# Patient Record
Sex: FEMALE | Race: WHITE | NOT HISPANIC OR LATINO | Employment: OTHER | URBAN - METROPOLITAN AREA
[De-identification: names, ages, dates, MRNs, and addresses within clinical notes are randomized per-mention and may not be internally consistent; named-entity substitution may affect disease eponyms.]

---

## 2017-03-14 DIAGNOSIS — N18.30 CKD (CHRONIC KIDNEY DISEASE) STAGE 3, GFR 30-59 ML/MIN (H): ICD-10-CM

## 2017-03-14 NOTE — TELEPHONE ENCOUNTER
Medication Detail      Disp Refills Start End MANASA   lisinopril (PRINIVIL/ZESTRIL) 10 MG tablet 90 tablet 0 12/13/2016  No   Sig: Take 1 tablet (10 mg) by mouth daily          Last Office Visit with G, P or [facility] prescribing provider: 12/13/16       Potassium   Date Value Ref Range Status   12/13/2016 4.7 3.4 - 5.3 mmol/L Final     Creatinine   Date Value Ref Range Status   12/13/2016 1.10 (H) 0.52 - 1.04 mg/dL Final     BP Readings from Last 3 Encounters:   12/13/16 130/68   09/06/16 167/77   08/16/16 183/72

## 2017-03-15 RX ORDER — LISINOPRIL 10 MG/1
10 TABLET ORAL DAILY
Qty: 90 TABLET | Refills: 0 | Status: SHIPPED | OUTPATIENT
Start: 2017-03-15 | End: 2017-06-10

## 2017-03-15 NOTE — TELEPHONE ENCOUNTER
Routing refill request to provider for review/approval because:  Labs out of range:  creatinine    Routing to PCP.    Dr. Dickerson-Please sign if agree.    Thank you!  MEGHAN Ruiz, CRISTIANN, RN

## 2017-03-15 NOTE — TELEPHONE ENCOUNTER
Medication Detail      Disp Refills Start End MANASA   lisinopril (PRINIVIL/ZESTRIL) 10 MG tablet 90 tablet 0 12/13/2016  No   Sig: Take 1 tablet (10 mg) by mouth daily     Last Office Visit with G, P or [facility] prescribing provider: 12/13/16       Potassium   Date Value Ref Range Status   12/13/2016 4.7 3.4 - 5.3 mmol/L Final     Creatinine   Date Value Ref Range Status   12/13/2016 1.10 (H) 0.52 - 1.04 mg/dL Final     BP Readings from Last 3 Encounters:   12/13/16 130/68   09/06/16 167/77   08/16/16 183/72

## 2017-04-10 ENCOUNTER — TELEPHONE (OUTPATIENT)
Dept: FAMILY MEDICINE | Facility: CLINIC | Age: 82
End: 2017-04-10

## 2017-04-10 NOTE — TELEPHONE ENCOUNTER
Pt c/o bursitis pain in arm. Bicep area.  Wanting to know exercises to help it.  Neck pain with reading and sitting.  Swelling in neck.    Pt made appt with pcp for issue.  KIM Sanford

## 2017-06-10 DIAGNOSIS — N18.30 CKD (CHRONIC KIDNEY DISEASE) STAGE 3, GFR 30-59 ML/MIN (H): ICD-10-CM

## 2017-06-10 NOTE — TELEPHONE ENCOUNTER
Medication Detail      Disp Refills Start End MANASA   lisinopril (PRINIVIL/ZESTRIL) 10 MG tablet 90 tablet 0 3/15/2017  No   Sig: Take 1 tablet (10 mg) by mouth daily   Class: E-Prescribe   Notes to Pharmacy: ++DUE FOR FOLLOW UP IN June 2017++   Route: Oral   Order: [id number]       Last Office Visit with [facility] or [facility] prescribing provider: 12/13/2016       Potassium   Date Value Ref Range Status   12/13/2016 4.7 3.4 - 5.3 mmol/L Final     Creatinine   Date Value Ref Range Status   12/13/2016 1.10 (H) 0.52 - 1.04 mg/dL Final     BP Readings from Last 3 Encounters:   12/13/16 130/68   09/06/16 167/77   08/16/16 183/72

## 2017-06-13 RX ORDER — LISINOPRIL 10 MG/1
TABLET ORAL
Qty: 30 TABLET | Refills: 0 | Status: SHIPPED | OUTPATIENT
Start: 2017-06-13 | End: 2017-07-12

## 2017-06-13 NOTE — TELEPHONE ENCOUNTER
Elevated creatinine  Last refill for #90 had reminder that patient was to be seen in June  No appointment has been scheduled  1 month refill pended and message added to make appointment.  Shanae Noyola RN

## 2017-11-29 ENCOUNTER — OFFICE VISIT (OUTPATIENT)
Dept: FAMILY MEDICINE | Facility: CLINIC | Age: 82
End: 2017-11-29
Payer: MEDICARE

## 2017-11-29 VITALS
OXYGEN SATURATION: 99 % | SYSTOLIC BLOOD PRESSURE: 148 MMHG | TEMPERATURE: 97.8 F | WEIGHT: 168.5 LBS | DIASTOLIC BLOOD PRESSURE: 74 MMHG | BODY MASS INDEX: 30.82 KG/M2 | HEART RATE: 78 BPM

## 2017-11-29 DIAGNOSIS — B02.9 HERPES ZOSTER WITHOUT COMPLICATION: Primary | ICD-10-CM

## 2017-11-29 PROCEDURE — 99214 OFFICE O/P EST MOD 30 MIN: CPT | Performed by: PHYSICIAN ASSISTANT

## 2017-11-29 RX ORDER — ACYCLOVIR 800 MG/1
800 TABLET ORAL
Qty: 35 TABLET | Refills: 0 | Status: SHIPPED | OUTPATIENT
Start: 2017-11-29 | End: 2018-05-10

## 2017-11-29 NOTE — PATIENT INSTRUCTIONS
Shingles  Shingles is a viral infection caused by the same virus as chicken pox. Anyone who has had chicken pox may get shingles later in life. The virus stays in the body, but remains dormant (asleep). Shingles often occurs in older persons or persons with lowered immunity. But it can affect anyone at any age.  Shingles starts as a tingling patch of skin on one side of the body. Small, painful blisters may then appear. The rash does not spread to the rest of the body.  Exposure to shingles cannot cause shingles. However, it can cause chicken pox in anyone who has not had chicken pox or has not been vaccinated. The contagious period ends when all blisters have crusted over (generally about 2 weeks after the illness begins).  After the blisters heal, the affected skin may be sensitive or painful for months (neuralgia). This often gradually goes away.  A shingles vaccine is available. This can help prevent shingles or make it less painful. It is generally recommended for adults over the age of 60 who have had chicken pox in the past, but who have never had shingles. Adults over 60 who have had neither chicken pox nor shingles can prevent both diseases with the chicken pox vaccine. Ask your healthcare provider about these vaccines.  Home care    Medicines may be prescribed to help relieve pain. Take these medicines as directed. Ask your healthcare provider or pharmacist before using over-the-counter medicines for helping treat pain and itching.    In certain cases, antiviral medicines may be prescribed to reduce pain, shorten the illness, and prevent neuralgia. Take these medicines as directed.    Compresses made from a solution of cool water mixed with cornstarch or baking soda may help relieve pain and itching.     Gently wash skin daily with soap and water to help prevent infection.  Be certain to rinse off all of the soap, which can be irritating.    Trim fingernails and try not to scratch. Scratching the sores  may leave scars.    Stay home from work or school until all blisters have formed a crust and you are no longer contagious.  Follow-up care  Follow up with your healthcare provider or as directed by our staff.  When to seek medical advice    Fever of 100.4 F (38 C) or higher, or as directed by your healthcare provider    Affected skin is on the face or neck and any of the following occur:    Headache    Eye pain    Changes in vision    Sores near the eye    Weakness of facial muscles    Pain, redness, or swelling of a joint    Signs of skin infection: colored drainage from the sores, warmth, increasing redness, or increasing pain  Date Last Reviewed: 9/25/2015 2000-2017 [employer]. [address] [place] [address], [place] [address]. All rights reserved. This information is not intended as a substitute for professional medical care. Always follow your healthcare professional's instructions.

## 2017-11-29 NOTE — PROGRESS NOTES
"  SUBJECTIVE:   Lara Marc is a 82 year old female who presents to clinic today for the following health issues:      Rash      Duration: one year ago    Description  Location: left leg inner thigh, began one year ago as a little rash and has gotten much worse over time.  Itching: moderate    Intensity:  moderate    Accompanying signs and symptoms: little water blisters    History (similar episodes/previous evaluation): None    Precipitating or alleviating factors:  SHE THINKS SHE IS ALLERGIC TO SOMETHING BELEAVES IT COULD BE A SIDE EFFECT OF LISINOPRIL MEDICATION    Therapies tried and outcome: different over the counter ointments nothing prescription.          {additional problems for provider to add:035766}    Problem list and histories reviewed & adjusted, as indicated.  Additional history: {NONE - AS DOCUMENTED:373197::\"as documented\"}    {HIST REVIEW/ LINKS 2:471935}    Reviewed and updated as needed this visit by clinical staff     Reviewed and updated as needed this visit by Provider         {PROVIDER CHARTING PREFERENCE:868610}  "

## 2017-11-29 NOTE — MR AVS SNAPSHOT
After Visit Summary   11/29/2017    Lara Marc    MRN: [id number]           Patient Information     Date Of Birth          1935        Visit Information        Provider Department      11/29/2017 4:00 PM Julianna Fried PA-C [facility]        Today's Diagnoses     Herpes zoster without complication    -  1      Care Instructions      Shingles  Shingles is a viral infection caused by the same virus as chicken pox. Anyone who has had chicken pox may get shingles later in life. The virus stays in the body, but remains dormant (asleep). Shingles often occurs in older persons or persons with lowered immunity. But it can affect anyone at any age.  Shingles starts as a tingling patch of skin on one side of the body. Small, painful blisters may then appear. The rash does not spread to the rest of the body.  Exposure to shingles cannot cause shingles. However, it can cause chicken pox in anyone who has not had chicken pox or has not been vaccinated. The contagious period ends when all blisters have crusted over (generally about 2 weeks after the illness begins).  After the blisters heal, the affected skin may be sensitive or painful for months (neuralgia). This often gradually goes away.  A shingles vaccine is available. This can help prevent shingles or make it less painful. It is generally recommended for adults over the age of 60 who have had chicken pox in the past, but who have never had shingles. Adults over 60 who have had neither chicken pox nor shingles can prevent both diseases with the chicken pox vaccine. Ask your healthcare provider about these vaccines.  Home care    Medicines may be prescribed to help relieve pain. Take these medicines as directed. Ask your healthcare provider or pharmacist before using over-the-counter medicines for helping treat pain and itching.    In certain cases, antiviral medicines may be prescribed to reduce pain, shorten the illness, and  prevent neuralgia. Take these medicines as directed.    Compresses made from a solution of cool water mixed with cornstarch or baking soda may help relieve pain and itching.     Gently wash skin daily with soap and water to help prevent infection.  Be certain to rinse off all of the soap, which can be irritating.    Trim fingernails and try not to scratch. Scratching the sores may leave scars.    Stay home from work or school until all blisters have formed a crust and you are no longer contagious.  Follow-up care  Follow up with your healthcare provider or as directed by our staff.  When to seek medical advice    Fever of 100.4 F (38 C) or higher, or as directed by your healthcare provider    Affected skin is on the face or neck and any of the following occur:    Headache    Eye pain    Changes in vision    Sores near the eye    Weakness of facial muscles    Pain, redness, or swelling of a joint    Signs of skin infection: colored drainage from the sores, warmth, increasing redness, or increasing pain  Date Last Reviewed: 9/25/2015 2000-2017 [employer]. [address] [place] [address]. All rights reserved. This information is not intended as a substitute for professional medical care. Always follow your healthcare professional's instructions.                Follow-ups after your visit        Additional Services     DERMATOLOGY REFERRAL       Your provider has referred you to:   [facility]: [facility] [phone]   [email or web address]    Please be aware that coverage of these services is subject to the terms and limitations of your health insurance plan.  Call member services at your health plan with any benefit or coverage questions.      Please bring the following with you to your appointment:    (1) Any X-Rays, CTs or MRIs which have been performed.  Contact the facility where they were done to arrange for  prior to your  "scheduled appointment.    (2) List of current medications  (3) This referral request   (4) Any documents/labs given to you for this referral                  Who to contact     If you have questions or need follow up information about today's clinic visit or your schedule please contact [facility] directly at [phone].  Normal or non-critical lab and imaging results will be communicated to you by MyChart, letter or phone within 4 business days after the clinic has received the results. If you do not hear from us within 7 days, please contact the clinic through [facility] or phone. If you have a critical or abnormal lab result, we will notify you by phone as soon as possible.  Submit refill requests through [employer] or call your pharmacy and they will forward the refill request to us. Please allow 3 business days for your refill to be completed.          Additional Information About Your Visit        MyChart Information     [employer] lets you send messages to your doctor, view your test results, renew your prescriptions, schedule appointments and more. To sign up, go to [email or web address] . Click on \"Log in\" on the left side of the screen, which will take you to the Welcome page. Then click on \"Sign up Now\" on the right side of the page.     You will be asked to enter the access code listed below, as well as some personal information. Please follow the directions to create your username and password.     Your access code is: [email or web address]  Expires: 2018  4:38 PM     Your access code will  in 90 days. If you need help or a new code, please call your [facility] clinic or [phone].        Care EveryWhere ID     This is your Care EveryWhere ID. This could be used by other organizations to access your [facility] medical records  [id number]        Your Vitals Were     Pulse Temperature Pulse Oximetry Breastfeeding? BMI (Body Mass Index)       78 97.8  F (36.6  C) (Axillary) 99% No 30.82 kg/m2  "       Blood Pressure from Last 3 Encounters:   11/29/17 148/74   12/13/16 130/68   09/06/16 167/77    Weight from Last 3 Encounters:   11/29/17 168 lb 8 oz (76.4 kg)   12/13/16 172 lb (78 kg)   09/06/16 172 lb (78 kg)              We Performed the Following     DERMATOLOGY REFERRAL          Today's Medication Changes          These changes are accurate as of: 11/29/17  4:38 PM.  If you have any questions, ask your nurse or doctor.               Start taking these medicines.        Dose/Directions    acyclovir 800 MG tablet   Commonly known as:  ZOVIRAX   Used for:  Herpes zoster without complication        Dose:  800 mg   Take 1 tablet (800 mg) by mouth 5 times daily for 7 days   Quantity:  35 tablet   Refills:  0            Where to get your medicines      These medications were sent to [facility] [address] [place] [address] [address]     Phone:  [phone]     acyclovir 800 MG tablet                Primary Care Provider Office Phone # Fax #    Yecenia Dickerson [phone] [address] [facility] [address]        Equal Access to Services     ALEXEI ZUÑIGA : Hadii lam [place]o Sotim, waaxda luqadaha, qaybta kaalmada adesantosh, raffi de la vega . So [facility] [phone].    ATENCIÓN: Si habla español, tiene a mckeon disposición servicios gratuitos de asistencia lingüística. Llame al [phone].    We comply with applicable federal civil rights laws and [place] laws. We do not discriminate on the basis of race, color, national origin, age, disability, sex, sexual orientation, or gender identity.            Thank you!     Thank you for choosing [facility]  for your care. Our goal is always to provide you with excellent care. Hearing back from our patients is one way we can continue to improve our services. Please take a few minutes to complete the written survey that you may receive in the mail after  your visit with us. Thank you!             Your Updated Medication List - Protect others around you: Learn how to safely use, store and throw away your medicines at www.disposemymeds.org.          This list is accurate as of: 11/29/17  4:38 PM.  Always use your most recent med list.                   Brand Name Dispense Instructions for use Diagnosis    acyclovir 800 MG tablet    ZOVIRAX    35 tablet    Take 1 tablet (800 mg) by mouth 5 times daily for 7 days    Herpes zoster without complication       lisinopril 10 MG tablet    PRINIVIL/ZESTRIL    90 tablet    TAKE 1 TABLET (10 MG) BY MOUTH DAILY. MAKE APPOINTMENT FOR FUTHER REFILLS    CKD (chronic kidney disease) stage 3, GFR 30-59 ml/min       MULTIVITAMIN PO      Take 1 tablet by mouth daily.        VITAMIN D3 PO      Take by mouth daily

## 2017-12-02 ASSESSMENT — ENCOUNTER SYMPTOMS
SHORTNESS OF BREATH: 0
NAUSEA: 0
ABDOMINAL PAIN: 0
DIARRHEA: 0
FOCAL WEAKNESS: 0
VOMITING: 0
FEVER: 0
CHILLS: 0
HEADACHES: 0

## 2017-12-02 NOTE — PROGRESS NOTES
"HPI    SUBJECTIVE:   Lara Marc is a 82 year old female who presents to clinic today for the following health issues:    Rash      Duration: one year ago    Description  Location: left leg inner thigh, began one year ago as a little rash and has gotten much worse over time.  Itching: moderate    Intensity:  moderate    Accompanying signs and symptoms: little water blisters    History (similar episodes/previous evaluation): None    Precipitating or alleviating factors: None    Therapies tried and outcome: different over the counter ointments     Pt reports she had small red dots and what looked like dry skin to her L inner thigh for the past year. And for the past week the rash has changed and become large red patches with \"water blisters\". Rash is slightly painful and itchy. No new soaps, lotions, or other products.     Additional complaints: None    Chart Review:  PHQ-9 SCORE 10/18/2011   Total Score 17     No flowsheet data found.    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     C VAGINAL HYSTERECTOMY  9/15/06    TVH/BSO/A&P repair/sacrospinus ligament fixation......childbirth x 9     childbirth X9[       CLOSED RX RIB FRACTURE  7/06    left     DAVINCI SACROCOLPOPEXY, MIDURETHRAL SLING, CYSTOSCOPY  2/8/2013    Procedure: DAVINCI SACROCOLPOPEXY, MIDURETHRAL SLING, CYSTOSCOPY;  DAVINCI SACROCOLPOPEXY, TVT EXACT SLING, RIGHT SALPINGO-OOPHERECTOMY, CYSTOSCOPY;  Surgeon: Bennie Galdamez MD;  " Location:  OR     HC COLONOSCOPY THRU STOMA, DIAGNOSTIC  7/2005    diverticulosis,small polyp,recheck 5 yrs     LAPAROSCOPIC CHOLECYSTECTOMY  3/31/2011    Procedure:LAPAROSCOPIC CHOLECYSTECTOMY; Surgeon:DAVID MOLINA; Location:[facility] OR     Family History   Problem Relation Age of Onset     HEART DISEASE Father      Cancer - colorectal Brother      Prostate Cancer Brother      DIABETES Sister      HEART DISEASE Sister       Social History   Substance Use Topics     Smoking status: Former Smoker     Packs/day: 0.00     Years: 17.00     Smokeless tobacco: Never Used      Comment: quit when she was 29 years old     Alcohol use No        Problem list, Medication list, Allergies, Medical/Social/Surg hx reviewed in [facility], updated as appropriate.      Review of Systems   Constitutional: Negative for chills and fever.   Respiratory: Negative for shortness of breath.    Cardiovascular: Negative for chest pain.   Gastrointestinal: Negative for abdominal pain, diarrhea, nausea and vomiting.   Skin: Positive for rash.   Neurological: Negative for focal weakness and headaches.   All other systems reviewed and are negative.        Physical Exam   Constitutional: She is oriented to person, place, and time and well-developed, well-nourished, and in no distress.   HENT:   Head: Normocephalic and atraumatic.   Cardiovascular: Normal rate, regular rhythm and normal heart sounds.    Pulmonary/Chest: Effort normal and breath sounds normal.   Musculoskeletal: Normal range of motion.   Neurological: She is alert and oriented to person, place, and time. Gait normal.   Skin: Skin is warm and dry. Rash noted.        Nursing note and vitals reviewed.    Vital Signs  /74  Pulse 78  Temp 97.8  F (36.6  C) (Axillary)  Wt 168 lb 8 oz (76.4 kg)  SpO2 99%  Breastfeeding? No  BMI 30.82 kg/m2   Body mass index is 30.82 kg/(m^2).    Diagnostic Test Results:  none     ASSESSMENT/PLAN:                                                         ICD-10-CM    1. Herpes zoster without complication B02.9 acyclovir (ZOVIRAX) 800 MG tablet     DERMATOLOGY REFERRAL     Rash over the past week c/w herpes zoster. Unsure what rash has been present for the past year but it sounds like it may be unrelated to new development of shingles. Due to atypical presentation, referred to dermatology. Rx acyclovir.    I have discussed any lab or imaging results, the patient's diagnosis, and my plan of treatment with the patient and/or family. Patient is aware to come back in if with worsening symptoms or if no relief despite treatment plan.  Patient voiced understanding and had no further questions.       Follow Up: Data Unavailable    DEBORA SosaA, PA-C  [facility]

## 2017-12-21 DIAGNOSIS — N18.30 CKD (CHRONIC KIDNEY DISEASE) STAGE 3, GFR 30-59 ML/MIN (H): ICD-10-CM

## 2017-12-22 NOTE — TELEPHONE ENCOUNTER
Requested Prescriptions   Pending Prescriptions Disp Refills     lisinopril (PRINIVIL/ZESTRIL) 10 MG tablet [Pharmacy Med Name: LISINOPRIL 10 MG TABLET]  Last Written Prescription Date:  7/13/2017  Last Fill Quantity: 90 tabs,  # refills: 1   Last Office Visit with [facility], [facility] or [facility] prescribing provider:  11/29/2017     Future Office Visit:      90 tablet 1     Sig: TAKE 1 TABLET (10 MG) BY MOUTH DAILY. MAKE APPOINTMENT FOR FUTHER REFILLS    ACE Inhibitors (Including Combos) Protocol Failed    12/21/2017  1:00 AM       Failed - Blood pressure under 140/90    BP Readings from Last 3 Encounters:   11/29/17 148/74   12/13/16 130/68   09/06/16 167/77                Failed - Normal serum creatinine on file in past 12 months    Recent Labs   Lab Test  12/13/16   1208   CR  1.10*            Failed - Normal serum potassium on file in past 12 months    Recent Labs   Lab Test  12/13/16   1208   POTASSIUM  4.7            Passed - Recent or future visit with authorizing provider's specialty    Patient had office visit in the last year or has a visit in the next 30 days with authorizing provider.  See chart review.              Passed - Patient is age 18 or older       Passed - No active pregnancy on record       Passed - No positive pregnancy test in past 12 months

## 2017-12-23 RX ORDER — LISINOPRIL 10 MG/1
TABLET ORAL
Qty: 90 TABLET | Refills: 0 | Status: SHIPPED | OUTPATIENT
Start: 2017-12-23 | End: 2018-03-31

## 2018-03-31 DIAGNOSIS — N18.30 CKD (CHRONIC KIDNEY DISEASE) STAGE 3, GFR 30-59 ML/MIN (H): ICD-10-CM

## 2018-04-02 NOTE — TELEPHONE ENCOUNTER
"Requested Prescriptions   Pending Prescriptions Disp Refills     lisinopril (PRINIVIL/ZESTRIL) 10 MG tablet [Pharmacy Med Name: LISINOPRIL 10 MG TABLET]  Last Written Prescription Date:  12-23-17  Last Fill Quantity: 90 tab,  # refills: 0   Last office visit: 11/29/2017 with prescribing provider:  Albin Beatty    Future Office Visit:    90 tablet 0     Sig: TAKE 1 TABLET (10 MG) BY MOUTH DAILY. MAKE APPOINTMENT FOR FUTHER REFILLS    ACE Inhibitors (Including Combos) Protocol Failed    3/31/2018  5:03 PM       Failed - Blood pressure under 140/90 in past 12 months    BP Readings from Last 3 Encounters:   11/29/17 148/74   12/13/16 130/68   09/06/16 167/77          Failed - Normal serum creatinine on file in past 12 months    Recent Labs   Lab Test  12/13/16   1208   CR  1.10*          Failed - Normal serum potassium on file in past 12 months    Recent Labs   Lab Test  12/13/16   1208   POTASSIUM  4.7          Passed - Recent (12 mo) or future (30 days) visit within the authorizing provider's specialty    Patient had office visit in the last 12 months or has a visit in the next 30 days with authorizing provider or within the authorizing provider's specialty.  See \"Patient Info\" tab in inbasket, or \"Choose Columns\" in Meds & Orders section of the refill encounter.           Passed - Patient is age 18 or older       Passed - No active pregnancy on record       Passed - No positive pregnancy test in past 12 months          "

## 2018-04-05 RX ORDER — LISINOPRIL 10 MG/1
TABLET ORAL
Qty: 30 TABLET | Refills: 0 | Status: SHIPPED | OUTPATIENT
Start: 2018-04-05 | End: 2018-05-02

## 2018-04-05 NOTE — TELEPHONE ENCOUNTER
Medication is being filled for one month only due to: Patient needs to be seen because it has been more than one year since last visit.    Please call her to make an appointment  Thanks!     Daniella Hendrickson RN

## 2018-05-02 DIAGNOSIS — Z13.6 CARDIOVASCULAR SCREENING; LDL GOAL LESS THAN 130: Primary | ICD-10-CM

## 2018-05-02 DIAGNOSIS — N18.30 CKD (CHRONIC KIDNEY DISEASE) STAGE 3, GFR 30-59 ML/MIN (H): ICD-10-CM

## 2018-05-02 NOTE — TELEPHONE ENCOUNTER
"Requested Prescriptions   Pending Prescriptions Disp Refills     lisinopril (PRINIVIL/ZESTRIL) 10 MG tablet [Pharmacy Med Name: LISINOPRIL 10 MG TABLET]  Last Written Prescription Date:  11-29-17  Last Fill Quantity: 30 tab,  # refills: 0   Last office visit: 11/29/2017 with prescribing provider:  Albin Beatty  Future Office Visit:   Next 5 appointments (look out 90 days)     May 17, 2018  9:20 AM CDT   [facility] Dickerson [facility]    [address] [place] [id number] [phone]               30 tablet 0     Sig: TAKE 1 TABLET BY MOUTH EVERY DAY *NEED APPOINTMENT FOR MORE REFILLS    ACE Inhibitors (Including Combos) Protocol Failed    5/2/2018  1:33 AM       Failed - Blood pressure under 140/90 in past 12 months    BP Readings from Last 3 Encounters:   11/29/17 148/74   12/13/16 130/68   09/06/16 167/77          Failed - Normal serum creatinine on file in past 12 months    Recent Labs   Lab Test  12/13/16   1208   CR  1.10*          Failed - Normal serum potassium on file in past 12 months    Recent Labs   Lab Test  12/13/16   1208   POTASSIUM  4.7          Passed - Recent (12 mo) or future (30 days) visit within the authorizing provider's specialty    Patient had office visit in the last 12 months or has a visit in the next 30 days with authorizing provider or within the authorizing provider's specialty.  See \"Patient Info\" tab in inbasket, or \"Choose Columns\" in Meds & Orders section of the refill encounter.           Passed - Patient is age 18 or older       Passed - No active pregnancy on record       Passed - No positive pregnancy test in past 12 months          "

## 2018-05-07 RX ORDER — LISINOPRIL 10 MG/1
TABLET ORAL
Qty: 30 TABLET | Refills: 0 | Status: SHIPPED | OUTPATIENT
Start: 2018-05-07 | End: 2018-05-10

## 2018-05-07 NOTE — TELEPHONE ENCOUNTER
BP above range  Creatinine above range and last done 2016  Last refill had note for patient to be seen for further refills  No appointment has been scheduled   Pended labs per [facility]  Routing to provider per protocol. (PCP and Provider listed on refill, Rogreio)  Routing to scheduling as well  Shanae Noyola RN

## 2018-05-09 NOTE — PROGRESS NOTES
"  SUBJECTIVE:   Lara Marc is a 82 year old female who presents to clinic today for the following health issues:      Hyperlipidemia Follow-Up    Rate your low fat/cholesterol diet?: poor    Taking statin?  No    Other lipid medications/supplements?:  none    Chronic Kidney Disease Follow-up    Current NSAID use?  Yes:   Aspirin  Frequency: 2/daily - due to arthritis    Amount of exercise or physical activity: None - has been having more weakness    Problems taking medications regularly: No    Medication side effects: not sure but now has a rash on left thigh and upper right side of back    Diet: regular (no restrictions)      Patient reports general weakness over the years and sometimes that her balance is off; she worries about falling. She gets short of breath with activity. This is not new. It seems to be generally increasing over time and she admits she does not get much physical activity. Denies exertional chest pain.  She complains of her right back and right shoulder pain as if she pulled a muscle.     She has a itchy rash on her back and thighs that has been present for 9 months to a year that has waxed and waned over that period of time that is possibly spreading. She notes that it starts out as a blistering look, but it then does not appear to be blistering.  She was told it was shingles and given medicine ,but she did not take it as she couldn't afford it and she didn't think it was shingles.      Problem list and histories reviewed & adjusted, as indicated.  Additional history: as documented    12/13/16 Office Visit HPI:  \"Hypertension Follow-up   Outpatient blood pressures are not being checked.    Low Salt Diet: not monitoring salt      Chronic Kidney Disease Follow-up  Current NSAID use?  Yes:   ibuprofen (Motrin)  Frequency: 2 tablets in the morning, 1 Ibuprofen PM at night     Amount of exercise or physical activity: 6-7 days/week for an average of 15-30 minutes    Problems taking medications " "regularly: No    Medication side effects: none    Diet: regular (no restrictions)     She reports some pain in her knee, which she thinks is arthritis. She has been taking 2 ibuprofen every morning, and 1 at night. She has no complaint of chest pain or shortness of breath.\"    12/13/16 Office Visit A&P:  \"1. Hypertension goal BP (blood pressure) < 140/90  Controlled. Continue lisinopril, which she is tolerating well.  - Basic metabolic panel  2. CKD (chronic kidney disease) stage 3, GFR 30-59 ml/min  Stable. BMP today.  - lisinopril (PRINIVIL/ZESTRIL) 10 MG tablet; Take 1 tablet (10 mg) by mouth daily  Dispense: 90 tablet; Refill: 0  3. CARDIOVASCULAR SCREENING; LDL GOAL LESS THAN 130  Note -- she is nonfasting today, but overdue for lipids.  - Lipid Profile with reflex to direct LDL  4. Left knee pain  Improving over time. She declines referral to sports medicine today, but will call if she changes her mind.      She mentioned that she has been taking ibuprofen PM for sleep lately. I recommended against this and suggested she try melatonin 3mg nightly for sleep instead.      Patient declined a flu shot and pneumonia shot today but will consider these for the future. 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     C VAGINAL HYSTERECTOMY  9/15/06    TVH/BSO/A&P repair/sacrospinus ligament fixation......childbirth x 9     childbirth X9[       CLOSED RX RIB FRACTURE  7/06    " left     DAVINCI SACROCOLPOPEXY, MIDURETHRAL SLING, CYSTOSCOPY  2/8/2013    Procedure: DAVINCI SACROCOLPOPEXY, MIDURETHRAL SLING, CYSTOSCOPY;  DAVINCI SACROCOLPOPEXY, TVT EXACT SLING, RIGHT SALPINGO-OOPHERECTOMY, CYSTOSCOPY;  Surgeon: Bennie Galdamez MD;  Location:  OR      COLONOSCOPY THRU STOMA, DIAGNOSTIC  7/2005    diverticulosis,small polyp,recheck 5 yrs     LAPAROSCOPIC CHOLECYSTECTOMY  3/31/2011    Procedure:LAPAROSCOPIC CHOLECYSTECTOMY; Surgeon:DAVID MOLINA; Location: OR       Social History   Substance Use Topics     Smoking status: Former Smoker     Packs/day: 0.00     Years: 17.00     Smokeless tobacco: Never Used      Comment: quit when she was 29 years old     Alcohol use No     Family History   Problem Relation Age of Onset     HEART DISEASE Father      Cancer - colorectal Brother      Prostate Cancer Brother      DIABETES Sister      HEART DISEASE Sister          Current Outpatient Prescriptions   Medication Sig Dispense Refill     Cholecalciferol (VITAMIN D3 PO) Take by mouth daily       lisinopril (PRINIVIL/ZESTRIL) 10 MG tablet Take 1 tablet (10 mg) by mouth daily 90 tablet 3     Multiple Vitamins-Minerals (MULTIVITAMIN OR) Take 1 tablet by mouth daily.       [DISCONTINUED] lisinopril (PRINIVIL/ZESTRIL) 10 MG tablet TAKE 1 TABLET BY MOUTH EVERY DAY *NEED APPOINTMENT FOR MORE REFILLS 30 tablet 0     Allergies   Allergen Reactions     Nkda [No Known Drug Allergies]      Seasonal Allergies      Itchy eyes and watery eyes     Recent Labs   Lab Test  12/13/16   1208  06/09/16   0528  06/08/16 2027  06/08/16   0913  08/18/15   1204   09/18/13   1840  09/18/13   1130  02/05/13   1123   LDL  109*   --    --    --   102   --   114   --    --    HDL  33*   --    --    --   30*   --   36*   --    --    TRIG  183*   --    --    --   159*   --   109   --    --    ALT   --    --    --   32   --    --    --   26  32   CR  1.10*  1.16*  1.37*  1.23*  1.06*   < >   --   0.98  1.04   GFRESTIMATED  48*  " 45*  37*  42*  50*   < >   --   55*  51*   GFRESTBLACK  58*  54*  45*  51*  60*   < >   --   66  62   POTASSIUM  4.7  4.1  4.0  4.1  4.5   < >   --   4.2  4.6   TSH   --    --   2.21   --    --    --    --    --    --     < > = values in this interval not displayed.      BP Readings from Last 3 Encounters:   05/10/18 150/79   11/29/17 148/74   12/13/16 130/68    Wt Readings from Last 3 Encounters:   05/10/18 169 lb 4 oz (76.8 kg)   11/29/17 168 lb 8 oz (76.4 kg)   12/13/16 172 lb (78 kg)        Reviewed and updated as needed this visit by clinical staff  Tobacco  Allergies  Meds  Med Hx  Surg Hx  Fam Hx  Soc Hx      Reviewed and updated as needed this visit by Provider       ROS:  See above    This document serves as a record of the services and decisions personally performed and made by Yecenia Dickerson MD. It was created on his/her behalf by Madelyn Anderson, trained medical scribe. The creation of this document is based the provider's statements to the medical scribes.    Scribfili Anderson, May 10, 2018  OBJECTIVE:     /79  Pulse 76  Temp 98.5  F (36.9  C) (Oral)  Resp 16  Ht 5' 1.75\" (1.568 m)  Wt 169 lb 4 oz (76.8 kg)  SpO2 100%  BMI 31.21 kg/m2  Body mass index is 31.21 kg/(m^2).     GENERAL: healthy, alert and no distress  RESP: lungs clear to auscultation - no rales, rhonchi or wheezes  CV: regular rate and rhythm, normal S1 S2, 3/6 systolic murmur, no peripheral edema  SKIN: no suspicious lesions or rashes  NEURO: Normal strength and tone, mentation intact and speech normal  Skin: there are erythematous papules on the right shoulder and left anterior thigh, many appear excoriated, no vesicles.   PSYCH: mentation appears normal, affect normal/bright    Diagnostic Test Results:  Results for orders placed or performed in visit on 05/10/18 (from the past 24 hour(s))   Hemoglobin   Result Value Ref Range    Hemoglobin 12.0 11.7 - 15.7 g/dL       ASSESSMENT/PLAN:     1. Hypertension goal BP " (blood pressure) < 150/90  Elevated initially today, which pt feels is because the cuff is painful, will recheck. Discussed increasing medication. She has some orthostatic symptoms when arising from bed. I am concerned about being too aggressive with her medication. See pt instructions. Continue lisinopril 10mg daily for now and could increase to 20mg daily if elevated on MA bp recheck.   She will follow up with me in 6 months.   - BASIC METABOLIC PANEL    2. CKD (chronic kidney disease) stage 3, GFR 30-59 ml/min   stable. Due for recheck   - lisinopril (PRINIVIL/ZESTRIL) 10 MG tablet; Take 1 tablet (10 mg) by mouth daily  Dispense: 90 tablet; Refill: 3  - Albumin Random Urine Quantitative with Creat Ratio  - Hemoglobin    3. Rash   x 9 months per pt, intermittent appearance. Previous thought was possibly shingles, and she reports history of vesicles though none now. Atypical for shingles. Referred to derm.   - DERMATOLOGY REFERRAL    4. Shoulder pain, unspecified chronicity, unspecified laterality   right shoulder pain for some time, unclear how long. She prefers to wait on further evaluation as it is mild and intermittent. I gave referral if needed  - ORTHO  REFERRAL    5. CARDIOVASCULAR SCREENING; LDL GOAL LESS THAN 130     - Lipid panel reflex to direct LDL Fasting    Patient Instructions   1) I recommend tylenol (acetaminophen) for pain.   2) Schedule with dermatology for your rash   3) Schedule with sports medicine for your shoulder/neck pain.   4) We will recheck your blood pressure today. Goal is less than 150/90. If still high, please return for a free blood pressure check on another day. If still high, we should adjust your medication.       The information in this document, created by the medical scribe for me, accurately reflects the services I personally performed and the decisions made by me. I have reviewed and approved this document for accuracy. 05/10/18    Yecenia Dickerson MD  [place]  [facility]

## 2018-05-10 ENCOUNTER — OFFICE VISIT (OUTPATIENT)
Dept: FAMILY MEDICINE | Facility: CLINIC | Age: 83
End: 2018-05-10
Payer: MEDICARE

## 2018-05-10 VITALS
TEMPERATURE: 98.5 F | DIASTOLIC BLOOD PRESSURE: 79 MMHG | OXYGEN SATURATION: 100 % | WEIGHT: 169.25 LBS | SYSTOLIC BLOOD PRESSURE: 150 MMHG | RESPIRATION RATE: 16 BRPM | HEIGHT: 62 IN | BODY MASS INDEX: 31.15 KG/M2 | HEART RATE: 76 BPM

## 2018-05-10 DIAGNOSIS — I10 HYPERTENSION GOAL BP (BLOOD PRESSURE) < 140/90: Primary | ICD-10-CM

## 2018-05-10 DIAGNOSIS — M25.519 SHOULDER PAIN, UNSPECIFIED CHRONICITY, UNSPECIFIED LATERALITY: ICD-10-CM

## 2018-05-10 DIAGNOSIS — N18.30 CKD (CHRONIC KIDNEY DISEASE) STAGE 3, GFR 30-59 ML/MIN (H): ICD-10-CM

## 2018-05-10 DIAGNOSIS — Z13.6 CARDIOVASCULAR SCREENING; LDL GOAL LESS THAN 130: ICD-10-CM

## 2018-05-10 DIAGNOSIS — R21 RASH: ICD-10-CM

## 2018-05-10 LAB
ANION GAP SERPL CALCULATED.3IONS-SCNC: 7 MMOL/L (ref 3–14)
BUN SERPL-MCNC: 21 MG/DL (ref 7–30)
CALCIUM SERPL-MCNC: 9.2 MG/DL (ref 8.5–10.1)
CHLORIDE SERPL-SCNC: 111 MMOL/L (ref 94–109)
CHOLEST SERPL-MCNC: 207 MG/DL
CO2 SERPL-SCNC: 25 MMOL/L (ref 20–32)
CREAT SERPL-MCNC: 1.11 MG/DL (ref 0.52–1.04)
CREAT UR-MCNC: 103 MG/DL
GFR SERPL CREATININE-BSD FRML MDRD: 47 ML/MIN/1.7M2
GLUCOSE SERPL-MCNC: 97 MG/DL (ref 70–99)
HDLC SERPL-MCNC: 38 MG/DL
HGB BLD-MCNC: 12 G/DL (ref 11.7–15.7)
LDLC SERPL CALC-MCNC: 143 MG/DL
MICROALBUMIN UR-MCNC: 12 MG/L
MICROALBUMIN/CREAT UR: 11.65 MG/G CR (ref 0–25)
NONHDLC SERPL-MCNC: 169 MG/DL
POTASSIUM SERPL-SCNC: 4.9 MMOL/L (ref 3.4–5.3)
SODIUM SERPL-SCNC: 143 MMOL/L (ref 133–144)
TRIGL SERPL-MCNC: 128 MG/DL

## 2018-05-10 PROCEDURE — 80048 BASIC METABOLIC PNL TOTAL CA: CPT | Performed by: FAMILY MEDICINE

## 2018-05-10 PROCEDURE — 80061 LIPID PANEL: CPT | Performed by: FAMILY MEDICINE

## 2018-05-10 PROCEDURE — 99214 OFFICE O/P EST MOD 30 MIN: CPT | Performed by: FAMILY MEDICINE

## 2018-05-10 PROCEDURE — 85018 HEMOGLOBIN: CPT | Performed by: FAMILY MEDICINE

## 2018-05-10 PROCEDURE — 36415 COLL VENOUS BLD VENIPUNCTURE: CPT | Performed by: FAMILY MEDICINE

## 2018-05-10 PROCEDURE — 82043 UR ALBUMIN QUANTITATIVE: CPT | Performed by: FAMILY MEDICINE

## 2018-05-10 RX ORDER — LISINOPRIL 10 MG/1
10 TABLET ORAL DAILY
Qty: 90 TABLET | Refills: 3 | Status: SHIPPED | OUTPATIENT
Start: 2018-05-10 | End: 2019-05-25

## 2018-05-10 NOTE — PATIENT INSTRUCTIONS
1) I recommend tylenol (acetaminophen) for pain.   2) Schedule with dermatology for your rash   3) Schedule with sports medicine for your shoulder/neck pain.   4) We will recheck your blood pressure today. Goal is less than 150/90. If still high, please return for a free blood pressure check on another day. If still high, we should adjust your medication.

## 2018-05-10 NOTE — PROGRESS NOTES
Excellent! Please call or sent a [employer] message if you have any questions. Yecenia Dickerson M.D.

## 2018-05-10 NOTE — LETTER
May 15, 2018      Lara TORRES Monico  [address]  [facility] [address]        Dear ,    We are writing to inform you of your test results.    The results of your recent lipid (cholesterol) profile were abnormal.    Here are the results:  Lab Results       Component                Value               Date                       CHOL                     207                 05/10/2018            Lab Results       Component                Value               Date                       HDL                      38                  05/10/2018            Lab Results       Component                Value               Date                       LDL                      143                 05/10/2018            Lab Results       Component                Value               Date                       TRIG                     128                 05/10/2018            Lab Results       Component                Value               Date                       CHOLHDLRATIO             5.5                 08/18/2015              Desired or goal levels are:  CHOLESTEROL: Desirable is less than 200.   HDL (Good Cholesterol): Desirable is greater than 40 (for men) greater than 50 (for women).  LDL (Bad Cholesterol): Desirable is less than 130 (or less than 100 if you have heart disease or diabetes). Borderline 130-160.  TRIGLYCERIDES: Desirable is less than 150.  Borderline is 150-200.    For high triglycerides, reduce the intake of jam, jelly, honey, alcohol, and/or coffee creamers  To help lower your LDL (bad cholesterol) you could start adding ground flax seed to your diet. The recommended dose is 2 heaping tablespoonfuls daily, you may want to start with 1 tablespoonful and increase to 2 heaping tablespoonfuls. You can mix or add ground flax seed to hot cereals, yogurt, applesauce, cottage cheese or any sauce mixture that is your portion. Ground flax seed can be found in the baking aisle near the flour.      As you may  know, an elevated cholesterol is one factor that increases your risk for heart disease and stroke. You can improve your cholesterol by controlling the amount and type of fat you eat and by increasing your daily activity level.    Here are some ways to improve your nutrition:  Eat less fat (especially butter, Crisco and other saturated fats)  Buy lean cuts of meat, reduce your portions of red meat or substitute poultry or fish  Use skim milk and low-fat dairy products  Eat no more than 4 egg yolks per week  Avoid fried or fast foods that are high in fat  Eat more fruits and vegetables      Also consider starting or increasing your aerobic activity. Aerobic activity is the best way to improve HDL (good) cholesterol. If this would be new to you, please talk with me first about what activities are safe for you.      Other lab results:    Your urine protein test and hemoglobin were normal.    The testing of your blood sugar, kidney function, and electrolytes was stable.      Please feel free to contact us with any questions or if you would like more information.            Resulted Orders   BASIC METABOLIC PANEL   Result Value Ref Range    Sodium 143 133 - 144 mmol/L    Potassium 4.9 3.4 - 5.3 mmol/L    Chloride 111 (H) 94 - 109 mmol/L    Carbon Dioxide 25 20 - 32 mmol/L    Anion Gap 7 3 - 14 mmol/L    Glucose 97 70 - 99 mg/dL      Comment:      Non Fasting    Urea Nitrogen 21 7 - 30 mg/dL    Creatinine 1.11 (H) 0.52 - 1.04 mg/dL    GFR Estimate 47 (L) >60 mL/min/1.7m2      Comment:      Non  GFR Calc    GFR Estimate If Black 57 (L) >60 mL/min/1.7m2      Comment:       GFR Calc    Calcium 9.2 8.5 - 10.1 mg/dL   Lipid panel reflex to direct LDL Fasting   Result Value Ref Range    Cholesterol 207 (H) <200 mg/dL      Comment:      Desirable:       <200 mg/dl    Triglycerides 128 <150 mg/dL      Comment:      Non Fasting    HDL Cholesterol 38 (L) >49 mg/dL    LDL Cholesterol Calculated 143 (H)  <100 mg/dL      Comment:      Above desirable:  100-129 mg/dl  Borderline High:  130-159 mg/dL  High:             160-189 mg/dL  Very high:       >189 mg/dl      Non HDL Cholesterol 169 (H) <130 mg/dL      Comment:      Above Desirable:  130-159 mg/dl  Borderline high:  160-189 mg/dl  High:             190-219 mg/dl  Very high:       >219 mg/dl     Albumin Random Urine Quantitative with Creat Ratio   Result Value Ref Range    Creatinine Urine 103 mg/dL    Albumin Urine mg/L 12 mg/L    Albumin Urine mg/g Cr 11.65 0 - 25 mg/g Cr   Hemoglobin   Result Value Ref Range    Hemoglobin 12.0 11.7 - 15.7 g/dL       If you have any questions or concerns, please call the clinic at the number listed above.       Sincerely,        Yecenia Dickerson MD/nr

## 2018-05-10 NOTE — MR AVS SNAPSHOT
After Visit Summary   5/10/2018    Lara Marc    MRN: [id number]           Patient Information     Date Of Birth          1935        Visit Information        Provider Department      5/10/2018 9:40 AM Yecenia Dickerson MD [facility]        Today's Diagnoses     Hypertension goal BP (blood pressure) < 140/90    -  1    CKD (chronic kidney disease) stage 3, GFR 30-59 ml/min        Rash        Shoulder pain, unspecified chronicity, unspecified laterality        CARDIOVASCULAR SCREENING; LDL GOAL LESS THAN 130          Care Instructions    1) I recommend tylenol (acetaminophen) for pain.   2) Schedule with dermatology for your rash   3) Schedule with sports medicine for your shoulder/neck pain.   4) We will recheck your blood pressure today. Goal is less than 150/90. If still high, please return for a free blood pressure check on another day. If still high, we should adjust your medication.             Follow-ups after your visit        Additional Services     DERMATOLOGY REFERRAL       Your provider has referred you to: [facility]: D[facility]atology Clinic [facility] [phone]   [email or web address]  [facility]: [place] [facility] [phone]  [email or web address]: [facility] [place] [phone]   [email or web address]    Please be aware that coverage of these services is subject to the terms and limitations of your health insurance plan.  Call member services at your health plan with any benefit or coverage questions.      Please bring the following with you to your appointment:    (1) Any X-Rays, CTs or MRIs which have been performed.  Contact the facility where they were done to arrange for  prior to your scheduled appointment.    (2) List of current medications  (3) This referral request   (4) Any documents/labs given to you for this referral            ORTHO  REFERRAL        [facility] is referring you to the Orthopedic  Services at [facility].       The  Representative will assist you in the coordination of your Orthopedic and Musculoskeletal Care as prescribed by your physician.    The  Representative will call you within 1 business day to help schedule your appointment, or you may contact the  Representative at:    All areas ~ [phone]     Type of Referral : Non Surgical       Timeframe requested: Routine    Coverage of these services is subject to the terms and limitations of your health insurance plan.  Please call member services at your health plan with any benefit or coverage questions.      If X-rays, CT or MRI's have been performed, please contact the facility where they were done to arrange for , prior to your scheduled appointment.  Please bring this referral request to your appointment and present it to your specialist.                  Who to contact     If you have questions or need follow up information about today's clinic visit or your schedule please contact [facility] directly at [phone].  Normal or non-critical lab and imaging results will be communicated to you by MyChart, letter or phone within 4 business days after the clinic has received the results. If you do not hear from us within 7 days, please contact the clinic through sendwithushart or phone. If you have a critical or abnormal lab result, we will notify you by phone as soon as possible.  Submit refill requests through Ticketbud or call your pharmacy and they will forward the refill request to us. Please allow 3 business days for your refill to be completed.          Additional Information About Your Visit        sendwithusharYouTube Information     Ticketbud lets you send messages to your doctor, view your test results, renew your prescriptions, schedule appointments and more. To sign up, go to [email or web address]  ". Click on \"Log in\" on the left side of the screen, which will take you to the Welcome page. Then click on \"Sign up Now\" on the right side of the page.     You will be asked to enter the access code listed below, as well as some personal information. Please follow the directions to create your username and password.     Your access code is: [email or web address]  Expires: 2018 10:19 AM     Your access code will  in 90 days. If you need help or a new code, please call your [place] clinic or [phone].        Care EveryWhere ID     This is your Care EveryWhere ID. This could be used by other organizations to access your [place] medical records  [id number]        Your Vitals Were     Pulse Temperature Respirations Height Pulse Oximetry BMI (Body Mass Index)    76 98.5  F (36.9  C) (Oral) 16 5' 1.75\" (1.568 m) 100% 31.21 kg/m2       Blood Pressure from Last 3 Encounters:   05/10/18 152/84   17 148/74   16 130/68    Weight from Last 3 Encounters:   05/10/18 169 lb 4 oz (76.8 kg)   17 168 lb 8 oz (76.4 kg)   16 172 lb (78 kg)              We Performed the Following     Albumin Random Urine Quantitative with Creat Ratio     BASIC METABOLIC PANEL     DERMATOLOGY REFERRAL     Hemoglobin     Lipid panel reflex to direct LDL Fasting     ORTHO  REFERRAL          Today's Medication Changes          These changes are accurate as of 5/10/18 10:19 AM.  If you have any questions, ask your nurse or doctor.               These medicines have changed or have updated prescriptions.        Dose/Directions    lisinopril 10 MG tablet   Commonly known as:  PRINIVIL/ZESTRIL   This may have changed:  See the new instructions.   Used for:  Hypertension goal BP (blood pressure) < 140/90   Changed by:  Yecenia Dickerson MD        Dose:  10 mg   Take 1 tablet (10 mg) by mouth daily   Quantity:  90 tablet   Refills:  3            Where to get your medicines      These medications were sent to [facility] [address] [place] " [facility] - [place], [place] - [address]  [address] [address]     Phone:  [phone]     lisinopril 10 MG tablet                Primary Care Provider Office Phone # Fax #    Yecenia Dickerson [phone] [address]  [facility] [address]        Equal Access to Services     FELIPA ZUÑIGA : Hadii aad ku hadasho Soomaali, waaxda luqadaha, qaybta kaalmada adeegyada, waxay idiin hayaan adeeg kharash lasoilan . So [facility] [phone].    ATENCIÓN: Si habla español, tiene a mckeon disposición servicios gratuitos de asistencia lingüística. Llame al [phone].    We comply with applicable federal civil rights laws and [place] laws. We do not discriminate on the basis of race, color, national origin, age, disability, sex, sexual orientation, or gender identity.            Thank you!     Thank you for choosing [facility]  for your care. Our goal is always to provide you with excellent care. Hearing back from our patients is one way we can continue to improve our services. Please take a few minutes to complete the written survey that you may receive in the mail after your visit with us. Thank you!             Your Updated Medication List - Protect others around you: Learn how to safely use, store and throw away your medicines at www.disposemymeds.org.          This list is accurate as of 5/10/18 10:19 AM.  Always use your most recent med list.                   Brand Name Dispense Instructions for use Diagnosis    lisinopril 10 MG tablet    PRINIVIL/ZESTRIL    90 tablet    Take 1 tablet (10 mg) by mouth daily    Hypertension goal BP (blood pressure) < 140/90       MULTIVITAMIN PO      Take 1 tablet by mouth daily.        VITAMIN D3 PO      Take by mouth daily

## 2018-05-14 NOTE — PROGRESS NOTES
The results of your recent lipid (cholesterol) profile were abnormal.    Here are the results:  Lab Results       Component                Value               Date                       CHOL                     207                 05/10/2018            Lab Results       Component                Value               Date                       HDL                      38                  05/10/2018            Lab Results       Component                Value               Date                       LDL                      143                 05/10/2018            Lab Results       Component                Value               Date                       TRIG                     128                 05/10/2018            Lab Results       Component                Value               Date                       CHOLHDLRANDREWO             5.5                 08/18/2015              Desired or goal levels are:  CHOLESTEROL: Desirable is less than 200.   HDL (Good Cholesterol): Desirable is greater than 40 (for men) greater than 50 (for women).  LDL (Bad Cholesterol): Desirable is less than 130 (or less than 100 if you have heart disease or diabetes). Borderline 130-160.  TRIGLYCERIDES: Desirable is less than 150.  Borderline is 150-200.    For high triglycerides, reduce the intake of jam, jelly, honey, alcohol, and/or coffee creamers  To help lower your LDL (bad cholesterol) you could start adding ground flax seed to your diet. The recommended dose is 2 heaping tablespoonfuls daily, you may want to start with 1 tablespoonful and increase to 2 heaping tablespoonfuls. You can mix or add ground flax seed to hot cereals, yogurt, applesauce, cottage cheese or any sauce mixture that is your portion. Ground flax seed can be found in the baking aisle near the flour.      As you may know, an elevated cholesterol is one factor that increases your risk for heart disease and stroke. You can improve your cholesterol by controlling the amount  and type of fat you eat and by increasing your daily activity level.    Here are some ways to improve your nutrition:  Eat less fat (especially butter, Crisco and other saturated fats)  Buy lean cuts of meat, reduce your portions of red meat or substitute poultry or fish  Use skim milk and low-fat dairy products  Eat no more than 4 egg yolks per week  Avoid fried or fast foods that are high in fat  Eat more fruits and vegetables      Also consider starting or increasing your aerobic activity. Aerobic activity is the best way to improve HDL (good) cholesterol. If this would be new to you, please talk with me first about what activities are safe for you.      Other lab results:    Your urine protein test and hemoglobin were normal.    The testing of your blood sugar, kidney function, and electrolytes was stable.      Please feel free to contact us with any questions or if you would like more information.

## 2018-08-27 ENCOUNTER — TELEPHONE (OUTPATIENT)
Dept: FAMILY MEDICINE | Facility: CLINIC | Age: 83
End: 2018-08-27

## 2018-08-27 NOTE — TELEPHONE ENCOUNTER
She can try magnesium over the counter up to 400 mg daily and make an apt to discuss with dr Dickerson if not better after this for further evaluation with labs like ferritin etc

## 2018-08-27 NOTE — TELEPHONE ENCOUNTER
"Patient states she has had leg cramps on and off for a long time  States she tried \"Calm\" for the leg cramps and then had pain in area of her kidney.  Stopped the OTC medication immediately, thinks the issue is because it has Quinine in it.    Generally the cramps are in her calves and her toes but the other night they were going up into her thighs as well  Patient is asking if there is an OTC medication she can try that would not affect her kidneys.  Please advise.  Shanae Noyola RN      "

## 2018-08-27 NOTE — TELEPHONE ENCOUNTER
Writer called patient and reviewed message per below from Dr. Nunez.    Patient verbalized understanding and in agreement with plan.  CRISTIAN GilmanN, RN

## 2018-08-30 ENCOUNTER — ALLIED HEALTH/NURSE VISIT (OUTPATIENT)
Dept: NURSING | Facility: CLINIC | Age: 83
End: 2018-08-30
Payer: MEDICARE

## 2018-08-30 VITALS — HEART RATE: 76 BPM | DIASTOLIC BLOOD PRESSURE: 71 MMHG | SYSTOLIC BLOOD PRESSURE: 115 MMHG

## 2018-08-30 DIAGNOSIS — I10 HYPERTENSION GOAL BP (BLOOD PRESSURE) < 140/90: Primary | ICD-10-CM

## 2018-08-30 PROCEDURE — 99207 ZZC NO CHARGE NURSE ONLY: CPT

## 2018-08-30 NOTE — NURSING NOTE
Lara Marc is a 83 year old patient who comes in today for a Blood Pressure check.  Initial BP:  /71 (BP Location: Left arm, Patient Position: Sitting, Cuff Size: Adult Large)  Pulse 76     76  Disposition: results routed to provider and BP elevated.  Triage RN notified, patient asked to wait due to having sx of feeling tired and confusion.    Adama Marroquin, KRISTIE

## 2018-08-30 NOTE — MR AVS SNAPSHOT
"              After Visit Summary   8/30/2018    Lara Marc    MRN: [id number]           Patient Information     Date Of Birth          1935        Visit Information        Provider Department      8/30/2018 2:00 PM  MEDICAL ASSISTANT [facility]        Today's Diagnoses     Hypertension goal BP (blood pressure) < 140/90    -  1       Follow-ups after your visit        Your next 10 appointments already scheduled     Sep 10, 2018  2:20 PM CDT   [facility] Visit with [facility]erson [facility]   [facility]    [address] [place] [id number] [phone]           Bring a current list of meds and any records pertaining to this visit. For Physicals, please bring immunization records and any forms needing to be filled out. Please arrive 10 minutes early to complete paperwork.              Who to contact     If you have questions or need follow up information about today's clinic visit or your schedule please contact [facility] directly at [phone].  Normal or non-critical lab and imaging results will be communicated to you by LiquidTalkhart, letter or phone within 4 business days after the clinic has received the results. If you do not hear from us within 7 days, please contact the clinic through Inventablest or phone. If you have a critical or abnormal lab result, we will notify you by phone as soon as possible.  Submit refill requests through StartSampling or call your pharmacy and they will forward the refill request to us. Please allow 3 business days for your refill to be completed.          Additional Information About Your Visit        LiquidTalkharShots Information     StartSampling lets you send messages to your doctor, view your test results, renew your prescriptions, schedule appointments and more. To sign up, go to [email or web address] . Click on \"Log in\" on the left side of the screen, which will take you to the Welcome page. Then click on \"Sign " "up Now\" on the right side of the page.     You will be asked to enter the access code listed below, as well as some personal information. Please follow the directions to create your username and password.     Your access code is: [email or web address]  Expires: 2018  7:30 AM     Your access code will  in 90 days. If you need help or a new code, please call your [place] clinic or [phone].        Care EveryWhere ID     This is your Care EveryWhere ID. This could be used by other organizations to access your [place] medical records  [id number]        Your Vitals Were     Pulse                   76            Blood Pressure from Last 3 Encounters:   18 115/71   05/10/18 150/79   17 148/74    Weight from Last 3 Encounters:   05/10/18 169 lb 4 oz (76.8 kg)   17 168 lb 8 oz (76.4 kg)   16 172 lb (78 kg)              Today, you had the following     No orders found for display       Primary Care Provider Office Phone # Fax #    Yecenia Dickerson [phone]       [address]  [facility] [address]        Equal Access to Services     FELIPA ZUÑIGA AH: Hadii lam ku hadasho Soomaali, waaxda luqadaha, qaybta kaalmada adeegyada, raffi reddy. So [facility] [phone].    ATENCIÓN: Si habla español, tiene a mckeon disposición servicios gratuitos de asistencia lingüística. Llame al [phone].    We comply with applicable federal civil rights laws and [place] laws. We do not discriminate on the basis of race, color, national origin, age, disability, sex, sexual orientation, or gender identity.            Thank you!     Thank you for choosing [facility]  for your care. Our goal is always to provide you with excellent care. Hearing back from our patients is one way we can continue to improve our services. Please take a few minutes to complete the written survey that you may receive in the mail after your visit with us. Thank you!           "   Your Updated Medication List - Protect others around you: Learn how to safely use, store and throw away your medicines at www.disposemymeds.org.          This list is accurate as of 8/30/18 11:59 PM.  Always use your most recent med list.                   Brand Name Dispense Instructions for use Diagnosis    lisinopril 10 MG tablet    PRINIVIL/ZESTRIL    90 tablet    Take 1 tablet (10 mg) by mouth daily    Hypertension goal BP (blood pressure) < 140/90       MULTIVITAMIN PO      Take 1 tablet by mouth daily.        VITAMIN D3 PO      Take by mouth daily

## 2018-08-31 NOTE — PROGRESS NOTES
Blood pressure now in range.  Patient is still concerned about the OTC meds with Quinine that she took for leg cramps and what that may have done to her kidney.  Patient has been feeling light headed at times.  Appointment with provider scheduled for 9/10/18.  Patient to call if increase in symptoms.  Shanae Noyola RN

## 2018-09-10 ENCOUNTER — OFFICE VISIT (OUTPATIENT)
Dept: FAMILY MEDICINE | Facility: CLINIC | Age: 83
End: 2018-09-10
Payer: MEDICARE

## 2018-09-10 VITALS
RESPIRATION RATE: 18 BRPM | SYSTOLIC BLOOD PRESSURE: 118 MMHG | WEIGHT: 172 LBS | BODY MASS INDEX: 31.71 KG/M2 | OXYGEN SATURATION: 98 % | DIASTOLIC BLOOD PRESSURE: 64 MMHG | HEART RATE: 81 BPM | TEMPERATURE: 98.4 F

## 2018-09-10 DIAGNOSIS — R25.2 CRAMP OF LIMB: ICD-10-CM

## 2018-09-10 DIAGNOSIS — I10 HYPERTENSION GOAL BP (BLOOD PRESSURE) < 140/90: Primary | ICD-10-CM

## 2018-09-10 DIAGNOSIS — N18.30 CKD (CHRONIC KIDNEY DISEASE) STAGE 3, GFR 30-59 ML/MIN (H): ICD-10-CM

## 2018-09-10 DIAGNOSIS — L30.9 DERMATITIS: ICD-10-CM

## 2018-09-10 DIAGNOSIS — R49.8 WEAKNESS OF VOICE: ICD-10-CM

## 2018-09-10 PROCEDURE — 36415 COLL VENOUS BLD VENIPUNCTURE: CPT | Performed by: FAMILY MEDICINE

## 2018-09-10 PROCEDURE — 80048 BASIC METABOLIC PNL TOTAL CA: CPT | Performed by: FAMILY MEDICINE

## 2018-09-10 PROCEDURE — 99214 OFFICE O/P EST MOD 30 MIN: CPT | Performed by: FAMILY MEDICINE

## 2018-09-10 RX ORDER — TRIAMCINOLONE ACETONIDE 1 MG/G
CREAM TOPICAL
Qty: 30 G | Refills: 0 | Status: SHIPPED | OUTPATIENT
Start: 2018-09-10 | End: 2020-04-28

## 2018-09-10 RX ORDER — LISINOPRIL 20 MG/1
20 TABLET ORAL DAILY
Qty: 90 TABLET | Refills: 1 | Status: CANCELLED | OUTPATIENT
Start: 2018-09-10

## 2018-09-10 NOTE — MR AVS SNAPSHOT
After Visit Summary   9/10/2018    Lara Marc    MRN: [id number]           Patient Information     Date Of Birth          1935        Visit Information        Provider Department      9/10/2018 2:20 PM Yecenia Dickerson MD [facility] [place]        Today's Diagnoses     Hypertension goal BP (blood pressure) < 140/90    -  1    CKD (chronic kidney disease) stage 3, GFR 30-59 ml/min        Cramp of limb        Dermatitis        Weakness of voice          Care Instructions      1) Try measures below for your leg cramps. Make sure that you wear shoes with good arch support.   2) Start a daily fiber supplement for more regular stools.  3) You can take your magnesium and vitamin D supplements. You do not need to take a multivitamin. I will check your electrolytes and kidney function today.     Leg Cramps  A muscle cramp or spasm is a strong contraction of the muscle fibers. It is also called a charley horse. This may occur in the foot, calf, or thigh at night when the legs are elevated. If the spasm is prolonged, it can become very painful.  This may be caused by sleeping in an uncomfortable position, muscle fatigue, poor muscle tone from lack of exercise and stretching, dehydration, electrolyte imbalance, diabetes, alcohol use, and certain medicine.  Home care    Drink plenty of fluids during the day to prevent dehydration.    Stretch your legs before bedtime.    Eat a diet high in potassium. These foods include fresh fruit, such as bananas, oranges, cantaloupe, and honeydew melon. It also includes apple, prune, orange, grape and pineapple juices. Other foods high in potassium are white, red, and mahoney beans, baked potatoes, raw spinach, cod, flounder, halibut, salmon, and scallops.    Talk with your healthcare provider about taking mineral and vitamin supplements that contain magnesium and vitamin B-12 if you are not already taking these. Other prescription medicines may also be  used.    Avoid stimulants such as caffeine, nicotine, decongestants.  How to relieve an acute leg cramp    For mild pain, getting out of the bed and walking may help. Some people find relief with heat and massage. You can apply heat with a warm shower, bath, or compress. Some people feel better with a cold packs. You can make an ice pack by filling a plastic bag that seals at the top with ice cubes and then wrapping it with a thin towel. Try both and use the method that feels best for 15 to 20 minutes at a time.    For severe pain, stretching the muscle that is in spasm may quickly relieve the pain.    When the spasm is in your foot, your toes may curl up or down. To stretch the muscle in spasm, bend your toes in the opposite direction. If the spasm pulls your toes up, bend them down. If the spasm pulls them down, bend them up.    When the spasm is in your calf, bend the ankle so the foot points upward toward your knee.    When the spasm is in your thigh, bend or straighten the knee and hip until you feel relief.  Follow-up care  Follow up with your healthcare provider, or as advised.  When to seek medical advice  Call your healthcare provider right away if any of these occur:    Walking makes your pain worse and rest makes it better    You develop weakness in the affected leg    Pain or frequency of spasms increases and is not controlled by the above measures  Date Last Reviewed: 11/23/2015 2000-2017 [employer]. [address] [place] [address]. All rights reserved. This information is not intended as a substitute for professional medical care. Always follow your healthcare professional's instructions.                Follow-ups after your visit        Additional Services     CARE COORDINATION REFERRAL       Services are provided by a Care Coordinator for people with complex needs such as: medical, social, or financial troubles.  The Care Coordinator works with the patient and their  "Primary Care Provider to determine health goals, obtain resources, achieve outcomes, and develop care plans that help coordinate the patient's care.     Reason for Referral: Financial Support: unable to afford visits to specialists when referred.     Additional pertinent details:       Clinical Staff have discussed the Care Coordination Referral with the patient and/or caregiver: yes            SPEECH THERAPY REFERRAL       *This therapy referral will be filtered to a centralized scheduling office at [facility] and the patient will receive a call to schedule an appointment at a [facility] location most convenient for them. *     [facility] provides Speech Therapy evaluation and treatment and many specialty services across the [facility] system.  If requesting a specialty program, please choose from the list below.  If you have not heard from the scheduling office within 2 business days, please call [phone] for all locations, with the exception of [place], please call [phone] and [facility], please call [phone]      Treatment: Evaluation & Treatment  Speech Treatment Diagnosis: Problems With Voice Production  Special Instructions:    Special Programs: as indicated    Please be aware that coverage of these services is subject to the terms and limitations of your health insurance plan.  Call member services at your health plan with any benefit or coverage questions.      **Note to Provider:  If you are referring outside of [facility] for the therapy appointment, please list the name of the location in the \"special instructions\" above, print the referral and give to the patient to schedule the appointment.                  Who to contact     If you have questions or need follow up information about today's clinic visit or your schedule please contact [facility] directly at [phone].  Normal or non-critical lab and imaging results will be " "communicated to you by evolsohart, letter or phone within 4 business days after the clinic has received the results. If you do not hear from us within 7 days, please contact the clinic through Khush or phone. If you have a critical or abnormal lab result, we will notify you by phone as soon as possible.  Submit refill requests through Khush or call your pharmacy and they will forward the refill request to us. Please allow 3 business days for your refill to be completed.          Additional Information About Your Visit        Khush Information     Khush lets you send messages to your doctor, view your test results, renew your prescriptions, schedule appointments and more. To sign up, go to [email or web address] . Click on \"Log in\" on the left side of the screen, which will take you to the Welcome page. Then click on \"Sign up Now\" on the right side of the page.     You will be asked to enter the access code listed below, as well as some personal information. Please follow the directions to create your username and password.     Your access code is: [email or web address]  Expires: 2018  7:30 AM     Your access code will  in 90 days. If you need help or a new code, please call your [place] clinic or [phone].        Care EveryWhere ID     This is your Care EveryWhere ID. This could be used by other organizations to access your [facility] medical records  [id number]        Your Vitals Were     Pulse Temperature Respirations Pulse Oximetry BMI (Body Mass Index)       81 98.4  F (36.9  C) (Oral) 18 98% 31.71 kg/m2        Blood Pressure from Last 3 Encounters:   09/10/18 118/64   18 115/71   05/10/18 150/79    Weight from Last 3 Encounters:   09/10/18 172 lb (78 kg)   05/10/18 169 lb 4 oz (76.8 kg)   17 168 lb 8 oz (76.4 kg)              We Performed the Following     Basic metabolic panel     CARE COORDINATION REFERRAL     SPEECH THERAPY REFERRAL          Today's Medication Changes          These " changes are accurate as of 9/10/18  3:00 PM.  If you have any questions, ask your nurse or doctor.               Start taking these medicines.        Dose/Directions    triamcinolone 0.1 % cream   Commonly known as:  KENALOG   Used for:  Dermatitis   Started by:  Yecenia Dickerson MD        Apply sparingly to affected area three times daily for 14 days.   Quantity:  30 g   Refills:  0            Where to get your medicines      These medications were sent to [place] [id number] [place] [facility] - [address] [address]     Phone:  [phone]     triamcinolone 0.1 % cream                Primary Care Provider Office Phone # Fax #    Yecenia Dickerson [phone]       [address]  [facility] [address]        Equal Access to Services     FELIPA ZUÑIGA : Hadii aad ku hadasho Soomaali, waaxda luqadaha, qaybta kaalmada adeegyada, raffi de la vega . So [facility] [phone].    ATENCIÓN: Si habla español, tiene a mckeon disposición servicios gratuitos de asistencia lingüística. Llame al [phone].    We comply with applicable federal civil rights laws and [place] laws. We do not discriminate on the basis of race, color, national origin, age, disability, sex, sexual orientation, or gender identity.            Thank you!     Thank you for choosing [facility]  for your care. Our goal is always to provide you with excellent care. Hearing back from our patients is one way we can continue to improve our services. Please take a few minutes to complete the written survey that you may receive in the mail after your visit with us. Thank you!             Your Updated Medication List - Protect others around you: Learn how to safely use, store and throw away your medicines at www.disposemymeds.org.          This list is accurate as of 9/10/18  3:00 PM.  Always use your most recent med list.                   Brand Name Dispense Instructions  for use Diagnosis    lisinopril 10 MG tablet    PRINIVIL/ZESTRIL    90 tablet    Take 1 tablet (10 mg) by mouth daily    Hypertension goal BP (blood pressure) < 140/90       MULTIVITAMIN PO      Take 1 tablet by mouth daily.        triamcinolone 0.1 % cream    KENALOG    30 g    Apply sparingly to affected area three times daily for 14 days.    Dermatitis       VITAMIN D3 PO      Take by mouth daily

## 2018-09-10 NOTE — LETTER
September 12, 2018      Lara TORRES Monico  [address]  [facility] [address]        Dear ,    We are writing to inform you of your test results.    Your lab results are stable. Please let us know if you have any questions.    Resulted Orders   Basic metabolic panel   Result Value Ref Range    Sodium 143 133 - 144 mmol/L    Potassium 4.5 3.4 - 5.3 mmol/L    Chloride 111 (H) 94 - 109 mmol/L    Carbon Dioxide 26 20 - 32 mmol/L    Anion Gap 6 3 - 14 mmol/L    Glucose 87 70 - 99 mg/dL      Comment:      Non Fasting    Urea Nitrogen 25 7 - 30 mg/dL    Creatinine 1.25 (H) 0.52 - 1.04 mg/dL    GFR Estimate 41 (L) >60 mL/min/1.7m2      Comment:      Non  GFR Calc    GFR Estimate If Black 50 (L) >60 mL/min/1.7m2      Comment:       GFR Calc    Calcium 8.6 8.5 - 10.1 mg/dL     If you have any questions or concerns, please call the clinic at the number listed above.   Sincerely,  Yecenia Dickerson MD/nr

## 2018-09-10 NOTE — PATIENT INSTRUCTIONS
1) Try measures below for your leg cramps. Make sure that you wear shoes with good arch support.   2) Start a daily fiber supplement for more regular stools.  3) You can take your magnesium and vitamin D supplements. You do not need to take a multivitamin. I will check your electrolytes and kidney function today.     Leg Cramps  A muscle cramp or spasm is a strong contraction of the muscle fibers. It is also called a charley horse. This may occur in the foot, calf, or thigh at night when the legs are elevated. If the spasm is prolonged, it can become very painful.  This may be caused by sleeping in an uncomfortable position, muscle fatigue, poor muscle tone from lack of exercise and stretching, dehydration, electrolyte imbalance, diabetes, alcohol use, and certain medicine.  Home care    Drink plenty of fluids during the day to prevent dehydration.    Stretch your legs before bedtime.    Eat a diet high in potassium. These foods include fresh fruit, such as bananas, oranges, cantaloupe, and honeydew melon. It also includes apple, prune, orange, grape and pineapple juices. Other foods high in potassium are white, red, and mahoney beans, baked potatoes, raw spinach, cod, flounder, halibut, salmon, and scallops.    Talk with your healthcare provider about taking mineral and vitamin supplements that contain magnesium and vitamin B-12 if you are not already taking these. Other prescription medicines may also be used.    Avoid stimulants such as caffeine, nicotine, decongestants.  How to relieve an acute leg cramp    For mild pain, getting out of the bed and walking may help. Some people find relief with heat and massage. You can apply heat with a warm shower, bath, or compress. Some people feel better with a cold packs. You can make an ice pack by filling a plastic bag that seals at the top with ice cubes and then wrapping it with a thin towel. Try both and use the method that feels best for 15 to 20 minutes at a  time.    For severe pain, stretching the muscle that is in spasm may quickly relieve the pain.    When the spasm is in your foot, your toes may curl up or down. To stretch the muscle in spasm, bend your toes in the opposite direction. If the spasm pulls your toes up, bend them down. If the spasm pulls them down, bend them up.    When the spasm is in your calf, bend the ankle so the foot points upward toward your knee.    When the spasm is in your thigh, bend or straighten the knee and hip until you feel relief.  Follow-up care  Follow up with your healthcare provider, or as advised.  When to seek medical advice  Call your healthcare provider right away if any of these occur:    Walking makes your pain worse and rest makes it better    You develop weakness in the affected leg    Pain or frequency of spasms increases and is not controlled by the above measures  Date Last Reviewed: 11/23/2015 2000-2017 [employer]. [address] [place] [address], [place] [address]. All rights reserved. This information is not intended as a substitute for professional medical care. Always follow your healthcare professional's instructions.

## 2018-09-10 NOTE — PROGRESS NOTES
"  SUBJECTIVE:   Lara Marc is a 83 year old female who presents to clinic today for the following health issues:       Last week she had an episode of cramps in her legs that were worse than they had been in the past, up to her thighs. She got some medicine for leg cramps. Tried Calm and took a dose of each of that and the OTC supplement. The next couple of days, she felt no energy. She says both packages indicated they had quinine and she worried that had caused an issue. Her right low back was hurting while this happened so she thought it was her kidney. She is having trouble going to sleep because she is worried about getting a leg cramp. She came in here and talked with triage because she was worried. She then got some magnesium. Then she noticed that her MVI had magnesium, so did not know if she should take both, so did not take it.     She has been having some rashes. Her sister was ill for awhile and had a rash and had some cream that lara used. This was clobetasol. She used that on her arm where she gets some red itchy bumps sometimes. She did not go to derm. She says she does not go to any other doctors because she does not have the money to go.     She feels like she always has to have a bowel movement. She will have a spasm that she thinks that she is going to leak. She thinks she goes to the bathroom 10 times a day. She will only get small amt of stool at a time. Now she is back on her bowel routine. The last few days, she has had regular stools. No hard stools. She does not use fiber. Every since her sacrocolpexy, sling procedure she has had regular bowel movements. Before that she had constipation. She denies blood in the stool, or melena.     Denies dysuria, hematuria.     She says her voice seems to be \"going away\" over a and she can barely talk. Denies any throat pain. She is not sure when this started. Over the years she is noticing this. She has a hard time having a conversation with " "someone because her voice feels weak.     She brought her medicine with her to see what I think.      Shanae Noyola RN        8/27/18 1:28 PM   Note      Patient states she has had leg cramps on and off for a long time  States she tried \"Calm\" for the leg cramps and then had pain in area of her kidney.  Stopped the OTC medication immediately, thinks the issue is because it has Quinine in it.     Generally the cramps are in her calves and her toes but the other night they were going up into her thighs as well  Patient is asking if there is an OTC medication she can try that would not affect her kidneys.  Please advise.  Shanae Noyola RN               Problem list and histories reviewed & adjusted, as indicated.  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     C VAGINAL HYSTERECTOMY  9/15/06    TVH/BSO/A&P repair/sacrospinus ligament fixation......childbirth x 9     childbirth X9[       CLOSED RX RIB FRACTURE  7/06    left     DAVINCI SACROCOLPOPEXY, MIDURETHRAL SLING, CYSTOSCOPY  2/8/2013    Procedure: DAVINCI SACROCOLPOPEXY, MIDURETHRAL SLING, CYSTOSCOPY;  DAVINCI SACROCOLPOPEXY, TVT EXACT SLING, RIGHT SALPINGO-OOPHERECTOMY, CYSTOSCOPY;  Surgeon: Bennie Galdamez MD;  Location:  OR      COLONOSCOPY THRU STOMA, DIAGNOSTIC  7/2005    diverticulosis,small polyp,recheck 5 yrs     LAPAROSCOPIC CHOLECYSTECTOMY  3/31/2011    Procedure:LAPAROSCOPIC CHOLECYSTECTOMY; " Surgeon:DAVID MOLINA; Location: OR       Social History   Substance Use Topics     Smoking status: Former Smoker     Packs/day: 0.00     Years: 17.00     Smokeless tobacco: Never Used      Comment: quit when she was 29 years old     Alcohol use No     Family History   Problem Relation Age of Onset     HEART DISEASE Father      Cancer - colorectal Brother      Prostate Cancer Brother      Diabetes Sister      HEART DISEASE Sister          Current Outpatient Prescriptions   Medication Sig Dispense Refill     triamcinolone (KENALOG) 0.1 % cream Apply sparingly to affected area three times daily for 14 days. 30 g 0     Cholecalciferol (VITAMIN D3 PO) Take by mouth daily       lisinopril (PRINIVIL/ZESTRIL) 10 MG tablet Take 1 tablet (10 mg) by mouth daily 90 tablet 3     Multiple Vitamins-Minerals (MULTIVITAMIN OR) Take 1 tablet by mouth daily.       Allergies   Allergen Reactions     Nkda [No Known Drug Allergies]      Seasonal Allergies      Itchy eyes and watery eyes     Recent Labs   Lab Test  05/10/18   1026  12/13/16   1208   06/08/16   2027  06/08/16   0913  08/18/15   1204   09/18/13   1130  02/05/13   1123   LDL  143*  109*   --    --    --   102   < >   --    --    HDL  38*  33*   --    --    --   30*   < >   --    --    TRIG  128  183*   --    --    --   159*   < >   --    --    ALT   --    --    --    --   32   --    --   26  32   CR  1.11*  1.10*   < >  1.37*  1.23*  1.06*   < >  0.98  1.04   GFRESTIMATED  47*  48*   < >  37*  42*  50*   < >  55*  51*   GFRESTBLACK  57*  58*   < >  45*  51*  60*   < >  66  62   POTASSIUM  4.9  4.7   < >  4.0  4.1  4.5   < >  4.2  4.6   TSH   --    --    --   2.21   --    --    --    --    --     < > = values in this interval not displayed.      BP Readings from Last 3 Encounters:   09/10/18 118/64   08/30/18 115/71   05/10/18 150/79    Wt Readings from Last 3 Encounters:   09/10/18 172 lb (78 kg)   05/10/18 169 lb 4 oz (76.8 kg)   11/29/17 168 lb 8 oz (76.4 kg)               Reviewed and updated as needed this visit by clinical staff  Tobacco  Allergies  Meds  Med Hx  Surg Hx  Fam Hx  Soc Hx      Reviewed and updated as needed this visit by Provider         ROS:  As above     OBJECTIVE:     /64 (BP Location: Left arm, Patient Position: Sitting, Cuff Size: Adult Regular)  Pulse 81  Temp 98.4  F (36.9  C) (Oral)  Resp 18  Wt 172 lb (78 kg)  SpO2 98%  BMI 31.71 kg/m2  Body mass index is 31.71 kg/(m^2).  GENERAL: healthy, alert and no distress    Diagnostic Test Results:  none     ASSESSMENT/PLAN:        1. Hypertension goal BP (blood pressure) < 150/90  Controlled . Will continue lisinopril 10mg daily.            2. CKD (chronic kidney disease) stage 3, GFR 30-59 ml/min   stable. Bmp today     3. Leg cramps  Will check BMP. Reviewed recommendations below. She will try a magnesium supplement as recommended by Dr. Nunez.     4. Dermatitis     - triamcinolone (KENALOG) 0.1 % cream; Apply sparingly to affected area three times daily for 14 days.  Dispense: 30 g; Refill: 0  - CARE COORDINATION REFERRAL    5. Weakness of voice   for years, seems to be worse over time. Can sign without problem. Denies any pain. Recommended speech eval.   - SPEECH THERAPY REFERRAL  - CARE COORDINATION REFERRAL     Patient Instructions     1) Try measures below for your leg cramps. Make sure that you wear shoes with good arch support.   2) Start a daily fiber supplement for more regular stools.  3) You can take your magnesium and vitamin D supplements. You do not need to take a multivitamin. I will check your electrolytes and kidney function today.     Leg Cramps  A muscle cramp or spasm is a strong contraction of the muscle fibers. It is also called a charley horse. This may occur in the foot, calf, or thigh at night when the legs are elevated. If the spasm is prolonged, it can become very painful.  This may be caused by sleeping in an uncomfortable position, muscle fatigue, poor muscle tone  from lack of exercise and stretching, dehydration, electrolyte imbalance, diabetes, alcohol use, and certain medicine.  Home care    Drink plenty of fluids during the day to prevent dehydration.    Stretch your legs before bedtime.    Eat a diet high in potassium. These foods include fresh fruit, such as bananas, oranges, cantaloupe, and honeydew melon. It also includes apple, prune, orange, grape and pineapple juices. Other foods high in potassium are white, red, and mahoney beans, baked potatoes, raw spinach, cod, flounder, halibut, salmon, and scallops.    Talk with your healthcare provider about taking mineral and vitamin supplements that contain magnesium and vitamin B-12 if you are not already taking these. Other prescription medicines may also be used.    Avoid stimulants such as caffeine, nicotine, decongestants.  How to relieve an acute leg cramp    For mild pain, getting out of the bed and walking may help. Some people find relief with heat and massage. You can apply heat with a warm shower, bath, or compress. Some people feel better with a cold packs. You can make an ice pack by filling a plastic bag that seals at the top with ice cubes and then wrapping it with a thin towel. Try both and use the method that feels best for 15 to 20 minutes at a time.    For severe pain, stretching the muscle that is in spasm may quickly relieve the pain.    When the spasm is in your foot, your toes may curl up or down. To stretch the muscle in spasm, bend your toes in the opposite direction. If the spasm pulls your toes up, bend them down. If the spasm pulls them down, bend them up.    When the spasm is in your calf, bend the ankle so the foot points upward toward your knee.    When the spasm is in your thigh, bend or straighten the knee and hip until you feel relief.  Follow-up care  Follow up with your healthcare provider, or as advised.  When to seek medical advice  Call your healthcare provider right away if any of  these occur:    Walking makes your pain worse and rest makes it better    You develop weakness in the affected leg    Pain or frequency of spasms increases and is not controlled by the above measures  Date Last Reviewed: 11/23/2015 2000-2017 [employer]. [address] [place] [address], [place] [address]. All rights reserved. This information is not intended as a substitute for professional medical care. Always follow your healthcare professional's instructions.            Yecenia Dickerson M.D.        [facility]

## 2018-09-11 LAB
ANION GAP SERPL CALCULATED.3IONS-SCNC: 6 MMOL/L (ref 3–14)
BUN SERPL-MCNC: 25 MG/DL (ref 7–30)
CALCIUM SERPL-MCNC: 8.6 MG/DL (ref 8.5–10.1)
CHLORIDE SERPL-SCNC: 111 MMOL/L (ref 94–109)
CO2 SERPL-SCNC: 26 MMOL/L (ref 20–32)
CREAT SERPL-MCNC: 1.25 MG/DL (ref 0.52–1.04)
GFR SERPL CREATININE-BSD FRML MDRD: 41 ML/MIN/1.7M2
GLUCOSE SERPL-MCNC: 87 MG/DL (ref 70–99)
POTASSIUM SERPL-SCNC: 4.5 MMOL/L (ref 3.4–5.3)
SODIUM SERPL-SCNC: 143 MMOL/L (ref 133–144)

## 2018-09-12 ENCOUNTER — PATIENT OUTREACH (OUTPATIENT)
Dept: CARE COORDINATION | Facility: CLINIC | Age: 83
End: 2018-09-12

## 2018-09-13 NOTE — PROGRESS NOTES
"Clinic Care Coordination Contact    Clinic Care Coordination Contact  OUTREACH    Referral Information:  Referral Source: PCP    Chief Complaint   Patient presents with     Clinic Care Coordination - Initial        Universal Utilization: No Concerns     Utilization    Last refreshed: 9/12/2018  4:04 PM:  No Show Count (past year) 0       Last refreshed: 9/12/2018  4:04 PM:  ED visits 0       Last refreshed: 9/12/2018  4:04 PM:  Hospital admissions 0          Current as of: 9/12/2018  4:04 PM           Medication Management:  Patient manages her own medications    Functional Status:  Bed or wheelchair confined: No    Living Situation:  Current living arrangement: I live in a private home    Financial/Insurance: Medicare     SW received referral to outreach to patient regarding financial/insurance concerns. Referral stated that patient is unable to attend specialist appointments due to her out of pocket cost. SW spoke with patient who states that she \"doesn't feel the need to see a specialist anyway\". SW discussed barriers to seeing other providers and patient states she is on a fixed income and could go into her savings to cover the cost of other appointments but \"doesn't want to\". Discussed the referral for Speech therapy and patient states she doesn't plan to attend and doesn't feel it is necessary. Patient stated she is \"old and might not have a lot of time left.\" Patient does not have a supplemental insurance with her Medicare coverage. Discussed the option to enroll in supplemental insurance which would help with her medical bills. Patient states she has thought about doing this but still isn't sure. Discussed with patient the future and what would happen if she had a hospital stay and the associated cost. Patient's response is that \"she hopes the good Lord will take her before anything happens.\" SW strongly encouraged patient to apply for a supplemental insurance policy during the open enrollment period in " October. Patient declines any assistance from SW at this time but appreciates the call. SW will do no further follow-up as patient is not interested in services/resources for specialty appointments. Patient can be referred back to SW at any time in the future if needs arise.    Patient/Caregiver understanding: Pt reports understanding and denies any additional questions or concerns at this times. SW CC engaged in [employer] communication during encounter.    Alison Rodriguez SANDEEP  Social Work Care Coordinator   [facility]  [phone]

## 2018-10-02 ENCOUNTER — HOSPITAL ENCOUNTER (EMERGENCY)
Facility: CLINIC | Age: 83
Discharge: HOME | End: 2018-10-02
Attending: EMERGENCY MEDICINE | Admitting: EMERGENCY MEDICINE
Payer: MEDICARE

## 2018-10-02 VITALS
WEIGHT: 172 LBS | DIASTOLIC BLOOD PRESSURE: 48 MMHG | TEMPERATURE: 98.2 F | OXYGEN SATURATION: 95 % | SYSTOLIC BLOOD PRESSURE: 134 MMHG | BODY MASS INDEX: 32.47 KG/M2 | HEIGHT: 61 IN | RESPIRATION RATE: 18 BRPM | HEART RATE: 121 BPM

## 2018-10-02 DIAGNOSIS — N30.00 ACUTE CYSTITIS WITHOUT HEMATURIA: ICD-10-CM

## 2018-10-02 LAB
ALBUMIN UR-MCNC: NEGATIVE MG/DL
AMORPH CRY #/AREA URNS HPF: ABNORMAL /HPF
ANION GAP SERPL CALCULATED.3IONS-SCNC: 7 MMOL/L (ref 3–14)
APPEARANCE UR: CLEAR
BASOPHILS # BLD AUTO: 0 10E9/L (ref 0–0.2)
BASOPHILS NFR BLD AUTO: 0.1 %
BILIRUB UR QL STRIP: NEGATIVE
BUN SERPL-MCNC: 28 MG/DL (ref 7–30)
CALCIUM SERPL-MCNC: 8.7 MG/DL (ref 8.5–10.1)
CHLORIDE SERPL-SCNC: 108 MMOL/L (ref 94–109)
CO2 SERPL-SCNC: 25 MMOL/L (ref 20–32)
COLOR UR AUTO: YELLOW
CREAT SERPL-MCNC: 1.24 MG/DL (ref 0.52–1.04)
DIFFERENTIAL METHOD BLD: ABNORMAL
EOSINOPHIL # BLD AUTO: 0.2 10E9/L (ref 0–0.7)
EOSINOPHIL NFR BLD AUTO: 1.9 %
ERYTHROCYTE [DISTWIDTH] IN BLOOD BY AUTOMATED COUNT: 14.8 % (ref 10–15)
GFR SERPL CREATININE-BSD FRML MDRD: 41 ML/MIN/1.7M2
GLUCOSE SERPL-MCNC: 110 MG/DL (ref 70–99)
GLUCOSE UR STRIP-MCNC: NEGATIVE MG/DL
HCT VFR BLD AUTO: 39.4 % (ref 35–47)
HGB BLD-MCNC: 12.4 G/DL (ref 11.7–15.7)
HGB UR QL STRIP: NEGATIVE
IMM GRANULOCYTES # BLD: 0 10E9/L (ref 0–0.4)
IMM GRANULOCYTES NFR BLD: 0.1 %
INTERPRETATION ECG - MUSE: NORMAL
KETONES UR STRIP-MCNC: NEGATIVE MG/DL
LEUKOCYTE ESTERASE UR QL STRIP: ABNORMAL
LYMPHOCYTES # BLD AUTO: 0.6 10E9/L (ref 0.8–5.3)
LYMPHOCYTES NFR BLD AUTO: 7.3 %
MCH RBC QN AUTO: 26.7 PG (ref 26.5–33)
MCHC RBC AUTO-ENTMCNC: 31.5 G/DL (ref 31.5–36.5)
MCV RBC AUTO: 85 FL (ref 78–100)
MONOCYTES # BLD AUTO: 0.3 10E9/L (ref 0–1.3)
MONOCYTES NFR BLD AUTO: 3.4 %
NEUTROPHILS # BLD AUTO: 7 10E9/L (ref 1.6–8.3)
NEUTROPHILS NFR BLD AUTO: 87.2 %
NITRATE UR QL: NEGATIVE
NRBC # BLD AUTO: 0 10*3/UL
NRBC BLD AUTO-RTO: 0 /100
PH UR STRIP: 6.5 PH (ref 5–7)
PLATELET # BLD AUTO: 151 10E9/L (ref 150–450)
POTASSIUM SERPL-SCNC: 4.4 MMOL/L (ref 3.4–5.3)
RBC # BLD AUTO: 4.64 10E12/L (ref 3.8–5.2)
RBC #/AREA URNS AUTO: 3 /HPF (ref 0–2)
SODIUM SERPL-SCNC: 140 MMOL/L (ref 133–144)
SOURCE: ABNORMAL
SP GR UR STRIP: 1.02 (ref 1–1.03)
SQUAMOUS #/AREA URNS AUTO: 9 /HPF (ref 0–1)
TRANS CELLS #/AREA URNS HPF: <1 /HPF (ref 0–1)
UROBILINOGEN UR STRIP-MCNC: NORMAL MG/DL (ref 0–2)
WBC # BLD AUTO: 8.1 10E9/L (ref 4–11)
WBC #/AREA URNS AUTO: 25 /HPF (ref 0–5)

## 2018-10-02 PROCEDURE — 25000128 H RX IP 250 OP 636: Performed by: EMERGENCY MEDICINE

## 2018-10-02 PROCEDURE — 81001 URINALYSIS AUTO W/SCOPE: CPT | Performed by: EMERGENCY MEDICINE

## 2018-10-02 PROCEDURE — 87086 URINE CULTURE/COLONY COUNT: CPT | Performed by: EMERGENCY MEDICINE

## 2018-10-02 PROCEDURE — 85025 COMPLETE CBC W/AUTO DIFF WBC: CPT | Performed by: EMERGENCY MEDICINE

## 2018-10-02 PROCEDURE — A9270 NON-COVERED ITEM OR SERVICE: HCPCS | Mod: GY | Performed by: EMERGENCY MEDICINE

## 2018-10-02 PROCEDURE — 25000132 ZZH RX MED GY IP 250 OP 250 PS 637: Mod: GY | Performed by: EMERGENCY MEDICINE

## 2018-10-02 PROCEDURE — 96361 HYDRATE IV INFUSION ADD-ON: CPT | Performed by: EMERGENCY MEDICINE

## 2018-10-02 PROCEDURE — 96360 HYDRATION IV INFUSION INIT: CPT | Performed by: EMERGENCY MEDICINE

## 2018-10-02 PROCEDURE — 93005 ELECTROCARDIOGRAM TRACING: CPT | Performed by: EMERGENCY MEDICINE

## 2018-10-02 PROCEDURE — 80048 BASIC METABOLIC PNL TOTAL CA: CPT | Performed by: EMERGENCY MEDICINE

## 2018-10-02 PROCEDURE — 99284 EMERGENCY DEPT VISIT MOD MDM: CPT | Mod: 25 | Performed by: EMERGENCY MEDICINE

## 2018-10-02 PROCEDURE — 93010 ELECTROCARDIOGRAM REPORT: CPT | Mod: Z6 | Performed by: EMERGENCY MEDICINE

## 2018-10-02 RX ORDER — NITROFURANTOIN 25; 75 MG/1; MG/1
100 CAPSULE ORAL 2 TIMES DAILY
Qty: 6 CAPSULE | Refills: 0 | Status: SHIPPED | OUTPATIENT
Start: 2018-10-02 | End: 2018-10-08

## 2018-10-02 RX ORDER — NITROFURANTOIN 25; 75 MG/1; MG/1
100 CAPSULE ORAL EVERY 12 HOURS SCHEDULED
Status: COMPLETED | OUTPATIENT
Start: 2018-10-02 | End: 2018-10-02

## 2018-10-02 RX ORDER — SODIUM CHLORIDE 9 MG/ML
INJECTION, SOLUTION INTRAVENOUS CONTINUOUS
Status: DISCONTINUED | OUTPATIENT
Start: 2018-10-02 | End: 2018-10-02 | Stop reason: HOSPADM

## 2018-10-02 RX ADMIN — SODIUM CHLORIDE: 9 INJECTION, SOLUTION INTRAVENOUS at 08:33

## 2018-10-02 RX ADMIN — NITROFURANTOIN (MONOHYDRATE/MACROCRYSTALS) 100 MG: 75; 25 CAPSULE ORAL at 10:10

## 2018-10-02 ASSESSMENT — ENCOUNTER SYMPTOMS
WOUND: 0
SHORTNESS OF BREATH: 0
CHILLS: 0
DIFFICULTY URINATING: 1
COUGH: 1
ABDOMINAL PAIN: 0
COLOR CHANGE: 0
FEVER: 0
TREMORS: 1

## 2018-10-02 NOTE — ED PROVIDER NOTES
[place] EMERGENCY DEPARTMENT ([facility])  10/02/18   History     Chief Complaint   Patient presents with     Generalized Body Aches     The history is provided by the patient and a relative.     Lara Marc is a 83 year old female with a medical history significant for hypertension and CKD stage III who presents to the Emergency Department for evaluation of generalized body shakes.  Patient reports that she had fallen asleep early this morning at approximately 2:30 AM, she then woke up at 3 AM with generalized body shakes.  She states that she has had shaking in muscles in the past, but this has typically resolved within a few seconds.  Her shaking this morning continued throughout the early morning and was more severe than anything she has had in the past.  She states that she did feel somewhat chilled when the shakes first started, but otherwise did not feel cold.  She does report that she intermittently felt she was becoming feverish, but this resolved as well.  Patient's shaking began improving prior to coming to the Emergency Department.  She reports that she was feeling at her baseline of health yesterday.  She does note that she had a more busy day than usual with cleaning and moving furniture.  Patient states that she has had a mild cough, but nothing that she was concerned about.  She denies any shortness of breath and denies any skin issues or significant abdominal pain.  She does note that she has had some problems with her bowel movements and urine.  She denies any history of diabetes mellitus.    I have reviewed the Medications, Allergies, Past Medical and Surgical History, and Social History in the [facility] system.    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     Dyspnea on exertion      Gastro-oesophageal reflux disease      Hypertension goal BP (blood pressure) < 140/90 7/25/2011     Other and unspecified hyperlipidemia      Unspecified essential hypertension     not medicated at this time     Uterovaginal prolapse, complete 2004     resolved '06     Vaginal enterocele, congenital or acquired 2007     or cystocele       Past Surgical History:   Procedure Laterality Date     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     C VAGINAL HYSTERECTOMY  9/15/06    TVH/BSO/A&P repair/sacrospinus ligament fixation......childbirth x 9     childbirth X9[       CLOSED RX RIB FRACTURE  7/06    left     DAVINCI SACROCOLPOPEXY, MIDURETHRAL SLING, CYSTOSCOPY  2/8/2013    Procedure: DAVINCI SACROCOLPOPEXY, MIDURETHRAL SLING, CYSTOSCOPY;  DAVINCI SACROCOLPOPEXY, TVT EXACT SLING, RIGHT SALPINGO-OOPHERECTOMY, CYSTOSCOPY;  Surgeon: Bennie Galdamez MD;  Location:  OR      COLONOSCOPY THRU STOMA, DIAGNOSTIC  7/2005    diverticulosis,small polyp,recheck 5 yrs     LAPAROSCOPIC CHOLECYSTECTOMY  3/31/2011    Procedure:LAPAROSCOPIC CHOLECYSTECTOMY; Surgeon:DAVID MOLINA; Location:[facility] OR       Family History   Problem Relation Age of Onset     HEART DISEASE Father      Cancer - colorectal Brother      Prostate Cancer Brother      Diabetes Sister      HEART DISEASE Sister        Social History   Substance Use Topics     Smoking status: Former Smoker     Packs/day: 0.00     Years: 17.00     Smokeless tobacco: Never Used      Comment: quit when she was 29 years old     Alcohol use No       No current facility-administered medications for this encounter.      Current Outpatient Prescriptions   Medication     nitroFURantoin, macrocrystal-monohydrate, (MACROBID) 100 MG capsule     Cholecalciferol (VITAMIN D3 PO)     lisinopril (PRINIVIL/ZESTRIL) 10 MG tablet     Multiple Vitamins-Minerals  "(MULTIVITAMIN OR)     triamcinolone (KENALOG) 0.1 % cream        Allergies   Allergen Reactions     Nkda [No Known Drug Allergies]      Seasonal Allergies      Itchy eyes and watery eyes         Review of Systems   Constitutional: Negative for chills and fever.   Respiratory: Positive for cough (mild). Negative for shortness of breath.    Gastrointestinal: Negative for abdominal pain.   Genitourinary: Positive for difficulty urinating.   Skin: Negative for color change and wound.   Neurological: Positive for tremors.   All other systems reviewed and are negative.      Physical Exam   BP: 168/89  Pulse: 121  Heart Rate: 121  Temp: 98.5  F (36.9  C)  Resp: 18  Height: 154.9 cm (5' 1\")  Weight: 78 kg (172 lb)  SpO2: 95 %      Physical Exam   Constitutional: She is oriented to person, place, and time. She appears well-developed and well-nourished. No distress.   HENT:   Head: Normocephalic and atraumatic.   Eyes: Conjunctivae and EOM are normal. Pupils are equal, round, and reactive to light.   Neck: Normal range of motion. Neck supple. No JVD present.   Cardiovascular: Normal rate and regular rhythm.    Murmur heard.  Pulmonary/Chest: Effort normal and breath sounds normal. No respiratory distress. She has no wheezes. She has no rales.   Abdominal: Soft. She exhibits no mass. There is no tenderness. There is no guarding.   Musculoskeletal: Normal range of motion.        Right lower leg: Normal.        Left lower leg: Normal.   Neurological: She is alert and oriented to person, place, and time. No cranial nerve deficit.   Skin: Skin is warm and dry. She is not diaphoretic.   Psychiatric: She has a normal mood and affect. Her behavior is normal. Thought content normal.   Nursing note and vitals reviewed.      ED Course   8:24 AM  The patient was seen and examined by Vignesh Oneill MD in Room ED23.     ED Course     Procedures             EKG Interpretation:      Interpreted by Vignesh Oneill  Time reviewed: " 9:47 AM   Symptoms at time of EKG: Shakes   Rhythm: LBBB  Rate: normal  Axis: normal  Ectopy: none  Conduction: normal  ST Segments/ T Waves: No ST-T wave changes  Q Waves: none  Comparison to prior: Unchanged from 2016    Clinical Impression: normal EKG      Medications   nitroFURantoin (macrocrystal-monohydrate) (MACROBID) capsule 100 mg (100 mg Oral Given 10/2/18 1010)              Labs Ordered and Resulted from Time of ED Arrival Up to the Time of Departure from the ED   CBC WITH PLATELETS DIFFERENTIAL - Abnormal; Notable for the following:        Result Value    Absolute Lymphocytes 0.6 (*)     All other components within normal limits   BASIC METABOLIC PANEL - Abnormal; Notable for the following:     Glucose 110 (*)     Creatinine 1.24 (*)     GFR Estimate 41 (*)     GFR Estimate If Black 50 (*)     All other components within normal limits   ROUTINE UA WITH MICROSCOPIC - Abnormal; Notable for the following:     Leukocyte Esterase Urine Large (*)     WBC Urine 25 (*)     RBC Urine 3 (*)     Squamous Epithelial /HPF Urine 9 (*)     Amorphous Crystals Few (*)     All other components within normal limits   URINE CULTURE AEROBIC BACTERIAL            Assessments & Plan (with Medical Decision Making)   83-year-old female with episode of shaking when she woke up this morning.  No fevers.  She is having some frequency and urgency with urination.  Here she was found to have pyuria and was given a dose of oral antibiotics remainder of her workup was negative.  She did seem somewhat dehydrated and was given fluids and feels better.  I suspect her symptoms were from her urinary infection.  We will discharge her home to complete a course of Macrobid I recommend they make primary clinic follow-up and she can return if she is not doing well or having any other concerning symptoms.  Urine culture is pending.    I have reviewed the nursing notes.    I have reviewed the findings, diagnosis, plan and need for follow up with the  patient.    Discharge Medication List as of 10/2/2018 11:58 AM      START taking these medications    Details   nitroFURantoin, macrocrystal-monohydrate, (MACROBID) 100 MG capsule Take 1 capsule (100 mg) by mouth 2 times daily, Disp-6 capsule, R-0, Local Print             Final diagnoses:   Acute cystitis without hematuria     IJuan M, am serving as a trained medical scribe to document services personally performed by Vignesh Oneill MD, based on the provider's statements to me.   I, Vignesh Oneill MD, was physically present and have reviewed and verified the accuracy of this note documented by Juan M Vega.    10/2/2018   [facility], [place], EMERGENCY DEPARTMENT     Vignesh Oneill MD  10/02/18 [id number]

## 2018-10-02 NOTE — ED AVS SNAPSHOT
[facility], Emergency Department    [address] [facility] [address]    Phone:  [phone]                                       Lara Marc   MRN: [id number]    Department:  [facility], Emergency Department   Date of Visit:  10/2/2018           Patient Information     Date Of Birth          1935        Your diagnoses for this visit were:     Acute cystitis without hematuria        You were seen by Vignesh Oneill MD.        Discharge Instructions       Take antibiotics as directed and make a follow-up appointment for later this week in your clinic  Return if you have any problems, concerns or change in your condition    24 Hour Appointment Hotline       To make an appointment at any [facility] clinic, call [phone] ([phone]). If you don't have a family doctor or clinic, we will help you find one. [facility] clinics are conveniently located to serve the needs of you and your family.             Review of your medicines      START taking        Dose / Directions Last dose taken    nitroFURantoin (macrocrystal-monohydrate) 100 MG capsule   Commonly known as:  MACROBID   Dose:  100 mg   Quantity:  6 capsule        Take 1 capsule (100 mg) by mouth 2 times daily   Refills:  0          Our records show that you are taking the medicines listed below. If these are incorrect, please call your family doctor or clinic.        Dose / Directions Last dose taken    lisinopril 10 MG tablet   Commonly known as:  PRINIVIL/ZESTRIL   Dose:  10 mg   Quantity:  90 tablet        Take 1 tablet (10 mg) by mouth daily   Refills:  3        MULTIVITAMIN PO   Dose:  1 tablet        Take 1 tablet by mouth daily.   Refills:  0        triamcinolone 0.1 % cream   Commonly known as:  KENALOG   Quantity:  30 g        Apply sparingly to affected area three times daily for 14 days.   Refills:  0        VITAMIN D3 PO        Take by mouth daily   Refills:  0                Prescriptions were sent or printed at  "these locations (1 Prescription)                   Other Prescriptions                Printed at Department/Unit printer (1 of 1)         nitroFURantoin, macrocrystal-monohydrate, (MACROBID) 100 MG capsule                Procedures and tests performed during your visit     Basic metabolic panel    CBC with platelets differential    EKG 12-lead, tracing only    UA with Microscopic    Urine Culture      Orders Needing Specimen Collection     None      Pending Results     Date and Time Order Name Status Description    10/2/2018 0855 Urine Culture Preliminary             Pending Culture Results     Date and Time Order Name Status Description    10/2/2018 0855 Urine Culture Preliminary             Pending Results Instructions     If you had any lab results that were not finalized at the time of your Discharge, you can call the ED Lab Result RN at [phone]. You will be contacted by this team for any positive Lab results or changes in treatment. The nurses are available 7 days a week from 10A to 6:30P.  You can leave a message 24 hours per day and they will return your call.        Thank you for choosing [facility]       Thank you for choosing [facility] for your care. Our goal is always to provide you with excellent care. Hearing back from our patients is one way we can continue to improve our services. Please take a few minutes to complete the written survey that you may receive in the mail after you visit with us. Thank you!        [employer] Information     [employer] lets you send messages to your doctor, view your test results, renew your prescriptions, schedule appointments and more. To sign up, go to [email or web address] . Click on \"Log in\" on the left side of the screen, which will take you to the Welcome page. Then click on \"Sign up Now\" on the right side of the page.     You will be asked to enter the access code listed below, as well as some personal information. Please follow the directions to create your " username and password.     Your access code is: [email or web address]  Expires: 2018  7:30 AM     Your access code will  in 90 days. If you need help or a new code, please call your [place] clinic or [phone].        Care EveryWhere ID     This is your Care EveryWhere ID. This could be used by other organizations to access your [place] medical records  [id number]        Equal Access to Services     [facility]: Hadii lam robin hadasho Soomaali, waaxda luqadaha, qaybta kaalmada adeegyada, raffi de la vega . So [facility] [phone].    ATENCIÓN: Si habla español, tiene a mckeon disposición servicios gratuitos de asistencia lingüística. Llame al [phone].    We comply with applicable federal civil rights laws and [place] laws. We do not discriminate on the basis of race, color, national origin, age, disability, sex, sexual orientation, or gender identity.            After Visit Summary       This is your record. Keep this with you and show to your community pharmacist(s) and doctor(s) at your next visit.

## 2018-10-02 NOTE — ED AVS SNAPSHOT
[facility], [place], Emergency Department    [address] [place] [address]    Phone:  [phone]                                       Lara Marc   MRN: [id number]    Department:  [facility], Emergency Department   Date of Visit:  10/2/2018           After Visit Summary Signature Page     I have received my discharge instructions, and my questions have been answered. I have discussed any challenges I see with this plan with the nurse or doctor.    ..........................................................................................................................................  Patient/Patient Representative Signature      ..........................................................................................................................................  Patient Representative Print Name and Relationship to Patient    ..................................................               ................................................  Date                                   Time    ..........................................................................................................................................  Reviewed by Signature/Title    ...................................................              ..............................................  Date                                               Time          22EPIC Rev 08/18

## 2018-10-02 NOTE — DISCHARGE INSTRUCTIONS
Take antibiotics as directed and make a follow-up appointment for later this week in your clinic  Return if you have any problems, concerns or change in your condition

## 2018-10-02 NOTE — ED TRIAGE NOTES
Patient reports full body shakes since 0300, ? Feverish at home,  Sob in the last week, increased frequency and urgency with urination. Denies chest pain, abd pain, nausea, emesis, diarrhea.

## 2018-10-03 LAB
BACTERIA SPEC CULT: NORMAL
BACTERIA SPEC CULT: NORMAL
Lab: NORMAL
SPECIMEN SOURCE: NORMAL

## 2018-10-05 ENCOUNTER — ALLIED HEALTH/NURSE VISIT (OUTPATIENT)
Dept: NURSING | Facility: CLINIC | Age: 83
End: 2018-10-05
Payer: MEDICARE

## 2018-10-05 DIAGNOSIS — R21 RASH: Primary | ICD-10-CM

## 2018-10-05 PROCEDURE — 99207 ZZC NO CHARGE NURSE ONLY: CPT

## 2018-10-05 NOTE — MR AVS SNAPSHOT
After Visit Summary   10/5/2018    Lara Marc    MRN: [id number]           Patient Information     Date Of Birth          1935        Visit Information        Provider Department      10/5/2018 2:30 PM HW RN/TRIAGE NURSE ONLY [facility]        Today's Diagnoses     Rash    -  1       Follow-ups after your visit        Your next 10 appointments already scheduled     Oct 08, 2018 11:00 AM CDT   SHORT with Yecenia Dickerson MD   [facility] ([facility])    [address] [place] [address] [phone]              Who to contact     If you have questions or need follow up information about today's clinic visit or your schedule please contact [facility] directly at [phone].  Normal or non-critical lab and imaging results will be communicated to you by MyChart, letter or phone within 4 business days after the clinic has received the results. If you do not hear from us within 7 days, please contact the clinic through MyChart or phone. If you have a critical or abnormal lab result, we will notify you by phone as soon as possible.  Submit refill requests through [employer] or call your pharmacy and they will forward the refill request to us. Please allow 3 business days for your refill to be completed.          Additional Information About Your Visit        Care EveryWhere ID     This is your Care EveryWhere ID. This could be used by other organizations to access your [place] medical records  [id number]         Blood Pressure from Last 3 Encounters:   10/02/18 134/48   09/10/18 118/64   08/30/18 115/71    Weight from Last 3 Encounters:   10/02/18 172 lb (78 kg)   09/10/18 172 lb (78 kg)   05/10/18 169 lb 4 oz (76.8 kg)              Today, you had the following     No orders found for display       Primary Care Provider Office Phone # Fax #    Yecenia Dickerson [phone] [address] [place] [address]  [place] [address]        Equal Access to Services     [facility] : Hadii lam robin padminidiane Martaali, waesperanzada luqadaha, qaybta kamarywes bo, raffi reddy. So [facility] [phone].    ATENCIÓN: Si habla español, tiene a mckeon disposición servicios gratuitos de asistencia lingüística. Llame al [phone].    We comply with applicable federal civil rights laws and [place] laws. We do not discriminate on the basis of race, color, national origin, age, disability, sex, sexual orientation, or gender identity.            Thank you!     Thank you for choosing [facility]  for your care. Our goal is always to provide you with excellent care. Hearing back from our patients is one way we can continue to improve our services. Please take a few minutes to complete the written survey that you may receive in the mail after your visit with us. Thank you!             Your Updated Medication List - Protect others around you: Learn how to safely use, store and throw away your medicines at www.disposemymeds.org.          This list is accurate as of 10/5/18  2:57 PM.  Always use your most recent med list.                   Brand Name Dispense Instructions for use Diagnosis    lisinopril 10 MG tablet    PRINIVIL/ZESTRIL    90 tablet    Take 1 tablet (10 mg) by mouth daily    Hypertension goal BP (blood pressure) < 140/90       MULTIVITAMIN PO      Take 1 tablet by mouth daily.        nitroFURantoin (macrocrystal-monohydrate) 100 MG capsule    MACROBID    6 capsule    Take 1 capsule (100 mg) by mouth 2 times daily        triamcinolone 0.1 % cream    KENALOG    30 g    Apply sparingly to affected area three times daily for 14 days.    Dermatitis       VITAMIN D3 PO      Take by mouth daily

## 2018-10-05 NOTE — PROGRESS NOTES
S-(situation): Patient walked into clinic c/o redness at center of chest and under rt breast. Patient states she has noticed the area red for a week then stated 2-21/2 weeks. Patient states has been working in garden and was nervous that it was a bite or Lyme's disease. Patient reported the area to be itchy but not currently. Denies fever, no swelling of any kind reported or noted by writer, denies ha, no resp. Infection. Area slightly red, no blisters no pinpoint redness, denies pain, no particular form of rash noted, not raised.     B-(background): Patient noted some redness at center of chest about 2 weeks ago or so. No other associated symptoms. Patient was in ER on 10/2/2018 for acute cystitis and hematuria.     A-(assessment): Area pink at center of chest between breast and slightly under rt breast.     R-(recommendations): Patient scheduled for hospital f/u on Monday 10/8 at 11:00 for hospital f/u and assessment of pinkened rash are.    Patient instructed to wash area and make sure to dry completely under and between breasts-ok to use OTC powder to dry area. Patient encouraged to ask Pharmacist what would be a good OTC powder for area. Patient in agreement with plan and verbalizes understanding.   Thanks!   Judi Billy RN

## 2018-11-23 ENCOUNTER — TELEPHONE (OUTPATIENT)
Dept: FAMILY MEDICINE | Facility: CLINIC | Age: 83
End: 2018-11-23

## 2018-11-23 ENCOUNTER — OFFICE VISIT (OUTPATIENT)
Dept: URGENT CARE | Facility: URGENT CARE | Age: 83
End: 2018-11-23
Payer: MEDICARE

## 2018-11-23 VITALS
HEIGHT: 61 IN | BODY MASS INDEX: 32.1 KG/M2 | DIASTOLIC BLOOD PRESSURE: 80 MMHG | RESPIRATION RATE: 15 BRPM | TEMPERATURE: 97.6 F | HEART RATE: 80 BPM | WEIGHT: 170 LBS | SYSTOLIC BLOOD PRESSURE: 118 MMHG

## 2018-11-23 DIAGNOSIS — H83.09 VIRAL LABYRINTHITIS, UNSPECIFIED LATERALITY: Primary | ICD-10-CM

## 2018-11-23 PROCEDURE — 99213 OFFICE O/P EST LOW 20 MIN: CPT | Performed by: FAMILY MEDICINE

## 2018-11-23 RX ORDER — MULTIVITAMIN WITH IRON
1 TABLET ORAL DAILY
COMMUNITY

## 2018-11-23 RX ORDER — ASPIRIN 81 MG/1
81 TABLET ORAL DAILY
COMMUNITY

## 2018-11-23 NOTE — MR AVS SNAPSHOT
"              After Visit Summary   11/23/2018    Lara Marc    MRN: [id number]           Patient Information     Date Of Birth          1935        Visit Information        Provider Department      11/23/2018 5:40 PM Ganesh Perry MD [facility]        Today's Diagnoses     Viral labyrinthitis, unspecified laterality    -  1       Follow-ups after your visit        Your next 10 appointments already scheduled     Nov 26, 2018 11:00 AM CST   SHORT with [facility]ec[facility] Dick[facility]   [facility]    [address] [place] [address] [phone]              Who to contact     If you have questions or need follow up information about today's clinic visit or your schedule please contact [facility] directly at [phone].  Normal or non-critical lab and imaging results will be communicated to you by MyChart, letter or phone within 4 business days after the clinic has received the results. If you do not hear from us within 7 days, please contact the clinic through MyChart or phone. If you have a critical or abnormal lab result, we will notify you by phone as soon as possible.  Submit refill requests through [employer] or call your pharmacy and they will forward the refill request to us. Please allow 3 business days for your refill to be completed.          Additional Information About Your Visit        Care EveryWhere ID     This is your Care EveryWhere ID. This could be used by other organizations to access your [place] medical records  [id number]        Your Vitals Were     Pulse Temperature Respirations Height BMI (Body Mass Index)       80 97.6  F (36.4  C) (Oral) 15 5' 1\" (1.549 m) 32.12 kg/m2        Blood Pressure from Last 3 Encounters:   11/23/18 118/80   10/02/18 134/48   09/10/18 118/64    Weight from Last 3 Encounters:   11/23/18 170 lb (77.1 kg)   10/02/18 172 lb (78 kg)   09/10/18 172 lb (78 kg) "              Today, you had the following     No orders found for display       Primary Care Provider Office Phone # Fax #    Yecenia Dickerson [phone]       [address]  [place] [address]        Equal Access to Services     FELIPA ZUÑIGA : Hadii lam ku haddwaino Soomaali, waaxda luqadaha, qaybta kaalmada adeegyada, raffi mckeonn rocco dior yolette reddy. So [facility] [phone].    ATENCIÓN: Si habla español, tiene a mckeon disposición servicios gratuitos de asistencia lingüística. Llame al [phone].    We comply with applicable federal civil rights laws and [place] laws. We do not discriminate on the basis of race, color, national origin, age, disability, sex, sexual orientation, or gender identity.            Thank you!     Thank you for choosing [facility]  for your care. Our goal is always to provide you with excellent care. Hearing back from our patients is one way we can continue to improve our services. Please take a few minutes to complete the written survey that you may receive in the mail after your visit with us. Thank you!             Your Updated Medication List - Protect others around you: Learn how to safely use, store and throw away your medicines at www.disposemymeds.org.          This list is accurate as of 11/23/18  7:23 PM.  Always use your most recent med list.                   Brand Name Dispense Instructions for use Diagnosis    aspirin 81 MG EC tablet      Take 81 mg by mouth daily        lisinopril 10 MG tablet    PRINIVIL/ZESTRIL    90 tablet    Take 1 tablet (10 mg) by mouth daily    Hypertension goal BP (blood pressure) < 140/90       magnesium 250 MG tablet      Take 1 tablet by mouth daily        MULTIVITAMIN PO      Take 1 tablet by mouth daily.        triamcinolone 0.1 % cream    KENALOG    30 g    Apply sparingly to affected area three times daily for 14 days.    Dermatitis       VITAMIN D3 PO      Take by mouth daily

## 2018-11-23 NOTE — TELEPHONE ENCOUNTER
Pt reports sx starting yest.  Stayed home from Thanksgiving activities.    Dizzy.  Better now.  Has balance issues.  Has to hold on to something for balance.    No energy.  Tired.  Voiding fine but does notice some difficulty going at times. Has to reposition in order to void. Not all the time.    No appts at  or . PT will have her sister take her to UC at  this evening.  Discussed other UC options.  Pt did make an appt with pcp for 11/26/18. I did tell pt that I thought that was too long to wait.  KIM Sanford

## 2018-11-24 NOTE — PROGRESS NOTES
Subjective: Yesterday morning patient woke feeling the room was spinning.  After a while she was able to get up.  Felt a little nauseous initially but as the day progressed it got better.  This morning it was worse again but again she has been up and around and it is feeling better.  She has not had anything like this before except when she was first diagnosed with hypertension.  Blood pressure has been fine.  No new medications.    Objective: Neurologic exam is entirely normal.  I cannot see her fundi.  Thyroid is normal.  Carotids are normal.  Heart is regular without murmurs.  Lungs are clear.  Abdomen benign.  Movement does not seem to cause much in the way of symptoms.    Assessment and plan: Pretty classic tightness.  It seems to be already getting better.  I explained the mechanism can try Epley maneuvers if she wants but most likely it will just run its course, usually within a week.

## 2018-12-04 ENCOUNTER — OFFICE VISIT (OUTPATIENT)
Dept: FAMILY MEDICINE | Facility: CLINIC | Age: 83
End: 2018-12-04
Payer: MEDICARE

## 2018-12-04 VITALS
SYSTOLIC BLOOD PRESSURE: 122 MMHG | BODY MASS INDEX: 32.45 KG/M2 | OXYGEN SATURATION: 98 % | DIASTOLIC BLOOD PRESSURE: 62 MMHG | WEIGHT: 171.75 LBS | TEMPERATURE: 97.6 F | HEART RATE: 72 BPM

## 2018-12-04 DIAGNOSIS — I10 HYPERTENSION GOAL BP (BLOOD PRESSURE) < 140/90: ICD-10-CM

## 2018-12-04 DIAGNOSIS — R53.83 FATIGUE, UNSPECIFIED TYPE: ICD-10-CM

## 2018-12-04 DIAGNOSIS — R42 DIZZINESS: Primary | ICD-10-CM

## 2018-12-04 LAB
BASOPHILS # BLD AUTO: 0 10E9/L (ref 0–0.2)
BASOPHILS NFR BLD AUTO: 0.2 %
DIFFERENTIAL METHOD BLD: ABNORMAL
EOSINOPHIL # BLD AUTO: 0.3 10E9/L (ref 0–0.7)
EOSINOPHIL NFR BLD AUTO: 4.2 %
ERYTHROCYTE [DISTWIDTH] IN BLOOD BY AUTOMATED COUNT: 14.7 % (ref 10–15)
HCT VFR BLD AUTO: 38.4 % (ref 35–47)
HGB BLD-MCNC: 11.9 G/DL (ref 11.7–15.7)
LYMPHOCYTES # BLD AUTO: 1.5 10E9/L (ref 0.8–5.3)
LYMPHOCYTES NFR BLD AUTO: 25.8 %
MCH RBC QN AUTO: 26.2 PG (ref 26.5–33)
MCHC RBC AUTO-ENTMCNC: 31 G/DL (ref 31.5–36.5)
MCV RBC AUTO: 84 FL (ref 78–100)
MONOCYTES # BLD AUTO: 0.6 10E9/L (ref 0–1.3)
MONOCYTES NFR BLD AUTO: 9.7 %
NEUTROPHILS # BLD AUTO: 3.6 10E9/L (ref 1.6–8.3)
NEUTROPHILS NFR BLD AUTO: 60.1 %
PLATELET # BLD AUTO: 202 10E9/L (ref 150–450)
RBC # BLD AUTO: 4.55 10E12/L (ref 3.8–5.2)
WBC # BLD AUTO: 6 10E9/L (ref 4–11)

## 2018-12-04 PROCEDURE — 84439 ASSAY OF FREE THYROXINE: CPT | Performed by: FAMILY MEDICINE

## 2018-12-04 PROCEDURE — 80048 BASIC METABOLIC PNL TOTAL CA: CPT | Performed by: FAMILY MEDICINE

## 2018-12-04 PROCEDURE — 85025 COMPLETE CBC W/AUTO DIFF WBC: CPT | Performed by: FAMILY MEDICINE

## 2018-12-04 PROCEDURE — 99214 OFFICE O/P EST MOD 30 MIN: CPT | Performed by: FAMILY MEDICINE

## 2018-12-04 PROCEDURE — 84443 ASSAY THYROID STIM HORMONE: CPT | Performed by: FAMILY MEDICINE

## 2018-12-04 PROCEDURE — 36415 COLL VENOUS BLD VENIPUNCTURE: CPT | Performed by: FAMILY MEDICINE

## 2018-12-04 NOTE — MR AVS SNAPSHOT
After Visit Summary   12/4/2018    Lara Marc    MRN: [id number]           Patient Information     Date Of Birth          1935        Visit Information        Provider Department      12/4/2018 11:00 AM Yecenia Dickerson MD [facility]        Today's Diagnoses     Dizziness    -  1    Hypertension goal BP (blood pressure) < 140/90        Fatigue, unspecified type          Care Instructions    1) Schedule with ENT if the dizziness persists.   2) Continue to hydrate well  3) I will do some labs today to look for other causes of your spinning and fatigue.   4) Follow up with me in two weeks. If symptoms worsen, let me know right away.     Dizziness (Uncertain Cause)  Dizziness is a common symptom. It may be described as lightheadedness, spinning, or feeling like you are going to faint. Dizziness can have many causes.  Be sure to tell the healthcare provider about:    All medicines you take, including prescription, over-the-counter, herbs, and supplements    Any other symptoms you have    Any health problems you are being treated for    Any past major health problems you've had, such as a heart attack, balance issues, hearing problems, or blood pressure problems    Anything that causes the dizziness to get worse or better  Today's exam did not show an exact cause for your dizziness. Other tests may be needed. Follow up with your healthcare provider.  Home care    Dizziness that occurs with sudden standing may be a sign of mild dehydration. Drink extra fluids for the next few days.    If you recently started a new medicine, stopped a medicine, or had the dose of a current medicine changed, talk with the prescribing healthcare provider. Your medicine plan may need adjustment.    If dizziness lasts more than a few seconds, sit or lie down until it passes. This may help prevent injury in case you pass out. Get up slowly when you feel better.    Don't drive or use power tools or dangerous  equipment until you have had no dizziness for at least 48 hours.  Follow-up care  Follow up with your healthcare provider for further evaluation within the next 7 days or as advised.  When to seek medical advice  Call your healthcare provider for any of the following:    Worsening of symptoms or new symptoms    Passing out or seizure    Repeated vomiting    Headache    Palpitations (the sense that your heart is fluttering or beating fast or hard)    Shortness of breath    Blood in vomit or stool (black or red color)    Weakness of an arm or leg or 1 side of the face    Vision or hearing changes    Trouble walking or speaking    Chest, arm, neck, back, or jaw pain  Date Last Reviewed: 11/1/2017 2000-2018 [employer]. [address] [place] [address]. All rights reserved. This information is not intended as a substitute for professional medical care. Always follow your healthcare professional's instructions.                Follow-ups after your visit        Additional Services     OTOLARYNGOLOGY REFERRAL       Your provider has referred you to: [facility]: [facility] (Otolaryngology) - [place] [phone]  [email or web address]  N: [facility] [place] [phone]   [email or web address]  N: [facility] [phone] [email or web address]    Please be aware that coverage of these services is subject to the terms and limitations of your health insurance plan.  Call member services at your health plan with any benefit or coverage questions.      Please bring the following with you to your appointment:    (1) Any X-Rays, CTs or MRIs which have been performed.  Contact the facility where they were done to arrange for  prior to your scheduled appointment.   (2) List of current medications  (3) This referral request    (4) Any documents/labs given to you for this referral                  Who to contact     If you have questions or need follow up information about today's clinic visit or your schedule please contact [facility] directly at [phone].  Normal or non-critical lab and imaging results will be communicated to you by MyChart, letter or phone within 4 business days after the clinic has received the results. If you do not hear from us within 7 days, please contact the clinic through MyChart or phone. If you have a critical or abnormal lab result, we will notify you by phone as soon as possible.  Submit refill requests through [employer] or call your pharmacy and they will forward the refill request to us. Please allow 3 business days for your refill to be completed.          Additional Information About Your Visit        Care EveryWhere ID     This is your Care EveryWhere ID. This could be used by other organizations to access your [place] medical records  [id number]        Your Vitals Were     Pulse Temperature Pulse Oximetry BMI (Body Mass Index)          72 97.6  F (36.4  C) (Oral) 98% 32.45 kg/m2         Blood Pressure from Last 3 Encounters:   12/04/18 122/62   11/23/18 118/80   10/02/18 134/48    Weight from Last 3 Encounters:   12/04/18 171 lb 12 oz (77.9 kg)   11/23/18 170 lb (77.1 kg)   10/02/18 172 lb (78 kg)              We Performed the Following     Basic metabolic panel     CBC with platelets differential     OTOLARYNGOLOGY REFERRAL     TSH with free T4 reflex        Primary Care Provider Office Phone # Fax #    Yecenia Dickerson [phone] [address]  [facility] [address]        Equal Access to Services     ALEXEI ZUÑIGA : Hadii lam Arellano, aishwarya queen, raffi peng. So [facility] [phone].    ATENCIÓN: Si habla español, tiene a mckeon disposición servicios gratuitos de asistencia lingüística.  Ewelina kearney [phone].    We comply with applicable federal civil rights laws and [place] laws. We do not discriminate on the basis of race, color, national origin, age, disability, sex, sexual orientation, or gender identity.            Thank you!     Thank you for choosing [facility]  for your care. Our goal is always to provide you with excellent care. Hearing back from our patients is one way we can continue to improve our services. Please take a few minutes to complete the written survey that you may receive in the mail after your visit with us. Thank you!             Your Updated Medication List - Protect others around you: Learn how to safely use, store and throw away your medicines at www.disposemymeds.org.          This list is accurate as of 12/4/18 12:08 PM.  Always use your most recent med list.                   Brand Name Dispense Instructions for use Diagnosis    aspirin 81 MG EC tablet      Take 81 mg by mouth daily        lisinopril 10 MG tablet    PRINIVIL/ZESTRIL    90 tablet    Take 1 tablet (10 mg) by mouth daily    Hypertension goal BP (blood pressure) < 140/90       magnesium 250 MG tablet      Take 1 tablet by mouth daily        MULTIVITAMIN PO      Take 1 tablet by mouth daily.        triamcinolone 0.1 % external cream    KENALOG    30 g    Apply sparingly to affected area three times daily for 14 days.    Dermatitis       VITAMIN D3 PO      Take by mouth daily

## 2018-12-04 NOTE — PATIENT INSTRUCTIONS
1) Schedule with ENT if the dizziness persists.   2) Continue to hydrate well  3) I will do some labs today to look for other causes of your spinning and fatigue.   4) Follow up with me in two weeks. If symptoms worsen, let me know right away.     Dizziness (Uncertain Cause)  Dizziness is a common symptom. It may be described as lightheadedness, spinning, or feeling like you are going to faint. Dizziness can have many causes.  Be sure to tell the healthcare provider about:    All medicines you take, including prescription, over-the-counter, herbs, and supplements    Any other symptoms you have    Any health problems you are being treated for    Any past major health problems you've had, such as a heart attack, balance issues, hearing problems, or blood pressure problems    Anything that causes the dizziness to get worse or better  Today's exam did not show an exact cause for your dizziness. Other tests may be needed. Follow up with your healthcare provider.  Home care    Dizziness that occurs with sudden standing may be a sign of mild dehydration. Drink extra fluids for the next few days.    If you recently started a new medicine, stopped a medicine, or had the dose of a current medicine changed, talk with the prescribing healthcare provider. Your medicine plan may need adjustment.    If dizziness lasts more than a few seconds, sit or lie down until it passes. This may help prevent injury in case you pass out. Get up slowly when you feel better.    Don't drive or use power tools or dangerous equipment until you have had no dizziness for at least 48 hours.  Follow-up care  Follow up with your healthcare provider for further evaluation within the next 7 days or as advised.  When to seek medical advice  Call your healthcare provider for any of the following:    Worsening of symptoms or new symptoms    Passing out or seizure    Repeated vomiting    Headache    Palpitations (the sense that your heart is fluttering or  beating fast or hard)    Shortness of breath    Blood in vomit or stool (black or red color)    Weakness of an arm or leg or 1 side of the face    Vision or hearing changes    Trouble walking or speaking    Chest, arm, neck, back, or jaw pain  Date Last Reviewed: 11/1/2017 2000-2018 [employer]. [address] [place] [address]. All rights reserved. This information is not intended as a substitute for professional medical care. Always follow your healthcare professional's instructions.

## 2018-12-04 NOTE — PROGRESS NOTES
SUBJECTIVE:   Lara Marc is a 83 year old female who presents to clinic today for the following health issues:     What name does patient prefer? Lara     Dizziness  Onset: a couple of weeks ago     Description:   Do you feel faint:  No, but feels weak and fatigued with the vertigo  Does it feel like the surroundings (bed, room) are moving: yes, when it is occurring, when it first started she did feel as if the room was spinning   Unsteady/off balance: YES- she has to hang on to things   Have you passed out or fallen: no    Intensity: no     Progression of Symptoms:  Dizziness is getting better but she still has it when she wakes up in the morning     Accompanying Signs & Symptoms:  Heart palpitations: No new heart palpitations   sometimes when she is lying in bed she can hear her heart beating or sensation of skipped beat. Not persistent. Not new since dizziness, had this a long time   Nausea, vomiting: YES- nausea when she felt as if she was spinning   Weakness in arms or legs: YES- mainly her legs, but generally weak  Fatigue: YES  Vision or speech changes: no  Ringing in ears (Tinnitus): YES-- little bit, not new, has had this for years.   Hearing Loss: YES- gradual over time, not new  SOB: YES sometimes, seems more so since she has been feeling fatigued recently     History:   Head trauma/concussion hx: YES- when she was little (2years old) she fell 2 stories into the basement    Previous similar symptoms: YES- when she started taking her BP medication for the first time, she was also feeling this way prior to the medication   Recent bleeding history: no      Precipitating factors:   Worse with activity or head movement: YES- she if she moves head to quickly   Any new medications (BP?): no   Alcohol/drug abuse/withdrawal: no     Alleviating factors:   Does staying in a fixed position give relief:  Not so much     Therapies Tried and outcome: exercise that she was given to relieve some tension in her  "ears      One day, she woke up in the morning it was quite early and the room was spinning. She got up and it did not go away. She walked around and had to hang on to things as she walked and it gradually got a little better. She has felt tired and weak. When she lies down at night she feels the spinning sensation for a little bit and in the morning has the same thing. Last night the spinning did not happen right away and same thing this morning. Feels a sensation of fuzziness in the head. Feels like she is generally tired and weak. Back in October she had shaking in the morning and went to the ER and was diagnosed with a UTI.     Couple of days ago left ear hurt, but went away after that day.       She has a hiatal hernia and when she first sits down to eat sometimes it will burn.     If she drinks too much water she gets nauseated and sometimes has to vomit. This is not new.     She has urinary leakage sometimes and has some sensation of bowel pressure and these are not new. Has also had leakage of stool. This is not new since the dizziness started, present for 6 months or more.     She reports she thinks she has allergies and has itchy eyes, congestion and cough for years. It is all year round, especially in the morning. This has not changed.       From [facility] on 11/23/18:   \"   Subjective: Yesterday morning patient woke feeling the room was spinning.  After a while she was able to get up.  Felt a little nauseous initially but as the day progressed it got better.  This morning it was worse again but again she has been up and around and it is feeling better.  She has not had anything like this before except when she was first diagnosed with hypertension.  Blood pressure has been fine.  No new medications.     Objective: Neurologic exam is entirely normal.  I cannot see her fundi.  Thyroid is normal.  Carotids are normal.  Heart is regular without murmurs.  Lungs are clear.  Abdomen benign.  Movement does not " "seem to cause much in the way of symptoms.     Assessment and plan: Pretty classic tightness.  It seems to be already getting better.  I explained the mechanism can try Epley maneuvers if she wants but most likely it will just run its course, usually within a week.\"    Problem list and histories reviewed & adjusted, as indicated.  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     C VAGINAL HYSTERECTOMY  9/15/06    TVH/BSO/A&P repair/sacrospinus ligament fixation......childbirth x 9     childbirth X9[       CLOSED RX RIB FRACTURE  7/06    left     DAVINCI SACROCOLPOPEXY, MIDURETHRAL SLING, CYSTOSCOPY  2/8/2013    Procedure: DAVINCI SACROCOLPOPEXY, MIDURETHRAL SLING, CYSTOSCOPY;  DAVINCI SACROCOLPOPEXY, TVT EXACT SLING, RIGHT SALPINGO-OOPHERECTOMY, CYSTOSCOPY;  Surgeon: Bennie Galdamez MD;  Location:  OR      COLONOSCOPY THRU STOMA, DIAGNOSTIC  7/2005    diverticulosis,small polyp,recheck 5 yrs     LAPAROSCOPIC CHOLECYSTECTOMY  3/31/2011    Procedure:LAPAROSCOPIC CHOLECYSTECTOMY; Surgeon:DAVID MOLINA; Location: OR       Social History   Substance Use Topics     Smoking status: Former Smoker     Packs/day: 0.00     Years: 17.00     Smokeless tobacco: Never Used      Comment: quit when she was 29 years old     Alcohol use No     Family History   Problem Relation Age of Onset     Heart Disease Father      Cancer - colorectal Brother      Prostate Cancer Brother "      Diabetes Sister      Heart Disease Sister          Current Outpatient Prescriptions   Medication Sig Dispense Refill     aspirin 81 MG EC tablet Take 81 mg by mouth daily       Cholecalciferol (VITAMIN D3 PO) Take by mouth daily       lisinopril (PRINIVIL/ZESTRIL) 10 MG tablet Take 1 tablet (10 mg) by mouth daily 90 tablet 3     magnesium 250 MG tablet Take 1 tablet by mouth daily       Multiple Vitamins-Minerals (MULTIVITAMIN OR) Take 1 tablet by mouth daily.       triamcinolone (KENALOG) 0.1 % cream Apply sparingly to affected area three times daily for 14 days. 30 g 0     Allergies   Allergen Reactions     Nkda [No Known Drug Allergies]      Seasonal Allergies      Itchy eyes and watery eyes     Recent Labs   Lab Test  10/02/18   0706  09/10/18   1509  05/10/18   1026  12/13/16   1208   06/08/16   2027  06/08/16   0913  08/18/15   1204   09/18/13   1130  02/05/13   1123   LDL   --    --   143*  109*   --    --    --   102   < >   --    --    HDL   --    --   38*  33*   --    --    --   30*   < >   --    --    TRIG   --    --   128  183*   --    --    --   159*   < >   --    --    ALT   --    --    --    --    --    --   32   --    --   26  32   CR  1.24*  1.25*  1.11*  1.10*   < >  1.37*  1.23*  1.06*   < >  0.98  1.04   GFRESTIMATED  41*  41*  47*  48*   < >  37*  42*  50*   < >  55*  51*   GFRESTBLACK  50*  50*  57*  58*   < >  45*  51*  60*   < >  66  62   POTASSIUM  4.4  4.5  4.9  4.7   < >  4.0  4.1  4.5   < >  4.2  4.6   TSH   --    --    --    --    --   2.21   --    --    --    --    --     < > = values in this interval not displayed.      BP Readings from Last 3 Encounters:   12/04/18 122/62   11/23/18 118/80   10/02/18 134/48    Wt Readings from Last 3 Encounters:   12/04/18 171 lb 12 oz (77.9 kg)   11/23/18 170 lb (77.1 kg)   10/02/18 172 lb (78 kg)              Reviewed and updated as needed this visit by clinical staff  Tobacco  Allergies  Meds       Reviewed and updated as needed this visit  by Provider         ROS:   ROS: 10 point ROS neg other than the symptoms noted above in the HPI.     OBJECTIVE:     /62  Pulse 72  Temp 97.6  F (36.4  C) (Oral)  Wt 171 lb 12 oz (77.9 kg)  SpO2 98%  BMI 32.45 kg/m2  Body mass index is 32.45 kg/(m^2).  /62  Pulse 72  Temp 97.6  F (36.4  C) (Oral)  Wt 171 lb 12 oz (77.9 kg)  SpO2 98%  BMI 32.45 kg/m2    Gen: alert, no acute distress  Eyes: anicteric, normal lids and conjunctiva; PERRL  ENT: OP normal  NECK: no masses, no thyromegaly appreciated  Resp: CTAB, normal respiratory effort  CV: Regular rate and rhythm, 2/6 systolic murmur unchanged, no peripheral edema  ABD; soft, nontender, no appreciable masses or hepatosplenomegaly  NEURO:  CN intact, ambulates without difficulty.  no focal deficits noted.  Psych: A and O x 3, appropriate affect     Diagnostic Test Results:  none     ASSESSMENT/PLAN:          1. Dizziness  unclear etiology with uncertain prognosis, requires further workup    Seen in UC and symptoms thought likely viral labyrinthitis. Vertigo is improving, but not resolved. May still be viral etiology, but will complete additional labs today given her sense of fatigue   - OTOLARYNGOLOGY REFERRAL  - Basic metabolic panel  - CBC with platelets differential    2.Fatigue, unspecified type  Vertigo associated with fatigue, generalized weakness. ?secondary to viral illness. The vertigo is improving. Will check labs as above and will follow up in the next two weeks.   - TSH with free T4 reflex     3. Hypertension goal BP (blood pressure) < 140/90   controlled  - Basic metabolic panel      Patient Instructions   1) Schedule with ENT if the dizziness persists.   2) Continue to hydrate well  3) I will do some labs today to look for other causes of your spinning and fatigue.   4) Follow up with me in two weeks. If symptoms worsen, let me know right away.     Dizziness (Uncertain Cause)  Dizziness is a common symptom. It may be described as  lightheadedness, spinning, or feeling like you are going to faint. Dizziness can have many causes.  Be sure to tell the healthcare provider about:    All medicines you take, including prescription, over-the-counter, herbs, and supplements    Any other symptoms you have    Any health problems you are being treated for    Any past major health problems you've had, such as a heart attack, balance issues, hearing problems, or blood pressure problems    Anything that causes the dizziness to get worse or better  Today's exam did not show an exact cause for your dizziness. Other tests may be needed. Follow up with your healthcare provider.  Home care    Dizziness that occurs with sudden standing may be a sign of mild dehydration. Drink extra fluids for the next few days.    If you recently started a new medicine, stopped a medicine, or had the dose of a current medicine changed, talk with the prescribing healthcare provider. Your medicine plan may need adjustment.    If dizziness lasts more than a few seconds, sit or lie down until it passes. This may help prevent injury in case you pass out. Get up slowly when you feel better.    Don't drive or use power tools or dangerous equipment until you have had no dizziness for at least 48 hours.  Follow-up care  Follow up with your healthcare provider for further evaluation within the next 7 days or as advised.  When to seek medical advice  Call your healthcare provider for any of the following:    Worsening of symptoms or new symptoms    Passing out or seizure    Repeated vomiting    Headache    Palpitations (the sense that your heart is fluttering or beating fast or hard)    Shortness of breath    Blood in vomit or stool (black or red color)    Weakness of an arm or leg or 1 side of the face    Vision or hearing changes    Trouble walking or speaking    Chest, arm, neck, back, or jaw pain  Date Last Reviewed: 11/1/2017 2000-2018 [employer]. [address] [facility] [address], [place] [address]. All rights reserved. This information is not intended as a substitute for professional medical care. Always follow your healthcare professional's instructions.            Yecenia Dickerson M.D.        [facility]

## 2018-12-05 LAB
ANION GAP SERPL CALCULATED.3IONS-SCNC: 9 MMOL/L (ref 3–14)
BUN SERPL-MCNC: 28 MG/DL (ref 7–30)
CALCIUM SERPL-MCNC: 8.9 MG/DL (ref 8.5–10.1)
CHLORIDE SERPL-SCNC: 109 MMOL/L (ref 94–109)
CO2 SERPL-SCNC: 22 MMOL/L (ref 20–32)
CREAT SERPL-MCNC: 1.27 MG/DL (ref 0.52–1.04)
GFR SERPL CREATININE-BSD FRML MDRD: 40 ML/MIN/1.7M2
GLUCOSE SERPL-MCNC: 87 MG/DL (ref 70–99)
POTASSIUM SERPL-SCNC: 5.1 MMOL/L (ref 3.4–5.3)
SODIUM SERPL-SCNC: 140 MMOL/L (ref 133–144)
T4 FREE SERPL-MCNC: 1.04 NG/DL (ref 0.76–1.46)
TSH SERPL DL<=0.005 MIU/L-ACNC: 4.01 MU/L (ref 0.4–4)

## 2018-12-18 ENCOUNTER — OFFICE VISIT (OUTPATIENT)
Dept: FAMILY MEDICINE | Facility: CLINIC | Age: 83
End: 2018-12-18
Payer: MEDICARE

## 2018-12-18 VITALS
SYSTOLIC BLOOD PRESSURE: 144 MMHG | DIASTOLIC BLOOD PRESSURE: 70 MMHG | TEMPERATURE: 98 F | HEART RATE: 84 BPM | OXYGEN SATURATION: 97 % | RESPIRATION RATE: 16 BRPM | WEIGHT: 173 LBS | BODY MASS INDEX: 32.69 KG/M2

## 2018-12-18 DIAGNOSIS — R42 DIZZINESS: Primary | ICD-10-CM

## 2018-12-18 PROCEDURE — 99214 OFFICE O/P EST MOD 30 MIN: CPT | Performed by: FAMILY MEDICINE

## 2018-12-18 NOTE — PROGRESS NOTES
"  SUBJECTIVE:   Lara Marc is a 83 year old female who presents to clinic today for the following health issues:     Update: patient states in the morning before getting out of bed she has to sit at the edge of her bed before the dizziness goes away. This morning the dizziness did not go away as fast. Feels like she is spinning or the room is spinning.     In general the past couple of weeks, the dizziness has been pretty good during the day when she first gets up, but still feels like she has to hang on to things to walk. When she calls her daughter, she calms down and then feels better.     She says her forehead feels fuzzy. Eyes feel like she is fuzzy. Vision is okay. She has a hard time describing. Says she would not describe as brain fog or vision change, just seems a little different.     What name does patient prefer? Lara - Yes    From clinic visit on 12/4/2018:  \"  Dizziness  Onset: a couple of weeks ago     Description:   Do you feel faint:  No, but feels weak and fatigued with the vertigo  Does it feel like the surroundings (bed, room) are moving: yes, when it is occurring, when it first started she did feel as if the room was spinning   Unsteady/off balance: YES- she has to hang on to things   Have you passed out or fallen: no    Intensity: no     Progression of Symptoms:  Dizziness is getting better but she still has it when she wakes up in the morning     Accompanying Signs & Symptoms:  Heart palpitations: No new heart palpitations   sometimes when she is lying in bed she can hear her heart beating or sensation of skipped beat. Not persistent. Not new since dizziness, had this a long time   Nausea, vomiting: YES- nausea when she felt as if she was spinning   Weakness in arms or legs: YES- mainly her legs, but generally weak  Fatigue: YES  Vision or speech changes: no  Ringing in ears (Tinnitus): YES-- little bit, not new, has had this for years.   Hearing Loss: YES- gradual over time, not " new  SOB: YES sometimes, seems more so since she has been feeling fatigued recently     History:   Head trauma/concussion hx: YES- when she was little (2years old) she fell 2 stories into the basement    Previous similar symptoms: YES- when she started taking her BP medication for the first time, she was also feeling this way prior to the medication   Recent bleeding history: no      Precipitating factors:   Worse with activity or head movement: YES- she if she moves head to quickly   Any new medications (BP?): no   Alcohol/drug abuse/withdrawal: no     Alleviating factors:   Does staying in a fixed position give relief:  Not so much     Therapies Tried and outcome: exercise that she was given to relieve some tension in her ears      One day, she woke up in the morning it was quite early and the room was spinning. She got up and it did not go away. She walked around and had to hang on to things as she walked and it gradually got a little better. She has felt tired and weak. When she lies down at night she feels the spinning sensation for a little bit and in the morning has the same thing. Last night the spinning did not happen right away and same thing this morning. Feels a sensation of fuzziness in the head. Feels like she is generally tired and weak. Back in October she had shaking in the morning and went to the ER and was diagnosed with a UTI.     Couple of days ago left ear hurt, but went away after that day.       She has a hiatal hernia and when she first sits down to eat sometimes it will burn.     If she drinks too much water she gets nauseated and sometimes has to vomit. This is not new.     She has urinary leakage sometimes and has some sensation of bowel pressure and these are not new. Has also had leakage of stool. This is not new since the dizziness started, present for 6 months or more.     She reports she thinks she has allergies and has itchy eyes, congestion and cough for years. It is all year  "round, especially in the morning. This has not changed.   \"      From  viist on 11/23/18:   \"   Subjective: Yesterday morning patient woke feeling the room was spinning.  After a while she was able to get up.  Felt a little nauseous initially but as the day progressed it got better.  This morning it was worse again but again she has been up and around and it is feeling better.  She has not had anything like this before except when she was first diagnosed with hypertension.  Blood pressure has been fine.  No new medications.     Objective: Neurologic exam is entirely normal.  I cannot see her fundi.  Thyroid is normal.  Carotids are normal.  Heart is regular without murmurs.  Lungs are clear.  Abdomen benign.  Movement does not seem to cause much in the way of symptoms.     Assessment and plan: Pretty classic tightness.  It seems to be already getting better.  I explained the mechanism can try Epley maneuvers if she wants but most likely it will just run its course, usually within a week.\"    Problem list and histories reviewed & adjusted, as indicated.  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     C VAGINAL HYSTERECTOMY  9/15/06    TVH/BSO/A&P repair/sacrospinus ligament fixation......childbirth x 9     childbirth X9[       CLOSED RX RIB FRACTURE  7/06    left     DAVINCI SACROCOLPOPEXY, MIDURETHRAL SLING, CYSTOSCOPY  2/8/2013    Procedure: DAVINCI " SACROCOLPOPEXY, MIDURETHRAL SLING, CYSTOSCOPY;  DAVINCI SACROCOLPOPEXY, TVT EXACT SLING, RIGHT SALPINGO-OOPHERECTOMY, CYSTOSCOPY;  Surgeon: Bennie Galdamez MD;  Location:  OR      COLONOSCOPY THRU STOMA, DIAGNOSTIC  7/2005    diverticulosis,small polyp,recheck 5 yrs     LAPAROSCOPIC CHOLECYSTECTOMY  3/31/2011    Procedure:LAPAROSCOPIC CHOLECYSTECTOMY; Surgeon:DAVID MOLINA; Location:[facility] OR       Social History     Tobacco Use     Smoking status: Former Smoker     Packs/day: 0.00     Years: 17.00     Pack years: 0.00     Smokeless tobacco: Never Used     Tobacco comment: quit when she was 29 years old   Substance Use Topics     Alcohol use: No     Family History   Problem Relation Age of Onset     Heart Disease Father      Cancer - colorectal Brother      Prostate Cancer Brother      Diabetes Sister      Heart Disease Sister          Current Outpatient Medications   Medication Sig Dispense Refill     aspirin 81 MG EC tablet Take 81 mg by mouth daily       Cholecalciferol (VITAMIN D3 PO) Take by mouth daily       lisinopril (PRINIVIL/ZESTRIL) 10 MG tablet Take 1 tablet (10 mg) by mouth daily 90 tablet 3     magnesium 250 MG tablet Take 1 tablet by mouth daily       Multiple Vitamins-Minerals (MULTIVITAMIN OR) Take 1 tablet by mouth daily.       triamcinolone (KENALOG) 0.1 % cream Apply sparingly to affected area three times daily for 14 days. 30 g 0     Allergies   Allergen Reactions     Nkda [No Known Drug Allergies]      Seasonal Allergies      Itchy eyes and watery eyes     Recent Labs   Lab Test 12/04/18  1214 10/02/18  0706  05/10/18  1026 12/13/16  1208  06/08/16  2027 06/08/16  0913 08/18/15  1204  09/18/13  1130 02/05/13  1123   LDL  --   --   --  143* 109*  --   --   --  102   < >  --   --    HDL  --   --   --  38* 33*  --   --   --  30*   < >  --   --    TRIG  --   --   --  128 183*  --   --   --  159*   < >  --   --    ALT  --   --   --   --   --   --   --  32  --   --  26 32   CR 1.27* 1.24*   < >  1.11* 1.10*   < > 1.37* 1.23* 1.06*   < > 0.98 1.04   GFRESTIMATED 40* 41*   < > 47* 48*   < > 37* 42* 50*   < > 55* 51*   GFRESTBLACK 49* 50*   < > 57* 58*   < > 45* 51* 60*   < > 66 62   POTASSIUM 5.1 4.4   < > 4.9 4.7   < > 4.0 4.1 4.5   < > 4.2 4.6   TSH 4.01*  --   --   --   --   --  2.21  --   --   --   --   --     < > = values in this interval not displayed.      BP Readings from Last 3 Encounters:   12/18/18 144/70   12/04/18 122/62   11/23/18 118/80    Wt Readings from Last 3 Encounters:   12/18/18 78.5 kg (173 lb)   12/04/18 77.9 kg (171 lb 12 oz)   11/23/18 77.1 kg (170 lb)              Reviewed and updated as needed this visit by clinical staff  Tobacco  Allergies  Meds  Med Hx  Surg Hx  Fam Hx  Soc Hx      Reviewed and updated as needed this visit by Provider         ROS:   ROS: 10 point ROS neg other than the symptoms noted above in the HPI.     OBJECTIVE:     /70   Pulse 84   Temp 98  F (36.7  C) (Oral)   Resp 16   Wt 78.5 kg (173 lb)   SpO2 97%   BMI 32.69 kg/m    Body mass index is 32.69 kg/m .  /70   Pulse 84   Temp 98  F (36.7  C) (Oral)   Resp 16   Wt 78.5 kg (173 lb)   SpO2 97%   BMI 32.69 kg/m      Gen: alert, no acute distress       Diagnostic Test Results:  none     ASSESSMENT/PLAN:           1. Dizziness  unclear etiology with uncertain prognosis, requires further workup    Seen in UC and symptoms thought likely viral labyrinthitis. Vertigo is improving, but not resolved. May still be viral etiology. Labs completed at last visit did not show evidence of cause.  If she agrees to schedule with neurology and with otolaryngology for further evaluation.  Her daughter is here with her today and encouraging her to make those appointments.      2.Fatigue, unspecified type  Vertigo associated with vague sense of fuzziness and fatigue and difficulty balancing when the dizziness is present.?secondary to viral illness. The vertigo is improving.  See above    3. Hypertension  goal BP (blood pressure) < 140/90   reasonable control below 150/90    continue lisinopril 10 mg daily.      Patient Instructions   1) Schedule with neurology for your dizziness, balance difficulty and odd head sensation.   2) Schedule with ENT as well.   Your provider has referred you to: [facility]: [facility] (Otolaryngology) - [place] [phone]  [email or web address]  [facility]: [facility] [phone]   [email or web address]  N: [facility] [phone] [email or web address]      Yecenia Dickerson M.D.        [facility]

## 2018-12-18 NOTE — PATIENT INSTRUCTIONS
1) Schedule with neurology for your dizziness, balance difficulty and odd head sensation.   2) Schedule with ENT as well.   Your provider has referred you to: [facility] (Otolaryngology) - [place] [phone]  [email or web address]  [facility]: [facility] [phone]   [email or web address]  N: [facility] [phone] [email or web address]

## 2019-01-15 ENCOUNTER — OFFICE VISIT (OUTPATIENT)
Dept: NEUROLOGY | Facility: CLINIC | Age: 84
End: 2019-01-15
Payer: MEDICARE

## 2019-01-15 VITALS
TEMPERATURE: 98.4 F | WEIGHT: 173 LBS | RESPIRATION RATE: 12 BRPM | DIASTOLIC BLOOD PRESSURE: 93 MMHG | SYSTOLIC BLOOD PRESSURE: 170 MMHG | HEART RATE: 106 BPM | BODY MASS INDEX: 32.69 KG/M2 | OXYGEN SATURATION: 96 %

## 2019-01-15 DIAGNOSIS — R42 VERTIGO: Primary | ICD-10-CM

## 2019-01-15 PROCEDURE — 99205 OFFICE O/P NEW HI 60 MIN: CPT | Performed by: PSYCHIATRY & NEUROLOGY

## 2019-01-15 NOTE — PATIENT INSTRUCTIONS
AFTER VISIT SUMMARY (AVS):    At today's visit we thoroughly discussed various diagnostic possibilities for your symptoms and the reasons for work-up, which includes:  Orders Placed This Encounter   Procedures     MR Brain w/o & w Contrast     PHYSICAL THERAPY REFERRAL     No new medications were ordered.  We discussed the use of meclizine that could be prescribed in the future if desired.    Additional recommendations after the work-up.    Next follow-up appointment is in the next 4 weeks or earlier if needed.    Please do not hesitate to call me with any questions or concerns.    Thanks.

## 2019-01-15 NOTE — PROGRESS NOTES
"INITIAL NEUROLOGY CONSULTATION    DATE OF VISIT: 1/15/2019  CLINIC LOCATION: [facility] [place][note 3]  MRN: [id number]  PATIENT NAME: Lara Marc  YOB: 1935    PRIMARY CARE PROVIDER: Yecenia Dickerson MD.     REASON FOR VISIT:   Chief Complaint   Patient presents with     Dizziness     HISTORY OF PRESENT ILLNESS:                                                    Ms. Lara Marc is 83 year old right handed female patient with past medical history hypertension, chronic kidney disease, stage 3, gout, and hyperlipidemia, who was seen in consultation today requested by Yecenia Dickerson MD, for dizziness.    Per patient's report, she was in her usual state of health until 11/22/2018, when she woke up feeling very dizzy in the morning: \"Everything was spinning\".  She also felt nauseous and unsteady on her feet without additional associated symptoms.  This condition lasted all day, but improved after that.  However, intermittent dizziness/vertigo continued since that time.  She only notices it when she lies down, gets up, turns her head, changes her body position, or turns in bed.  It feels like spinning and continues for 10-15 seconds multiple times per day.  There are no other associated symptoms, aggravating or alleviating factors.  She did not try any treatments.  She was seen at urgent care in November 2018, and her presentation felt consistent with viral labyrinthitis.    Most recent labs from December 2018 include elevated creatinine of 1.27 with low GFR of 40, slightly elevated TSH (4.01) with normal free T4 (1.04), and normal CBC.    Previous head CT from 05/20/2005, performed for facial numbness and dizziness, was negative for acute intracranial pathology.    No additional useful information is available in Care Everywhere, which was reviewed.    The patient denies a history of recent head injury. Prior neurological history: negative for migraine, stroke, brain neoplasms, seizure disorders, " multiple sclerosis, meningitis, encephalitis, and major head injuries.    Neurologic Review of Systems - no amaurosis, diplopia, abnormal speech, unilateral numbness or weakness. She endorses insomnia, heart murmur, tinnitus, hay fever, nausea, vomiting, urinary retention/urgency arthritis, and left eye infection.  All of these symptoms were previously discussed with other medical providers. Otherwise, she denies any other complaints on 14-point comprehensive review of systems.  PAST MEDICAL/SURGICAL HISTORY:                                                    I personally reviewed patient's past medical and surgical history with the patient at today's visit.  Patient Active Problem List   Diagnosis     Kidney donor     Acquired absence of kidney     CARDIOVASCULAR SCREENING; LDL GOAL LESS THAN 130     Hypertension goal BP (blood pressure) < 140/90     CKD (chronic kidney disease) stage 3, GFR 30-59 ml/min (H)     Adjustment disorder with depressed mood     GERD (gastroesophageal reflux disease)     Complete uterovaginal prolapse     Advanced directives, counseling/discussion     Gout     Breast pain, left     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     CKD (chronic kidney disease) stage 3, GFR 30-59 ml/min (H) 7/25/2011    has 1 kidney, the right kidney is present---gave left kidney to sister     Dyspnea on exertion      Gastro-oesophageal reflux disease      Hypertension goal BP (blood pressure) < 140/90 7/25/2011     Other and unspecified hyperlipidemia      Unspecified essential hypertension     not medicated at this time     Uterovaginal prolapse, complete 2004     resolved '06     Vaginal enterocele, congenital or acquired 2007     or cystocele     Past Surgical History:   Procedure Laterality Date     C NEPHRECTOMY  1994    donated left  kidney to sister     C VAGINAL HYSTERECTOMY  9/15/06    TVH/BSO/A&P repair/sacrospinus ligament fixation......childbirth x 9     childbirth X9[       CLOSED RX RIB FRACTURE  7/06    left     DAVINCI SACROCOLPOPEXY, MIDURETHRAL SLING, CYSTOSCOPY  2/8/2013    Procedure: DAVINCI SACROCOLPOPEXY, MIDURETHRAL SLING, CYSTOSCOPY;  DAVINCI SACROCOLPOPEXY, TVT EXACT SLING, RIGHT SALPINGO-OOPHERECTOMY, CYSTOSCOPY;  Surgeon: Bennie Galdamez MD;  Location:  OR      COLONOSCOPY THRU STOMA, DIAGNOSTIC  7/2005    diverticulosis,small polyp,recheck 5 yrs     LAPAROSCOPIC CHOLECYSTECTOMY  3/31/2011    Procedure:LAPAROSCOPIC CHOLECYSTECTOMY; Surgeon:DAVID MOLINA; Location[facility]     MEDICATIONS:                                                    I personally reviewed patient's medications and allergies with the patient at today's visit.  Current Outpatient Medications on File Prior to Visit:  aspirin 81 MG EC tablet Take 81 mg by mouth daily   Cholecalciferol (VITAMIN D3 PO) Take by mouth daily   lisinopril (PRINIVIL/ZESTRIL) 10 MG tablet Take 1 tablet (10 mg) by mouth daily   magnesium 250 MG tablet Take 1 tablet by mouth daily   Multiple Vitamins-Minerals (MULTIVITAMIN OR) Take 1 tablet by mouth daily.   triamcinolone (KENALOG) 0.1 % cream Apply sparingly to affected area three times daily for 14 days.     ALLERGIES:                                                      Allergies   Allergen Reactions     Nkda [No Known Drug Allergies]      Seasonal Allergies      Itchy eyes and watery eyes     FAMILY/SOCIAL HISTORY:                                                    Family and social history was reviewed with the patient at today's visit.  No family history of neurological disorders.  Former smoker, quit when she was 29 years old.  Denies current alcohol and recreational drug use.  , lives with her sister.  Retired.  REVIEW OF SYSTEMS:                                                    Patient has completed a  [facility] Patient Health History, including a 14-system review, which was personally reviewed, and pertinent positives are listed in HPI. She denies any additional problems on the further questioning.  EXAM:                                                    VITAL SIGNS:   BP (!) 170/93 (BP Location: Left arm, Patient Position: Sitting)   Pulse 106   Temp 98.4  F (36.9  C) (Oral)   Resp 12   Wt 78.5 kg (173 lb)   SpO2 96%   BMI 32.69 kg/m    Orthostatic blood pressure:  Vitals:    01/15/19 1047 01/15/19 1050   BP: 150/77 (!) 170/93   BP Location: Left arm Left arm   Patient Position: Supine Sitting   Cuff Size: Adult Large    Pulse: 93 106   Resp: 12    Temp: 98.4  F (36.9  C)    TempSrc: Oral    SpO2: 96%    Weight: 78.5 kg (173 lb)      Mini-Cog Assessment:  Mini Cog Assessment  Clock Draw Score: 2 Normal  3 Item Recall: 3 objects recalled  Mini Cog Total Score: 5  Administered by: : Rachel Munroe MA    General: pt is in NAD, cooperative.  Skin: normal turgor, moist mucous membranes, no lesions/rashes noticed.  HEENT: ATNC, EOMI, PERRL, white sclera, normal conjunctiva, no nystagmus or ptosis. No carotid bruits bilaterally.  Respiratory: lung sounds clear to auscultation bilaterally, no crackles, wheezes, rhonchi. Symmetric lung excursion, no accessory respiratory muscle use.  Cardiovascular: normal S1/S2, loud systolic murmur, no rubs/gallops.   Abdomen: Not distended.  : deferred.    Neurological:  Mental: alert, follows commands, Mini Cog Total Score: 5/5 with 3/3 on memory recall, no aphasia or dysarthria. Fund of knowledge is appropriate for age.  Cranial Nerves:  CN II: visual acuity - able to accurately count fingers with each eye. Visual fields intact, fundi: discs sharp, no papilledema and normal vessels bilaterally.  No spontaneous nystagmus.  No skew deviation.  CN III, IV, VI: EOM intact, pupils equal and reactive  CN V: facial sensation nl  CN VII: face symmetric, no facial droop  CN  VIII: hearing bilaterally reduced.  Head impulse test is equivocal on the left and negative on the right.  [place]-Hallpike maneuver is positive on the left and negative on the right.  CN IX: palate elevation symmetric, uvula at midline  CN XI SCM normal, shoulder shrug nl  CN XII: tongue midline  Motor: Strength: 5/5 in all major groups of all extremities. Normal tone. No abnormal movements. No pronator drift b/l.  Reflexes: Triceps, biceps, brachioradialis, patellar, and achilles reflexes normal and symmetric. No clonus noted. Toes are down-going b/l.   Sensory: temperature, light touch, pinprick, and vibration intact. Romberg: negative.  Coordination: FNF and heel-shin tests intact b/l.   Gait:  Normal, able to tandem, toe, and heel walk.  DATA:   LABS/IMAGING/OTHER STUDIES: I reviewed pertinent medical records, including personal review of head CT images and Care Everywhere, as detailed in the history of present illness.  ASSESSMENT and PLAN:      ASSESSMENT: Lara Marc is a 83 year old female patient with past medical history hypertension, chronic kidney disease, stage III, gout, and hyperlipidemia, who presents with intermittent positional dizziness since November 2018.    We had a prolonged discussion with the patient regarding her symptoms.  She was not able to tolerate orthostatic testing.  Her neurological exam today demonstrated positive [place]-Hallpike maneuver on the left with equivocal head impulse test and without additional focal neurological findings.  The clinical presentation is most likely due to BPPV versus vestibular neuronitis.  However, acute vascular event in the posterior circulation territory or left cerebellopontine lesion cannot be excluded.  For that reason, I ordered brain MRI with and without contrast.  I also placed a physical therapy referral for balance/vestibular therapy.  We discussed the use of meclizine, but the patient declined it at this time.    Patient to follow up with  Primary Care provider regarding elevated blood pressure.     DIAGNOSES:    ICD-10-CM    1. Vertigo R42 MR Brain w/o & w Contrast     PHYSICAL THERAPY REFERRAL     PLAN: At today's visit we thoroughly discussed various diagnostic possibilities for patient's symptoms and the reasons for work-up, which includes:  Orders Placed This Encounter   Procedures     MR Brain w/o & w Contrast     PHYSICAL THERAPY REFERRAL     No new medications were ordered.  We discussed the use of meclizine that could be prescribed in the future if desired.    Additional recommendations after the work-up.    Next follow-up appointment is in the next 2-4 weeks or earlier if needed.    Total Time:  81 minutes with > 50% spent counseling the patient on stated above assessment and recommendations, including nature of the diagnosis, needed w/u, and proposed plan.  Additional time was used to answer patient's questions regarding her symptoms and the plan.    Angel Garcia MD  / Neurology  [place]  (Chart documentation was completed in part with Dragon voice-recognition software. Even though reviewed, some grammatical, spelling, and word errors may remain.)

## 2019-01-16 ENCOUNTER — TELEPHONE (OUTPATIENT)
Dept: FAMILY MEDICINE | Facility: CLINIC | Age: 84
End: 2019-01-16

## 2019-01-16 NOTE — TELEPHONE ENCOUNTER
Called pt to schedule open end MRI. I explain to her what the open end MRI are like, but she decline and would like to try physical therapy first then continue with MRI if it is not helping. Pt was terrified from last MRI appt. Would like MRI to be last resort if possible.      Dr. Garcia, please advise.     Rachel Munroe MA

## 2019-01-16 NOTE — TELEPHONE ENCOUNTER
Patient called explaining she had an MRI scheduled today per Dr Garcia request however, patient states she panicked and was unable to complete    Patient would like to see what options can be done    Please advise

## 2019-02-01 ENCOUNTER — HOSPITAL ENCOUNTER (OUTPATIENT)
Dept: PHYSICAL THERAPY | Facility: CLINIC | Age: 84
Setting detail: THERAPIES SERIES
End: 2019-02-01
Attending: PSYCHIATRY & NEUROLOGY
Payer: MEDICARE

## 2019-02-01 DIAGNOSIS — R42 VERTIGO: ICD-10-CM

## 2019-02-01 PROCEDURE — 95992 CANALITH REPOSITIONING PROC: CPT | Mod: GP | Performed by: PHYSICAL THERAPIST

## 2019-02-01 PROCEDURE — 97161 PT EVAL LOW COMPLEX 20 MIN: CPT | Mod: GP | Performed by: PHYSICAL THERAPIST

## 2019-02-01 NOTE — IP AVS SNAPSHOT
MRN:[id number]                      After Visit Summary   2/1/2019    Lara Marc    MRN: [id number]           Visit Information        Provider Department      2/1/2019 10:45 AM Grecia Kincaid, LIANNA [facility], [place], Physical Therapy - Outpatient        Your next 10 appointments already scheduled    Feb 26, 2019 10:00 AM CST  Return Visit with Angel Garcia MD  [facility][note 3] ([facility][note 3]) [address] [place] [id number] [phone]        Further instructions from your care team       175/87 was your blood pressure.    ?Should be 120/80 or ask your doctor.      SEE your primary doctor in next week or two - to have bp checked.    I think you had positional vertigo - I treated it 3 x today-   CALL Me on tues or wed next week to let me know how you are.    [phone]  Grecia      Care EveryWhere ID    This is your Care EveryWhere ID. This could be used by other organizations to access your [place] medical records  [id number]       Equal Access to Services    FELIPA ZUÑIAG AH: Hadii lam sage Sotim, waaxda luqadaha, qaybta kaalmada adeegraman, raffi reddy. So [facility] [phone].    ATENCIÓN: Si habla español, tiene a mckeon disposición servicios gratuitos de asistencia lingüística. Llame al [phone].    We comply with applicable federal civil rights laws and [place] laws. We do not discriminate on the basis of race, color, national origin, age, disability, sex, sexual orientation, or gender identity.

## 2019-02-01 NOTE — PROGRESS NOTES
"                                                                                              [facility]        OUTPATIENT PHYSICAL THERAPY FUNCTIONAL EVALUATION  PLAN OF TREATMENT FOR OUTPATIENT REHABILITATION  (COMPLETE FOR INITIAL CLAIMS ONLY)  Patient's Last Name, First Name, M.I.  YOB: 1935  Lara Marc     Provider's Name   [facility]   Medical Record No.  [id number]     Start of Care Date:  02/01/19   Onset Date:   1/15/19   Type:     _X__PT   ____OT  ____SLP Medical Diagnosis:   Vertigo (positional)     PT Diagnosis:  BPPV Visits from SOC:  1                              __________________________________________________________________________________  Plan of Treatment/Functional Goals:  neuromuscular re-education, other (see comments)  crms        GOALS  improved sxs  pt to report over phone or show dhi score 8 or less for \"decreased sxs w/ bed mov't\"  05/01/19        Therapy Frequency:  other (see comments)   Predicted Duration of Therapy Intervention:  up to 5x in 90 days    Grecia Kincaid, PT                                    I CERTIFY THE NEED FOR THESE SERVICES FURNISHED UNDER        THIS PLAN OF TREATMENT AND WHILE UNDER MY CARE     (Physician co-signature of this document indicates review and certification of the therapy plan).                Certification Date From:    2/1/19  Certification Date To:   5/1/19    Referring Provider:  Angel Cerrato    Initial Assessment  See Epic Evaluation- Start of Care Date: 02/01/19     Agree with plan  Enrique sanchez md  February 1, 2019            "

## 2019-02-06 NOTE — ADDENDUM NOTE
Encounter addended by: Grecia Kincaid, PT on: 2/6/2019 12:26 PM   Actions taken: Flowsheet accepted, Episode resolved

## 2019-05-20 ENCOUNTER — TELEPHONE (OUTPATIENT)
Dept: FAMILY MEDICINE | Facility: CLINIC | Age: 84
End: 2019-05-20

## 2019-05-20 NOTE — TELEPHONE ENCOUNTER
Reason for call:  Patient reporting a symptom    Symptom or request: cough, sneezing    Duration (how long have symptoms been present): 1 week    Have you been treated for this before? No    Additional comments: worried its pneumonia    Phone Number patient can be reached at:  Home number on file [phone] (home)    Best Time:  asap    Can we leave a detailed message on this number:  YES    Call taken on 5/20/2019 at 10:24 AM by Luzma Piedra

## 2019-05-20 NOTE — TELEPHONE ENCOUNTER
Patient states she has cold symptoms and has been coughing for a week.  Nasal congestion is just starting   Feels cough is dry, not really bringing things up  Has not felt feverish and has not taken temperature  States cough I starting to hurt in her chest more when coughing.  Denies feeling SOB.  Wondering if there is something OTC she can do for the cough.    Discussed drinking warm liquids to try to open things up and they can be more soothing to the throat.  Patient would be willing to be seen if Dr Dickerson thinks she should.  Please advise.  (I did inform patient of [facility] hours as no appts available at )  Shanae Noyola RN

## 2019-05-20 NOTE — TELEPHONE ENCOUNTER
She could try Robitussin DM (dextromethorphan) and/or guaifenesin over-the-counter as instructed. Yecenia Dickerson M.D.

## 2019-05-25 DIAGNOSIS — I10 HYPERTENSION GOAL BP (BLOOD PRESSURE) < 140/90: ICD-10-CM

## 2019-05-28 NOTE — TELEPHONE ENCOUNTER
"Requested Prescriptions   Pending Prescriptions Disp Refills     lisinopril (PRINIVIL/ZESTRIL) 10 MG tablet [Pharmacy Med Name: LISINOPRIL 10 MG TABLET] 90 tablet 3     Sig: TAKE 1 TABLET BY MOUTH EVERY DAY         Last Written Prescription Date:  5/10/18  Last Fill Quantity: 90,   # refills: 3  Last Office Visit: 12/18/18  Future Office visit:       Routing refill request to provider for review/approval because:  BP not at goal      ACE Inhibitors (Including Combos) Protocol Failed - 5/25/2019 12:15 AM        Failed - Blood pressure under 140/90 in past 12 months     BP Readings from Last 3 Encounters:   01/15/19 (!) 170/93   12/18/18 144/70   12/04/18 122/62                 Failed - Normal serum creatinine on file in past 12 months     Recent Labs   Lab Test 12/04/18  1214   CR 1.27*             Passed - Recent (12 mo) or future (30 days) visit within the authorizing provider's specialty     Patient had office visit in the last 12 months or has a visit in the next 30 days with authorizing provider or within the authorizing provider's specialty.  See \"Patient Info\" tab in inbasket, or \"Choose Columns\" in Meds & Orders section of the refill encounter.              Passed - Medication is active on med list        Passed - Patient is age 18 or older        Passed - No active pregnancy on record        Passed - Normal serum potassium on file in past 12 months     Recent Labs   Lab Test 12/04/18  1214   POTASSIUM 5.1             Passed - No positive pregnancy test within past 12 months          "

## 2019-05-28 NOTE — TELEPHONE ENCOUNTER
"Requested Prescriptions   Pending Prescriptions Disp Refills     lisinopril (PRINIVIL/ZESTRIL) 10 MG tablet [Pharmacy Med Name: LISINOPRIL 10 MG TABLET] 30 tablet 0     Sig: TAKE 1 TABLET BY MOUTH EVERY DAY  Last Written Prescription Date:  5/10/2018  Last Fill Quantity: 90 tablet,  # refills: 3   Last Office Visit: 12/18/2018   Future Office Visit:            ACE Inhibitors (Including Combos) Protocol Failed - 5/27/2019 10:54 PM        Failed - Blood pressure under 140/90 in past 12 months     BP Readings from Last 3 Encounters:   01/15/19 (!) 170/93   12/18/18 144/70   12/04/18 122/62           Failed - Normal serum creatinine on file in past 12 months     Recent Labs   Lab Test 12/04/18  1214   CR 1.27*           Passed - Recent (12 mo) or future (30 days) visit within the authorizing provider's specialty     Patient had office visit in the last 12 months or has a visit in the next 30 days with authorizing provider or within the authorizing provider's specialty.  See \"Patient Info\" tab in inbasket, or \"Choose Columns\" in Meds & Orders section of the refill encounter.            Passed - Medication is active on med list        Passed - Patient is age 18 or older        Passed - No active pregnancy on record        Passed - Normal serum potassium on file in past 12 months     Recent Labs   Lab Test 12/04/18  1214   POTASSIUM 5.1           Passed - No positive pregnancy test within past 12 months          "

## 2019-05-29 RX ORDER — LISINOPRIL 10 MG/1
TABLET ORAL
Qty: 90 TABLET | Refills: 1 | Status: SHIPPED | OUTPATIENT
Start: 2019-05-29 | End: 2019-09-17

## 2019-05-29 NOTE — TELEPHONE ENCOUNTER
12/12/18 OV with PCP-  . Hypertension goal BP (blood pressure) < 140/90   reasonable control below 150/90    continue lisinopril 10 mg daily.    1/15/19 OV was with neurology. Pt had vertigo.    Will fill based on PCP note.  KIM Sanford

## 2019-09-09 ENCOUNTER — NURSE TRIAGE (OUTPATIENT)
Dept: FAMILY MEDICINE | Facility: CLINIC | Age: 84
End: 2019-09-09

## 2019-09-09 ENCOUNTER — APPOINTMENT (OUTPATIENT)
Dept: GENERAL RADIOLOGY | Facility: CLINIC | Age: 84
DRG: 388 | End: 2019-09-09
Attending: FAMILY MEDICINE
Payer: MEDICARE

## 2019-09-09 ENCOUNTER — APPOINTMENT (OUTPATIENT)
Dept: CT IMAGING | Facility: CLINIC | Age: 84
DRG: 388 | End: 2019-09-09
Attending: EMERGENCY MEDICINE
Payer: MEDICARE

## 2019-09-09 ENCOUNTER — HOSPITAL ENCOUNTER (INPATIENT)
Facility: CLINIC | Age: 84
LOS: 2 days | Discharge: HOME | DRG: 388 | End: 2019-09-11
Attending: EMERGENCY MEDICINE | Admitting: SURGERY
Payer: MEDICARE

## 2019-09-09 DIAGNOSIS — K56.609 SBO (SMALL BOWEL OBSTRUCTION) (H): ICD-10-CM

## 2019-09-09 DIAGNOSIS — K56.2 CECAL VOLVULUS (H): Primary | ICD-10-CM

## 2019-09-09 LAB
ALBUMIN SERPL-MCNC: 3.4 G/DL (ref 3.4–5)
ALBUMIN UR-MCNC: NEGATIVE MG/DL
ALP SERPL-CCNC: 65 U/L (ref 40–150)
ALT SERPL W P-5'-P-CCNC: 30 U/L (ref 0–50)
ANION GAP SERPL CALCULATED.3IONS-SCNC: 6 MMOL/L (ref 3–14)
APPEARANCE UR: CLEAR
AST SERPL W P-5'-P-CCNC: 30 U/L (ref 0–45)
BASOPHILS # BLD AUTO: 0 10E9/L (ref 0–0.2)
BASOPHILS NFR BLD AUTO: 0.2 %
BILIRUB SERPL-MCNC: 0.3 MG/DL (ref 0.2–1.3)
BILIRUB UR QL STRIP: NEGATIVE
BUN SERPL-MCNC: 28 MG/DL (ref 7–30)
CALCIUM SERPL-MCNC: 8.9 MG/DL (ref 8.5–10.1)
CHLORIDE SERPL-SCNC: 109 MMOL/L (ref 94–109)
CO2 SERPL-SCNC: 28 MMOL/L (ref 20–32)
COLOR UR AUTO: YELLOW
CREAT SERPL-MCNC: 1.27 MG/DL (ref 0.52–1.04)
DIFFERENTIAL METHOD BLD: ABNORMAL
EOSINOPHIL # BLD AUTO: 0.2 10E9/L (ref 0–0.7)
EOSINOPHIL NFR BLD AUTO: 3.3 %
ERYTHROCYTE [DISTWIDTH] IN BLOOD BY AUTOMATED COUNT: 14.5 % (ref 10–15)
GFR SERPL CREATININE-BSD FRML MDRD: 39 ML/MIN/{1.73_M2}
GLUCOSE SERPL-MCNC: 95 MG/DL (ref 70–99)
GLUCOSE UR STRIP-MCNC: NEGATIVE MG/DL
HCT VFR BLD AUTO: 37.4 % (ref 35–47)
HGB BLD-MCNC: 11.6 G/DL (ref 11.7–15.7)
HGB UR QL STRIP: NEGATIVE
IMM GRANULOCYTES # BLD: 0 10E9/L (ref 0–0.4)
IMM GRANULOCYTES NFR BLD: 0.2 %
KETONES UR STRIP-MCNC: NEGATIVE MG/DL
LEUKOCYTE ESTERASE UR QL STRIP: ABNORMAL
LIPASE SERPL-CCNC: 186 U/L (ref 73–393)
LYMPHOCYTES # BLD AUTO: 1.1 10E9/L (ref 0.8–5.3)
LYMPHOCYTES NFR BLD AUTO: 23.3 %
MCH RBC QN AUTO: 25.1 PG (ref 26.5–33)
MCHC RBC AUTO-ENTMCNC: 31 G/DL (ref 31.5–36.5)
MCV RBC AUTO: 81 FL (ref 78–100)
MONOCYTES # BLD AUTO: 0.4 10E9/L (ref 0–1.3)
MONOCYTES NFR BLD AUTO: 9.3 %
MUCOUS THREADS #/AREA URNS LPF: PRESENT /LPF
NEUTROPHILS # BLD AUTO: 2.9 10E9/L (ref 1.6–8.3)
NEUTROPHILS NFR BLD AUTO: 63.7 %
NITRATE UR QL: NEGATIVE
NRBC # BLD AUTO: 0 10*3/UL
NRBC BLD AUTO-RTO: 0 /100
PH UR STRIP: 5.5 PH (ref 5–7)
PLATELET # BLD AUTO: 191 10E9/L (ref 150–450)
POTASSIUM SERPL-SCNC: 4.7 MMOL/L (ref 3.4–5.3)
PROT SERPL-MCNC: 7 G/DL (ref 6.8–8.8)
RBC # BLD AUTO: 4.63 10E12/L (ref 3.8–5.2)
RBC #/AREA URNS AUTO: 1 /HPF (ref 0–2)
SODIUM SERPL-SCNC: 143 MMOL/L (ref 133–144)
SOURCE: ABNORMAL
SP GR UR STRIP: 1.02 (ref 1–1.03)
SQUAMOUS #/AREA URNS AUTO: 2 /HPF (ref 0–1)
TROPONIN I BLD-MCNC: 0 UG/L (ref 0–0.08)
UROBILINOGEN UR STRIP-MCNC: NORMAL MG/DL (ref 0–2)
WBC # BLD AUTO: 4.5 10E9/L (ref 4–11)
WBC #/AREA URNS AUTO: 2 /HPF (ref 0–5)

## 2019-09-09 PROCEDURE — 12000001 ZZH R&B MED SURG/OB UMMC

## 2019-09-09 PROCEDURE — 25000128 H RX IP 250 OP 636: Performed by: EMERGENCY MEDICINE

## 2019-09-09 PROCEDURE — 85025 COMPLETE CBC W/AUTO DIFF WBC: CPT | Performed by: EMERGENCY MEDICINE

## 2019-09-09 PROCEDURE — 99285 EMERGENCY DEPT VISIT HI MDM: CPT | Mod: Z6 | Performed by: EMERGENCY MEDICINE

## 2019-09-09 PROCEDURE — 84484 ASSAY OF TROPONIN QUANT: CPT

## 2019-09-09 PROCEDURE — 80053 COMPREHEN METABOLIC PANEL: CPT | Performed by: EMERGENCY MEDICINE

## 2019-09-09 PROCEDURE — 74177 CT ABD & PELVIS W/CONTRAST: CPT

## 2019-09-09 PROCEDURE — 96374 THER/PROPH/DIAG INJ IV PUSH: CPT | Performed by: EMERGENCY MEDICINE

## 2019-09-09 PROCEDURE — 81001 URINALYSIS AUTO W/SCOPE: CPT | Performed by: EMERGENCY MEDICINE

## 2019-09-09 PROCEDURE — 25800030 ZZH RX IP 258 OP 636: Performed by: STUDENT IN AN ORGANIZED HEALTH CARE EDUCATION/TRAINING PROGRAM

## 2019-09-09 PROCEDURE — 25000128 H RX IP 250 OP 636: Performed by: STUDENT IN AN ORGANIZED HEALTH CARE EDUCATION/TRAINING PROGRAM

## 2019-09-09 PROCEDURE — 99285 EMERGENCY DEPT VISIT HI MDM: CPT | Mod: 25 | Performed by: EMERGENCY MEDICINE

## 2019-09-09 PROCEDURE — 83690 ASSAY OF LIPASE: CPT | Performed by: EMERGENCY MEDICINE

## 2019-09-09 PROCEDURE — 96361 HYDRATE IV INFUSION ADD-ON: CPT | Performed by: EMERGENCY MEDICINE

## 2019-09-09 PROCEDURE — 93005 ELECTROCARDIOGRAM TRACING: CPT | Performed by: EMERGENCY MEDICINE

## 2019-09-09 PROCEDURE — 71045 X-RAY EXAM CHEST 1 VIEW: CPT

## 2019-09-09 RX ORDER — ONDANSETRON 4 MG/1
4 TABLET, ORALLY DISINTEGRATING ORAL EVERY 6 HOURS PRN
Status: DISCONTINUED | OUTPATIENT
Start: 2019-09-09 | End: 2019-09-11 | Stop reason: HOSPADM

## 2019-09-09 RX ORDER — HYDROMORPHONE HCL/0.9% NACL/PF 0.2MG/0.2
0.2 SYRINGE (ML) INTRAVENOUS EVERY 4 HOURS PRN
Status: DISCONTINUED | OUTPATIENT
Start: 2019-09-09 | End: 2019-09-11 | Stop reason: HOSPADM

## 2019-09-09 RX ORDER — SODIUM CHLORIDE 9 MG/ML
INJECTION, SOLUTION INTRAVENOUS
Status: DISCONTINUED
Start: 2019-09-09 | End: 2019-09-09 | Stop reason: HOSPADM

## 2019-09-09 RX ORDER — ACETAMINOPHEN 325 MG/1
325 TABLET ORAL EVERY 4 HOURS PRN
Status: DISCONTINUED | OUTPATIENT
Start: 2019-09-09 | End: 2019-09-10

## 2019-09-09 RX ORDER — SODIUM CHLORIDE 9 MG/ML
100 INJECTION, SOLUTION INTRAVENOUS ONCE
Status: DISCONTINUED | OUTPATIENT
Start: 2019-09-09 | End: 2019-09-11 | Stop reason: HOSPADM

## 2019-09-09 RX ORDER — IOPAMIDOL 755 MG/ML
100 INJECTION, SOLUTION INTRAVASCULAR ONCE
Status: DISCONTINUED | OUTPATIENT
Start: 2019-09-09 | End: 2019-09-11 | Stop reason: HOSPADM

## 2019-09-09 RX ORDER — SODIUM CHLORIDE 9 MG/ML
INJECTION, SOLUTION INTRAVENOUS CONTINUOUS
Status: DISCONTINUED | OUTPATIENT
Start: 2019-09-09 | End: 2019-09-11

## 2019-09-09 RX ORDER — IOPAMIDOL 755 MG/ML
100 INJECTION, SOLUTION INTRAVASCULAR ONCE
Status: COMPLETED | OUTPATIENT
Start: 2019-09-09 | End: 2019-09-09

## 2019-09-09 RX ORDER — NALOXONE HYDROCHLORIDE 0.4 MG/ML
.1-.4 INJECTION, SOLUTION INTRAMUSCULAR; INTRAVENOUS; SUBCUTANEOUS
Status: DISCONTINUED | OUTPATIENT
Start: 2019-09-09 | End: 2019-09-11 | Stop reason: HOSPADM

## 2019-09-09 RX ORDER — LIDOCAINE 40 MG/G
CREAM TOPICAL
Status: DISCONTINUED | OUTPATIENT
Start: 2019-09-09 | End: 2019-09-11 | Stop reason: HOSPADM

## 2019-09-09 RX ORDER — ONDANSETRON 2 MG/ML
4 INJECTION INTRAMUSCULAR; INTRAVENOUS EVERY 6 HOURS PRN
Status: DISCONTINUED | OUTPATIENT
Start: 2019-09-09 | End: 2019-09-11 | Stop reason: HOSPADM

## 2019-09-09 RX ADMIN — IOPAMIDOL 84 ML: 755 INJECTION, SOLUTION INTRAVENOUS at 16:00

## 2019-09-09 RX ADMIN — Medication 0.2 MG: at 21:05

## 2019-09-09 RX ADMIN — SODIUM CHLORIDE 1000 ML: 9 INJECTION, SOLUTION INTRAVENOUS at 15:17

## 2019-09-09 RX ADMIN — SODIUM CHLORIDE: 9 INJECTION, SOLUTION INTRAVENOUS at 21:13

## 2019-09-09 ASSESSMENT — ENCOUNTER SYMPTOMS
FREQUENCY: 0
DIARRHEA: 1
ARTHRALGIAS: 0
CONSTIPATION: 0
ABDOMINAL DISTENTION: 1
RECTAL PAIN: 0
DIFFICULTY URINATING: 0
VOMITING: 0
ANAL BLEEDING: 0
ABDOMINAL PAIN: 1
SHORTNESS OF BREATH: 0
CONFUSION: 0
NAUSEA: 0
APPETITE CHANGE: 0
DIZZINESS: 0
BLOOD IN STOOL: 0
COLOR CHANGE: 0

## 2019-09-09 NOTE — H&P
General Surgery H&P Note  [facility]    Lara Marc MRN# [id number]   Age: 84 year old YOB: 1935     Date of Admission:  9/9/2019    Reason for consult: Concern for cecal volvulus        Requesting physician: Dr. Garcia        Level of consult: Consult, place orders and assume complete care of the patient           Assessment:   85 YO F w/ 2 days of worsening diffuse abdominal pain and CT findings c/w possible cecal volvulus versus partial large bowel obstruction. Exam w/o concern of acute abdomen, labs and vitals reassuring.         Recommendations:   -Admit to general surgery for non-operative management of possible cecal volvulus  -Exam, vitals and labs reassuring, no acute surgical intervention at this time  -NPO, IVF  -Low threshold for NGT placement if she becomes more distended, nauseated, increase in abdominal pain  -AM labs and AXR  -Serial abdominal exam overnight           History of Present Illness:   CC: Abdominal pain     History is obtained from the patient    Lara Marc is an 85 YO F w/ hx of HTN, CKD (hx of surgically absent kidney) who presents with abdominal pain. The pain started 2 days ago as a mild pain that worsened today. It is diffusely located and sharp in nature. She endorses having nausea in the ED when she drank PO contrast for her CT, no emesis. She passed gas this AM and had 3 bowel movements yesterday. Denies feeling bloated/distended. She reports that she has had similar symptoms like this in the past that resolve on their own, but never has had as severe pain as this episode. CT AP in the ED showed medialization of the cecum, dilation of the sigmoid with collapse at the site of cecum crossing concerning for partial obstruction and increased risk of cecal volvulus. Labs were unremarkable and the patient has been hemodynamically wnl. Of note, the patient also reported left-sided chest pain on presentation to the ED that felt heavy/aching and did  not radiate. Cardiac workup including Trop, EKG and echo did not show concern for acute cardiac etiology. Chest pain has now resolved and she attributes it to anxiety. Denies fevers, chills, SOB.           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     CKD (chronic kidney disease) stage 3, GFR 30-59 ml/min (H) 7/25/2011    has 1 kidney, the right kidney is present---gave left kidney to sister     Dyspnea on exertion      Gastro-oesophageal reflux disease      Hypertension goal BP (blood pressure) < 140/90 7/25/2011     Other and unspecified hyperlipidemia      Unspecified essential hypertension     not medicated at this time     Uterovaginal prolapse, complete 2004     resolved '06     Vaginal enterocele, congenital or acquired 2007     or cystocele             Past Surgical History:     Past Surgical History:   Procedure Laterality Date     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     C VAGINAL HYSTERECTOMY  9/15/06    TVH/BSO/A&P repair/sacrospinus ligament fixation......childbirth x 9     childbirth X9[       CLOSED RX RIB FRACTURE  7/06    left     DAVINCI SACROCOLPOPEXY, MIDURETHRAL SLING, CYSTOSCOPY  2/8/2013    Procedure: DAVINCI SACROCOLPOPEXY, MIDURETHRAL SLING, CYSTOSCOPY;  DAVINCI SACROCOLPOPEXY, TVT EXACT SLING, RIGHT SALPINGO-OOPHERECTOMY, CYSTOSCOPY;  Surgeon: Bennie Galdamez MD;  Location:  OR      COLONOSCOPY THRU STOMA, DIAGNOSTIC  7/2005    diverticulosis,small polyp,recheck 5 yrs     LAPAROSCOPIC CHOLECYSTECTOMY  3/31/2011    Procedure:LAPAROSCOPIC CHOLECYSTECTOMY; Surgeon:DAVID MOLINA; Location:[facility]             Social History:     Social History     Socioeconomic History     Marital status:      Spouse name: Not on file     Number of children: 9     Years of education:  Not on file     Highest education level: Not on file   Occupational History     Not on file   Social Needs     Financial resource strain: Not on file     Food insecurity:     Worry: Not on file     Inability: Not on file     Transportation needs:     Medical: Not on file     Non-medical: Not on file   Tobacco Use     Smoking status: Former Smoker     Packs/day: 0.00     Years: 17.00     Pack years: 0.00     Smokeless tobacco: Never Used     Tobacco comment: quit when she was 29 years old   Substance and Sexual Activity     Alcohol use: No     Drug use: No     Sexual activity: Never   Lifestyle     Physical activity:     Days per week: Not on file     Minutes per session: Not on file     Stress: Not on file   Relationships     Social connections:     Talks on phone: Not on file     Gets together: Not on file     Attends Alevism service: Not on file     Active member of club or organization: Not on file     Attends meetings of clubs or organizations: Not on file     Relationship status: Not on file     Intimate partner violence:     Fear of current or ex partner: Not on file     Emotionally abused: Not on file     Physically abused: Not on file     Forced sexual activity: Not on file   Other Topics Concern      Service No     Blood Transfusions Yes     Caffeine Concern Not Asked     Occupational Exposure Not Asked     Hobby Hazards Not Asked     Sleep Concern Not Asked     Stress Concern Not Asked     Weight Concern Not Asked     Special Diet Not Asked     Back Care Not Asked     Exercise Yes     Bike Helmet Not Asked     Seat Belt Yes     Self-Exams No     Parent/sibling w/ CABG, MI or angioplasty before 65F 55M? No   Social History Narrative     Not on file             Family History:     Family History   Problem Relation Age of Onset     Heart Disease Father      Cancer - colorectal Brother      Prostate Cancer Brother      Diabetes Sister      Heart Disease Sister              Allergies:      Allergies    Allergen Reactions     Nkda [No Known Drug Allergies]      Seasonal Allergies      Itchy eyes and watery eyes             Medications:     No current facility-administered medications on file prior to encounter.   Current Outpatient Medications on File Prior to Encounter:  Cholecalciferol (VITAMIN D3 PO) Take by mouth daily   lisinopril (PRINIVIL/ZESTRIL) 10 MG tablet TAKE 1 TABLET BY MOUTH EVERY DAY   magnesium 250 MG tablet Take 1 tablet by mouth daily   Multiple Vitamins-Minerals (MULTIVITAMIN OR) Take 1 tablet by mouth daily.   aspirin 81 MG EC tablet Take 81 mg by mouth daily   triamcinolone (KENALOG) 0.1 % cream Apply sparingly to affected area three times daily for 14 days.             Review of Systems:      All other review of systems negative, except for what is mentioned above        Physical Exam:   BP (!) 175/94   Pulse 83   Temp 97.7  F (36.5  C) (Oral)   Resp 16   Wt 77.7 kg (171 lb 4.8 oz)   SpO2 99%   BMI 32.37 kg/m    General: Alert, interactive, NAD  Resp: Unlabored breathing on RA  Cardiac: RRR  Abdomen: Soft, nontender, nondistended, no rebound or guarding, no hernias  Extremities: No LE edema or obvious joint abnormalities  Skin: Warm and dry, no jaundice or rash  Neuro: A&Ox3, CN 2-12 intact, STEWART            Data:     Results for orders placed or performed during the hospital encounter of 09/09/19 (from the past 24 hour(s))   UA reflex to Microscopic   Result Value Ref Range    Color Urine Yellow     Appearance Urine Clear     Glucose Urine Negative NEG^Negative mg/dL    Bilirubin Urine Negative NEG^Negative    Ketones Urine Negative NEG^Negative mg/dL    Specific Gravity Urine 1.016 1.003 - 1.035    Blood Urine Negative NEG^Negative    pH Urine 5.5 5.0 - 7.0 pH    Protein Albumin Urine Negative NEG^Negative mg/dL    Urobilinogen mg/dL Normal 0.0 - 2.0 mg/dL    Nitrite Urine Negative NEG^Negative    Leukocyte Esterase Urine Small (A) NEG^Negative    Source Midstream Urine     RBC Urine  1 0 - 2 /HPF    WBC Urine 2 0 - 5 /HPF    Squamous Epithelial /HPF Urine 2 (H) 0 - 1 /HPF    Mucous Urine Present (A) NEG^Negative /LPF   CBC with platelets differential   Result Value Ref Range    WBC 4.5 4.0 - 11.0 10e9/L    RBC Count 4.63 3.8 - 5.2 10e12/L    Hemoglobin 11.6 (L) 11.7 - 15.7 g/dL    Hematocrit 37.4 35.0 - 47.0 %    MCV 81 78 - 100 fl    MCH 25.1 (L) 26.5 - 33.0 pg    MCHC 31.0 (L) 31.5 - 36.5 g/dL    RDW 14.5 10.0 - 15.0 %    Platelet Count 191 150 - 450 10e9/L    Diff Method Automated Method     % Neutrophils 63.7 %    % Lymphocytes 23.3 %    % Monocytes 9.3 %    % Eosinophils 3.3 %    % Basophils 0.2 %    % Immature Granulocytes 0.2 %    Nucleated RBCs 0 0 /100    Absolute Neutrophil 2.9 1.6 - 8.3 10e9/L    Absolute Lymphocytes 1.1 0.8 - 5.3 10e9/L    Absolute Monocytes 0.4 0.0 - 1.3 10e9/L    Absolute Eosinophils 0.2 0.0 - 0.7 10e9/L    Absolute Basophils 0.0 0.0 - 0.2 10e9/L    Abs Immature Granulocytes 0.0 0 - 0.4 10e9/L    Absolute Nucleated RBC 0.0    Comprehensive metabolic panel   Result Value Ref Range    Sodium 143 133 - 144 mmol/L    Potassium 4.7 3.4 - 5.3 mmol/L    Chloride 109 94 - 109 mmol/L    Carbon Dioxide 28 20 - 32 mmol/L    Anion Gap 6 3 - 14 mmol/L    Glucose 95 70 - 99 mg/dL    Urea Nitrogen 28 7 - 30 mg/dL    Creatinine 1.27 (H) 0.52 - 1.04 mg/dL    GFR Estimate 39 (L) >60 mL/min/[1.73_m2]    GFR Estimate If Black 45 (L) >60 mL/min/[1.73_m2]    Calcium 8.9 8.5 - 10.1 mg/dL    Bilirubin Total 0.3 0.2 - 1.3 mg/dL    Albumin 3.4 3.4 - 5.0 g/dL    Protein Total 7.0 6.8 - 8.8 g/dL    Alkaline Phosphatase 65 40 - 150 U/L    ALT 30 0 - 50 U/L    AST 30 0 - 45 U/L   Lipase   Result Value Ref Range    Lipase 186 73 - 393 U/L   CT Abdomen Pelvis w Contrast    Narrative    CT ABDOMEN PELVIS W CONTRAST   9/9/2019 4:06 PM     HISTORY: Abd distension; ; diffuse tenderness/distension, r/o  sbo/diverticulitis    TECHNIQUE:   No IV contrast material. Radiation dose for this scan  was  reduced using automated exposure control, adjustment of the mA and/or  kV according to patient size, or iterative reconstruction technique.    COMPARISON: CT abdomen pelvis 1/5/2013    FINDINGS:      Small sliding hiatal hernia is present. The liver demonstrates  multiple 8 to 12 mm areas of hypoattenuation within the superior liver  which are incompletely characterized, statistically likely benign.  Gallbladder is surgically absent. The pancreas, spleen, and adrenals  are unremarkable. Left kidney is absent. Right kidney is unremarkable.  Abdominal aorta demonstrates infrarenal abdominal aortic aneurysm  measuring 3.3 cm in anterior to posterior dimension, previously 2.7  cm. Duodenal diverticulum is present, unchanged. There is new  medialization of the cecum, dilatation of the sigmoid colon, and  relative sigmoid colon collapse at the site of cecum crossing. There  is focal short segment inflammatory stranding along the hepatic  flexure of the colon. There is a combination of stool in fluid within  the rectum. No evidence of diverticulitis. Bladder is unremarkable.  Bone windows demonstrate no destructive or aggressive osseous lesions.      Impression    IMPRESSION:   1. New medialization of the cecum, dilatation of the sigmoid colon,  and relative sigmoid colon collapse at the site of cecum crossing.  This configuration may represent functional partial obstruction. The  patient is likely at an increased risk for volvulus. No evidence of  jaci obstruction. No evidence of diverticulitis. Stool and fluid are  present within the rectum.  2. Focal short segment inflammatory stranding surrounding the right  colon hepatic flexure, likely infectious/inflammatory.  3. Abdominal aortic aneurysm measuring up to 3.3 cm, enlarged compared  to 1/5/2013 at which time it measured 2.7 cm. Recommend continued  surveillance.    SUSANNAH CERNA MD   EKG 12-lead, tracing only   Result Value Ref Range    Interpretation ECG  Click View Image link to view waveform and result    XR Chest Port 1 View    Narrative    EXAM: XR CHEST PORT 1 VW  9/9/2019 7:16 PM      HISTORY: chest pain    COMPARISON: VQ scan 6/8/16, chest radiograph 6/8/2016.    FINDINGS: AP view of the chest. Trachea is midline, cardiac silhouette  size is enlarged. Prominent pulmonary vasculature and perihilar  fullness. No focal pulmonary opacity, pneumothorax, or pleural  effusion. Atherosclerotic calcification in the aortic arch.      Impression    IMPRESSION: Enlarged cardiac silhouette, mild pulmonary edema.         I have personally reviewed the examination and initial interpretation  and I agree with the findings.    KATHRINE RANDHAWA MD   Troponin POCT   Result Value Ref Range    Troponin I 0.00 0.00 - 0.08 ug/L        CT AP 9/9/19:  IMPRESSION:   1. New medialization of the cecum, dilatation of the sigmoid colon,  and relative sigmoid colon collapse at the site of cecum crossing.  This configuration may represent functional partial obstruction. The  patient is likely at an increased risk for volvulus. No evidence of  jaci obstruction. No evidence of diverticulitis. Stool and fluid are  present within the rectum.  2. Focal short segment inflammatory stranding surrounding the right  colon hepatic flexure, likely infectious/inflammatory.  3. Abdominal aortic aneurysm measuring up to 3.3 cm, enlarged compared  to 1/5/2013 at which time it measured 2.7 cm. Recommend continued  Surveillance.    Patient seen and discussed with staff, Dr. Yo.       Mercedez Lmia MD  Surgery Resident, PGY2

## 2019-09-09 NOTE — TELEPHONE ENCOUNTER
I called the pt and got her sister on the phone.  Her sister says her daughter just took pt to the [facility] ER for diarrhea and stomach cramping.  KIM Sanford

## 2019-09-09 NOTE — ED PROVIDER NOTES
History     Chief Complaint   Patient presents with     Abdominal Pain     Yesterday abdominal pain all quads with diarrhea 3-4x times today, denies nausea/vomiting      HPI  Lara Marc is a 84 year old female  with a history of hypertension, hyperlipidemia, stage III CKD, gout, status post total abdominal hysterectomy, bilateral sphingo-oophorectomy and sacrospinous ligament fixation (), complicated by urovaginal prolapse status post sacrocolpopexy (), left nephrectomy (donor ) and prior cholecystectomy (); who presents to the Emergency Department for evaluation of abdominal pain. Patient complains of constant diffuse abdominal pain since yesterday. She reports that pain was fairly faint yesterday and was more pronounced when she woke up this morning. She characterizes this as a cramping sensation with an intermittent sharp sensation. This is associated with bloating, intestinal rumbling as well as diarrhea--approximately three to four episodes both yesterday and today. She reports that her last bowel movement normal bowel movement was yesterday before diarrhea onset. She typically has normal daily bowel movements.     She denies any nausea, emesis, hematochezia, melena, bright blood per rectum, changes in appetite, changes in urination, vaginal bleeding, vaginal discharge, or other complaints. She denies any recent ill contacts or travels abroad. She denies any possible precipitating factors other than consuming mixed nuts yesterday. Her last oral intake was yesterday evening. She reports taking an antiacid this morning.       I have reviewed the Medications, Allergies, Past Medical and Surgical History, and Social History in the [employer] system.    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     CKD (chronic kidney  disease) stage 3, GFR 30-59 ml/min (H) 7/25/2011    has 1 kidney, the right kidney is present---gave left kidney to sister     Dyspnea on exertion      Gastro-oesophageal reflux disease      Hypertension goal BP (blood pressure) < 140/90 7/25/2011     Other and unspecified hyperlipidemia      Unspecified essential hypertension     not medicated at this time     Uterovaginal prolapse, complete 2004     resolved '06     Vaginal enterocele, congenital or acquired 2007     or cystocele       Past Surgical History:   Procedure Laterality Date     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     C VAGINAL HYSTERECTOMY  9/15/06    TVH/BSO/A&P repair/sacrospinus ligament fixation......childbirth x 9     childbirth X9[       CLOSED RX RIB FRACTURE  7/06    left     DAVINCI SACROCOLPOPEXY, MIDURETHRAL SLING, CYSTOSCOPY  2/8/2013    Procedure: DAVINCI SACROCOLPOPEXY, MIDURETHRAL SLING, CYSTOSCOPY;  DAVINCI SACROCOLPOPEXY, TVT EXACT SLING, RIGHT SALPINGO-OOPHERECTOMY, CYSTOSCOPY;  Surgeon: Bennie Galdamez MD;  Location:  OR      COLONOSCOPY THRU STOMA, DIAGNOSTIC  7/2005    diverticulosis,small polyp,recheck 5 yrs     LAPAROSCOPIC CHOLECYSTECTOMY  3/31/2011    Procedure:LAPAROSCOPIC CHOLECYSTECTOMY; Surgeon:DAVID MOLINA; Location:[facility] OR       Family History   Problem Relation Age of Onset     Heart Disease Father      Cancer - colorectal Brother      Prostate Cancer Brother      Diabetes Sister      Heart Disease Sister        Social History     Tobacco Use     Smoking status: Former Smoker     Packs/day: 0.00     Years: 17.00     Pack years: 0.00     Smokeless tobacco: Never Used     Tobacco comment: quit when she was 29 years old   Substance Use Topics     Alcohol use: No     Current Facility-Administered Medications   Medication     sodium chloride 0.9 % infusion     iopamidol (ISOVUE-370) solution 100 mL     sodium chloride 0.9 % bag 500mL for CT scan flush use     sodium chloride 0.9% infusion     Current  Outpatient Medications   Medication     Cholecalciferol (VITAMIN D3 PO)     lisinopril (PRINIVIL/ZESTRIL) 10 MG tablet     magnesium 250 MG tablet     Multiple Vitamins-Minerals (MULTIVITAMIN OR)     aspirin 81 MG EC tablet     triamcinolone (KENALOG) 0.1 % cream        Allergies   Allergen Reactions     Nkda [No Known Drug Allergies]      Seasonal Allergies      Itchy eyes and watery eyes       Review of Systems   Constitutional: Negative for appetite change.   HENT: Negative for congestion.    Respiratory: Negative for shortness of breath.    Cardiovascular: Negative for chest pain.   Gastrointestinal: Positive for abdominal distention, abdominal pain (diffuse) and diarrhea. Negative for anal bleeding, blood in stool, constipation, nausea, rectal pain and vomiting.   Genitourinary: Negative for decreased urine volume, difficulty urinating, frequency, vaginal bleeding and vaginal discharge.   Musculoskeletal: Negative for arthralgias.   Skin: Negative for color change.   Neurological: Negative for dizziness.   Psychiatric/Behavioral: Negative for confusion.       Physical Exam   BP: (!) 203/84  Heart Rate: 87  Temp: 98.5  F (36.9  C)  Resp: 16  Weight: 77.7 kg (171 lb 4.8 oz)  SpO2: 98 %      Physical Exam   Constitutional: No distress.   HENT:   Head: Atraumatic.   Mouth/Throat: Oropharynx is clear and moist. No oropharyngeal exudate.   Eyes: Pupils are equal, round, and reactive to light. No scleral icterus.   Cardiovascular: Normal heart sounds and intact distal pulses.   Pulmonary/Chest: Breath sounds normal. No respiratory distress.   Abdominal: Soft. Bowel sounds are normal. There is no tenderness.   Abdomen is moderately distended.  Diffusely tender in all quadrants.  No obvious point tenderness.  Mild diffuse guarding.  No rebound tenderness.  No overlying skin changes.  Hyperactive bowel sounds throughout.   Musculoskeletal: She exhibits no edema or tenderness.   Skin: Skin is warm. No rash noted. She is  not diaphoretic.       ED Course            Labs Ordered and Resulted from Time of ED Arrival Up to the Time of Departure from the ED   CBC WITH PLATELETS DIFFERENTIAL - Abnormal; Notable for the following components:       Result Value    Hemoglobin 11.6 (*)     MCH 25.1 (*)     MCHC 31.0 (*)     All other components within normal limits   COMPREHENSIVE METABOLIC PANEL - Abnormal; Notable for the following components:    Creatinine 1.27 (*)     GFR Estimate 39 (*)     GFR Estimate If Black 45 (*)     All other components within normal limits   URINE MACROSCOPIC WITH REFLEX TO MICRO - Abnormal; Notable for the following components:    Leukocyte Esterase Urine Small (*)     Squamous Epithelial /HPF Urine 2 (*)     Mucous Urine Present (*)     All other components within normal limits   LIPASE            Assessments & Plan (with Medical Decision Making)   Patient presented for evaluation of abdominal pain.  On arrival, patient has normal vital signs.  She is visibly in pain.  Preferring to lay on her left side.  Abdomen is diffusely tender to palpation and moderately distended with hyperactive bowel sounds.  Initial concern is for small bowel obstruction, possible diverticulitis.  CT scan findings suggestive of functional small bowel obstruction versus developing volvulus.  Laboratory work-up is largely unremarkable.  Pain is controlled emergency department.  Call placed to general surgeon, currently awaiting callback.  Spoke with the general surgeon on-call, requested transfer to the [facility] for a consultation in the emergency department.  Staff notified.      I have reviewed the nursing notes.    I have reviewed the findings, diagnosis, plan and need for follow up with the patient.    New Prescriptions    No medications on file       Final diagnoses:   None   IGenesis, am serving as a trained medical scribe to document services personally performed by Edward Carlson DO, based on the provider's statements to  me.   I, Edward Carlson DO, was physically present and have reviewed and verified the accuracy of this note documented by Genesis Andres.      9/9/2019   [facility], [place], EMERGENCY DEPARTMENT     Edward Carlson DO  09/09/19 9929

## 2019-09-09 NOTE — ED NOTES
Bed: ED23  Expected date: 9/9/19  Expected time: 5:20 PM  Means of arrival: Ambulance  Comments:  Lara Marc 84 F with AP. pSBO on CT with high risk for cecal volvulus. Surgery to see her in ED. [place] [facility].     Maddie Echeverria MD  09/09/19 9074

## 2019-09-09 NOTE — TELEPHONE ENCOUNTER
Reason for call:  Patient reporting a symptom    Symptom or request: diarrhea and stomach cramping    Duration (how long have symptoms been present): started yesterday    Have you been treated for this before? No    Additional comments: Pt is having stomach pain with diarrhea and symptoms are not improving.     Phone Number patient can be reached at:  Home number on file [phone] (home)    Best Time:  anytime    Can we leave a detailed message on this number:  YES    Call taken on 9/9/2019 at 12:04 PM by Laura Monique

## 2019-09-10 ENCOUNTER — APPOINTMENT (OUTPATIENT)
Dept: GENERAL RADIOLOGY | Facility: CLINIC | Age: 84
DRG: 388 | End: 2019-09-10
Payer: MEDICARE

## 2019-09-10 ENCOUNTER — APPOINTMENT (OUTPATIENT)
Dept: CT IMAGING | Facility: CLINIC | Age: 84
DRG: 388 | End: 2019-09-10
Attending: STUDENT IN AN ORGANIZED HEALTH CARE EDUCATION/TRAINING PROGRAM
Payer: MEDICARE

## 2019-09-10 LAB
ANION GAP SERPL CALCULATED.3IONS-SCNC: 8 MMOL/L (ref 3–14)
BUN SERPL-MCNC: 23 MG/DL (ref 7–30)
CALCIUM SERPL-MCNC: 8.4 MG/DL (ref 8.5–10.1)
CHLORIDE SERPL-SCNC: 114 MMOL/L (ref 94–109)
CO2 SERPL-SCNC: 20 MMOL/L (ref 20–32)
CREAT SERPL-MCNC: 1.24 MG/DL (ref 0.52–1.04)
ERYTHROCYTE [DISTWIDTH] IN BLOOD BY AUTOMATED COUNT: 14.6 % (ref 10–15)
GFR SERPL CREATININE-BSD FRML MDRD: 40 ML/MIN/{1.73_M2}
GLUCOSE SERPL-MCNC: 87 MG/DL (ref 70–99)
HCT VFR BLD AUTO: 35.5 % (ref 35–47)
HGB BLD-MCNC: 10.5 G/DL (ref 11.7–15.7)
INTERPRETATION ECG - MUSE: NORMAL
LACTATE BLD-SCNC: 0.8 MMOL/L (ref 0.7–2)
MCH RBC QN AUTO: 24.7 PG (ref 26.5–33)
MCHC RBC AUTO-ENTMCNC: 29.6 G/DL (ref 31.5–36.5)
MCV RBC AUTO: 84 FL (ref 78–100)
PLATELET # BLD AUTO: 175 10E9/L (ref 150–450)
POTASSIUM SERPL-SCNC: 4.1 MMOL/L (ref 3.4–5.3)
RBC # BLD AUTO: 4.25 10E12/L (ref 3.8–5.2)
SODIUM SERPL-SCNC: 144 MMOL/L (ref 133–144)
WBC # BLD AUTO: 4 10E9/L (ref 4–11)

## 2019-09-10 PROCEDURE — 12000001 ZZH R&B MED SURG/OB UMMC

## 2019-09-10 PROCEDURE — 25800030 ZZH RX IP 258 OP 636: Performed by: STUDENT IN AN ORGANIZED HEALTH CARE EDUCATION/TRAINING PROGRAM

## 2019-09-10 PROCEDURE — 25000128 H RX IP 250 OP 636: Performed by: STUDENT IN AN ORGANIZED HEALTH CARE EDUCATION/TRAINING PROGRAM

## 2019-09-10 PROCEDURE — 25000132 ZZH RX MED GY IP 250 OP 250 PS 637: Mod: GY | Performed by: STUDENT IN AN ORGANIZED HEALTH CARE EDUCATION/TRAINING PROGRAM

## 2019-09-10 PROCEDURE — 80048 BASIC METABOLIC PNL TOTAL CA: CPT | Performed by: STUDENT IN AN ORGANIZED HEALTH CARE EDUCATION/TRAINING PROGRAM

## 2019-09-10 PROCEDURE — 74176 CT ABD & PELVIS W/O CONTRAST: CPT

## 2019-09-10 PROCEDURE — 85027 COMPLETE CBC AUTOMATED: CPT | Performed by: STUDENT IN AN ORGANIZED HEALTH CARE EDUCATION/TRAINING PROGRAM

## 2019-09-10 PROCEDURE — 83605 ASSAY OF LACTIC ACID: CPT | Performed by: STUDENT IN AN ORGANIZED HEALTH CARE EDUCATION/TRAINING PROGRAM

## 2019-09-10 PROCEDURE — 74019 RADEX ABDOMEN 2 VIEWS: CPT

## 2019-09-10 PROCEDURE — 36415 COLL VENOUS BLD VENIPUNCTURE: CPT | Performed by: STUDENT IN AN ORGANIZED HEALTH CARE EDUCATION/TRAINING PROGRAM

## 2019-09-10 RX ORDER — POLYETHYLENE GLYCOL 3350 17 G/17G
17 POWDER, FOR SOLUTION ORAL 2 TIMES DAILY
Qty: 14 PACKET | Refills: 0 | Status: SHIPPED | OUTPATIENT
Start: 2019-09-10 | End: 2019-09-17

## 2019-09-10 RX ORDER — POLYETHYLENE GLYCOL 3350 17 G/17G
17 POWDER, FOR SOLUTION ORAL 2 TIMES DAILY
Status: DISCONTINUED | OUTPATIENT
Start: 2019-09-10 | End: 2019-09-11 | Stop reason: HOSPADM

## 2019-09-10 RX ORDER — ACETAMINOPHEN 325 MG/1
650 TABLET ORAL EVERY 8 HOURS
COMMUNITY
Start: 2019-09-10

## 2019-09-10 RX ORDER — HYDRALAZINE HYDROCHLORIDE 20 MG/ML
10-20 INJECTION INTRAMUSCULAR; INTRAVENOUS EVERY 6 HOURS PRN
Status: DISCONTINUED | OUTPATIENT
Start: 2019-09-10 | End: 2019-09-11 | Stop reason: HOSPADM

## 2019-09-10 RX ORDER — ACETAMINOPHEN 325 MG/1
650 TABLET ORAL EVERY 8 HOURS
Status: DISCONTINUED | OUTPATIENT
Start: 2019-09-10 | End: 2019-09-11 | Stop reason: HOSPADM

## 2019-09-10 RX ORDER — LISINOPRIL 10 MG/1
10 TABLET ORAL DAILY
Status: DISCONTINUED | OUTPATIENT
Start: 2019-09-10 | End: 2019-09-11 | Stop reason: HOSPADM

## 2019-09-10 RX ADMIN — SODIUM CHLORIDE: 9 INJECTION, SOLUTION INTRAVENOUS at 06:13

## 2019-09-10 RX ADMIN — LISINOPRIL 10 MG: 10 TABLET ORAL at 15:35

## 2019-09-10 RX ADMIN — SODIUM CHLORIDE: 9 INJECTION, SOLUTION INTRAVENOUS at 15:42

## 2019-09-10 RX ADMIN — ENOXAPARIN SODIUM 40 MG: 40 INJECTION SUBCUTANEOUS at 13:06

## 2019-09-10 RX ADMIN — ACETAMINOPHEN 650 MG: 325 TABLET, FILM COATED ORAL at 19:30

## 2019-09-10 ASSESSMENT — ACTIVITIES OF DAILY LIVING (ADL)
ADLS_ACUITY_SCORE: 11
RETIRED_COMMUNICATION: 0-->UNDERSTANDS/COMMUNICATES WITHOUT DIFFICULTY
SWALLOWING: 0-->SWALLOWS FOODS/LIQUIDS WITHOUT DIFFICULTY
TOILETING: 0-->INDEPENDENT
TRANSFERRING: 0-->INDEPENDENT
ADLS_ACUITY_SCORE: 13
BATHING: 0-->INDEPENDENT
DRESS: 0-->INDEPENDENT
COGNITION: 0 - NO COGNITION ISSUES REPORTED
ADLS_ACUITY_SCORE: 12
ADLS_ACUITY_SCORE: 12
RETIRED_EATING: 0-->INDEPENDENT
AMBULATION: 0-->INDEPENDENT
ADLS_ACUITY_SCORE: 12
FALL_HISTORY_WITHIN_LAST_SIX_MONTHS: NO
ADLS_ACUITY_SCORE: 13

## 2019-09-10 ASSESSMENT — PAIN DESCRIPTION - DESCRIPTORS: DESCRIPTORS: ACHING;HEADACHE

## 2019-09-10 NOTE — PLAN OF CARE
Pt arrived from ED at 2250. /74 upon arrival, other vitals stable. Pt c/o quite a bit of pain in left abdomen/back, cannot have IV dilaudid again until 0100. Cross-cover text paged that pt is in pain and cannot have IV dilaudid again for 2 hrs, and that pt is hypertensive/states she did not take her Lisinopril tonight. NPO except meds. IVF infusing into PIV @ 100 mL/hr. Denies nausea. Pt's son called, and would like an update when possible. Continue with POC.

## 2019-09-10 NOTE — PROGRESS NOTES
General Surgery Progress Note    Subjective:  No acute events overnight. Patient reports passing flatus and having small bowel movements. Patient has been NPO. Voiding with adequate UOP. Hypertensive up to  this AM, otherwise vitals wnl.    Vitals:  Vitals:    09/09/19 2359 09/10/19 0325 09/10/19 0716 09/10/19 1000   BP: 136/57 (!) 143/53 (!) 180/85 (!) 150/83   BP Location: Left arm Right arm Left arm Left arm   Pulse: 85      Resp: 14 16 16 16   Temp: 96.3  F (35.7  C) 97  F (36.1  C) 97  F (36.1  C)    TempSrc: Oral Oral Oral    SpO2: 96% 96% 97% 97%   Weight:    77.8 kg (171 lb 9.6 oz)     I/O:  I/O last 3 completed shifts:  In: 811 [I.V.:811]  Out: 600 [Urine:600]    Physical Exam:  Gen: AAOx3, NAD  Heart:   RRR  Pulm: Non-labored breathing on RA  Abd: Soft, minimally distended, non-tender to deep palpation, no rebound or guarding    BMP  Recent Labs   Lab 09/10/19  0704 09/09/19  1358    143   POTASSIUM 4.1 4.7   CHLORIDE 114* 109   CO2 20 28   BUN 23 28   CR 1.24* 1.27*   GLC 87 95     CBC  Recent Labs   Lab 09/10/19  0704 09/09/19  1358   WBC 4.0 4.5   HGB 10.5* 11.6*   HCT 35.5 37.4    191       Imaging:  AXR 9/10/19  Impression:   Grossly stable gaseous distention of the sigmoid colon in a  configuration concerning for sigmoid volvulus. No pneumatosis, portal  venous gas, or intraabdominal free air.      A/P: This is a 84 year old female with abdominal pain at CT findings c/w with possible cecal volvulus. Transition point seen in sigmoid colon. She continues to have reassuring exam, no leukocytosis, lactic acid wnl, and she is hemodynamically normal.    -Will obtain CT AP with rectal contrast this AM to further clarify anatomy and need for possible surgical intervention   -Pain control with tylenol scheduled, oxycodone prn, dilaudid prn  -Holding PTA ASA and ACE inhibitor, hydralazine PRN for hypertension  -NPO, low threshold for NGT if pain worsens, n/v  -Ppx: Lovenox,  ambulation    Patient seen with chief resident, Dr. iVllagomez, who discussed with staff.     Mercedez Lima MD  Surgery Resident, PGY2

## 2019-09-10 NOTE — PROGRESS NOTES
SERIAL ABDOMINAL EXAM    Pt states she has been able to get some sleep. No change in abdominal pain. Denies having gas but states may need to use restroom soon.    Gen: NAD, A&Ox3  CV: RRR  Pulm: Non labored breathing  Ab: soft, obese, non distended, minimally tender to palpation, no guarding or rebound appreciated. No signs of peritonitis    P:   -Continue serial abdominal exams  -Remainder of cares per primary    Tommy Martinez MD  PGY-1, Surgery Crosscover

## 2019-09-10 NOTE — ED NOTES
Patient seen initially on the [facility] ER please defer to their note.  Patient describes abdominal pain CT scan showed concerns for bowel obstruction with questionable cecal volvulus.  Was also noted to have a 3.3 aorta infrarenal dilatation noted without signs of dissection.  Patient was transferred to the [facility] for surgery to see.  Patient describes some left chest pain that resolved.  EKG was done without hyperacute changes.  Medicare troponin negative.  Chest x-ray done revealing no mediastinal widening etc.  Symptoms had improved patient seen by surgery here in the ER will be admitted to Dr. Yo service for bowel obstruction at this point.  No acute surgical intervention was indicated per their team at this point as I did see the patient in the ER. Echo done bedside also.    Limited Bedside Cardiac Ultrasound, performed and interpreted by me.   Indication: Chest Pain.  Parasternal long axis, parasternal short axis and apical 4 chamber views were acquired.   Image quality was satisfactory.    Findings:    Global left ventricular function appears intact.  Chambers do not appear dilated. Aortic root appears normal without dilitation.  There is no evidence of free fluid within the pericardium.    IMPRESSION: Grossly normal left ventricular function and chamber size.  No pericardial effusion..      Results for orders placed or performed during the hospital encounter of 09/09/19   CT Abdomen Pelvis w Contrast    Narrative    CT ABDOMEN PELVIS W CONTRAST   9/9/2019 4:06 PM     HISTORY: Abd distension; ; diffuse tenderness/distension, r/o  sbo/diverticulitis    TECHNIQUE:   No IV contrast material. Radiation dose for this scan was  reduced using automated exposure control, adjustment of the mA and/or  kV according to patient size, or iterative reconstruction technique.    COMPARISON: CT abdomen pelvis 1/5/2013    FINDINGS:      Small sliding hiatal hernia is present. The liver  demonstrates  multiple 8 to 12 mm areas of hypoattenuation within the superior liver  which are incompletely characterized, statistically likely benign.  Gallbladder is surgically absent. The pancreas, spleen, and adrenals  are unremarkable. Left kidney is absent. Right kidney is unremarkable.  Abdominal aorta demonstrates infrarenal abdominal aortic aneurysm  measuring 3.3 cm in anterior to posterior dimension, previously 2.7  cm. Duodenal diverticulum is present, unchanged. There is new  medialization of the cecum, dilatation of the sigmoid colon, and  relative sigmoid colon collapse at the site of cecum crossing. There  is focal short segment inflammatory stranding along the hepatic  flexure of the colon. There is a combination of stool in fluid within  the rectum. No evidence of diverticulitis. Bladder is unremarkable.  Bone windows demonstrate no destructive or aggressive osseous lesions.      Impression    IMPRESSION:   1. New medialization of the cecum, dilatation of the sigmoid colon,  and relative sigmoid colon collapse at the site of cecum crossing.  This configuration may represent functional partial obstruction. The  patient is likely at an increased risk for volvulus. No evidence of  jaci obstruction. No evidence of diverticulitis. Stool and fluid are  present within the rectum.  2. Focal short segment inflammatory stranding surrounding the right  colon hepatic flexure, likely infectious/inflammatory.  3. Abdominal aortic aneurysm measuring up to 3.3 cm, enlarged compared  to 1/5/2013 at which time it measured 2.7 cm. Recommend continued  surveillance.    SUSANNAH CERNA MD   XR Chest Port 1 View    Narrative    EXAM: XR CHEST PORT 1 VW  9/9/2019 7:16 PM      HISTORY: chest pain    COMPARISON: VQ scan 6/8/16, chest radiograph 6/8/2016.    FINDINGS: AP view of the chest. Trachea is midline, cardiac silhouette  size is enlarged. Prominent pulmonary vasculature and perihilar  fullness. No focal pulmonary  opacity, pneumothorax, or pleural  effusion. Atherosclerotic calcification in the aortic arch.      Impression    IMPRESSION: Enlarged cardiac silhouette, mild pulmonary edema.         I have personally reviewed the examination and initial interpretation  and I agree with the findings.    KATHRINE RANDHAWA MD   CBC with platelets differential   Result Value Ref Range    WBC 4.5 4.0 - 11.0 10e9/L    RBC Count 4.63 3.8 - 5.2 10e12/L    Hemoglobin 11.6 (L) 11.7 - 15.7 g/dL    Hematocrit 37.4 35.0 - 47.0 %    MCV 81 78 - 100 fl    MCH 25.1 (L) 26.5 - 33.0 pg    MCHC 31.0 (L) 31.5 - 36.5 g/dL    RDW 14.5 10.0 - 15.0 %    Platelet Count 191 150 - 450 10e9/L    Diff Method Automated Method     % Neutrophils 63.7 %    % Lymphocytes 23.3 %    % Monocytes 9.3 %    % Eosinophils 3.3 %    % Basophils 0.2 %    % Immature Granulocytes 0.2 %    Nucleated RBCs 0 0 /100    Absolute Neutrophil 2.9 1.6 - 8.3 10e9/L    Absolute Lymphocytes 1.1 0.8 - 5.3 10e9/L    Absolute Monocytes 0.4 0.0 - 1.3 10e9/L    Absolute Eosinophils 0.2 0.0 - 0.7 10e9/L    Absolute Basophils 0.0 0.0 - 0.2 10e9/L    Abs Immature Granulocytes 0.0 0 - 0.4 10e9/L    Absolute Nucleated RBC 0.0    Comprehensive metabolic panel   Result Value Ref Range    Sodium 143 133 - 144 mmol/L    Potassium 4.7 3.4 - 5.3 mmol/L    Chloride 109 94 - 109 mmol/L    Carbon Dioxide 28 20 - 32 mmol/L    Anion Gap 6 3 - 14 mmol/L    Glucose 95 70 - 99 mg/dL    Urea Nitrogen 28 7 - 30 mg/dL    Creatinine 1.27 (H) 0.52 - 1.04 mg/dL    GFR Estimate 39 (L) >60 mL/min/[1.73_m2]    GFR Estimate If Black 45 (L) >60 mL/min/[1.73_m2]    Calcium 8.9 8.5 - 10.1 mg/dL    Bilirubin Total 0.3 0.2 - 1.3 mg/dL    Albumin 3.4 3.4 - 5.0 g/dL    Protein Total 7.0 6.8 - 8.8 g/dL    Alkaline Phosphatase 65 40 - 150 U/L    ALT 30 0 - 50 U/L    AST 30 0 - 45 U/L   Lipase   Result Value Ref Range    Lipase 186 73 - 393 U/L   UA reflex to Microscopic   Result Value Ref Range    Color Urine Yellow     Appearance  Urine Clear     Glucose Urine Negative NEG^Negative mg/dL    Bilirubin Urine Negative NEG^Negative    Ketones Urine Negative NEG^Negative mg/dL    Specific Gravity Urine 1.016 1.003 - 1.035    Blood Urine Negative NEG^Negative    pH Urine 5.5 5.0 - 7.0 pH    Protein Albumin Urine Negative NEG^Negative mg/dL    Urobilinogen mg/dL Normal 0.0 - 2.0 mg/dL    Nitrite Urine Negative NEG^Negative    Leukocyte Esterase Urine Small (A) NEG^Negative    Source Midstream Urine     RBC Urine 1 0 - 2 /HPF    WBC Urine 2 0 - 5 /HPF    Squamous Epithelial /HPF Urine 2 (H) 0 - 1 /HPF    Mucous Urine Present (A) NEG^Negative /LPF   EKG 12-lead, tracing only   Result Value Ref Range    Interpretation ECG Click View Image link to view waveform and result    Troponin POCT   Result Value Ref Range    Troponin I 0.00 0.00 - 0.08 ug/L          Lake Garcia MD  09/10/19 0230

## 2019-09-10 NOTE — PLAN OF CARE
A&O. VSS- slightly hypertensive. Minimal pain in left abd and back, declines pain interventions. Pt informed to call if need pain meds. R PIV x 1 infusing MIVF. Spontaneously voiding. Passing gas, no BM reported overnight. NPO except meds. Denies nausea. Admission questions started, need to complete due to pt request to sleep. Skin assessment not yet complete. SBA. Continue with plan of care.

## 2019-09-10 NOTE — ED NOTES
[facility], [place]   ED Nurse to Floor Handoff     Lara Marc is a 84 year old female who speaks English and lives alone,  in a home  They arrived in the ED by ambulance from emergency room    ED Chief Complaint: Abdominal Pain (Yesterday abdominal pain all quads with diarrhea 3-4x times today, denies nausea/vomiting )    ED Dx;   Final diagnoses:   SBO (small bowel obstruction) (H)         Needed?: No    Allergies:   Allergies   Allergen Reactions     Nkda [No Known Drug Allergies]      Seasonal Allergies      Itchy eyes and watery eyes   .  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     CKD (chronic kidney disease) stage 3, GFR 30-59 ml/min (H) 7/25/2011    has 1 kidney, the right kidney is present---gave left kidney to sister     Dyspnea on exertion      Gastro-oesophageal reflux disease      Hypertension goal BP (blood pressure) < 140/90 7/25/2011     Other and unspecified hyperlipidemia      Unspecified essential hypertension     not medicated at this time     Uterovaginal prolapse, complete 2004     resolved '06     Vaginal enterocele, congenital or acquired 2007     or cystocele      Baseline Mental status: WDL  Current Mental Status changes: at basesline    Infection present or suspected this encounter: no  Sepsis suspected: No  Isolation type: No active isolations     Activity level - Baseline/Home:  Independent  Activity Level - Current:   Independent    Bariatric equipment needed?: No    In the ED these meds were given:   Medications   iopamidol (ISOVUE-370) solution 100 mL (100 mLs Intravenous Not Given 9/9/19 1548)   sodium chloride 0.9 % bag 500mL for CT scan flush use (has no administration in time range)   sodium chloride 0.9% infusion (100 mLs Intravenous Not Given  9/9/19 1549)   lidocaine 1 % 0.1-1 mL (has no administration in time range)   lidocaine (LMX4) cream (has no administration in time range)   sodium chloride (PF) 0.9% PF flush 3 mL (has no administration in time range)   sodium chloride (PF) 0.9% PF flush 3 mL (3 mLs Intracatheter Not Given 9/9/19 2113)   naloxone (NARCAN) injection 0.1-0.4 mg (has no administration in time range)   melatonin tablet 1 mg (has no administration in time range)   sodium chloride 0.9% infusion ( Intravenous New Bag 9/9/19 2113)   ondansetron (ZOFRAN-ODT) ODT tab 4 mg (has no administration in time range)     Or   ondansetron (ZOFRAN) injection 4 mg (has no administration in time range)   acetaminophen (TYLENOL) tablet 325 mg (has no administration in time range)   oxyCODONE IR (ROXICODONE) half-tab 2.5 mg (has no administration in time range)   HYDROmorphone (DILAUDID) injection 0.2 mg (0.2 mg Intravenous Given 9/9/19 2105)   enoxaparin (LOVENOX) injection 40 mg (has no administration in time range)   0.9% sodium chloride BOLUS (0 mLs Intravenous Stopped 9/9/19 1714)   iopamidol (ISOVUE-370) solution 100 mL (84 mLs Intravenous Given 9/9/19 1600)       Drips running?  Yes Normal Saline @ 100mls/hr    Home pump  No    Current LDAs  Peripheral IV 09/09/19 Right Hand (Active)   Site Assessment WDL 9/9/2019  2:30 PM   Line Status Saline locked 9/9/2019  2:30 PM   Phlebitis Scale 0-->no symptoms 9/9/2019  2:30 PM   Infiltration Scale 0 9/9/2019  2:30 PM   Extravasation? No 9/9/2019  2:30 PM   Dressing Intervention New dressing  9/9/2019  2:30 PM   Number of days: 0       Incision/Surgical Site 03/31/11 Midline;Right;Lateral Abdomen (Active)   Number of days: 3084       Incision/Surgical Site 02/08/13 Abdomen (Active)   Number of days: 2404       Incision/Surgical Site 02/08/13 Bilateral Pubis (Active)   Number of days: 2404       Labs results:   Labs Ordered and Resulted from Time of ED Arrival Up to the Time of Departure from the ED   CBC  WITH PLATELETS DIFFERENTIAL - Abnormal; Notable for the following components:       Result Value    Hemoglobin 11.6 (*)     MCH 25.1 (*)     MCHC 31.0 (*)     All other components within normal limits   COMPREHENSIVE METABOLIC PANEL - Abnormal; Notable for the following components:    Creatinine 1.27 (*)     GFR Estimate 39 (*)     GFR Estimate If Black 45 (*)     All other components within normal limits   URINE MACROSCOPIC WITH REFLEX TO MICRO - Abnormal; Notable for the following components:    Leukocyte Esterase Urine Small (*)     Squamous Epithelial /HPF Urine 2 (*)     Mucous Urine Present (*)     All other components within normal limits   LIPASE       Imaging Studies:   Recent Results (from the past 24 hour(s))   CT Abdomen Pelvis w Contrast    Narrative    CT ABDOMEN PELVIS W CONTRAST   9/9/2019 4:06 PM     HISTORY: Abd distension; ; diffuse tenderness/distension, r/o  sbo/diverticulitis    TECHNIQUE:   No IV contrast material. Radiation dose for this scan was  reduced using automated exposure control, adjustment of the mA and/or  kV according to patient size, or iterative reconstruction technique.    COMPARISON: CT abdomen pelvis 1/5/2013    FINDINGS:      Small sliding hiatal hernia is present. The liver demonstrates  multiple 8 to 12 mm areas of hypoattenuation within the superior liver  which are incompletely characterized, statistically likely benign.  Gallbladder is surgically absent. The pancreas, spleen, and adrenals  are unremarkable. Left kidney is absent. Right kidney is unremarkable.  Abdominal aorta demonstrates infrarenal abdominal aortic aneurysm  measuring 3.3 cm in anterior to posterior dimension, previously 2.7  cm. Duodenal diverticulum is present, unchanged. There is new  medialization of the cecum, dilatation of the sigmoid colon, and  relative sigmoid colon collapse at the site of cecum crossing. There  is focal short segment inflammatory stranding along the hepatic  flexure of the  colon. There is a combination of stool in fluid within  the rectum. No evidence of diverticulitis. Bladder is unremarkable.  Bone windows demonstrate no destructive or aggressive osseous lesions.      Impression    IMPRESSION:   1. New medialization of the cecum, dilatation of the sigmoid colon,  and relative sigmoid colon collapse at the site of cecum crossing.  This configuration may represent functional partial obstruction. The  patient is likely at an increased risk for volvulus. No evidence of  jaci obstruction. No evidence of diverticulitis. Stool and fluid are  present within the rectum.  2. Focal short segment inflammatory stranding surrounding the right  colon hepatic flexure, likely infectious/inflammatory.  3. Abdominal aortic aneurysm measuring up to 3.3 cm, enlarged compared  to 1/5/2013 at which time it measured 2.7 cm. Recommend continued  surveillance.    SUSANNAH CRENA MD   XR Chest Port 1 View    Narrative    EXAM: XR CHEST PORT 1 VW  9/9/2019 7:16 PM      HISTORY: chest pain    COMPARISON: VQ scan 6/8/16, chest radiograph 6/8/2016.    FINDINGS: AP view of the chest. Trachea is midline, cardiac silhouette  size is enlarged. Prominent pulmonary vasculature and perihilar  fullness. No focal pulmonary opacity, pneumothorax, or pleural  effusion. Atherosclerotic calcification in the aortic arch.      Impression    IMPRESSION: Enlarged cardiac silhouette, mild pulmonary edema.         I have personally reviewed the examination and initial interpretation  and I agree with the findings.    KATHRINE RANDHAWA MD       Recent vital signs:   BP (!) 170/99   Pulse 86   Temp 97.7  F (36.5  C) (Oral)   Resp 18   Wt 77.7 kg (171 lb 4.8 oz)   SpO2 95%   BMI 32.37 kg/m      [place] Coma Scale Score: 15 (09/09/19 2230)       Cardiac Rhythm: Normal Sinus  Pt needs tele? No  Skin/wound Issues: None    Code Status: Full Code    Pain control: fair    Nausea control: pt had none    Abnormal labs/tests/findings  requiring intervention: none    Family present during ED course? No   Family Comments/Social Situation comments:     Tasks needing completion: None    Josy Chairez, RN  [phone] [facility]

## 2019-09-11 VITALS
TEMPERATURE: 96.9 F | HEART RATE: 68 BPM | DIASTOLIC BLOOD PRESSURE: 74 MMHG | RESPIRATION RATE: 16 BRPM | OXYGEN SATURATION: 96 % | BODY MASS INDEX: 32.42 KG/M2 | SYSTOLIC BLOOD PRESSURE: 172 MMHG | WEIGHT: 171.6 LBS

## 2019-09-11 LAB
ANION GAP SERPL CALCULATED.3IONS-SCNC: 9 MMOL/L (ref 3–14)
BUN SERPL-MCNC: 18 MG/DL (ref 7–30)
CALCIUM SERPL-MCNC: 8.6 MG/DL (ref 8.5–10.1)
CHLORIDE SERPL-SCNC: 113 MMOL/L (ref 94–109)
CO2 SERPL-SCNC: 24 MMOL/L (ref 20–32)
CREAT SERPL-MCNC: 1.13 MG/DL (ref 0.52–1.04)
ERYTHROCYTE [DISTWIDTH] IN BLOOD BY AUTOMATED COUNT: 14.6 % (ref 10–15)
GFR SERPL CREATININE-BSD FRML MDRD: 45 ML/MIN/{1.73_M2}
GLUCOSE SERPL-MCNC: 86 MG/DL (ref 70–99)
HCT VFR BLD AUTO: 34.5 % (ref 35–47)
HGB BLD-MCNC: 10.3 G/DL (ref 11.7–15.7)
MCH RBC QN AUTO: 24.8 PG (ref 26.5–33)
MCHC RBC AUTO-ENTMCNC: 29.9 G/DL (ref 31.5–36.5)
MCV RBC AUTO: 83 FL (ref 78–100)
PLATELET # BLD AUTO: 160 10E9/L (ref 150–450)
POTASSIUM SERPL-SCNC: 4 MMOL/L (ref 3.4–5.3)
RBC # BLD AUTO: 4.16 10E12/L (ref 3.8–5.2)
SODIUM SERPL-SCNC: 146 MMOL/L (ref 133–144)
WBC # BLD AUTO: 3.2 10E9/L (ref 4–11)

## 2019-09-11 PROCEDURE — 36415 COLL VENOUS BLD VENIPUNCTURE: CPT | Performed by: STUDENT IN AN ORGANIZED HEALTH CARE EDUCATION/TRAINING PROGRAM

## 2019-09-11 PROCEDURE — 25000132 ZZH RX MED GY IP 250 OP 250 PS 637: Mod: GY | Performed by: STUDENT IN AN ORGANIZED HEALTH CARE EDUCATION/TRAINING PROGRAM

## 2019-09-11 PROCEDURE — 85027 COMPLETE CBC AUTOMATED: CPT | Performed by: STUDENT IN AN ORGANIZED HEALTH CARE EDUCATION/TRAINING PROGRAM

## 2019-09-11 PROCEDURE — 80048 BASIC METABOLIC PNL TOTAL CA: CPT | Performed by: STUDENT IN AN ORGANIZED HEALTH CARE EDUCATION/TRAINING PROGRAM

## 2019-09-11 RX ADMIN — POLYETHYLENE GLYCOL 3350 17 G: 17 POWDER, FOR SOLUTION ORAL at 07:35

## 2019-09-11 RX ADMIN — LISINOPRIL 10 MG: 10 TABLET ORAL at 07:35

## 2019-09-11 RX ADMIN — ACETAMINOPHEN 650 MG: 325 TABLET, FILM COATED ORAL at 07:34

## 2019-09-11 ASSESSMENT — ACTIVITIES OF DAILY LIVING (ADL)
ADLS_ACUITY_SCORE: 11

## 2019-09-11 ASSESSMENT — PAIN DESCRIPTION - DESCRIPTORS: DESCRIPTORS: ACHING;HEADACHE

## 2019-09-11 NOTE — DISCHARGE SUMMARY
[facility]  Discharge Summary  General Surgery     Lara Marc MRN# [id number]   YOB: 1935 Age: 84 year old     Date of Admission:  9/9/2019  Date of Discharge::  9/11/2019  Admitting Physician:  Juancarlos Yo MD  Discharge Physician:  Juancarlos Yo MD  Primary Care Physician:        Yecenia Dickerson          Admission Diagnoses:   Possible intermittent cecal versus sigmoid volvulus           Discharge Diagnosis:   Same         Procedures:   None         Consultations:   None         Imaging Studies:     Results for orders placed or performed during the hospital encounter of 09/09/19   CT Abdomen Pelvis w Contrast    Narrative    CT ABDOMEN PELVIS W CONTRAST   9/9/2019 4:06 PM     HISTORY: Abd distension; ; diffuse tenderness/distension, r/o  sbo/diverticulitis    TECHNIQUE:   No IV contrast material. Radiation dose for this scan was  reduced using automated exposure control, adjustment of the mA and/or  kV according to patient size, or iterative reconstruction technique.    COMPARISON: CT abdomen pelvis 1/5/2013    FINDINGS:      Small sliding hiatal hernia is present. The liver demonstrates  multiple 8 to 12 mm areas of hypoattenuation within the superior liver  which are incompletely characterized, statistically likely benign.  Gallbladder is surgically absent. The pancreas, spleen, and adrenals  are unremarkable. Left kidney is absent. Right kidney is unremarkable.  Abdominal aorta demonstrates infrarenal abdominal aortic aneurysm  measuring 3.3 cm in anterior to posterior dimension, previously 2.7  cm. Duodenal diverticulum is present, unchanged. There is new  medialization of the cecum, dilatation of the sigmoid colon, and  relative sigmoid colon collapse at the site of cecum crossing. There  is focal short segment inflammatory stranding along the hepatic  flexure of the colon. There is a combination of stool in fluid within  the rectum. No evidence of  diverticulitis. Bladder is unremarkable.  Bone windows demonstrate no destructive or aggressive osseous lesions.      Impression    IMPRESSION:   1. New medialization of the cecum, dilatation of the sigmoid colon,  and relative sigmoid colon collapse at the site of cecum crossing.  This configuration may represent functional partial obstruction. The  patient is likely at an increased risk for volvulus. No evidence of  jaci obstruction. No evidence of diverticulitis. Stool and fluid are  present within the rectum.  2. Focal short segment inflammatory stranding surrounding the right  colon hepatic flexure, likely infectious/inflammatory.  3. Abdominal aortic aneurysm measuring up to 3.3 cm, enlarged compared  to 1/5/2013 at which time it measured 2.7 cm. Recommend continued  surveillance.    SUSANNAH CERNA MD   XR Chest Port 1 View    Narrative    EXAM: XR CHEST PORT 1 VW  9/9/2019 7:16 PM      HISTORY: chest pain    COMPARISON: VQ scan 6/8/16, chest radiograph 6/8/2016.    FINDINGS: AP view of the chest. Trachea is midline, cardiac silhouette  size is enlarged. Prominent pulmonary vasculature and perihilar  fullness. No focal pulmonary opacity, pneumothorax, or pleural  effusion. Atherosclerotic calcification in the aortic arch.      Impression    IMPRESSION: Enlarged cardiac silhouette, mild pulmonary edema.         I have personally reviewed the examination and initial interpretation  and I agree with the findings.    KATHRINE RANDHAWA MD   XR Abdomen 2 Views    Narrative    Exam: XR ABDOMEN 2 VW, 9/10/2019 8:54 AM    Indication: Evaluate for SBO, cecal volvulus    Comparison: 9/9/2019, 10/7/2004    Findings:   Upright and supine AP views of the abdomen were obtained. No change in  gaseous distention of the sigmoid colon. Moderate left colonic stool  burden. Relative paucity of bowel gas in the small bowel. No  pneumatosis, portal venous gas, or intra-abdominal free air. Surgical  clips project over the right upper  quadrant. Costochondral and mitral  annular calcifications project over the lower left chest. Old  left-sided rib fractures.      Impression    Impression:   Grossly stable gaseous distention of the sigmoid colon in a  configuration concerning for sigmoid volvulus. No pneumatosis, portal  venous gas, or intraabdominal free air.    I have personally reviewed the examination and initial interpretation  and I agree with the findings.    JUWAN LIMON MD   CT Abdomen Pelvis w/o Contrast    Narrative    EXAMINATION: CT ABDOMEN PELVIS W/O CONTRAST  9/10/2019 11:25 AM      CLINICAL HISTORY: Bowel obstruction, low-grade; Rectal Contrast to  evaluate for Large Bowel Obstruction    COMPARISON: Abdominal x-ray 9/10/2019, CT abdomen 9/9/2019    PROCEDURE COMMENTS: CT of the abdomen was performed without iohexol  (OMNIPAQUE) solution 50 mL intravenous and oral contrast. Contrast per  rectum was given. Coronal and sagittal reformatted images were  obtained.    FINDINGS:  Lower thorax: Mild reticulation along the left lung base. No  significant pleural effusion.    Abdomen and pelvis:  Previously seen focus relative collapsed distal sigmoid colon has been  resolved. There is contrast material seen in the mid redundant sigmoid  colon. Rectum is mildly distended. Small and large bowels are  nondilated. Normal appendix. Stomach is within normal limits. Small  hiatal hernia.    Nonenhanced liver is unremarkable. Grossly unchanged hypodensity in  the superior left hepatic lobe, which favors benign hepatic cyst.  Cholecystectomy. Spleen, pancreas, and adrenal glands are  unremarkable. Right kidney is unremarkable. Left nephrectomy.  Unremarkable urinary bladder. Air distending the vaginal canal. No  pelvic mass, retroperitoneal, or mesenteric adenopathy. Mild vascular  disease. Infrarenal aorta aneurysm measures 3.8 x 3.5 cm (series 5  image 156).    Soft tissue/bones: Mild diffuse osteopenic bony appearance. Mild to  moderate  degenerative changes of the lumbar spine.      Impression    IMPRESSION:  1. Previously seen questionable collapse of distal sigmoid  colon/volvulus has been resolved. There is present of contrast  material in the redundant mid sigmoid colon with nonobstructive small  and large bowels.  2. Stable infrarenal aorta aneurysm measures 3.8 x 3.5 cm.    I have personally reviewed the examination and initial interpretation  and I agree with the findings.    JUWAN LIMON MD              Medications Prior to Admission:     Medications Prior to Admission   Medication Sig Dispense Refill Last Dose     Cholecalciferol (VITAMIN D3 PO) Take by mouth daily   9/8/2019 at Unknown time     lisinopril (PRINIVIL/ZESTRIL) 10 MG tablet TAKE 1 TABLET BY MOUTH EVERY DAY 90 tablet 1 9/9/2019 at Unknown time     magnesium 250 MG tablet Take 1 tablet by mouth daily   9/8/2019 at Unknown time     Multiple Vitamins-Minerals (MULTIVITAMIN OR) Take 1 tablet by mouth daily.   9/8/2019 at Unknown time     aspirin 81 MG EC tablet Take 81 mg by mouth daily   Unknown at Unknown time     triamcinolone (KENALOG) 0.1 % cream Apply sparingly to affected area three times daily for 14 days. 30 g 0 Unknown at Unknown time              Discharge Medications:     Current Discharge Medication List      START taking these medications    Details   acetaminophen (TYLENOL) 325 MG tablet Take 2 tablets (650 mg) by mouth every 8 hours    Associated Diagnoses: Cecal volvulus (H)      polyethylene glycol (MIRALAX/GLYCOLAX) packet Take 17 g by mouth 2 times daily for 7 days  Qty: 14 packet, Refills: 0    Associated Diagnoses: Cecal volvulus (H)         CONTINUE these medications which have NOT CHANGED    Details   Cholecalciferol (VITAMIN D3 PO) Take by mouth daily      lisinopril (PRINIVIL/ZESTRIL) 10 MG tablet TAKE 1 TABLET BY MOUTH EVERY DAY  Qty: 90 tablet, Refills: 1    Associated Diagnoses: Hypertension goal BP (blood pressure) < 140/90      magnesium 250 MG  tablet Take 1 tablet by mouth daily      Multiple Vitamins-Minerals (MULTIVITAMIN OR) Take 1 tablet by mouth daily.      aspirin 81 MG EC tablet Take 81 mg by mouth daily      triamcinolone (KENALOG) 0.1 % cream Apply sparingly to affected area three times daily for 14 days.  Qty: 30 g, Refills: 0    Associated Diagnoses: Dermatitis                      Brief History of Illness:   85 YO F w/ hx of HTN, CKD (hx of surgically absent kidney) who presented to the ED on 9/9 with 2 days of worsening diffuse abdominal pain. She reports that she has had similar symptoms like this in the past that resolve on their own, but never has had as severe pain as this episode. CT AP in the ED showed medialization of the cecum, dilation of the sigmoid with collapse at the site of cecum crossing concerning for partial obstruction and increased risk of cecal volvulus. Labs were unremarkable and the patient was hemodynamically wnl.            Hospital Course:   The patient was admitted to the General Surgery service for management of possible cecal versus sigmoid volvulus. She was made NPO and started on IVF. An NGT was not placed. On HD1, her pain had improved and she was passing flatus and having bowel movements. A repeat CT AP with rectal contrast was obtained that showed resolution of previously seen possible transition point in the sigmoid colon. Her diet was advanced, which she tolerated well. On the day of discharge, her pain was resolved, she was tolerating her diet, voiding, ambulating and having bowel function. She was discharged to home with follow up in the general surgery clinic in 2 weeks.          Day of Discharge Physical Exam:   Blood pressure (!) 195/80, pulse 68, temperature 96.9  F (36.1  C), temperature source Oral, resp. rate 16, weight 77.8 kg (171 lb 9.6 oz), SpO2 96 %, not currently breastfeeding.    Gen: AAOx3, NAD  CV:      RRR  Pulm: Non-labored breathing on RA  Abd: Soft, non-distended, non-tender, no rebound  or guarding  Ext:  Warm and well-perfused         Final Pathology Result:   No pathology submitted           Discharge Instructions and Follow-Up:     Discharge Procedure Orders   Reason for your hospital stay   Order Comments: Volvulus with Large Bowel Obstruction     [facility] Follow-up and recommended labs and tests   Order Comments: Follow up with Dr. Yo , at (location with clinic name or city) [address] [place] [address], within 2 weeks for post-op evaluation.     Appointments on [place] and/or [facility] (with [facility] or [facility] provider or service). Call [phone] if you haven't heard regarding these appointments within 7 days of discharge.     Activity   Order Comments: Your activity upon discharge: Up ad ton, return to activities as tolerated     Order Specific Question Answer Comments   Is discharge order? Yes      When to contact your care team   Order Comments: Call primary care or see ED physician if you experience severe abdominal pain, nausea/vomiting or similar symptoms to your presentation this week.     Discharge Instructions   Order Comments: Diet: Regular diet    Activity: No lifting restrictions, return to activities as tolerated    Driving: Okay to resume driving    Dr. Yo's cell phone: [phone], [phone]     Diet   Order Comments: Follow this diet upon discharge: Regular     Order Specific Question Answer Comments   Is discharge order? Yes             Home Health Care:   Not needed           Discharge Disposition:   Discharged to home      Condition at discharge: Good    Patient seen with chief resident, Dr. Villagomez, who discussed with staff.    Mercedez Lima MD  Surgery Resident, PGY2

## 2019-09-11 NOTE — PLAN OF CARE
A&O. VSS. Denies abd pain. No PIV- MD aware and okay. Clear liquid diet- denies nausea. Spontaneously voiding. Denies passing gas, no BM reported overnight. Independent. Pt resting comfortably. Continue with plan of care.

## 2019-09-11 NOTE — PROGRESS NOTES
Shift: 0700 - 1200  VS: Temp: 96.9  F (36.1  C) Temp src: Oral BP: (!) 172/74 Pulse: 68 Heart Rate: 78 Resp: 16 SpO2: 96 % O2 Device: None (Room air)    Pain: Pt reports headache, gave PRN tylenol 650mg, decreased pain reported.   Neuro: A&Ox4. Pleasant and cooperative with care.   Cardiac:   HTN, team notified. Scheduled Lisinopril given with some improvement. Instructed pt to reduce salt intake, monitor BP regularly and to follow up with her PCP to reassess BP status/cardiac health.   Respiratory: Lung sounds clear/diminshed on RA.   GI/Diet/Appetite: Regular diet with fair appetite.   :    LDA's:   Skin:   Activity:   Tests/Procedures:   Pertinent Labs/Lab Collection:      Plan:

## 2019-09-11 NOTE — PLAN OF CARE
6022-2476: Pt alert and oriented, c/o headache pt reports from being less active than her usual, declined intervention. Denies nausea or abdominal pain. Pt reported she had 3 loose BMs today so held miralax, encouraged fluids and activity. No other complaints. Possible discharge tomorrow.

## 2019-09-11 NOTE — PROGRESS NOTES
Shift: 0700 - 1930  VS: Temp: 97.3  F (36.3  C) Temp src: Oral BP: 138/72 Pulse: 85 Heart Rate: 69 Resp: 16 SpO2: 98 % O2 Device: None (Room air)    Pain: Denies pain,   Neuro: A&Ox4. Pleasant and cooperative with care. Calls appropriately.  Cardiac:   HTN, OVSS on RA.   Respiratory: Lung sounds clear on RA.   GI/Diet/Appetite: Regular diet with fair appetite. LBM 9/10   :  Voiding w/o difficulty.   LDA's: NO PIV, VERBAL CONSENT BY TAMELA YOO TO NOT HAVE PIV.   Skin: Healing rash to sacral/coccyx, otherwise intact.   Activity: SBA  Tests/Procedures: ABD/Pelvic CT today, and ABD XR  Pertinent Labs/Lab Collection:      Plan: Plan to discharge 9/11

## 2019-09-12 ENCOUNTER — PATIENT OUTREACH (OUTPATIENT)
Dept: SURGERY | Facility: CLINIC | Age: 84
End: 2019-09-12

## 2019-09-12 ENCOUNTER — TELEPHONE (OUTPATIENT)
Dept: FAMILY MEDICINE | Facility: CLINIC | Age: 84
End: 2019-09-12

## 2019-09-12 NOTE — TELEPHONE ENCOUNTER
"Hospital/TCU/ED for chronic condition Discharge Protocol    \"Hi, my name is Kiersten Palacios RN, a registered nurse, and I am calling from [facility].  I am calling to follow up and see how things are going for you after your recent emergency visit/hospital/TCU stay.\"    Tell me how you are doing now that you are home?\" Feeling much better.    Small bowel movement since discharge and still passing flatus.    Eating small meals and advancing diet as tolerated.    Discussed when to take next dose of Lisinopril, as patient cannot recall exact time of Lisinopril dose during day yesterday.  Patient plans to take Lisinopril at noon.    Patient stated she does not plan to follow up with general surgery clinic but would like to schedule with PCP.      Discharge Instructions    \"Let's review your discharge instructions.  What is/are the follow-up recommendations?  Pt. Response: Follow up with Dr. Yo    \"Has an appointment with your primary care provider been scheduled?\"   No (schedule appointment)    \"When you see the provider, I would recommend that you bring your medications with you.\"    Medications    \"Tell me what changed about your medicines when you discharged?\"    Changes to chronic meds?    0-1    \"What questions do you have about your medications?\"    None     New diagnoses of heart failure, COPD, diabetes, or MI?    No              Post Discharge Medication Reconciliation Status: discharge medications reconciled, continue medications without change.    Was MTM referral placed (*Make sure to put transitions as reason for referral)?   No    Call Summary    \"What questions or concerns do you have about your recent visit and your follow-up care?\"     none    \"If you have questions or things don't continue to improve, we encourage you contact us through the main clinic number (give number).  Even if the clinic is not open, triage nurses are available 24/7 to help you.     We would like you to know that our " "clinic has extended hours (provide information).  We also have urgent care (provide details on closest location and hours/contact info)\"      \"Thank you for your time and take care!\"             "

## 2019-09-12 NOTE — PROGRESS NOTES
RN Post-Op/Post-Discharge Care Coordination Note    Ms. Lara Marc is a 84 year old female who was hospitalized with abdominal pain which was medically managed.  She was discharged from the hospital yesterday.  Spoke with Patient.    Support  Patient able to care for self independently     Health Status  Fevers/chills: Patient denies any fever or chills.  Nausea/Vomiting: Patient denies nausea/vomiting.  Eating/drinking: Patient is able to eat and drink without any complaints.  Bowel habits: Patient reports having a normal bowel movement.  Drains (BETY): N/A  Incisions: No surgery.    Pain: None  New Medications:  Tylenol, Miralax    Activity/Restrictions  No restrictions    Equipment  None    Pathology reviewed with patient:  N/A    Forms/Letters  No    All of her questions were answered.  She will call this office if she has any further questions and/or concerns.      Patient return to see the General Surgery Team on a PRN basis. The patient has a post hospital follow up appointment with her PCP on 9/17.    A copy of this note was routed to the primary surgeon.      Whom and When to Call  Patient acknowledges understanding of how to manage any medication changes and   when to seek medical care.     Patient advised that if after hour medical concerns arise to please call [phone] and choose option 4 to speak to the physician on call.

## 2019-09-16 NOTE — PROGRESS NOTES
"  SUBJECTIVE:   Lara Marc is a 84 year old female who presents to clinic today for the following health issues:      Hospital Follow-up Visit:    Hospital/Nursing Home/IP Rehab Facility: [facility]  Date of Admission: 9/9/2019  Date of Discharge: 9/11/2019  Reason(s) for Admission: Possible intermittent cecal versus sigmoid volvulus            Problems taking medications regularly:  None       Medication changes since discharge: None       Problems adhering to non-medication therapy:  None     Summary of hospitalization:  [facility] hospital discharge summary reviewed  Diagnostic Tests/Treatments reviewed.  Follow up needed: none  Other Healthcare Providers Involved in Patient s Care:         Surgical follow-up appointment - not scheduled yet  Update since discharge: improved.      Post Discharge Medication Reconciliation: discharge medications reconciled, continue medications without change.  Plan of care communicated with patient     Coding guidelines for this visit:  Type of Medical   Decision Making Face-to-Face Visit       within 7 Days of discharge Face-to-Face Visit        within 14 days of discharge   Moderate Complexity [id number]   High Complexity [id number]          Using miralax, but has been doing half dose. Had a bowel movement the first day after discharge, then none since. She will increase her miralax to full dose. No recurrent abdominal pain. No blood in the stool or melena.     From hospital discharge summary:  \"        Brief History of Illness:   83 YO F w/ hx of HTN, CKD (hx of surgically absent kidney) who presented to the ED on 9/9 with 2 days of worsening diffuse abdominal pain. She reports that she has had similar symptoms like this in the past that resolve on their own, but never has had as severe pain as this episode. CT AP in the ED showed medialization of the cecum, dilation of the sigmoid with collapse at the site of cecum crossing concerning " "for partial obstruction and increased risk of cecal volvulus. Labs were unremarkable and the patient was hemodynamically wnl.            Hospital Course:   The patient was admitted to the General Surgery service for management of possible cecal versus sigmoid volvulus. She was made NPO and started on IVF. An NGT was not placed. On HD1, her pain had improved and she was passing flatus and having bowel movements. A repeat CT AP with rectal contrast was obtained that showed resolution of previously seen possible transition point in the sigmoid colon. Her diet was advanced, which she tolerated well. On the day of discharge, her pain was resolved, she was tolerating her diet, voiding, ambulating and having bowel function. She was discharged to home with follow up in the general surgery clinic in 2 weeks.   \"       Problem list and histories reviewed & adjusted, as indicated.  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     C VAGINAL HYSTERECTOMY  9/15/06    TVH/BSO/A&P repair/sacrospinus ligament fixation......childbirth x 9     childbirth X9[       CLOSED RX RIB FRACTURE  7/06    left     DAVINCI SACROCOLPOPEXY, MIDURETHRAL SLING, CYSTOSCOPY  2/8/2013    Procedure: DAVINCI SACROCOLPOPEXY, MIDURETHRAL SLING, CYSTOSCOPY;  DAVINCI SACROCOLPOPEXY, TVT EXACT SLING, RIGHT SALPINGO-OOPHERECTOMY, CYSTOSCOPY;  Surgeon: Bennie Galdamez MD;  Location: [facility]" OR     HC COLONOSCOPY THRU STOMA, DIAGNOSTIC  7/2005    diverticulosis,small polyp,recheck 5 yrs     LAPAROSCOPIC CHOLECYSTECTOMY  3/31/2011    Procedure:LAPAROSCOPIC CHOLECYSTECTOMY; Surgeon:DAVID MOLINA; Location: OR       Social History     Tobacco Use     Smoking status: Former Smoker     Packs/day: 0.00     Years: 17.00     Pack years: 0.00     Smokeless tobacco: Never Used     Tobacco comment: quit when she was 29 years old   Substance Use Topics     Alcohol use: No     Family History   Problem Relation Age of Onset     Heart Disease Father      Cancer - colorectal Brother      Prostate Cancer Brother      Diabetes Sister      Heart Disease Sister          Current Outpatient Medications   Medication Sig Dispense Refill     acetaminophen (TYLENOL) 325 MG tablet Take 2 tablets (650 mg) by mouth every 8 hours       aspirin 81 MG EC tablet Take 81 mg by mouth daily       Cholecalciferol (VITAMIN D3 PO) Take by mouth daily       lisinopril (PRINIVIL/ZESTRIL) 10 MG tablet TAKE 1 TABLET BY MOUTH EVERY DAY 90 tablet 1     magnesium 250 MG tablet Take 1 tablet by mouth daily       Multiple Vitamins-Minerals (MULTIVITAMIN OR) Take 1 tablet by mouth daily.       polyethylene glycol (MIRALAX/GLYCOLAX) packet Take 17 g by mouth 2 times daily for 7 days 14 packet 0     triamcinolone (KENALOG) 0.1 % cream Apply sparingly to affected area three times daily for 14 days. 30 g 0     Allergies   Allergen Reactions     Nkda [No Known Drug Allergies]      Seasonal Allergies      Itchy eyes and watery eyes     Recent Labs   Lab Test 09/11/19  0702 09/10/19  0704 09/09/19  1358 12/04/18  1214  05/10/18  1026 12/13/16  1208  06/08/16  2027 06/08/16  0913 08/18/15  1204  09/18/13  1130   LDL  --   --   --   --   --  143* 109*  --   --   --  102   < >  --    HDL  --   --   --   --   --  38* 33*  --   --   --  30*   < >  --    TRIG  --   --   --   --   --  128 183*  --   --   --  159*   < >  --    ALT  --   --  30  --   --   --   --  "  --   --  32  --   --  26   CR 1.13* 1.24* 1.27* 1.27*   < > 1.11* 1.10*   < > 1.37* 1.23* 1.06*   < > 0.98   GFRESTIMATED 45* 40* 39* 40*   < > 47* 48*   < > 37* 42* 50*   < > 55*   GFRESTBLACK 52* 46* 45* 49*   < > 57* 58*   < > 45* 51* 60*   < > 66   POTASSIUM 4.0 4.1 4.7 5.1   < > 4.9 4.7   < > 4.0 4.1 4.5   < > 4.2   TSH  --   --   --  4.01*  --   --   --   --  2.21  --   --   --   --     < > = values in this interval not displayed.      BP Readings from Last 3 Encounters:   09/17/19 (!) 146/84   09/11/19 (!) 172/74   01/15/19 (!) 170/93    Wt Readings from Last 3 Encounters:   09/17/19 77.6 kg (171 lb)   09/10/19 77.8 kg (171 lb 9.6 oz)   01/15/19 78.5 kg (173 lb)              Reviewed and updated as needed this visit by clinical staff  Tobacco  Allergies  Meds       Reviewed and updated as needed this visit by Provider         ROS:  As above     OBJECTIVE:     BP (!) 146/84 (BP Location: Left arm, Patient Position: Sitting, Cuff Size: Adult Regular)   Pulse 74   Temp 98.3  F (36.8  C) (Oral)   Ht 1.575 m (5' 2\")   Wt 77.6 kg (171 lb)   SpO2 94%   BMI 31.28 kg/m    Body mass index is 31.28 kg/m .  GENERAL: healthy, alert and no distress    Diagnostic Test Results:  Results for orders placed or performed during the hospital encounter of 09/09/19   CT Abdomen Pelvis w Contrast    Narrative    CT ABDOMEN PELVIS W CONTRAST   9/9/2019 4:06 PM     HISTORY: Abd distension; ; diffuse tenderness/distension, r/o  sbo/diverticulitis    TECHNIQUE:   No IV contrast material. Radiation dose for this scan was  reduced using automated exposure control, adjustment of the mA and/or  kV according to patient size, or iterative reconstruction technique.    COMPARISON: CT abdomen pelvis 1/5/2013    FINDINGS:      Small sliding hiatal hernia is present. The liver demonstrates  multiple 8 to 12 mm areas of hypoattenuation within the superior liver  which are incompletely characterized, statistically likely " benign.  Gallbladder is surgically absent. The pancreas, spleen, and adrenals  are unremarkable. Left kidney is absent. Right kidney is unremarkable.  Abdominal aorta demonstrates infrarenal abdominal aortic aneurysm  measuring 3.3 cm in anterior to posterior dimension, previously 2.7  cm. Duodenal diverticulum is present, unchanged. There is new  medialization of the cecum, dilatation of the sigmoid colon, and  relative sigmoid colon collapse at the site of cecum crossing. There  is focal short segment inflammatory stranding along the hepatic  flexure of the colon. There is a combination of stool in fluid within  the rectum. No evidence of diverticulitis. Bladder is unremarkable.  Bone windows demonstrate no destructive or aggressive osseous lesions.      Impression    IMPRESSION:   1. New medialization of the cecum, dilatation of the sigmoid colon,  and relative sigmoid colon collapse at the site of cecum crossing.  This configuration may represent functional partial obstruction. The  patient is likely at an increased risk for volvulus. No evidence of  jaci obstruction. No evidence of diverticulitis. Stool and fluid are  present within the rectum.  2. Focal short segment inflammatory stranding surrounding the right  colon hepatic flexure, likely infectious/inflammatory.  3. Abdominal aortic aneurysm measuring up to 3.3 cm, enlarged compared  to 1/5/2013 at which time it measured 2.7 cm. Recommend continued  surveillance.    SUSANNAH CERNA MD   XR Chest Port 1 View    Narrative    EXAM: XR CHEST PORT 1 VW  9/9/2019 7:16 PM      HISTORY: chest pain    COMPARISON: VQ scan 6/8/16, chest radiograph 6/8/2016.    FINDINGS: AP view of the chest. Trachea is midline, cardiac silhouette  size is enlarged. Prominent pulmonary vasculature and perihilar  fullness. No focal pulmonary opacity, pneumothorax, or pleural  effusion. Atherosclerotic calcification in the aortic arch.      Impression    IMPRESSION: Enlarged cardiac  silhouette, mild pulmonary edema.         I have personally reviewed the examination and initial interpretation  and I agree with the findings.    KATHRINE RANDHAWA MD   XR Abdomen 2 Views    Narrative    Exam: XR ABDOMEN 2 VW, 9/10/2019 8:54 AM    Indication: Evaluate for SBO, cecal volvulus    Comparison: 9/9/2019, 10/7/2004    Findings:   Upright and supine AP views of the abdomen were obtained. No change in  gaseous distention of the sigmoid colon. Moderate left colonic stool  burden. Relative paucity of bowel gas in the small bowel. No  pneumatosis, portal venous gas, or intra-abdominal free air. Surgical  clips project over the right upper quadrant. Costochondral and mitral  annular calcifications project over the lower left chest. Old  left-sided rib fractures.      Impression    Impression:   Grossly stable gaseous distention of the sigmoid colon in a  configuration concerning for sigmoid volvulus. No pneumatosis, portal  venous gas, or intraabdominal free air.    I have personally reviewed the examination and initial interpretation  and I agree with the findings.    JUWAN LIMON MD   CT Abdomen Pelvis w/o Contrast    Narrative    EXAMINATION: CT ABDOMEN PELVIS W/O CONTRAST  9/10/2019 11:25 AM      CLINICAL HISTORY: Bowel obstruction, low-grade; Rectal Contrast to  evaluate for Large Bowel Obstruction    COMPARISON: Abdominal x-ray 9/10/2019, CT abdomen 9/9/2019    PROCEDURE COMMENTS: CT of the abdomen was performed without iohexol  (OMNIPAQUE) solution 50 mL intravenous and oral contrast. Contrast per  rectum was given. Coronal and sagittal reformatted images were  obtained.    FINDINGS:  Lower thorax: Mild reticulation along the left lung base. No  significant pleural effusion.    Abdomen and pelvis:  Previously seen focus relative collapsed distal sigmoid colon has been  resolved. There is contrast material seen in the mid redundant sigmoid  colon. Rectum is mildly distended. Small and large bowels  are  nondilated. Normal appendix. Stomach is within normal limits. Small  hiatal hernia.    Nonenhanced liver is unremarkable. Grossly unchanged hypodensity in  the superior left hepatic lobe, which favors benign hepatic cyst.  Cholecystectomy. Spleen, pancreas, and adrenal glands are  unremarkable. Right kidney is unremarkable. Left nephrectomy.  Unremarkable urinary bladder. Air distending the vaginal canal. No  pelvic mass, retroperitoneal, or mesenteric adenopathy. Mild vascular  disease. Infrarenal aorta aneurysm measures 3.8 x 3.5 cm (series 5  image 156).    Soft tissue/bones: Mild diffuse osteopenic bony appearance. Mild to  moderate degenerative changes of the lumbar spine.      Impression    IMPRESSION:  1. Previously seen questionable collapse of distal sigmoid  colon/volvulus has been resolved. There is present of contrast  material in the redundant mid sigmoid colon with nonobstructive small  and large bowels.  2. Stable infrarenal aorta aneurysm measures 3.8 x 3.5 cm.    I have personally reviewed the examination and initial interpretation  and I agree with the findings.    JUWAN LIMON MD   CBC with platelets differential   Result Value Ref Range    WBC 4.5 4.0 - 11.0 10e9/L    RBC Count 4.63 3.8 - 5.2 10e12/L    Hemoglobin 11.6 (L) 11.7 - 15.7 g/dL    Hematocrit 37.4 35.0 - 47.0 %    MCV 81 78 - 100 fl    MCH 25.1 (L) 26.5 - 33.0 pg    MCHC 31.0 (L) 31.5 - 36.5 g/dL    RDW 14.5 10.0 - 15.0 %    Platelet Count 191 150 - 450 10e9/L    Diff Method Automated Method     % Neutrophils 63.7 %    % Lymphocytes 23.3 %    % Monocytes 9.3 %    % Eosinophils 3.3 %    % Basophils 0.2 %    % Immature Granulocytes 0.2 %    Nucleated RBCs 0 0 /100    Absolute Neutrophil 2.9 1.6 - 8.3 10e9/L    Absolute Lymphocytes 1.1 0.8 - 5.3 10e9/L    Absolute Monocytes 0.4 0.0 - 1.3 10e9/L    Absolute Eosinophils 0.2 0.0 - 0.7 10e9/L    Absolute Basophils 0.0 0.0 - 0.2 10e9/L    Abs Immature Granulocytes 0.0 0 - 0.4 10e9/L     Absolute Nucleated RBC 0.0    Comprehensive metabolic panel   Result Value Ref Range    Sodium 143 133 - 144 mmol/L    Potassium 4.7 3.4 - 5.3 mmol/L    Chloride 109 94 - 109 mmol/L    Carbon Dioxide 28 20 - 32 mmol/L    Anion Gap 6 3 - 14 mmol/L    Glucose 95 70 - 99 mg/dL    Urea Nitrogen 28 7 - 30 mg/dL    Creatinine 1.27 (H) 0.52 - 1.04 mg/dL    GFR Estimate 39 (L) >60 mL/min/[1.73_m2]    GFR Estimate If Black 45 (L) >60 mL/min/[1.73_m2]    Calcium 8.9 8.5 - 10.1 mg/dL    Bilirubin Total 0.3 0.2 - 1.3 mg/dL    Albumin 3.4 3.4 - 5.0 g/dL    Protein Total 7.0 6.8 - 8.8 g/dL    Alkaline Phosphatase 65 40 - 150 U/L    ALT 30 0 - 50 U/L    AST 30 0 - 45 U/L   Lipase   Result Value Ref Range    Lipase 186 73 - 393 U/L   UA reflex to Microscopic   Result Value Ref Range    Color Urine Yellow     Appearance Urine Clear     Glucose Urine Negative NEG^Negative mg/dL    Bilirubin Urine Negative NEG^Negative    Ketones Urine Negative NEG^Negative mg/dL    Specific Gravity Urine 1.016 1.003 - 1.035    Blood Urine Negative NEG^Negative    pH Urine 5.5 5.0 - 7.0 pH    Protein Albumin Urine Negative NEG^Negative mg/dL    Urobilinogen mg/dL Normal 0.0 - 2.0 mg/dL    Nitrite Urine Negative NEG^Negative    Leukocyte Esterase Urine Small (A) NEG^Negative    Source Midstream Urine     RBC Urine 1 0 - 2 /HPF    WBC Urine 2 0 - 5 /HPF    Squamous Epithelial /HPF Urine 2 (H) 0 - 1 /HPF    Mucous Urine Present (A) NEG^Negative /LPF   Basic metabolic panel   Result Value Ref Range    Sodium 144 133 - 144 mmol/L    Potassium 4.1 3.4 - 5.3 mmol/L    Chloride 114 (H) 94 - 109 mmol/L    Carbon Dioxide 20 20 - 32 mmol/L    Anion Gap 8 3 - 14 mmol/L    Glucose 87 70 - 99 mg/dL    Urea Nitrogen 23 7 - 30 mg/dL    Creatinine 1.24 (H) 0.52 - 1.04 mg/dL    GFR Estimate 40 (L) >60 mL/min/[1.73_m2]    GFR Estimate If Black 46 (L) >60 mL/min/[1.73_m2]    Calcium 8.4 (L) 8.5 - 10.1 mg/dL   CBC with platelets   Result Value Ref Range    WBC 4.0 4.0 -  11.0 10e9/L    RBC Count 4.25 3.8 - 5.2 10e12/L    Hemoglobin 10.5 (L) 11.7 - 15.7 g/dL    Hematocrit 35.5 35.0 - 47.0 %    MCV 84 78 - 100 fl    MCH 24.7 (L) 26.5 - 33.0 pg    MCHC 29.6 (L) 31.5 - 36.5 g/dL    RDW 14.6 10.0 - 15.0 %    Platelet Count 175 150 - 450 10e9/L   Lactic acid whole blood   Result Value Ref Range    Lactic Acid 0.8 0.7 - 2.0 mmol/L   CBC with platelets   Result Value Ref Range    WBC 3.2 (L) 4.0 - 11.0 10e9/L    RBC Count 4.16 3.8 - 5.2 10e12/L    Hemoglobin 10.3 (L) 11.7 - 15.7 g/dL    Hematocrit 34.5 (L) 35.0 - 47.0 %    MCV 83 78 - 100 fl    MCH 24.8 (L) 26.5 - 33.0 pg    MCHC 29.9 (L) 31.5 - 36.5 g/dL    RDW 14.6 10.0 - 15.0 %    Platelet Count 160 150 - 450 10e9/L   Basic metabolic panel   Result Value Ref Range    Sodium 146 (H) 133 - 144 mmol/L    Potassium 4.0 3.4 - 5.3 mmol/L    Chloride 113 (H) 94 - 109 mmol/L    Carbon Dioxide 24 20 - 32 mmol/L    Anion Gap 9 3 - 14 mmol/L    Glucose 86 70 - 99 mg/dL    Urea Nitrogen 18 7 - 30 mg/dL    Creatinine 1.13 (H) 0.52 - 1.04 mg/dL    GFR Estimate 45 (L) >60 mL/min/[1.73_m2]    GFR Estimate If Black 52 (L) >60 mL/min/[1.73_m2]    Calcium 8.6 8.5 - 10.1 mg/dL   EKG 12-lead, tracing only   Result Value Ref Range    Interpretation ECG Click View Image link to view waveform and result    Troponin POCT   Result Value Ref Range    Troponin I 0.00 0.00 - 0.08 ug/L       ASSESSMENT/PLAN:       1. Volvulus (H) possible intermittent cecal versus sigmoid volvulus  No recurrent symptoms  It was recommended that she follow-up with general surgery 2 weeks after her hospitalization.  No appointment has been made yet.  I recommended scheduling.     2. Hypertension goal BP (blood pressure) < 140/90  Uncontrolled initially, will recheck with MA.  If remains uncontrolled, will increase lisinopril to 20 mg daily.  If that occurs, would need to follow-up 2 to 3 weeks afterward for blood pressure check and BMP.    3. CKD (chronic kidney disease) stage 3, GFR  30-59 ml/min (H)  Stable  Due for urine microalbumin    4. AAA   Stable infrarenal aortic aneurysm on CT 9/10/19 measuring 3.8cm x 3.5cm. Recommend surveillance by ultrasound in 3 years.     She is also due for lipid screening, but is not fasting today  She declines all other health maintenance recommendations including tetanus shot shingles shots influenza vaccine pneumonia vaccine, bone density test      Patient Instructions     Schedule an appointment with general surgery within the next week per the recommendations.   Follow up with Dr. Yo , at (location with clinic name or city) [address] [place] [address], within 2 weeks for post-op evaluation.      Appointments on [place] and/or [facility] (with [facility] or [facility] provider or service). Call [phone] if you haven't heard regarding these appointments within 7 days of discharge.     Increase your miralax to full dose daily.       Yecenia Dickerson M.D.        [facility] [place]

## 2019-09-17 ENCOUNTER — OFFICE VISIT (OUTPATIENT)
Dept: FAMILY MEDICINE | Facility: CLINIC | Age: 84
End: 2019-09-17
Payer: MEDICARE

## 2019-09-17 VITALS
BODY MASS INDEX: 31.47 KG/M2 | TEMPERATURE: 98.3 F | HEIGHT: 62 IN | HEART RATE: 74 BPM | DIASTOLIC BLOOD PRESSURE: 74 MMHG | SYSTOLIC BLOOD PRESSURE: 136 MMHG | WEIGHT: 171 LBS | OXYGEN SATURATION: 94 %

## 2019-09-17 DIAGNOSIS — I71.40 ABDOMINAL AORTIC ANEURYSM (AAA) WITHOUT RUPTURE (H): ICD-10-CM

## 2019-09-17 DIAGNOSIS — K56.2 VOLVULUS (H): Primary | ICD-10-CM

## 2019-09-17 DIAGNOSIS — N18.30 CKD (CHRONIC KIDNEY DISEASE) STAGE 3, GFR 30-59 ML/MIN (H): ICD-10-CM

## 2019-09-17 DIAGNOSIS — I10 HYPERTENSION GOAL BP (BLOOD PRESSURE) < 140/90: ICD-10-CM

## 2019-09-17 PROCEDURE — 99495 TRANSJ CARE MGMT MOD F2F 14D: CPT | Performed by: FAMILY MEDICINE

## 2019-09-17 PROCEDURE — 82043 UR ALBUMIN QUANTITATIVE: CPT | Performed by: FAMILY MEDICINE

## 2019-09-17 RX ORDER — LISINOPRIL 10 MG/1
10 TABLET ORAL DAILY
Qty: 90 TABLET | Refills: 1 | Status: SHIPPED | OUTPATIENT
Start: 2019-09-17 | End: 2020-06-25

## 2019-09-17 ASSESSMENT — MIFFLIN-ST. JEOR: SCORE: 1178.9

## 2019-09-17 NOTE — PATIENT INSTRUCTIONS
Schedule an appointment with general surgery within the next week per the recommendations.   Follow up with Dr. Yo , at (location with clinic name or city) [address] [place] [address], within 2 weeks for post-op evaluation.      Appointments on [place] and/or [facility] (with [facility] or [facility] provider or service). Call [phone] if you haven't heard regarding these appointments within 7 days of discharge.     Increase your miralax to full dose daily.

## 2019-09-17 NOTE — LETTER
September 19, 2019      Lara Marc  [address]  [facility] [address]        Dear ,    We are writing to inform you of your test results.    Normal results.     Resulted Orders   Albumin Random Urine Quantitative with Creat Ratio   Result Value Ref Range    Creatinine Urine 109 mg/dL    Albumin Urine mg/L 8 mg/L    Albumin Urine mg/g Cr 6.93 0 - 25 mg/g Cr       If you have any questions or concerns, please call the clinic at the number listed above.       Sincerely,        Yecenia Dickerson MD/nr

## 2019-09-18 LAB
CREAT UR-MCNC: 109 MG/DL
MICROALBUMIN UR-MCNC: 8 MG/L
MICROALBUMIN/CREAT UR: 6.93 MG/G CR (ref 0–25)

## 2019-09-25 ENCOUNTER — NURSE TRIAGE (OUTPATIENT)
Dept: FAMILY MEDICINE | Facility: CLINIC | Age: 84
End: 2019-09-25

## 2019-09-25 NOTE — TELEPHONE ENCOUNTER
Reason for Call:  Other call back    Detailed comments: Patient states she has been unable to use the bathroom (bowel movement) for one week now and is wondering what she should do. Please advise. Thanks!    Phone Number Patient can be reached at: Home number on file [phone] (home)    Best Time: Any    Can we leave a detailed message on this number? YES    Call taken on 9/25/2019 at 8:48 AM by Darlene Tavarez

## 2019-09-25 NOTE — TELEPHONE ENCOUNTER
"  Additional Information    Negative: Abdomen pain is the main symptom and adult male    Negative: Abdomen pain is the main symptom and adult female    Negative: Rectal bleeding or blood in stool is the main symptom    Negative: Patient sounds very sick or weak to the triager    Negative: Constant abdominal pain lasting > 2 hours    Negative: Vomiting bile (green color)    Negative: Vomiting and abdomen looks much more swollen than usual    Last bowel movement (BM) > 4 days ago    Answer Assessment - Initial Assessment Questions  1. STOOL PATTERN OR FREQUENCY: \"How often do you pass bowel movements (BMs)?\"  (Normal range: tid to q 3 days)  \"When was the last BM passed?\"        Patient states she is generally regular and has one bowel movement per day. Patient states she was hospitalized earlier this month for a blockage, but had \"explosive diarrhea\" and things have resolved. Patient states her last BM was approximately one week ago  2. STRAINING: \"Do you have to strain to have a BM?\"       Patient states she tries not to strain when attempting to have BM, as she has history of hemorrhoids  3. RECTAL PAIN: \"Does your rectum hurt when the stool comes out?\" If so, ask: \"Do you have hemorrhoids? How bad is the pain?\"  (Scale 1-10; or mild, moderate, severe)      Declines rectal pain  4. STOOL COMPOSITION: \"Are the stools hard?\"       Patient states last stool was normal   5. BLOOD ON STOOLS: \"Has there been any blood on the toilet tissue or on the surface of the BM?\" If so, ask: \"When was the last time?\"       Declines blood in stool/toilet paper  6. CHRONIC CONSTIPATION: \"Is this a new problem for you?\"  If no, ask: How long have you had this problem?\" (days, weeks, months)       Patient states she has been constipated in the past  7. CHANGES IN DIET: \"Have there been any recent changes in your diet?\"       Declines dietary changes  8. MEDICATIONS: \"Have you been taking any new medications?\"      Patient states she has " "been taking magnesium for one month, but she does not think this is the cause  9. LAXATIVES: \"Have you been using any laxatives or enemas?\"  If yes, ask \"What, how often, and when was the last time?\"      Patient has tried dulcolax and an herbal tea with no relief  10. CAUSE: \"What do you think is causing the constipation?\"         Patient is unsure what could be causing symptoms  11. OTHER SYMPTOMS: \"Do you have any other symptoms?\" (e.g., abdominal pain, fever, vomiting)        Declines associated symptoms    ++Writer advised patient to make an appointment to be seen in the clinic, patient declines. Patient states she would like to try home care measures first, and if she is still unsuccessful, she will make appointment later in the week. Writer gave patient home care advice and advised she consult with pharmacist for OTC medication recommendations. Advised patient if she does not have BM by Friday, make appointment to be seen. Writer advised patient if she experiences any abdominal pain; fever; vomiting or nausea to be seen sooner. Patient verbalized understanding and is in agreement with plan ++    Protocols used: CONSTIPATION-A-[place]    "

## 2020-03-10 ENCOUNTER — TELEPHONE (OUTPATIENT)
Dept: FAMILY MEDICINE | Facility: CLINIC | Age: 85
End: 2020-03-10

## 2020-03-10 NOTE — TELEPHONE ENCOUNTER
Patient is complaining of gout in her great toe.  Symptoms began 4 days ago and patient is having a hard time getting around  Patient would prefer not to have to be seen   States she was seen for this a few years ago.  Please advise.  Shanae Noyola RN

## 2020-03-10 NOTE — TELEPHONE ENCOUNTER
Reason for Call:  Other call back    Detailed comments: Patient states she is experiencing gout and is wondering what she should do or what to take. Please assist. Thanks!    Phone Number Patient can be reached at: Home number on file [phone] (home)    Best Time: Any    Can we leave a detailed message on this number? YES    Call taken on 3/10/2020 at 9:50 AM by Darlene Tavarez

## 2020-03-11 NOTE — TELEPHONE ENCOUNTER
I recommend clinic visit. She could be seen in [facility] urgent care. Please give location and contact info to pt. Yecenia Dickerson M.D.

## 2020-03-11 NOTE — TELEPHONE ENCOUNTER
Writer called patient and reviewed message per Dr. Dickerson and offered to schedule clinic appt.    Patient verbalized understanding, stated her symptoms are improved today and will call back if she feels she needs an appt.    CRISTIAN GilmanN, RN

## 2020-04-28 ENCOUNTER — OFFICE VISIT (OUTPATIENT)
Dept: URGENT CARE | Facility: URGENT CARE | Age: 85
End: 2020-04-28
Payer: MEDICARE

## 2020-04-28 ENCOUNTER — VIRTUAL VISIT (OUTPATIENT)
Dept: FAMILY MEDICINE | Facility: CLINIC | Age: 85
End: 2020-04-28
Payer: MEDICARE

## 2020-04-28 VITALS
DIASTOLIC BLOOD PRESSURE: 81 MMHG | HEIGHT: 62 IN | BODY MASS INDEX: 33.13 KG/M2 | WEIGHT: 180 LBS | SYSTOLIC BLOOD PRESSURE: 149 MMHG | TEMPERATURE: 97.3 F | OXYGEN SATURATION: 97 % | HEART RATE: 89 BPM

## 2020-04-28 DIAGNOSIS — M79.661 RIGHT CALF PAIN: Primary | ICD-10-CM

## 2020-04-28 DIAGNOSIS — M79.89 LEG SWELLING: Primary | ICD-10-CM

## 2020-04-28 PROCEDURE — 99214 OFFICE O/P EST MOD 30 MIN: CPT | Performed by: NURSE PRACTITIONER

## 2020-04-28 PROCEDURE — 99207 ZZC NO BILLABLE SERVICE THIS VISIT: CPT | Performed by: FAMILY MEDICINE

## 2020-04-28 ASSESSMENT — MIFFLIN-ST. JEOR: SCORE: 1219.72

## 2020-04-28 NOTE — PROGRESS NOTES
"Lara Marc is a 84 year old female who is being evaluated via a billable telephone visit.      The patient has been notified of following:     \"This telephone visit will be conducted via a call between you and your physician/provider. We have found that certain health care needs can be provided without the need for a physical exam.  This service lets us provide the care you need with a short phone conversation.  If a prescription is necessary we can send it directly to your pharmacy.  If lab work is needed we can place an order for that and you can then stop by our lab to have the test done at a later time.    Telephone visits are billed at different rates depending on your insurance coverage. During this emergency period, for some insurers they may be billed the same as an in-person visit.  Please reach out to your insurance provider with any questions.    If during the course of the call the physician/provider feels a telephone visit is not appropriate, you will not be charged for this service.\"    Patient has given verbal consent for Telephone visit?  Yes    How would you like to obtain your AVS? Mail a copy    Subjective     Lara Marc is a 84 year old female who presents to clinic today for the following health issues:    HPI  Musculoskeletal problem/pain      Duration: unknown     Description  Location: right legs     Intensity:  moderate    Accompanying signs and symptoms: swelling and tender     History  Previous similar problem: YES  Previous evaluation:  none    Precipitating or alleviating factors:  Trauma or overuse: no   Aggravating factors include: none    Therapies tried and outcome: nothing    Staying home due to covid.     Right leg is swollen. Left leg is fine.   Going on for couple of weeks. Before that gout episode and it was on right side and right knee.   Muscle in back of leg - cramps and sore. Does not go away completely. It is very tender.   No history of dvt.     Reviewed and " updated as needed this visit by Provider         Review of Systems   ROS COMP: Constitutional, HEENT, cardiovascular, pulmonary, gi and gu systems are negative, except as otherwise noted.       Objective   Reported vitals:  There were no vitals taken for this visit.   healthy, alert and no distress  PSYCH: Alert and oriented times 3; coherent speech, normal   rate and volume, able to articulate logical thoughts, able   to abstract reason, no tangential thoughts, no hallucinations   or delusions  Her affect is normal  RESP: No cough, no audible wheezing, able to talk in full sentences  Remainder of exam unable to be completed due to telephone visits      Assessment/Plan:  1. Leg swelling  Discussed over phone it would be very hard to examine and assess actual issue and possible differential diagnosis.  We discussed DVT cannot be ruled out completely.  Infection versus gout cannot be ruled out too.  I think it would be reasonable for her to be evaluated in person.  -We will ask our  to help her to get into our urgent care today.  She agreed and understood.      Phone call duration:  16 minutes    Kermit Gonzalez MD, MD

## 2020-04-28 NOTE — PATIENT INSTRUCTIONS
Call [facility] to see if you can get in today for scan  Follow up with your primary care provider for results.   If pain gets worse, go to the ER.

## 2020-04-28 NOTE — PROGRESS NOTES
SUBJECTIVE:   Lara Marc is a 84 year old female presenting with a chief complaint of   Chief Complaint   Patient presents with     Urgent Care     Pt in clinic to have eval for right leg swelling.  Pt states she does have a hx of knee pain and gout.     Leg Swelling       She is an established patient of [facility].  She presents today after having a virtual visit with her primary care provider for right leg swelling and pain.  States her right lower leg has been painful on and off for several weeks.  Is able to walk without any difficulties.  States she has had some gout flareups recently in her right great toe and the pain will shoot up into the calf.  This morning states she had a charley horse in her right calf.  Took some Tylenol and the pain went away.  She has no history of DVTs, PEs or clotting disorders.  Denies any chest pain or shortness of breath.  No fevers.  No trauma to the leg.  She is on a baby asa daily     Review of Systems    ROS: 10 point ROS neg other than the symptoms noted above in the HPI.    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     CKD (chronic kidney disease) stage 3, GFR 30-59 ml/min (H) 7/25/2011    has 1 kidney, the right kidney is present---gave left kidney to sister     Dyspnea on exertion      Gastro-oesophageal reflux disease      Hypertension goal BP (blood pressure) < 140/90 7/25/2011     Other and unspecified hyperlipidemia      Unspecified essential hypertension     not medicated at this time     Uterovaginal prolapse, complete 2004     resolved '06     Vaginal enterocele, congenital or acquired 2007     or cystocele     Family History   Problem Relation Age of Onset     Heart Disease Father      Cancer - colorectal Brother      Prostate Cancer Brother      Diabetes Sister      Heart Disease Sister   "    Current Outpatient Medications   Medication Sig Dispense Refill     acetaminophen (TYLENOL) 325 MG tablet Take 2 tablets (650 mg) by mouth every 8 hours       aspirin 81 MG EC tablet Take 81 mg by mouth daily       Cholecalciferol (VITAMIN D3 PO) Take by mouth daily       lisinopril (PRINIVIL/ZESTRIL) 10 MG tablet Take 1 tablet (10 mg) by mouth daily 90 tablet 1     magnesium 250 MG tablet Take 1 tablet by mouth daily       Multiple Vitamins-Minerals (MULTIVITAMIN OR) Take 1 tablet by mouth daily.       Social History     Tobacco Use     Smoking status: Former Smoker     Packs/day: 0.00     Years: 17.00     Pack years: 0.00     Smokeless tobacco: Never Used     Tobacco comment: quit when she was 29 years old   Substance Use Topics     Alcohol use: No       OBJECTIVE  BP (!) 149/81   Pulse 89   Temp 97.3  F (36.3  C) (Oral)   Ht 1.575 m (5' 2\")   Wt 81.6 kg (180 lb)   SpO2 97%   BMI 32.92 kg/m      Physical Exam   EXAM:  Constitutional: healthy, alert and no distress   Cardiovascular: negative, PMI normal. No lifts, heaves, or thrills. RRR. No murmurs, clicks gallops or rub, No edema or JVD.  Respiratory: negative, Percussion normal. Good diaphragmatic excursion. Lungs clear  Neck: Neck supple. No adenopathy. Thyroid symmetric, normal size,  Abdomen: Abdomen soft, non-tender. BS normal. No masses, organomegaly  NEURO: Gait normal. Reflexes normal and symmetric. Sensation grossly WNL.  SKIN: no suspicious lesions or rashes  JOINT/EXTREMITIES: extremities normal- no gross deformities noted, gait normal and normal muscle tone. Right calf is slightly larger than right and has numerous varicose veins in both legs. No warmth or redness to calves. Negative homans sign bilaterally        ASSESSMENT:      ICD-10-CM    1. Right calf pain  M79.661 US Lower Extremity Venous Duplex Right        Medical Decision Making:  Doubt this is DVT, but can not exclude. Will send for venous duplex right lower leg to rule out. "     PLAN:  Follow up with PCP for results.   IF pain gets worse or has any chest pain or SOB go to the ER or call 911      Patient Instructions   Call [facility] to see if you can get in today for scan  Follow up with your primary care provider for results.

## 2020-04-30 ENCOUNTER — ANCILLARY PROCEDURE (OUTPATIENT)
Dept: ULTRASOUND IMAGING | Facility: CLINIC | Age: 85
End: 2020-04-30
Attending: NURSE PRACTITIONER

## 2020-04-30 ENCOUNTER — HOSPITAL ENCOUNTER (EMERGENCY)
Facility: CLINIC | Age: 85
Discharge: HOME | End: 2020-04-30
Attending: EMERGENCY MEDICINE | Admitting: EMERGENCY MEDICINE
Payer: MEDICARE

## 2020-04-30 VITALS
OXYGEN SATURATION: 98 % | SYSTOLIC BLOOD PRESSURE: 173 MMHG | DIASTOLIC BLOOD PRESSURE: 95 MMHG | HEART RATE: 78 BPM | RESPIRATION RATE: 18 BRPM | TEMPERATURE: 98 F

## 2020-04-30 DIAGNOSIS — I82.4Z9 DEEP VEIN THROMBOSIS (DVT) OF DISTAL VEIN OF LOWER EXTREMITY, UNSPECIFIED CHRONICITY, UNSPECIFIED LATERALITY (H): ICD-10-CM

## 2020-04-30 LAB
ALBUMIN SERPL-MCNC: 3.7 G/DL (ref 3.4–5)
ALP SERPL-CCNC: 63 U/L (ref 40–150)
ALT SERPL W P-5'-P-CCNC: 30 U/L (ref 0–50)
ANION GAP SERPL CALCULATED.3IONS-SCNC: 4 MMOL/L (ref 3–14)
APTT PPP: 33 SEC (ref 22–37)
AST SERPL W P-5'-P-CCNC: 29 U/L (ref 0–45)
BASOPHILS # BLD AUTO: 0 10E9/L (ref 0–0.2)
BASOPHILS NFR BLD AUTO: 0.6 %
BILIRUB SERPL-MCNC: 0.2 MG/DL (ref 0.2–1.3)
BUN SERPL-MCNC: 26 MG/DL (ref 7–30)
CALCIUM SERPL-MCNC: 9.2 MG/DL (ref 8.5–10.1)
CHLORIDE SERPL-SCNC: 108 MMOL/L (ref 94–109)
CO2 SERPL-SCNC: 27 MMOL/L (ref 20–32)
CREAT SERPL-MCNC: 1.26 MG/DL (ref 0.52–1.04)
DIFFERENTIAL METHOD BLD: ABNORMAL
EOSINOPHIL # BLD AUTO: 0.2 10E9/L (ref 0–0.7)
EOSINOPHIL NFR BLD AUTO: 3 %
ERYTHROCYTE [DISTWIDTH] IN BLOOD BY AUTOMATED COUNT: 14.9 % (ref 10–15)
GFR SERPL CREATININE-BSD FRML MDRD: 39 ML/MIN/{1.73_M2}
GLUCOSE SERPL-MCNC: 113 MG/DL (ref 70–99)
HCT VFR BLD AUTO: 37 % (ref 35–47)
HGB BLD-MCNC: 11 G/DL (ref 11.7–15.7)
IMM GRANULOCYTES # BLD: 0 10E9/L (ref 0–0.4)
IMM GRANULOCYTES NFR BLD: 0.6 %
INR PPP: 1.09 (ref 0.86–1.14)
INTERPRETATION ECG - MUSE: NORMAL
LYMPHOCYTES # BLD AUTO: 1.5 10E9/L (ref 0.8–5.3)
LYMPHOCYTES NFR BLD AUTO: 24 %
MCH RBC QN AUTO: 24.7 PG (ref 26.5–33)
MCHC RBC AUTO-ENTMCNC: 29.7 G/DL (ref 31.5–36.5)
MCV RBC AUTO: 83 FL (ref 78–100)
MONOCYTES # BLD AUTO: 0.5 10E9/L (ref 0–1.3)
MONOCYTES NFR BLD AUTO: 7.9 %
NEUTROPHILS # BLD AUTO: 4 10E9/L (ref 1.6–8.3)
NEUTROPHILS NFR BLD AUTO: 63.9 %
NRBC # BLD AUTO: 0 10*3/UL
NRBC BLD AUTO-RTO: 0 /100
PLATELET # BLD AUTO: 237 10E9/L (ref 150–450)
POTASSIUM SERPL-SCNC: 4.3 MMOL/L (ref 3.4–5.3)
PROT SERPL-MCNC: 7.4 G/DL (ref 6.8–8.8)
RADIOLOGIST FLAGS: ABNORMAL
RBC # BLD AUTO: 4.45 10E12/L (ref 3.8–5.2)
SODIUM SERPL-SCNC: 139 MMOL/L (ref 133–144)
TROPONIN I BLD-MCNC: 0 UG/L (ref 0–0.08)
WBC # BLD AUTO: 6.3 10E9/L (ref 4–11)

## 2020-04-30 PROCEDURE — 99285 EMERGENCY DEPT VISIT HI MDM: CPT | Mod: 25 | Performed by: EMERGENCY MEDICINE

## 2020-04-30 PROCEDURE — 93005 ELECTROCARDIOGRAM TRACING: CPT | Performed by: EMERGENCY MEDICINE

## 2020-04-30 PROCEDURE — 85025 COMPLETE CBC W/AUTO DIFF WBC: CPT | Performed by: EMERGENCY MEDICINE

## 2020-04-30 PROCEDURE — 84484 ASSAY OF TROPONIN QUANT: CPT

## 2020-04-30 PROCEDURE — 85610 PROTHROMBIN TIME: CPT | Performed by: EMERGENCY MEDICINE

## 2020-04-30 PROCEDURE — 85730 THROMBOPLASTIN TIME PARTIAL: CPT | Performed by: EMERGENCY MEDICINE

## 2020-04-30 PROCEDURE — 93010 ELECTROCARDIOGRAM REPORT: CPT | Mod: Z6 | Performed by: EMERGENCY MEDICINE

## 2020-04-30 PROCEDURE — 25000132 ZZH RX MED GY IP 250 OP 250 PS 637: Mod: GY | Performed by: EMERGENCY MEDICINE

## 2020-04-30 PROCEDURE — 99284 EMERGENCY DEPT VISIT MOD MDM: CPT | Performed by: EMERGENCY MEDICINE

## 2020-04-30 PROCEDURE — 80053 COMPREHEN METABOLIC PANEL: CPT | Performed by: EMERGENCY MEDICINE

## 2020-04-30 RX ADMIN — APIXABAN 10 MG: 5 TABLET, FILM COATED ORAL at 19:13

## 2020-04-30 NOTE — ED TRIAGE NOTES
Presented with c/o of right leg pain and swelling. Pt was seen in clinic yesterday and US done today showing right leg DVT. Pt takes 81 mg aspirin daily. Denies SOB.

## 2020-04-30 NOTE — ED PROVIDER NOTES
"ED Provider Note  [facility]      History   No chief complaint on file.    HPI  Lara Marc is a 84 year old female who has a PMH significant for hypertension, acquired absence of one kidney, GERD who presented to the ED with right leg swelling and US showing short segment deep vein thrombosis in a single posterior tibial vein in the right mid calf. She was seen in urgent care on 4/28/2020 for right leg swelling and sent for ultrasound.  She was told to go to the ER for evaluation for DVT.  She notes that she has had cramps in both legs for \"a long time.\"  She notes some bilateral LE \"soreness\" and is not as active as she is normally.  She noticed bilateral LE swelling for some time that was worse at night and better in the am.  She mentioned her LE edema to her daughter who recommended she come to the clinic.  She notes LE swelling, R>L for the past few weeks. She also notes chest pain on and off for the past 4 years.  No exertional factors. She does note chronic dyspnea with exertion, but no changes in breathing. Denies cough, fever, chills, headache, vision changes, falls.  She denies personal or family history of blood clots.  Denies recent travel or immobilization.  She lives at home with her sister. She takes 81 mg po aspirin daily.     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     CKD (chronic kidney disease) stage 3, GFR 30-59 ml/min (H) 7/25/2011    has 1 kidney, the right kidney is present---gave left kidney to sister     Dyspnea on exertion      Gastro-oesophageal reflux disease      Hypertension goal BP (blood pressure) < 140/90 7/25/2011     Other and unspecified hyperlipidemia      Unspecified essential hypertension     not medicated at this time     Uterovaginal prolapse, " complete 2004     resolved '06     Vaginal enterocele, congenital or acquired 2007     or cystocele     Past Surgical History:   Procedure Laterality Date     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     C VAGINAL HYSTERECTOMY  9/15/06    TVH/BSO/A&P repair/sacrospinus ligament fixation......childbirth x 9     childbirth X9[       CLOSED RX RIB FRACTURE  7/06    left     DAVINCI SACROCOLPOPEXY, MIDURETHRAL SLING, CYSTOSCOPY  2/8/2013    Procedure: DAVINCI SACROCOLPOPEXY, MIDURETHRAL SLING, CYSTOSCOPY;  DAVINCI SACROCOLPOPEXY, TVT EXACT SLING, RIGHT SALPINGO-OOPHERECTOMY, CYSTOSCOPY;  Surgeon: Bennie Galdamez MD;  Location:  OR     HC COLONOSCOPY THRU STOMA, DIAGNOSTIC  7/2005    diverticulosis,small polyp,recheck 5 yrs     LAPAROSCOPIC CHOLECYSTECTOMY  3/31/2011    Procedure:LAPAROSCOPIC CHOLECYSTECTOMY; Surgeon:DAVID MOLINA; Location:[facility] OR     acetaminophen (TYLENOL) 325 MG tablet  apixaban ANTICOAGULANT (ELIQUIS STARTER PACK) 5 MG tablet  apixaban ANTICOAGULANT (ELIQUIS STARTER PACK) 5 MG tablet  aspirin 81 MG EC tablet  Cholecalciferol (VITAMIN D3 PO)  lisinopril (PRINIVIL/ZESTRIL) 10 MG tablet  magnesium 250 MG tablet  Multiple Vitamins-Minerals (MULTIVITAMIN OR)      Allergies   Allergen Reactions     Nkda [No Known Drug Allergies]      Seasonal Allergies      Itchy eyes and watery eyes     Past medical history, past surgical history, medications, and allergies were reviewed with the patient. Additional pertinent items: None    Family History  Family History   Problem Relation Age of Onset     Heart Disease Father      Cancer - colorectal Brother      Prostate Cancer Brother      Diabetes Sister      Heart Disease Sister      Family history was reviewed with the patient. Additional pertinent items: None    Social History  Social History     Tobacco Use     Smoking status: Former Smoker     Packs/day: 0.00     Years: 17.00     Pack years: 0.00     Smokeless tobacco: Never Used     Tobacco  comment: quit when she was 29 years old   Substance Use Topics     Alcohol use: No     Drug use: No      Social history was reviewed with the patient. Additional pertinent items: None    Review of Systems   Constitutional: Negative for fever.   Respiratory: Negative for cough and shortness of breath.    Cardiovascular: Positive for chest pain and leg swelling.   All other systems reviewed and are negative.    A complete review of systems was performed with pertinent positives and negatives noted in the HPI, and all other systems negative.    Physical Exam   BP: (!) 186/87  Pulse: 78  Heart Rate: 85  Temp: 97.6  F (36.4  C)  Resp: 18  SpO2: 97 %  Physical Exam  Constitutional:       Appearance: Normal appearance.   HENT:      Head: Normocephalic and atraumatic.      Nose: Nose normal.      Mouth/Throat:      Mouth: Mucous membranes are moist.      Pharynx: Oropharynx is clear.   Neck:      Musculoskeletal: Normal range of motion and neck supple.   Cardiovascular:      Rate and Rhythm: Normal rate and regular rhythm.      Pulses: Normal pulses.      Heart sounds: Normal heart sounds.   Pulmonary:      Effort: Pulmonary effort is normal.      Breath sounds: Normal breath sounds.   Abdominal:      General: Abdomen is flat. Bowel sounds are normal.      Palpations: Abdomen is soft.   Musculoskeletal: Normal range of motion.         General: Tenderness present.      Right lower leg: Edema present.   Skin:     General: Skin is warm and dry.   Neurological:      General: No focal deficit present.      Mental Status: She is alert.   Psychiatric:         Mood and Affect: Mood normal.         Behavior: Behavior normal.         ED Course      Procedures             EKG Interpretation:      Interpreted by Osmani Zambrano MD    Symptoms at time of EKG: intermittant chest pain   Rhythm: normal sinus   Rate: Normal  Ectopy: none  Conduction: left bundle branch block (complete)  ST Segments/ T Waves: No acute ischemic  changes  Q Waves: nonspecific  Comparison to prior: Unchanged    Clinical Impression: no acute changes and left bundle branch block                Results for orders placed or performed during the hospital encounter of 04/30/20 (from the past 24 hour(s))   EKG 12-lead, tracing only   Result Value Ref Range    Interpretation ECG Click View Image link to view waveform and result    Troponin POCT   Result Value Ref Range    Troponin I 0.00 0.00 - 0.08 ug/L   CBC with platelets differential   Result Value Ref Range    WBC 6.3 4.0 - 11.0 10e9/L    RBC Count 4.45 3.8 - 5.2 10e12/L    Hemoglobin 11.0 (L) 11.7 - 15.7 g/dL    Hematocrit 37.0 35.0 - 47.0 %    MCV 83 78 - 100 fl    MCH 24.7 (L) 26.5 - 33.0 pg    MCHC 29.7 (L) 31.5 - 36.5 g/dL    RDW 14.9 10.0 - 15.0 %    Platelet Count 237 150 - 450 10e9/L    Diff Method Automated Method     % Neutrophils 63.9 %    % Lymphocytes 24.0 %    % Monocytes 7.9 %    % Eosinophils 3.0 %    % Basophils 0.6 %    % Immature Granulocytes 0.6 %    Nucleated RBCs 0 0 /100    Absolute Neutrophil 4.0 1.6 - 8.3 10e9/L    Absolute Lymphocytes 1.5 0.8 - 5.3 10e9/L    Absolute Monocytes 0.5 0.0 - 1.3 10e9/L    Absolute Eosinophils 0.2 0.0 - 0.7 10e9/L    Absolute Basophils 0.0 0.0 - 0.2 10e9/L    Abs Immature Granulocytes 0.0 0 - 0.4 10e9/L    Absolute Nucleated RBC 0.0    INR   Result Value Ref Range    INR 1.09 0.86 - 1.14   Partial thromboplastin time   Result Value Ref Range    PTT 33 22 - 37 sec   Comprehensive metabolic panel   Result Value Ref Range    Sodium 139 133 - 144 mmol/L    Potassium 4.3 3.4 - 5.3 mmol/L    Chloride 108 94 - 109 mmol/L    Carbon Dioxide 27 20 - 32 mmol/L    Anion Gap 4 3 - 14 mmol/L    Glucose 113 (H) 70 - 99 mg/dL    Urea Nitrogen 26 7 - 30 mg/dL    Creatinine 1.26 (H) 0.52 - 1.04 mg/dL    GFR Estimate 39 (L) >60 mL/min/[1.73_m2]    GFR Estimate If Black 45 (L) >60 mL/min/[1.73_m2]    Calcium 9.2 8.5 - 10.1 mg/dL    Bilirubin Total 0.2 0.2 - 1.3 mg/dL    Albumin  3.7 3.4 - 5.0 g/dL    Protein Total 7.4 6.8 - 8.8 g/dL    Alkaline Phosphatase 63 40 - 150 U/L    ALT 30 0 - 50 U/L    AST 29 0 - 45 U/L            Results for orders placed or performed during the hospital encounter of 04/30/20   CBC with platelets differential     Status: Abnormal   Result Value Ref Range    WBC 6.3 4.0 - 11.0 10e9/L    RBC Count 4.45 3.8 - 5.2 10e12/L    Hemoglobin 11.0 (L) 11.7 - 15.7 g/dL    Hematocrit 37.0 35.0 - 47.0 %    MCV 83 78 - 100 fl    MCH 24.7 (L) 26.5 - 33.0 pg    MCHC 29.7 (L) 31.5 - 36.5 g/dL    RDW 14.9 10.0 - 15.0 %    Platelet Count 237 150 - 450 10e9/L    Diff Method Automated Method     % Neutrophils 63.9 %    % Lymphocytes 24.0 %    % Monocytes 7.9 %    % Eosinophils 3.0 %    % Basophils 0.6 %    % Immature Granulocytes 0.6 %    Nucleated RBCs 0 0 /100    Absolute Neutrophil 4.0 1.6 - 8.3 10e9/L    Absolute Lymphocytes 1.5 0.8 - 5.3 10e9/L    Absolute Monocytes 0.5 0.0 - 1.3 10e9/L    Absolute Eosinophils 0.2 0.0 - 0.7 10e9/L    Absolute Basophils 0.0 0.0 - 0.2 10e9/L    Abs Immature Granulocytes 0.0 0 - 0.4 10e9/L    Absolute Nucleated RBC 0.0    INR     Status: None   Result Value Ref Range    INR 1.09 0.86 - 1.14   Partial thromboplastin time     Status: None   Result Value Ref Range    PTT 33 22 - 37 sec   Comprehensive metabolic panel     Status: Abnormal   Result Value Ref Range    Sodium 139 133 - 144 mmol/L    Potassium 4.3 3.4 - 5.3 mmol/L    Chloride 108 94 - 109 mmol/L    Carbon Dioxide 27 20 - 32 mmol/L    Anion Gap 4 3 - 14 mmol/L    Glucose 113 (H) 70 - 99 mg/dL    Urea Nitrogen 26 7 - 30 mg/dL    Creatinine 1.26 (H) 0.52 - 1.04 mg/dL    GFR Estimate 39 (L) >60 mL/min/[1.73_m2]    GFR Estimate If Black 45 (L) >60 mL/min/[1.73_m2]    Calcium 9.2 8.5 - 10.1 mg/dL    Bilirubin Total 0.2 0.2 - 1.3 mg/dL    Albumin 3.7 3.4 - 5.0 g/dL    Protein Total 7.4 6.8 - 8.8 g/dL    Alkaline Phosphatase 63 40 - 150 U/L    ALT 30 0 - 50 U/L    AST 29 0 - 45 U/L   EKG 12-lead,  tracing only     Status: None   Result Value Ref Range    Interpretation ECG Click View Image link to view waveform and result    Troponin POCT     Status: None   Result Value Ref Range    Troponin I 0.00 0.00 - 0.08 ug/L     Medications   apixaban ANTICOAGULANT (ELIQUIS) tablet 10 mg (10 mg Oral Given 4/30/20 1913)        Assessments & Plan (with Medical Decision Making)     Physician Attestation   I, Osmani Zambrano, saw and evaluated Lara Marc as part of a shared visit.  I have reviewed and discussed with the advanced practice provider their history, physical and plan.    I personally reviewed the vital signs, medications, labs and imaging.    My key history or physical exam findings: Patient is awake and alert, no acute distress.  She is speaking in complete sentences and mentating normally.    Key management decisions made by me:       This patient presented to the emergency department after being diagnosed with a DVT.  She had complained of intermittent chest pain is been occurring over a long period of time.  Twelve-lead EKG is a left bundle branch block but unchanged compared to old EKG and troponin is negative decreasing suspicion for acute ischemia.  Nothing on clinical exam or history suggestive of massive or submassive pulmonary embolism.  We did discuss the case with pharmacy and patient will be started on apixaban.  I did discuss the risks and benefits of both Lovenox/Coumadin treatment versus apixaban and patient is more comfortable with treatment with apixaban.  Initial dose was given in the emergency department and she was provided with a prescription and instructions to follow-up with her clinic within the next week for further instructions.  She was discharged in good condition.    Osmani Zambrano  Date of Service (when I saw the patient): 4/30/20    Discharge Medication List as of 4/30/2020  7:28 PM      START taking these medications    Details   !! apixaban ANTICOAGULANT  (ELIQUIS STARTER PACK) 5 MG tablet Take 2 tablets (10 mg) by mouth 2 times daily for 7 days, THEN 1 tablet (5 mg) 2 times daily for 23 days., Disp-74 tablet,R-0, Local Print      !! apixaban ANTICOAGULANT (ELIQUIS STARTER PACK) 5 MG tablet Take 2 tablets (10 mg) by mouth 2 times daily for 7 days, THEN 1 tablet (5 mg) 2 times daily for 23 days., Disp-74 tablet,R-0, E-Prescribe       !! - Potential duplicate medications found. Please discuss with provider.          Final diagnoses:   Deep vein thrombosis (DVT) of distal vein of lower extremity, unspecified chronicity, unspecified laterality (H)       --  BRIANNA Courtney [facility]   Emergency Medicine   [facility], EMERGENCY DEPARTMENT  4/30/2020     Osmani Zambrano MD  05/02/20 1048

## 2020-04-30 NOTE — ED AVS SNAPSHOT
[facility] [place], Emergency Department  [address] [place] [id number]  Phone:  [phone]                                    Lara Marc   MRN: [id number]    Department:  [facility], Emergency Department   Date of Visit:  4/30/2020           After Visit Summary Signature Page    I have received my discharge instructions, and my questions have been answered. I have discussed any challenges I see with this plan with the nurse or doctor.    ..........................................................................................................................................  Patient/Patient Representative Signature      ..........................................................................................................................................  Patient Representative Print Name and Relationship to Patient    ..................................................               ................................................  Date                                   Time    ..........................................................................................................................................  Reviewed by Signature/Title    ...................................................              ..............................................  Date                                               Time          22EPIC Rev 08/18

## 2020-04-30 NOTE — DISCHARGE INSTRUCTIONS
Please make an appointment to follow up with Your Primary Care Provider by phone next week to discuss follow-up.    Eliquis (apixaban) as directed.  You will take 2 tablets by mouth twice per day for 7 days then switch to 1 tablet twice a day until directed to stop by your primary physician.

## 2020-05-01 ASSESSMENT — ENCOUNTER SYMPTOMS
FEVER: 0
SHORTNESS OF BREATH: 0
COUGH: 0

## 2020-05-04 ENCOUNTER — TELEPHONE (OUTPATIENT)
Dept: FAMILY MEDICINE | Facility: CLINIC | Age: 85
End: 2020-05-04

## 2020-05-04 DIAGNOSIS — I82.5Z9 CHRONIC DEEP VEIN THROMBOSIS (DVT) OF DISTAL VEIN OF LOWER EXTREMITY, UNSPECIFIED LATERALITY (H): Primary | ICD-10-CM

## 2020-05-04 NOTE — TELEPHONE ENCOUNTER
Please initiate PA for apixaban ANTICOAGULANT (ELIQUIS STARTER PACK) 5 MG tablet for a right leg blood clot.    Thanks! Judi Billy RN

## 2020-05-04 NOTE — TELEPHONE ENCOUNTER
This was rx in 4/30/2020 ER visit.    PCP is out of clinic today.  Will send to DOD.  Would you like a virtual appt? Please advise.  KIM Sanford

## 2020-05-04 NOTE — TELEPHONE ENCOUNTER
Reason for Call:  Other Clarification    Detailed comments: patient was prescribed:    apixaban ANTICOAGULANT (ELIQUIS STARTER PACK) 5 MG tablet for a right leg blood clot.    The cost is $  600.00 and she is unable to afford it.    Please prescribe another prescription ASAP that costs less, thanks.    [facility] [address] [place], [place]  [address]    Phone Number Patient can be reached at: Home number on file [phone] (home)    Best Time: asap    Can we leave a detailed message on this number? YES    Call taken on 5/4/2020 at 9:21 AM by Adrianna Perdomo

## 2020-05-04 NOTE — TELEPHONE ENCOUNTER
Prior Authorization Not Needed per Insurance    Medication: apixaban ANTICOAGULANT (ELIQUIS STARTER PACK) 5 MG tablet  Insurance Company:    Expected CoPay:      Pharmacy Filling the Rx: [facility] [id number] [place] [address]  Pharmacy Notified: Yes  Patient Notified: No    Patient does not have prescription insurance, no PA can be done. Pharmacist at [facility] suggested prescribing coumadin as the generic of that is much less expensive than Eliquis.

## 2020-05-04 NOTE — TELEPHONE ENCOUNTER
Can we do PA?  If it does not work, she might need to do coumadin.  It seems like Dr Dickerson is in the clinic tomorrow and can address if PA gets denied.

## 2020-05-05 ENCOUNTER — ANTICOAGULATION THERAPY VISIT (OUTPATIENT)
Dept: FAMILY MEDICINE | Facility: CLINIC | Age: 85
End: 2020-05-05
Payer: MEDICARE

## 2020-05-05 DIAGNOSIS — I82.409 DEEP VEIN THROMBOSIS (DVT) (H): ICD-10-CM

## 2020-05-05 PROCEDURE — 99207 ZZC NO CHARGE NURSE ONLY: CPT | Performed by: FAMILY MEDICINE

## 2020-05-05 NOTE — TELEPHONE ENCOUNTER
Patient called checking the status on this stating she needs to start taking something asap     Please advise

## 2020-05-05 NOTE — TELEPHONE ENCOUNTER
Patient was seen in ER and diagnosed with DVT.  Per note from PA team:  Patient does not have prescription insurance, no PA can be done. Pharmacist at [facility] suggested prescribing coumadin as the generic of that is much less expensive than Eliquis.  Please advise.  Shanae Noyola RN

## 2020-05-05 NOTE — TELEPHONE ENCOUNTER
"Maybe see what her options are to be seen by heme vs INR to get going on coumadin.  Referrals placed.  Thanks  Zaida \"Genaro\" GUEVARA Schultz   "

## 2020-05-05 NOTE — TELEPHONE ENCOUNTER
Writer called call [phone], phone number listed on hematology referral and was informed:  1. New process will start tomorrow where patient's referrals get reviewed by RNs then patients are scheduled with appropriate provider, depending on reason for referral  2. Writer to speak with bleeding/clotting clinic: [phone] regarding urgent need for anticoagulation start for patient    Writer was transferred to Bleeding/Clotting Clinic and spoke with Isabel who stated earliest appt right now is 5/11/20 but will send a message to clinic Physician Assistants to review patient's case and see if patient can be worked into schedule sooner.    Patient will be contacted directly by Bleeding/Clotting Clinic for appt.    Writer called patient and reviewed current plan for Hematolgy appt.  Patient verbalized understanding and stated she was also contacted by another nurse regarding anticoagulation but could not remember name.  Writer informed patient [facility] RN is Monse and writer will update her on Hematology appt.    MEGHAN Palacios, CRISTIANN, RN

## 2020-05-06 ENCOUNTER — ALLIED HEALTH/NURSE VISIT (OUTPATIENT)
Dept: NURSING | Facility: CLINIC | Age: 85
End: 2020-05-06
Payer: MEDICARE

## 2020-05-06 DIAGNOSIS — Z71.89 INJECTION EDUCATION, ENCOUNTER FOR: Primary | ICD-10-CM

## 2020-05-06 PROCEDURE — 99207 ZZC NO CHARGE NURSE ONLY: CPT

## 2020-05-06 NOTE — PROGRESS NOTES
The following medication was given:   PATIENT  IDENTIFIED BY NAME AND DATE OF BIRTH PRIOR TO INJECTION. 5 checks completed.    MEDICATION: Lovenox  ROUTE: SQ  SITE: RLQ - Abd.  DOSE: 40mg  LOT #: [id number]  :  evocatal  EXPIRATION DATE:    NDC#: 0955-1004-10    Writer cleaned site with alcohol pad. Allowed to dry. Administered and applied band-aid. Patient tolerated well.     Thanks! Judi Billy RN

## 2020-05-07 ENCOUNTER — ALLIED HEALTH/NURSE VISIT (OUTPATIENT)
Dept: NURSING | Facility: CLINIC | Age: 85
End: 2020-05-07
Payer: MEDICARE

## 2020-05-07 DIAGNOSIS — Z71.89 INJECTION EDUCATION, ENCOUNTER FOR: Primary | ICD-10-CM

## 2020-05-07 PROBLEM — I82.409 DEEP VEIN THROMBOSIS (DVT) (H): Status: ACTIVE | Noted: 2020-05-07

## 2020-05-07 PROCEDURE — 99207 ZZC NO CHARGE NURSE ONLY: CPT

## 2020-05-07 NOTE — PROGRESS NOTES
The following medication was given:   PATIENT IDENTIFIED BY NAME AND DATE OF BIRTH PRIOR TO INJECTION. 5 checks completed.    MEDICATION: Lovenox  ROUTE: SQ  SITE: RLQ - Abd.  DOSE: 40mg  LOT #: [id number]  :  [employer]  EXPIRATION DATE:    NDC#: 0955-1004-10    Writer cleaned site with alcohol pad. Allowed to dry. Administered and applied band-aid. Patient tolerated well.     Thanks! Judi Billy RN

## 2020-05-08 ENCOUNTER — ANTICOAGULATION THERAPY VISIT (OUTPATIENT)
Dept: FAMILY MEDICINE | Facility: CLINIC | Age: 85
End: 2020-05-08

## 2020-05-08 ENCOUNTER — ALLIED HEALTH/NURSE VISIT (OUTPATIENT)
Dept: NURSING | Facility: CLINIC | Age: 85
End: 2020-05-08
Payer: MEDICARE

## 2020-05-08 DIAGNOSIS — I82.5Z9 CHRONIC DEEP VEIN THROMBOSIS (DVT) OF DISTAL VEIN OF LOWER EXTREMITY, UNSPECIFIED LATERALITY (H): Primary | ICD-10-CM

## 2020-05-08 DIAGNOSIS — I82.409 DEEP VEIN THROMBOSIS (DVT) (H): ICD-10-CM

## 2020-05-08 DIAGNOSIS — I82.5Z9 CHRONIC DEEP VEIN THROMBOSIS (DVT) OF DISTAL VEIN OF LOWER EXTREMITY, UNSPECIFIED LATERALITY (H): ICD-10-CM

## 2020-05-08 DIAGNOSIS — I82.409 DEEP VEIN THROMBOSIS (DVT) (H): Primary | ICD-10-CM

## 2020-05-08 LAB
CAPILLARY BLOOD COLLECTION: NORMAL
INR PPP: 2 (ref 0.86–1.14)

## 2020-05-08 PROCEDURE — 36416 COLLJ CAPILLARY BLOOD SPEC: CPT | Performed by: FAMILY MEDICINE

## 2020-05-08 PROCEDURE — 99207 ZZC NO CHARGE NURSE ONLY: CPT | Performed by: FAMILY MEDICINE

## 2020-05-08 PROCEDURE — 85610 PROTHROMBIN TIME: CPT | Performed by: FAMILY MEDICINE

## 2020-05-08 PROCEDURE — 99207 ZZC NO CHARGE NURSE ONLY: CPT

## 2020-05-08 NOTE — NURSING NOTE
Lot # is actually [id number]    Writer cleaned site with alcohol pad. Allowed to dry. Administered and applied band-aid. Patient tolerated well.        KIM Sanford

## 2020-05-08 NOTE — NURSING NOTE
The following medication was given:     MEDICATION: Lovenox  ROUTE: SQ  SITE: [facility] - Abd.  DOSE: 40 mg  LOT #: [id number]  :  [employer]  EXPIRATION DATE:  10/21  NDC: 6408-2482-08      This is pt's last lovenox injection.  Pt brought in the med with her.  KIM Sanford

## 2020-05-08 NOTE — PROGRESS NOTES
ANTICOAGULATION FOLLOW-UP CLINIC VISIT    Patient Name:  Lara Marc  Date:  2020  Contact Type:  Telephone/ LVM for pt    SUBJECTIVE:  Patient Findings     Comments:   Unable to reach patient, lvm with dosing plan (2.5 mg daily) and recheck on . Reduction in dose advised per start up protocol due to quick climb in INR in 3 days. Patient does not require further Lovenox injections as INR is 2.0. Requested callback to report any changes to health, diet, meds, activity or any s/s of bleeding or clotting.               OBJECTIVE    INR   Date Value Ref Range Status   2020 2.00 (H) 0.86 - 1.14 Final     Comment:     This test is intended for monitoring Coumadin therapy.  Results are not   accurate in patients with prolonged INR due to factor deficiency.         ASSESSMENT / PLAN  INR assessment THER    Recheck INR In: 3 DAYS    INR Location Clinic      Anticoagulation Summary  As of 2020    INR goal:   2.0-3.0   TTR:   --   INR used for dosin.00 (2020)   Warfarin maintenance plan:   No maintenance plan   Full warfarin instructions:   : 2.5 mg; : 2.5 mg; 5/10: 2.5 mg   Plan last modified:   Monse Jackson RN (2020)   Next INR check:   2020   Target end date:       Indications    Deep vein thrombosis (DVT) (H) [I82.409]             Anticoagulation Episode Summary     INR check location:       Preferred lab:       Send INR reminders to:   KRYSTLE WEINER    Comments:         Anticoagulation Care Providers     Provider Role Specialty Phone number    Yecenia Dickerson MD [facility] [phone]            See the Encounter Report to view Anticoagulation Flowsheet and Dosing Calendar (Go to Encounters tab in chart review, and find the Anticoagulation Therapy Visit)    Dosage adjustment made based on physician directed care plan.    Monse Jackson RN

## 2020-05-08 NOTE — PROGRESS NOTES
Unable to reach patient, lvm with dosing plan (2.5 mg daily) and recheck on Monday 5/11. Reduction in dose advised per start up protocol due to quick climb in INR in 3 days. Patient does not require further Lovenox injections as INR is 2.0. Requested callback to report any changes to health, diet, meds, activity or any s/s of bleeding or clotting. Monse Jackson, CRISTIANN, RN

## 2020-05-11 ENCOUNTER — ANTICOAGULATION THERAPY VISIT (OUTPATIENT)
Dept: FAMILY MEDICINE | Facility: CLINIC | Age: 85
End: 2020-05-11

## 2020-05-11 DIAGNOSIS — I82.409 DEEP VEIN THROMBOSIS (DVT) (H): ICD-10-CM

## 2020-05-11 DIAGNOSIS — I82.5Z9 CHRONIC DEEP VEIN THROMBOSIS (DVT) OF DISTAL VEIN OF LOWER EXTREMITY, UNSPECIFIED LATERALITY (H): ICD-10-CM

## 2020-05-11 LAB
CAPILLARY BLOOD COLLECTION: NORMAL
INR PPP: 2.8 (ref 0.86–1.14)

## 2020-05-11 PROCEDURE — 99207 ZZC NO CHARGE NURSE ONLY: CPT | Performed by: FAMILY MEDICINE

## 2020-05-11 PROCEDURE — 85610 PROTHROMBIN TIME: CPT | Performed by: FAMILY MEDICINE

## 2020-05-11 PROCEDURE — 36416 COLLJ CAPILLARY BLOOD SPEC: CPT | Performed by: FAMILY MEDICINE

## 2020-05-11 NOTE — PROGRESS NOTES
ANTICOAGULATION FOLLOW-UP CLINIC VISIT    Patient Name:  Lara Marc  Date:  2020  Contact Type:  Telephone/ Patient    SUBJECTIVE:  Patient Findings     Comments:   The patient was assessed for diet, medication, and activity level changes, missed or extra doses, bruising or bleeding, with no problem findings.          OBJECTIVE    Recent labs: (last 7 days)     20  1351   INR 2.80*       ASSESSMENT / PLAN  INR assessment THER    Recheck INR In: 3 DAYS    INR Location Clinic      Anticoagulation Summary  As of 2020    INR goal:   2.0-3.0   TTR:   --   INR used for dosin.80 (2020)   Warfarin maintenance plan:   No maintenance plan   Full warfarin instructions:   : 2.5 mg; : 2.5 mg; : 2.5 mg   Plan last modified:   Monse Jackson RN (2020)   Next INR check:   2020   Target end date:       Indications    Deep vein thrombosis (DVT) (H) [I82.409]             Anticoagulation Episode Summary     INR check location:       Preferred lab:       Send INR reminders to:   KRYSTLE WEINER    Comments:         Anticoagulation Care Providers     Provider Role Specialty Phone number    Yecenia Dickerson MD [facility] [phone]            See the Encounter Report to view Anticoagulation Flowsheet and Dosing Calendar (Go to Encounters tab in chart review, and find the Anticoagulation Therapy Visit)    Writer advised patient to continue 2.5 mg daily and to have some greens with dinner tonight. Patient has a mixed green salad on the menu tonight! Recheck Thursday to be sure level does not continue to climb. Patient agreed to plan.     Patient made aware if signs of clotting (pain, tenderness, swelling, or color change in any extremity) AND/OR bleeding occur (nosebleeds, bleeding gums, bruising, or blood in stool or urine) to notify provider & seek medical attention. If severe symptoms develop, such as major bleeding, chest pain, shortness of breath, fall,  trauma or s/s of stroke, patient to call 911 immediately.     Dosage adjustment made based on physician directed care plan.    Monse Jackson RN

## 2020-05-11 NOTE — PROGRESS NOTES
Attempted to reach patient, no answer on cell. Lvm with sister at home requesting callback to [facility] at patient's earliest convenience. Monse Jackson, CRISTIANN, RN

## 2020-05-14 ENCOUNTER — ANTICOAGULATION THERAPY VISIT (OUTPATIENT)
Dept: FAMILY MEDICINE | Facility: CLINIC | Age: 85
End: 2020-05-14

## 2020-05-14 DIAGNOSIS — I82.5Z9 CHRONIC DEEP VEIN THROMBOSIS (DVT) OF DISTAL VEIN OF LOWER EXTREMITY, UNSPECIFIED LATERALITY (H): ICD-10-CM

## 2020-05-14 DIAGNOSIS — I82.409 DEEP VEIN THROMBOSIS (DVT) (H): ICD-10-CM

## 2020-05-14 LAB
CAPILLARY BLOOD COLLECTION: NORMAL
INR PPP: 2.8 (ref 0.86–1.14)

## 2020-05-14 PROCEDURE — 85610 PROTHROMBIN TIME: CPT | Performed by: FAMILY MEDICINE

## 2020-05-14 PROCEDURE — 36416 COLLJ CAPILLARY BLOOD SPEC: CPT | Performed by: FAMILY MEDICINE

## 2020-05-14 PROCEDURE — 99207 ZZC NO CHARGE NURSE ONLY: CPT | Performed by: FAMILY MEDICINE

## 2020-05-14 NOTE — PROGRESS NOTES
ANTICOAGULATION FOLLOW-UP CLINIC VISIT    Patient Name:  Lara Marc  Date:  2020  Contact Type:  Telephone/ Patient    SUBJECTIVE:  Patient Findings     Comments:   The patient was assessed for diet, medication, and activity level changes, missed or extra doses, bruising or bleeding, with no problem findings. Patient reports improved fatigue sx over the last few days.           OBJECTIVE    Recent labs: (last 7 days)     20  1337   INR 2.80*       ASSESSMENT / PLAN  INR assessment THER    Recheck INR In: 4 DAYS    INR Location Clinic      Anticoagulation Summary  As of 2020    INR goal:   2.0-3.0   TTR:   --   INR used for dosin.80 (2020)   Warfarin maintenance plan:   2.5 mg (2.5 mg x 1) every day   Full warfarin instructions:   2.5 mg every day   Weekly warfarin total:   17.5 mg   Plan last modified:   Monse Jackson RN (2020)   Next INR check:   2020   Target end date:       Indications    Deep vein thrombosis (DVT) (H) [I82.409]             Anticoagulation Episode Summary     INR check location:       Preferred lab:       Send INR reminders to:   KRYSTLE WEINER    Comments:         Anticoagulation Care Providers     Provider Role Specialty Phone number    Yecenia Dickerson MD [facility] [phone]            See the Encounter Report to view Anticoagulation Flowsheet and Dosing Calendar (Go to Encounters tab in chart review, and find the Anticoagulation Therapy Visit)    Writer advised patient to continue 2.5 mg daily and recheck Monday to ensure stability on slightly lower dose of 17.5 mg.    Patient made aware if signs of clotting (pain, tenderness, swelling, or color change in any extremity) AND/OR bleeding occur (nosebleeds, bleeding gums, bruising, or blood in stool or urine) to notify provider & seek medical attention. If severe symptoms develop, such as major bleeding, chest pain, shortness of breath, fall, trauma or s/s of stroke, patient  to call 911 immediately.     Dosage adjustment made based on physician directed care plan.    Monse Jackson RN

## 2020-05-15 NOTE — TELEPHONE ENCOUNTER
INR referral is required. Please complete cued order or advise.   Goal has been 2-3 for RLE DVT.   You will need to determine the length of treatment.   CRISTIAN GalvanN, RN

## 2020-05-18 ENCOUNTER — ANTICOAGULATION THERAPY VISIT (OUTPATIENT)
Dept: FAMILY MEDICINE | Facility: CLINIC | Age: 85
End: 2020-05-18

## 2020-05-18 DIAGNOSIS — I82.5Z9 CHRONIC DEEP VEIN THROMBOSIS (DVT) OF DISTAL VEIN OF LOWER EXTREMITY, UNSPECIFIED LATERALITY (H): ICD-10-CM

## 2020-05-18 DIAGNOSIS — I82.409 DEEP VEIN THROMBOSIS (DVT) (H): ICD-10-CM

## 2020-05-18 LAB
CAPILLARY BLOOD COLLECTION: NORMAL
INR PPP: 2.9 (ref 0.86–1.14)

## 2020-05-18 PROCEDURE — 36416 COLLJ CAPILLARY BLOOD SPEC: CPT | Performed by: FAMILY MEDICINE

## 2020-05-18 PROCEDURE — 99207 ZZC NO CHARGE NURSE ONLY: CPT | Performed by: FAMILY MEDICINE

## 2020-05-18 PROCEDURE — 85610 PROTHROMBIN TIME: CPT | Performed by: FAMILY MEDICINE

## 2020-05-18 NOTE — PROGRESS NOTES
ANTICOAGULATION FOLLOW-UP CLINIC VISIT    Patient Name:  Lara Marc  Date:  2020  Contact Type:  Telephone/ LVM for patient    SUBJECTIVE:  Patient Findings     Positives:   Other complaints ( update: Pt believes she may have gout at big toe. Writer rec home remedies (vit. C, epsom salt, cool compress, 200 mg ibuprofen ONCE) to try for 2 days since pt doesn't have insurance. If no improvements, rec calling clinic for Telephone appt. )    Comments:   Unable to reach patient x 2 attempts, lvm with new dosing plan: reduce weekly dose by 1.25 mg d/t ongoing higher INR levels. Recheck INR next Tuesday d/t Memorial holiday. Encouraged callback to report any changes to health, diet, meds, activity or any s/s of bleeding or clotting.            OBJECTIVE    Recent labs: (last 7 days)     20  1355   INR 2.90*       ASSESSMENT / PLAN  INR assessment THER    Recheck INR In: 8 DAYS    INR Location Clinic      Anticoagulation Summary  As of 2020    INR goal:   2.0-3.0   TTR:   100.0 % (1 d)   INR used for dosin.90 (2020)   Warfarin maintenance plan:   1.25 mg (2.5 mg x 0.5) every Tue; 2.5 mg (2.5 mg x 1) all other days   Full warfarin instructions:   : 1.25 mg; Otherwise 1.25 mg every Tue; 2.5 mg all other days   Weekly warfarin total:   16.25 mg   Plan last modified:   Monse Jackson RN (2020)   Next INR check:   2020   Target end date:       Indications    Deep vein thrombosis (DVT) (H) [I82.409]             Anticoagulation Episode Summary     INR check location:       Preferred lab:       Send INR reminders to:   KRYSTLE WEINER    Comments:         Anticoagulation Care Providers     Provider Role Specialty Phone number    Yecenia Dickerson MD [facility] [phone]            See the Encounter Report to view Anticoagulation Flowsheet and Dosing Calendar (Go to Encounters tab in chart review, and find the Anticoagulation Therapy Visit)     update:  patient did not receive yesterday's vm. New dosing plan advised today, trial slightly lower weekly dose of 16.25 mg. Recheck next Tuesday.     Patient made aware if signs of clotting (pain, tenderness, swelling, or color change in any extremity) AND/OR bleeding occur (nosebleeds, bleeding gums, bruising, or blood in stool or urine) to notify provider & seek medical attention. If severe symptoms develop, such as major bleeding, chest pain, shortness of breath, fall, trauma or s/s of stroke, patient to call 911 immediately.     Dosage adjustment made based on physician directed care plan.    Monse Jackson RN

## 2020-05-26 ENCOUNTER — ANTICOAGULATION THERAPY VISIT (OUTPATIENT)
Dept: FAMILY MEDICINE | Facility: CLINIC | Age: 85
End: 2020-05-26

## 2020-05-26 DIAGNOSIS — I82.5Z9 CHRONIC DEEP VEIN THROMBOSIS (DVT) OF DISTAL VEIN OF LOWER EXTREMITY, UNSPECIFIED LATERALITY (H): ICD-10-CM

## 2020-05-26 DIAGNOSIS — I82.409 DEEP VEIN THROMBOSIS (DVT) (H): ICD-10-CM

## 2020-05-26 LAB
CAPILLARY BLOOD COLLECTION: NORMAL
INR PPP: 2.2 (ref 0.86–1.14)

## 2020-05-26 PROCEDURE — 85610 PROTHROMBIN TIME: CPT | Performed by: FAMILY MEDICINE

## 2020-05-26 PROCEDURE — 36416 COLLJ CAPILLARY BLOOD SPEC: CPT | Performed by: FAMILY MEDICINE

## 2020-05-26 PROCEDURE — 99207 ZZC NO CHARGE NURSE ONLY: CPT | Performed by: FAMILY MEDICINE

## 2020-05-26 NOTE — PROGRESS NOTES
ANTICOAGULATION FOLLOW-UP CLINIC VISIT    Patient Name:  Lara Marc  Date:  2020  Contact Type:  Telephone/ Patient    SUBJECTIVE:  Patient Findings     Positives:   Change in diet/appetite (Pt had a few salads over the weekend, but doesn't know if that's an increase or not. )    Comments:   The patient was assessed for medication and activity level changes, missed or extra doses, bruising or bleeding, with no problem findings.            OBJECTIVE    Recent labs: (last 7 days)     20  1344   INR 2.20*       ASSESSMENT / PLAN  INR assessment THER    Recheck INR In: 1 WEEK    INR Location Clinic      Anticoagulation Summary  As of 2020    INR goal:   2.0-3.0   TTR:   100.0 % (1.3 wk)   INR used for dosin.20 (2020)   Warfarin maintenance plan:   1.25 mg (2.5 mg x 0.5) every Tue; 2.5 mg (2.5 mg x 1) all other days   Full warfarin instructions:   1.25 mg every Tue; 2.5 mg all other days   Weekly warfarin total:   16.25 mg   No change documented:   Monse Jackson RN   Plan last modified:   Monse Jackson RN (2020)   Next INR check:   2020   Target end date:   2020    Indications    Deep vein thrombosis (DVT) (H) [I82.409]  Chronic deep vein thrombosis (DVT) of distal vein of lower extremity  unspecified laterality (H) [I82.5Z9]             Anticoagulation Episode Summary     INR check location:       Preferred lab:       Send INR reminders to:   KRYSTLE WEINER    Comments:         Anticoagulation Care Providers     Provider Role Specialty Phone number    Yecenia Dickerson MD Referring [facility] [phone]            See the Encounter Report to view Anticoagulation Flowsheet and Dosing Calendar (Go to Encounters tab in chart review, and find the Anticoagulation Therapy Visit)    Writer advised patient to continue current maintenance dose of 16.25 mg and recheck in one week to ensure stability.     Patient made aware if signs of clotting (pain, tenderness,  swelling, or color change in any extremity) AND/OR bleeding occur (nosebleeds, bleeding gums, bruising, or blood in stool or urine) to notify provider & seek medical attention. If severe symptoms develop, such as major bleeding, chest pain, shortness of breath, fall, trauma or s/s of stroke, patient to call 911 immediately.     Dosage adjustment made based on physician directed care plan.    Monse Jackson RN

## 2020-05-26 NOTE — TELEPHONE ENCOUNTER
I placed the order for three months, which would be minimum. I see that she was attempting to get scheduled with the bleeding and clotting clinic at the . Can you make sure she schedules with hematology? I would like them to make recommendation for duration of therapy. Yecenia Dickerson M.D.

## 2020-06-02 ENCOUNTER — ANTICOAGULATION THERAPY VISIT (OUTPATIENT)
Dept: FAMILY MEDICINE | Facility: CLINIC | Age: 85
End: 2020-06-02

## 2020-06-02 DIAGNOSIS — I82.5Z9 CHRONIC DEEP VEIN THROMBOSIS (DVT) OF DISTAL VEIN OF LOWER EXTREMITY, UNSPECIFIED LATERALITY (H): ICD-10-CM

## 2020-06-02 DIAGNOSIS — I82.409 DEEP VEIN THROMBOSIS (DVT) (H): ICD-10-CM

## 2020-06-02 LAB
CAPILLARY BLOOD COLLECTION: NORMAL
INR PPP: 2.4 (ref 0.86–1.14)

## 2020-06-02 PROCEDURE — 99207 ZZC NO CHARGE NURSE ONLY: CPT | Performed by: FAMILY MEDICINE

## 2020-06-02 PROCEDURE — 85610 PROTHROMBIN TIME: CPT | Performed by: FAMILY MEDICINE

## 2020-06-02 PROCEDURE — 36416 COLLJ CAPILLARY BLOOD SPEC: CPT | Performed by: FAMILY MEDICINE

## 2020-06-02 NOTE — PROGRESS NOTES
Attempted to reach patient, not available at this time (no vm set-up on cell phone). Writer will try again later. Monse Jackson, CRISTIANN, RN

## 2020-06-02 NOTE — PROGRESS NOTES
ANTICOAGULATION FOLLOW-UP CLINIC VISIT    Patient Name:  Lara Marc  Date:  2020  Contact Type:  Telephone/ Patient    SUBJECTIVE:  Patient Findings     Comments:   The patient was assessed for diet, medication, and activity level changes, missed or extra doses, bruising or bleeding, with no problem findings.           OBJECTIVE    Recent labs: (last 7 days)     20  1046   INR 2.40*       ASSESSMENT / PLAN  INR assessment THER    Recheck INR In: 10 DAYS    INR Location Clinic      Anticoagulation Summary  As of 2020    INR goal:   2.0-3.0   TTR:   100.0 % (2.3 wk)   INR used for dosin.40 (2020)   Warfarin maintenance plan:   1.25 mg (2.5 mg x 0.5) every Tue; 2.5 mg (2.5 mg x 1) all other days   Full warfarin instructions:   1.25 mg every Tue; 2.5 mg all other days   Weekly warfarin total:   16.25 mg   No change documented:   Monse Jackson RN   Plan last modified:   Monse Jackson RN (2020)   Next INR check:   2020   Target end date:   2020    Indications    Deep vein thrombosis (DVT) (H) [I82.409]  Chronic deep vein thrombosis (DVT) of distal vein of lower extremity  unspecified laterality (H) [I82.5Z9]             Anticoagulation Episode Summary     INR check location:       Preferred lab:       Send INR reminders to:   KRYSTLE WEINER    Comments:         Anticoagulation Care Providers     Provider Role Specialty Phone number    Yecenia Dickerson MD Referring [facility] [phone]            See the Encounter Report to view Anticoagulation Flowsheet and Dosing Calendar (Go to Encounters tab in chart review, and find the Anticoagulation Therapy Visit)    Patient informed to continue current weekly dose and recheck in 10 days.    Patient made aware if signs of clotting (pain, tenderness, swelling, or color change in any extremity) AND/OR bleeding occur (nosebleeds, bleeding gums, bruising, or blood in stool or urine) to notify provider & seek medical  attention. If severe symptoms develop, such as major bleeding, chest pain, shortness of breath, fall, trauma or s/s of stroke, patient to call 911 immediately.     Dosage adjustment made based on physician directed care plan.    Monse Jackson RN

## 2020-06-12 ENCOUNTER — ANTICOAGULATION THERAPY VISIT (OUTPATIENT)
Dept: FAMILY MEDICINE | Facility: CLINIC | Age: 85
End: 2020-06-12

## 2020-06-12 DIAGNOSIS — I82.5Z9 CHRONIC DEEP VEIN THROMBOSIS (DVT) OF DISTAL VEIN OF LOWER EXTREMITY, UNSPECIFIED LATERALITY (H): ICD-10-CM

## 2020-06-12 DIAGNOSIS — I82.409 DEEP VEIN THROMBOSIS (DVT) (H): ICD-10-CM

## 2020-06-12 LAB
CAPILLARY BLOOD COLLECTION: NORMAL
INR PPP: 1.9 (ref 0.86–1.14)

## 2020-06-12 PROCEDURE — 85610 PROTHROMBIN TIME: CPT | Performed by: FAMILY MEDICINE

## 2020-06-12 PROCEDURE — 99207 ZZC NO CHARGE NURSE ONLY: CPT | Performed by: FAMILY MEDICINE

## 2020-06-12 PROCEDURE — 36416 COLLJ CAPILLARY BLOOD SPEC: CPT | Performed by: FAMILY MEDICINE

## 2020-06-12 NOTE — PROGRESS NOTES
ANTICOAGULATION FOLLOW-UP CLINIC VISIT    Patient Name:  Lara Marc  Date:  2020  Contact Type:  Telephone/ Patient    SUBJECTIVE:  Patient Findings     Positives:   Change in health (Diarrhea noted for two days earlier this week. Pt believes it was d/t something off she ate. Sx have been resolved since yesterday.)    Comments:   The patient was assessed for diet, medication, and activity level changes, missed or extra doses, bruising or bleeding, with no problem findings.          OBJECTIVE    Recent labs: (last 7 days)     20  1048   INR 1.90*       ASSESSMENT / PLAN  INR assessment THER    Recheck INR In: 1 WEEK    INR Location Clinic      Anticoagulation Summary  As of 2020    INR goal:   2.0-3.0   TTR:   92.4 % (3.7 wk)   INR used for dosin.90! (2020)   Warfarin maintenance plan:   2.5 mg (2.5 mg x 1) every day   Full warfarin instructions:   2.5 mg every day   Weekly warfarin total:   17.5 mg   Plan last modified:   Monse Jackson RN (2020)   Next INR check:   2020   Target end date:   2020    Indications    Deep vein thrombosis (DVT) (H) [I82.409]  Chronic deep vein thrombosis (DVT) of distal vein of lower extremity  unspecified laterality (H) [I82.5Z9]             Anticoagulation Episode Summary     INR check location:       Preferred lab:       Send INR reminders to:   KRYSTLE WEINER    Comments:         Anticoagulation Care Providers     Provider Role Specialty Phone number    Yecenia Dickerson MD Referring [facility] [phone]            See the Encounter Report to view Anticoagulation Flowsheet and Dosing Calendar (Go to Encounters tab in chart review, and find the Anticoagulation Therapy Visit)    Per protocol, patient advised to increase weekly warfarin dose by 1.25 mg to account for sub-therapeutic INR level. Recheck within 1 weeks to ensure stability. Patient is still in the initiation phase of therapy.     Patient made aware if signs of  clotting (pain, tenderness, swelling, or color change in any extremity) AND/OR bleeding occur (nosebleeds, bleeding gums, bruising, or blood in stool or urine) to notify provider & seek medical attention. If severe symptoms develop, such as major bleeding, chest pain, shortness of breath, fall, trauma or s/s of stroke, patient to call 911 immediately.       Dosage adjustment made based on physician directed care plan.    Monse Jackson RN

## 2020-06-22 ENCOUNTER — ANTICOAGULATION THERAPY VISIT (OUTPATIENT)
Dept: FAMILY MEDICINE | Facility: CLINIC | Age: 85
End: 2020-06-22

## 2020-06-22 DIAGNOSIS — I82.409 DEEP VEIN THROMBOSIS (DVT) (H): ICD-10-CM

## 2020-06-22 DIAGNOSIS — I82.5Z9 CHRONIC DEEP VEIN THROMBOSIS (DVT) OF DISTAL VEIN OF LOWER EXTREMITY, UNSPECIFIED LATERALITY (H): ICD-10-CM

## 2020-06-22 LAB
CAPILLARY BLOOD COLLECTION: NORMAL
INR PPP: 2.8 (ref 0.86–1.14)

## 2020-06-22 PROCEDURE — 99207 ZZC NO CHARGE NURSE ONLY: CPT | Performed by: FAMILY MEDICINE

## 2020-06-22 PROCEDURE — 36416 COLLJ CAPILLARY BLOOD SPEC: CPT | Performed by: FAMILY MEDICINE

## 2020-06-22 PROCEDURE — 85610 PROTHROMBIN TIME: CPT | Performed by: FAMILY MEDICINE

## 2020-06-22 RX ORDER — WARFARIN SODIUM 2.5 MG/1
2.5 TABLET ORAL DAILY
Qty: 60 TABLET | Refills: 0 | Status: SHIPPED | OUTPATIENT
Start: 2020-06-22 | End: 2020-08-24

## 2020-06-22 NOTE — PROGRESS NOTES
ANTICOAGULATION FOLLOW-UP CLINIC VISIT    Patient Name:  Lara Marc  Date:  2020  Contact Type:  Telephone/ Patient    SUBJECTIVE:  Patient Findings     Comments:   The patient was assessed for diet, medication, and activity level changes, missed or extra doses, bruising or bleeding, with no problem findings. Patient needs to schedule f/u with hematology per Dr. Dickerson to discuss warfarin duration.           OBJECTIVE    Recent labs: (last 7 days)     20  1314   INR 2.80*       ASSESSMENT / PLAN  INR assessment THER    Recheck INR In: 1 WEEK    INR Location Clinic      Anticoagulation Summary  As of 2020    INR goal:   2.0-3.0   TTR:   91.5 % (1.2 mo)   INR used for dosin.80 (2020)   Warfarin maintenance plan:   2.5 mg (2.5 mg x 1) every day   Full warfarin instructions:   2.5 mg every day   Weekly warfarin total:   17.5 mg   No change documented:   Monse Jackson RN   Plan last modified:   Monse Jackson RN (2020)   Next INR check:   2020   Target end date:   2020    Indications    Deep vein thrombosis (DVT) (H) [I82.409]  Chronic deep vein thrombosis (DVT) of distal vein of lower extremity  unspecified laterality (H) [I82.5Z9]             Anticoagulation Episode Summary     INR check location:       Preferred lab:       Send INR reminders to:   KRYSTLE WEINER    Comments:         Anticoagulation Care Providers     Provider Role Specialty Phone number    Yecenia Dickerson MD Referring [facility] [phone]            See the Encounter Report to view Anticoagulation Flowsheet and Dosing Calendar (Go to Encounters tab in chart review, and find the Anticoagulation Therapy Visit)    Patient made aware if signs of clotting (pain, tenderness, swelling, or color change in any extremity) AND/OR bleeding occur (nosebleeds, bleeding gums, bruising, or blood in stool or urine) to notify provider & seek medical attention. If severe symptoms develop, such as major  bleeding, chest pain, shortness of breath, fall, trauma or s/s of stroke, patient to call 911 immediately.     Dosage adjustment made based on physician directed care plan.    Monse Jackson RN

## 2020-06-25 DIAGNOSIS — I10 HYPERTENSION GOAL BP (BLOOD PRESSURE) < 140/90: ICD-10-CM

## 2020-06-25 RX ORDER — LISINOPRIL 10 MG/1
10 TABLET ORAL DAILY
Qty: 30 TABLET | Refills: 0 | Status: SHIPPED | OUTPATIENT
Start: 2020-06-25 | End: 2020-07-22
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
## 2020-06-30 ENCOUNTER — ANTICOAGULATION THERAPY VISIT (OUTPATIENT)
Dept: FAMILY MEDICINE | Facility: CLINIC | Age: 85
End: 2020-06-30

## 2020-06-30 DIAGNOSIS — I82.409 DEEP VEIN THROMBOSIS (DVT) (H): ICD-10-CM

## 2020-06-30 DIAGNOSIS — I82.5Z9 CHRONIC DEEP VEIN THROMBOSIS (DVT) OF DISTAL VEIN OF LOWER EXTREMITY, UNSPECIFIED LATERALITY (H): ICD-10-CM

## 2020-06-30 LAB — INR PPP: 2.6 (ref 0.86–1.14)

## 2020-06-30 PROCEDURE — 85610 PROTHROMBIN TIME: CPT | Performed by: FAMILY MEDICINE

## 2020-06-30 PROCEDURE — 99207 ZZC NO CHARGE NURSE ONLY: CPT | Performed by: FAMILY MEDICINE

## 2020-06-30 PROCEDURE — 36415 COLL VENOUS BLD VENIPUNCTURE: CPT | Performed by: FAMILY MEDICINE

## 2020-06-30 NOTE — PROGRESS NOTES
Writer attempted to reach patient, however, unavailable. Writer will try again in 15-20 minutes. Monse Jackson, CRISTIANN, RN

## 2020-06-30 NOTE — PROGRESS NOTES
ANTICOAGULATION FOLLOW-UP CLINIC VISIT    Patient Name:  Lara Marc  Date:  2020  Contact Type:  Telephone/ Patient    SUBJECTIVE:  Patient Findings     Comments:   The patient was assessed for diet, medication, and activity level changes, missed or extra doses, bruising or bleeding, with no problem findings.          OBJECTIVE    Recent labs: (last 7 days)     20  1601   INR 2.60*       ASSESSMENT / PLAN  INR assessment THER    Recheck INR In: 10 DAYS    INR Location Clinic      Anticoagulation Summary  As of 2020    INR goal:   2.0-3.0   TTR:   93.0 % (1.5 mo)   INR used for dosin.60 (2020)   Warfarin maintenance plan:   2.5 mg (2.5 mg x 1) every day   Full warfarin instructions:   2.5 mg every day   Weekly warfarin total:   17.5 mg   No change documented:   Monse Jackson RN   Plan last modified:   Monse Jackson RN (2020)   Next INR check:   7/10/2020   Target end date:   2020    Indications    Deep vein thrombosis (DVT) (H) [I82.409]  Chronic deep vein thrombosis (DVT) of distal vein of lower extremity  unspecified laterality (H) [I82.5Z9]             Anticoagulation Episode Summary     INR check location:       Preferred lab:       Send INR reminders to:   KRYSTLE WEINER    Comments:         Anticoagulation Care Providers     Provider Role Specialty Phone number    Yecenia Dickerson MD Referring [facility] [phone]            See the Encounter Report to view Anticoagulation Flowsheet and Dosing Calendar (Go to Encounters tab in chart review, and find the Anticoagulation Therapy Visit)    Writer informed patient to continue maintenance dose and recheck in 10 days. If stable, patient can transition to every 2 weeks.     Patient made aware if signs of clotting (pain, tenderness, swelling, or color change in any extremity) AND/OR bleeding occur (nosebleeds, bleeding gums, bruising, or blood in stool or urine) to notify provider & seek medical  attention. If severe symptoms develop, such as major bleeding, chest pain, shortness of breath, fall, trauma or s/s of stroke, patient to call 911 immediately.     Dosage adjustment made based on physician directed care plan.    Monse Jackson RN

## 2020-07-10 ENCOUNTER — ANTICOAGULATION THERAPY VISIT (OUTPATIENT)
Dept: FAMILY MEDICINE | Facility: CLINIC | Age: 85
End: 2020-07-10

## 2020-07-10 DIAGNOSIS — I82.409 DEEP VEIN THROMBOSIS (DVT) (H): ICD-10-CM

## 2020-07-10 DIAGNOSIS — I82.5Z9 CHRONIC DEEP VEIN THROMBOSIS (DVT) OF DISTAL VEIN OF LOWER EXTREMITY, UNSPECIFIED LATERALITY (H): ICD-10-CM

## 2020-07-10 LAB
CAPILLARY BLOOD COLLECTION: NORMAL
INR PPP: 2.2 (ref 0.86–1.14)

## 2020-07-10 PROCEDURE — 99207 ZZC NO CHARGE NURSE ONLY: CPT | Performed by: FAMILY MEDICINE

## 2020-07-10 PROCEDURE — 36416 COLLJ CAPILLARY BLOOD SPEC: CPT | Performed by: FAMILY MEDICINE

## 2020-07-10 PROCEDURE — 85610 PROTHROMBIN TIME: CPT | Performed by: FAMILY MEDICINE

## 2020-07-14 ENCOUNTER — TELEPHONE (OUTPATIENT)
Dept: HEMATOLOGY | Facility: CLINIC | Age: 85
End: 2020-07-14

## 2020-07-17 ENCOUNTER — VIRTUAL VISIT (OUTPATIENT)
Dept: FAMILY MEDICINE | Facility: CLINIC | Age: 85
End: 2020-07-17
Payer: MEDICARE

## 2020-07-17 ENCOUNTER — TELEPHONE (OUTPATIENT)
Dept: FAMILY MEDICINE | Facility: CLINIC | Age: 85
End: 2020-07-17

## 2020-07-17 DIAGNOSIS — I82.5Z9 CHRONIC DEEP VEIN THROMBOSIS (DVT) OF DISTAL VEIN OF LOWER EXTREMITY, UNSPECIFIED LATERALITY (H): ICD-10-CM

## 2020-07-17 DIAGNOSIS — M79.89 LEG SWELLING: ICD-10-CM

## 2020-07-17 DIAGNOSIS — R42 DIZZINESS: Primary | ICD-10-CM

## 2020-07-17 PROCEDURE — 99441 ZZC PHYSICIAN TELEPHONE EVALUATION 5-10 MIN: CPT | Performed by: NURSE PRACTITIONER

## 2020-07-17 NOTE — TELEPHONE ENCOUNTER
Pt called states she is having dizziness this am. Waking with mild headaches that go away. No chest, no SOB, no fever. States balance is ok-Just walking carefully. Denies vision problems. Denies any numbness, tingling or feeling of uneven  strength.  States she wants to stop hr warfarin-Advised she can not just stop this medication. Advised to keep appointment today with Imani Chowdhury. Sanjana Lamar RN

## 2020-07-17 NOTE — TELEPHONE ENCOUNTER
Patient left  for [facility] team as well. Writer spoke with pt who reports working in the [employer] yesterday, which is moldy. Allergies noted. Patient feels sx are related to her sinuses. Patient denies any s/s of bleeding or bruising.    Writer reviewed home treatments for sinus congestion and also discussed the importance of hydration. Discussed monitoring sodium intake as well. Patient felt very reassured. Writer also reiterated the importance of continuing Warfarin. Hematology f/u scheduled for 7/27. Patient advised to monitor sx closely and if worsen to go to the ER. Monse Jackson, CRISTIANN, RN

## 2020-07-17 NOTE — PROGRESS NOTES
"Lara Marc is a 84 year old female who is being evaluated via a billable telephone visit.      The patient has been notified of following:     \"This telephone visit will be conducted via a call between you and your physician/provider. We have found that certain health care needs can be provided without the need for a physical exam.  This service lets us provide the care you need with a short phone conversation.  If a prescription is necessary we can send it directly to your pharmacy.  If lab work is needed we can place an order for that and you can then stop by our lab to have the test done at a later time.    Telephone visits are billed at different rates depending on your insurance coverage. During this emergency period, for some insurers they may be billed the same as an in-person visit.  Please reach out to your insurance provider with any questions.    If during the course of the call the physician/provider feels a telephone visit is not appropriate, you will not be charged for this service.\"    Patient has given verbal consent for Telephone visit?  Yes    What phone number would you like to be contacted at? [phone]    How would you like to obtain your AVS? Mail a copy    Subjective     Lara Marc is a 84 year old female who presents via phone visit today for the following health issues:    HPI    Dizziness      Duration: This morning     Description   Feeling faint:  no   Feeling like the surroundings are moving: no   Loss of consciousness or falls: no     Intensity:  moderate    Accompanying signs and symptoms: feeling hazy and lightheaded   Nausea/vomitting: no   Palpitations: no   Weakness in arms or legs: no   Vision or speech changes: no   Ringing in ears (Tinnitus): no   Hearing loss related to dizziness: no   Other (fevers/chills/sweating/dyspnea): no     History (similar episodes/head trauma/previous evaluation/recent bleeding): None    Precipitating or alleviating factors (new " "meds/chemicals):  taking warfarin for a couple of months   Worse with activity/head movement: no     Therapies tried and outcome: None    Pt reports she is unsure why she made the appointment, though it may have been mixed up with an appointment she made for her sister.   When asked about dizziness she replied \"well I want to get off the warfarin\"  She associated the warfarin with dizziness this morning and denied that it had happened in the past  Worried her  passed after taking warfarin.         Patient Active Problem List   Diagnosis     Kidney donor     Acquired absence of kidney     CARDIOVASCULAR SCREENING; LDL GOAL LESS THAN 130     Hypertension goal BP (blood pressure) < 140/90     CKD (chronic kidney disease) stage 3, GFR 30-59 ml/min (H)     Adjustment disorder with depressed mood     GERD (gastroesophageal reflux disease)     Complete uterovaginal prolapse     Advanced directives, counseling/discussion     Gout     Breast pain, left     Cecal volvulus (H)     Abdominal aortic aneurysm (AAA) without rupture (H)     Deep vein thrombosis (DVT) (H)     Chronic deep vein thrombosis (DVT) of distal vein of lower extremity, unspecified laterality (H)     Past Surgical History:   Procedure Laterality Date     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     C VAGINAL HYSTERECTOMY  9/15/06    TVH/BSO/A&P repair/sacrospinus ligament fixation......childbirth x 9     childbirth X9[       CLOSED RX RIB FRACTURE  7/06    left     DAVINCI SACROCOLPOPEXY, MIDURETHRAL SLING, CYSTOSCOPY  2/8/2013    Procedure: DAVINCI SACROCOLPOPEXY, MIDURETHRAL SLING, CYSTOSCOPY;  DAVINCI SACROCOLPOPEXY, TVT EXACT SLING, RIGHT SALPINGO-OOPHERECTOMY, CYSTOSCOPY;  Surgeon: Bennie Galdamez MD;  Location:  OR      COLONOSCOPY THRU STOMA, DIAGNOSTIC  7/2005    diverticulosis,small polyp,recheck 5 yrs     LAPAROSCOPIC CHOLECYSTECTOMY  3/31/2011    Procedure:LAPAROSCOPIC CHOLECYSTECTOMY; Surgeon:DAVID MOLINA; " Location: OR       Social History     Tobacco Use     Smoking status: Former Smoker     Packs/day: 0.00     Years: 17.00     Pack years: 0.00     Smokeless tobacco: Never Used     Tobacco comment: quit when she was 29 years old   Substance Use Topics     Alcohol use: No     Family History   Problem Relation Age of Onset     Heart Disease Father      Cancer - colorectal Brother      Prostate Cancer Brother      Diabetes Sister      Heart Disease Sister          Current Outpatient Medications   Medication Sig Dispense Refill     acetaminophen (TYLENOL) 325 MG tablet Take 2 tablets (650 mg) by mouth every 8 hours       aspirin 81 MG EC tablet Take 81 mg by mouth daily       Cholecalciferol (VITAMIN D3 PO) Take by mouth daily       enoxaparin ANTICOAGULANT (LOVENOX) 40 MG/0.4ML syringe Inject 0.4 mLs (40 mg) Subcutaneous daily 4 Syringe 1     lisinopril (ZESTRIL) 10 MG tablet Take 1 tablet (10 mg) by mouth daily 30 tablet 0     magnesium 250 MG tablet Take 1 tablet by mouth daily       Multiple Vitamins-Minerals (MULTIVITAMIN OR) Take 1 tablet by mouth daily.       warfarin ANTICOAGULANT (COUMADIN) 2.5 MG tablet Take 1 tablet (2.5 mg) by mouth daily Current dose (6/22/20): 2.5 mg daily or as directed by ACC team. 60 tablet 0     Allergies   Allergen Reactions     Nkda [No Known Drug Allergies]      Seasonal Allergies      Itchy eyes and watery eyes       Reviewed and updated as needed this visit by Provider  Tobacco  Allergies  Meds  Problems  Med Hx  Surg Hx  Fam Hx         Review of Systems   Constitutional, HEENT, cardiovascular, pulmonary, GI, , musculoskeletal, neuro, skin, endocrine and psych systems are negative, except as otherwise noted.       Objective   Reported vitals:  There were no vitals taken for this visit.   healthy, alert and no distress  PSYCH: Alert and oriented times 3; coherent speech, normal   rate and volume, able to articulate logical thoughts, able   to abstract reason, no  tangential thoughts, no hallucinations   or delusions  Her affect is normal  RESP: No cough, no audible wheezing, able to talk in full sentences  Remainder of exam unable to be completed due to telephone visits          Assessment/Plan:    ICD-10-CM    1. Dizziness  R42    2. Chronic deep vein thrombosis (DVT) of distal vein of lower extremity, unspecified laterality (H)  I82.5Z9    3. Leg swelling  M79.89      Reports isolated episode of dizziness this morning, resolved after drinking juice and water    Discussed likely lifetime need for warfarin given chronic and recurrent  DVT. Also reviewed eliquis was not covered and too expensive according to Epic record.    STRONGLY encouraged to keep taking warfarin as prescribed   Due for INE recheck next week.   Will forward to PCP and ACC nurse as FYI.    No follow-ups on file.      Phone call duration:  9 minutes    BRIANNA Collier CNP

## 2020-07-18 NOTE — PROGRESS NOTES
Appt notes reviewed. Writer spoke with patient today as well. Patient is scheduled to see Hematology on 7/27. ALICIA Galvan, RN

## 2020-07-22 ENCOUNTER — VIRTUAL VISIT (OUTPATIENT)
Dept: FAMILY MEDICINE | Facility: CLINIC | Age: 85
End: 2020-07-22
Payer: MEDICARE

## 2020-07-22 DIAGNOSIS — R42 DIZZINESS: Primary | ICD-10-CM

## 2020-07-22 DIAGNOSIS — I82.5Z9 CHRONIC DEEP VEIN THROMBOSIS (DVT) OF DISTAL VEIN OF LOWER EXTREMITY, UNSPECIFIED LATERALITY (H): ICD-10-CM

## 2020-07-22 DIAGNOSIS — M79.89 LEG SWELLING: ICD-10-CM

## 2020-07-22 DIAGNOSIS — I10 HYPERTENSION GOAL BP (BLOOD PRESSURE) < 140/90: ICD-10-CM

## 2020-07-22 DIAGNOSIS — N18.30 CKD (CHRONIC KIDNEY DISEASE) STAGE 3, GFR 30-59 ML/MIN (H): ICD-10-CM

## 2020-07-22 PROCEDURE — 99441 ZZC PHYSICIAN TELEPHONE EVALUATION 5-10 MIN: CPT | Performed by: FAMILY MEDICINE

## 2020-07-22 RX ORDER — LISINOPRIL 10 MG/1
10 TABLET ORAL DAILY
Qty: 90 TABLET | Refills: 0 | Status: SHIPPED | OUTPATIENT
Start: 2020-07-22 | End: 2020-10-26

## 2020-07-22 NOTE — PROGRESS NOTES
"Lara Marc is a 84 year old female who is being evaluated via a billable telephone visit.      The patient has been notified of following:     \"This telephone visit will be conducted via a call between you and your physician/provider. We have found that certain health care needs can be provided without the need for a physical exam.  This service lets us provide the care you need with a short phone conversation.  If a prescription is necessary we can send it directly to your pharmacy.  If lab work is needed we can place an order for that and you can then stop by our lab to have the test done at a later time.    Telephone visits are billed at different rates depending on your insurance coverage. During this emergency period, for some insurers they may be billed the same as an in-person visit.  Please reach out to your insurance provider with any questions.    If during the course of the call the physician/provider feels a telephone visit is not appropriate, you will not be charged for this service.\"    Patient has given verbal consent for Telephone visit?  Yes    What phone number would you like to be contacted at? [phone] (H)    How would you like to obtain your AVS? Mail a copy    Subjective     Lara Marc is a 84 year old female who presents via phone visit today for the following health issues:    HPI    Her primary concern today is getting refill of lisinopril as she is almost out.   Dizziness  Onset: a couple of days ago     Description:   Do you feel faint:  no   Does it feel like the surroundings (bed, room) are moving: no   Unsteady/off balance: no   Have you passed out or fallen: no     Intensity: mild    Progression of Symptoms:  improving    Accompanying Signs & Symptoms:  Heart palpitations: no   Nausea, vomiting: no   Weakness in arms or legs: YES  Fatigue: YES- might be age related   Vision or speech changes: no   Ringing in ears (Tinnitus): YES- has had this for many years   Hearing " "Loss: YES- thinking of getting hearing aids     History:   Head trauma/concussion hx: no   Previous similar symptoms: no   Recent bleeding history: no     Precipitating factors:   Worse with activity or head movement: no   Any new medications (BP?): no   Alcohol/drug abuse/withdrawal: no     Alleviating factors:   Does staying in a fixed position give relief:  YES    Therapies Tried and outcome: she thinks that she was dehydrated she feels fine now     She is wondering if her BP has been fine she has to come into the clinic on Friday INR     For a couple of days, felt like she was in a haze. Wondered if she was dehydrated and it helped. She now feels back to normal. She knew she needed to be seen before refills of her medication. Needs lisinopril.     She was seen via virtual visit 5 days ago for dizziness as well.      From visit notes on 7/17/2020:   \"When asked about dizziness she replied \"well I want to get off the warfarin\"  She associated the warfarin with dizziness this morning and denied that it had happened in the past  Worried her  passed after taking warfarin. \"    Patient Active Problem List   Diagnosis     Kidney donor     Acquired absence of kidney     CARDIOVASCULAR SCREENING; LDL GOAL LESS THAN 130     Hypertension goal BP (blood pressure) < 140/90     CKD (chronic kidney disease) stage 3, GFR 30-59 ml/min (H)     Adjustment disorder with depressed mood     GERD (gastroesophageal reflux disease)     Complete uterovaginal prolapse     Advanced directives, counseling/discussion     Gout     Breast pain, left     Cecal volvulus (H)     Abdominal aortic aneurysm (AAA) without rupture (H)     Deep vein thrombosis (DVT) (H)     Chronic deep vein thrombosis (DVT) of distal vein of lower extremity, unspecified laterality (H)     Past Surgical History:   Procedure Laterality Date     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     C VAGINAL HYSTERECTOMY  9/15/06    TVH/BSO/A&P " repair/sacrospinus ligament fixation......childbirth x 9     childbirth X9[       CLOSED RX RIB FRACTURE  7/06    left     DAVINCI SACROCOLPOPEXY, MIDURETHRAL SLING, CYSTOSCOPY  2/8/2013    Procedure: DAVINCI SACROCOLPOPEXY, MIDURETHRAL SLING, CYSTOSCOPY;  DAVINCI SACROCOLPOPEXY, TVT EXACT SLING, RIGHT SALPINGO-OOPHERECTOMY, CYSTOSCOPY;  Surgeon: Bennie Galdamez MD;  Location:  OR      COLONOSCOPY THRU STOMA, DIAGNOSTIC  7/2005    diverticulosis,small polyp,recheck 5 yrs     LAPAROSCOPIC CHOLECYSTECTOMY  3/31/2011    Procedure:LAPAROSCOPIC CHOLECYSTECTOMY; Surgeon:DAVID MOLINA; Location:[facility]       Social History     Tobacco Use     Smoking status: Former Smoker     Packs/day: 0.00     Years: 17.00     Pack years: 0.00     Smokeless tobacco: Never Used     Tobacco comment: quit when she was 29 years old   Substance Use Topics     Alcohol use: No     Family History   Problem Relation Age of Onset     Heart Disease Father      Cancer - colorectal Brother      Prostate Cancer Brother      Diabetes Sister      Heart Disease Sister          Current Outpatient Medications   Medication Sig Dispense Refill     acetaminophen (TYLENOL) 325 MG tablet Take 2 tablets (650 mg) by mouth every 8 hours       aspirin 81 MG EC tablet Take 81 mg by mouth daily       Cholecalciferol (VITAMIN D3 PO) Take by mouth daily       enoxaparin ANTICOAGULANT (LOVENOX) 40 MG/0.4ML syringe Inject 0.4 mLs (40 mg) Subcutaneous daily 4 Syringe 1     lisinopril (ZESTRIL) 10 MG tablet Take 1 tablet (10 mg) by mouth daily 90 tablet 0     magnesium 250 MG tablet Take 1 tablet by mouth daily       Multiple Vitamins-Minerals (MULTIVITAMIN OR) Take 1 tablet by mouth daily.       warfarin ANTICOAGULANT (COUMADIN) 2.5 MG tablet Take 1 tablet (2.5 mg) by mouth daily Current dose (6/22/20): 2.5 mg daily or as directed by ACC team. 60 tablet 0     Allergies   Allergen Reactions     Nkda [No Known Drug Allergies]      Seasonal Allergies      Itchy eyes  and watery eyes     Recent Labs   Lab Test 04/30/20  1738 09/11/19  0702  09/09/19  1358 12/04/18  1214  05/10/18  1026 12/13/16  1208  06/08/16 2027 06/08/16  0913 08/18/15  1204   LDL  --   --   --   --   --   --  143* 109*  --   --   --  102   HDL  --   --   --   --   --   --  38* 33*  --   --   --  30*   TRIG  --   --   --   --   --   --  128 183*  --   --   --  159*   ALT 30  --   --  30  --   --   --   --   --   --  32  --    CR 1.26* 1.13*   < > 1.27* 1.27*   < > 1.11* 1.10*   < > 1.37* 1.23* 1.06*   GFRESTIMATED 39* 45*   < > 39* 40*   < > 47* 48*   < > 37* 42* 50*   GFRESTBLACK 45* 52*   < > 45* 49*   < > 57* 58*   < > 45* 51* 60*   POTASSIUM 4.3 4.0   < > 4.7 5.1   < > 4.9 4.7   < > 4.0 4.1 4.5   TSH  --   --   --   --  4.01*  --   --   --   --  2.21  --   --     < > = values in this interval not displayed.      BP Readings from Last 3 Encounters:   04/30/20 (!) 173/95   04/28/20 (!) 149/81   09/17/19 136/74    Wt Readings from Last 3 Encounters:   04/28/20 81.6 kg (180 lb)   09/17/19 77.6 kg (171 lb)   09/10/19 77.8 kg (171 lb 9.6 oz)                    Reviewed and updated as needed this visit by Provider         Review of Systems   Constitutional, HEENT, cardiovascular, pulmonary, gi and gu systems are negative, except as otherwise noted.       Objective   Reported vitals:  There were no vitals taken for this visit.   alert and no distress  PSYCH: Alert and oriented times 3; coherent speech, normal   rate and volume, able to articulate logical thoughts, able   to abstract reason, no tangential thoughts, no hallucinations   or delusions  Her affect is normal  RESP: No cough, no audible wheezing, able to talk in full sentences  Remainder of exam unable to be completed due to telephone visits    Diagnostic Test Results:  Labs reviewed in Epic        Assessment/Plan:       1. Dizziness  Resolved.    has history of recurrent dizziness/vertigo in recent years. This episode lasted two days and she reported it  resolved with hydration.   Saw neurology in 2018, 2019and was referred to PT      2. Hypertension goal BP (blood pressure) < 140/90  Uncontrolled. Recent readings over the past year have all been above goal. Continues lisinopril 10mg daily, refilled today. Will check BP at her next INR visit next week and schedule follow up telephone visit if high. Will plan to increase lisinopril to 20 mg daily at that time if needed.  Will need to follow-up 2 to 3 weeks afterward for blood pressure check and BMP.     3. CKD (chronic kidney disease) stage 3, GFR 30-59 ml/min (H)  Stable  Will check  urine microalbumin at her next INR draw     4. Right leg DVT on anticoagulation with warfarin  Continues warfarin. Followed by anticoagulation clinic.      5. AAA   Stable infrarenal aortic aneurysm on CT 9/10/19 measuring 3.8cm x 3.5cm. Recommend surveillance by ultrasound in 3 years.     6.  Volvulus (H) possible intermittent cecal versus sigmoid volvulus  No recurrent symptoms  It was recommended that she follow-up with general surgery 2 weeks after her hospitalization, but she did not do so     She is also due for lipid screening   She declines all other health maintenance recommendations including tetanus shot shingles shots influenza vaccine pneumonia vaccine, bone density test    Return in about 1 week (around 7/29/2020) for Lab Work, BP Recheck.      Phone call duration:  9 minutes    Yecenia Dickerson MD

## 2020-07-24 ENCOUNTER — TELEPHONE (OUTPATIENT)
Dept: FAMILY MEDICINE | Facility: CLINIC | Age: 85
End: 2020-07-24

## 2020-07-24 NOTE — TELEPHONE ENCOUNTER
Patient missed today's INR and BP check. Writer r/s for Monday 7/27, however, only time available is 530 pm. Routing to MA and Triage team to see if someone would be willing/available to check patient's BP at that time of the day. Thanks! Monse Jackson, CRISTIANN, RN

## 2020-07-27 ENCOUNTER — VIRTUAL VISIT (OUTPATIENT)
Dept: HEMATOLOGY | Facility: CLINIC | Age: 85
End: 2020-07-27
Attending: PHYSICIAN ASSISTANT
Payer: MEDICARE

## 2020-07-27 ENCOUNTER — APPOINTMENT (OUTPATIENT)
Dept: NURSING | Facility: CLINIC | Age: 85
End: 2020-07-27
Payer: MEDICARE

## 2020-07-27 VITALS — WEIGHT: 172 LBS | BODY MASS INDEX: 31.46 KG/M2

## 2020-07-27 DIAGNOSIS — I10 HYPERTENSION GOAL BP (BLOOD PRESSURE) < 140/90: ICD-10-CM

## 2020-07-27 DIAGNOSIS — I82.401 ACUTE DEEP VEIN THROMBOSIS (DVT) OF RIGHT LOWER EXTREMITY, UNSPECIFIED VEIN (H): Primary | ICD-10-CM

## 2020-07-27 PROCEDURE — 82043 UR ALBUMIN QUANTITATIVE: CPT | Performed by: FAMILY MEDICINE

## 2020-07-27 PROCEDURE — 40000809 ZZH STATISTIC NO DOCUMENTATION TO SUPPORT CHARGE

## 2020-07-27 PROCEDURE — 99442 ZZC PHYSICIAN TELEPHONE EVALUATION 11-20 MIN: CPT | Performed by: PHYSICIAN ASSISTANT

## 2020-07-27 NOTE — PROGRESS NOTES
Patient was contacted to complete the pre-visit call prior to their telephone visit with the provider.  The following statement was read:       This visit will be billed to your insurance the same as an in-person visit. Because of Coronavirus we are instituting telephone visits when possible to keep everyone safe. The telephone visit will be a call between you and the provider.  This service lets us provide the care you need with a telephone conversation.  If a prescription is necessary, we can send it directly to your pharmacy.If lab work or other testing is needed, we can help arrange a place/time for that to be done at a later date.If during the course of the call the provider feels a telephone visit is not appropriate, then your insurance company will not be billed.       Allergies and medications were reviewed and travel screening complete.     I thanked them for their time to cover this information.     Wayne Johansen CMA

## 2020-07-27 NOTE — TELEPHONE ENCOUNTER
FYI--No one available for BP check.See MA note.    This writer working remotely.     Thanks! Judi Billy RN

## 2020-07-27 NOTE — LETTER
July 30, 2020      Lara Marc  [address]  [facility] [address]        Dear ,    We are writing to inform you of your test results.    Normal results     Resulted Orders   Albumin Random Urine Quantitative with Creat Ratio   Result Value Ref Range    Creatinine Urine 194 mg/dL    Albumin Urine mg/L 35 mg/L    Albumin Urine mg/g Cr 18.25 0 - 25 mg/g Cr       If you have any questions or concerns, please call the clinic at the number listed above.       Sincerely,    Yecenia Dickerson MD/nr

## 2020-07-27 NOTE — PROGRESS NOTES
[facility]  [facility] [place] [address]  Phone: [phone], Fax: [phone]      Patient: Lara Marc  MRN: [id number]  : 1935  XAVI: 2020  Phone visit due to COVID-19    Reason:  Discussion of duration of anticoagulation.     HPI:  This is a 84 year old female with a history of right lower extremity DVT (small posterior tibial vein clot) diagnosed 2020 in the emergency department.  She had pain and swelling in the leg at the time. She cannot recall any inciting events except being less active this winter and possibly being dehydrated. She is currently on warfarin and is not happy being on this medication.  She also takes 81mg ASA and Lisinopril.    Lara states she had 9 children and 2 miscarriages in her life without clotting issues. She also had a hysterectomy and donated a kidney without clotting problems.  Her leg is no longer painful or swollen.  She has been trying to walk every day and drink more water.  She feels overall well. Denies any SOB or chest pain.      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     CKD (chronic kidney disease) stage 3, GFR 30-59 ml/min (H) 2011    has 1 kidney, the right kidney is present---gave left kidney to sister     Dyspnea on exertion      Gastro-oesophageal reflux disease      Hypertension goal BP (blood pressure) < 140/90 2011     Other and unspecified hyperlipidemia      Unspecified essential hypertension     not medicated at this time     Uterovaginal prolapse, complete      resolved '06     Vaginal enterocele, congenital or acquired 2007     or cystocele       Past Surgical History:  Past Surgical History:   Procedure Laterality Date     C NEPHRECTOMY      donated left kidney to  sister     C VAGINAL HYSTERECTOMY  9/15/06    TVH/BSO/A&P repair/sacrospinus ligament fixation......childbirth x 9     childbirth X9[       CLOSED RX RIB FRACTURE  7/06    left     DAVINCI SACROCOLPOPEXY, MIDURETHRAL SLING, CYSTOSCOPY  2/8/2013    Procedure: DAVINCI SACROCOLPOPEXY, MIDURETHRAL SLING, CYSTOSCOPY;  DAVINCI SACROCOLPOPEXY, TVT EXACT SLING, RIGHT SALPINGO-OOPHERECTOMY, CYSTOSCOPY;  Surgeon: Bennie Galdamez MD;  Location:  OR      COLONOSCOPY THRU STOMA, DIAGNOSTIC  7/2005    diverticulosis,small polyp,recheck 5 yrs     LAPAROSCOPIC CHOLECYSTECTOMY  3/31/2011    Procedure:LAPAROSCOPIC CHOLECYSTECTOMY; Surgeon:DAVID MOLINA; [facility]       Medications:  Current Outpatient Medications   Medication Sig Dispense Refill     acetaminophen (TYLENOL) 325 MG tablet Take 2 tablets (650 mg) by mouth every 8 hours       aspirin 81 MG EC tablet Take 81 mg by mouth daily       Cholecalciferol (VITAMIN D3 PO) Take by mouth daily       enoxaparin ANTICOAGULANT (LOVENOX) 40 MG/0.4ML syringe Inject 0.4 mLs (40 mg) Subcutaneous daily 4 Syringe 1     lisinopril (ZESTRIL) 10 MG tablet Take 1 tablet (10 mg) by mouth daily 90 tablet 0     magnesium 250 MG tablet Take 1 tablet by mouth daily       Multiple Vitamins-Minerals (MULTIVITAMIN OR) Take 1 tablet by mouth daily.       warfarin ANTICOAGULANT (COUMADIN) 2.5 MG tablet Take 1 tablet (2.5 mg) by mouth daily Current dose (6/22/20): 2.5 mg daily or as directed by ACC team. 60 tablet 0        Allergies:  Allergies   Allergen Reactions     Nkda [No Known Drug Allergies]      Seasonal Allergies      Itchy eyes and watery eyes       Social History:  Not reviewed    Family History:  States that one of her sons takes anticoagulation while traveling from [place].  No other clotting history that she is aware of.     Objectives:  Phone visit due to COVID-19     Labs:  Due for INR monitoring today.  Has had INR > 2 since 5/8/2020.    Imaging:  US done in emergency  department 4/30/2020:  Short segment deep vein thrombosis in a single posterior tibial vein  in the right mid calf.       Assessment:  In summary, Lara is an almost 84 yo woman who had an unprovoked distal right lower extremity DVT 4/30/2020.  She has been on anticoagulation with warfarin since 5/5/2020.  She is no longer having any symptoms of leg swelling or pain.    Plan:  1. Will discontinue anticoagulation.  Anticoagulation clinic notified.  While this was an unprovoked clot, it was small and distal and thus recommendations support three months of anticoagulation over longer term anticoagulation. Lara is also very opposed to long term anticoagulation and understands risks of recurrent clotting events.  I support this decision as anticoagulation also comes with significant risk for her.  I did recommended she have repeat ultrasound to confirm resolution of clot but she kindly refused.    2. Discussed risk factors for deep vein thrombosis and stressed importance of moving around house and walking as much as possible.  She will also try to remain hydrated.    3. She will continue her ASA,  lisinopril and have her blood pressure monitored.    She will call with any concerns.           Payal Rice MPH, PA-C  [facility]  [facility]  [phone] main line  [phone] pager  [phone] fax      Total Time Spent:  15 minutes, all 15 minutes was spent on telephone visit regarding anticoagulation plan.

## 2020-07-28 ENCOUNTER — ANTICOAGULATION THERAPY VISIT (OUTPATIENT)
Dept: FAMILY MEDICINE | Facility: CLINIC | Age: 85
End: 2020-07-28

## 2020-07-28 DIAGNOSIS — I82.5Z9 CHRONIC DEEP VEIN THROMBOSIS (DVT) OF DISTAL VEIN OF LOWER EXTREMITY, UNSPECIFIED LATERALITY (H): ICD-10-CM

## 2020-07-28 DIAGNOSIS — I82.401 ACUTE DEEP VEIN THROMBOSIS (DVT) OF RIGHT LOWER EXTREMITY, UNSPECIFIED VEIN (H): ICD-10-CM

## 2020-07-28 LAB
CREAT UR-MCNC: 194 MG/DL
MICROALBUMIN UR-MCNC: 35 MG/L
MICROALBUMIN/CREAT UR: 18.25 MG/G CR (ref 0–25)

## 2020-07-28 NOTE — TELEPHONE ENCOUNTER
Per patient, someone at  checked her BP for her yesterday. Result was 126/66. Routing to PCP. CRISTIAN GalvanN, RN

## 2020-07-28 NOTE — PROGRESS NOTES
"Patient f/u with Hematology yesterday 7/27 and it was decided to discontinue Warfarin therapy. Writer called patient and wished her all the best. Suggested to dispose of medications at the [facility] disposal center. Patient also removed from patient list and episode closed. CRISTIAN GalvanN, RN    \"Assessment:  In summary, Lara is an almost 84 yo woman who had an unprovoked distal right lower extremity DVT 4/30/2020.  She has been on anticoagulation with warfarin since 5/5/2020.  She is no longer having any symptoms of leg swelling or pain.    Plan:  1. Will discontinue anticoagulation.  Anticoagulation clinic notified.  While this was an unprovoked clot, it was small and distal and thus recommendations support three months of anticoagulation over longer term anticoagulation. Lara is also very opposed to long term anticoagulation and understands risks of recurrent clotting events.  I support this decision as anticoagulation also comes with significant risk for her.  I did recommended she have repeat ultrasound to confirm resolution of clot but she kindly refused.    2. Discussed risk factors for deep vein thrombosis and stressed importance of moving around house and walking as much as possible.  She will also try to remain hydrated.    3. She will continue her ASA,  lisinopril and have her blood pressure monitored.     She will call with any concerns.    Payal Rice MPH, PA-C  [facility]  [facility]  [phone] main line  [phone] pager  [phone] fax\"  "

## 2020-07-28 NOTE — PROGRESS NOTES
"  ANTICOAGULATION INITIAL CLINIC VISIT    Patient Name:  Lara Marc  Date:  5/5/2020  Referred by: Dr. Dickerson  Contact Type:  Face to Face    SUBJECTIVE:  Coumadin education was completed today.  Topics covered include:  -Introduction to coumadin  -Proper Administration  -INR Testing  -Sign/Symptoms of Bleeding  -Signs/Symptoms of Clot Formation or Stroke  -Dietary Intake of Vitamin K  -Drug Interactions  -Anticoagulation Identification (bracelet, necklace or wallet card)  -Future Surgery  -Effects of Alcohol, Tobacco, and Exercise on Coumadin    Coumadin Education Booklet and Coumadin Identification Wallet Card were given to the patient.    Patient Findings     Comments:   S: Patient with NEW RLE DVT. US (4/30/2020): \"Short segment deep vein thrombosis in a single posterior tibial vein in the right mid calf.\"  Apixaban not affordable, patient has no insurance. See 5/4 TE.      B: CKD stage 3. Hx of kidney transplant. Estimated Creatinine Clearance: 32.9 mL/min (A) (based on SCr of 1.26 mg/dL (H)). Patient takes 81 mg ASA daily and Tylenol (known interactions).      Wt Readings from Last 2 Encounters:  04/28/20 81.6 kg (180 lb)  09/17/19 77.6 kg (171 lb)     A:  Discussed options for patient starting Warfarin and bridging with Lovenox. Patient is NOT willing to self-administer Lovenox and is concerned for cost. Writer consulted Marco Antonio Sneed at [facility].      R: Discussed patient's situation with Dr. Gonzalez given the urgency at 430 pm. Dr. Gonzalez felt prophylaxis dosing (40 mg every day) of Lovenox was a safe compromise given pts kidney function and financial concerns. Patient agreed to meet at  Clinic. Max, pharmacist was able to find patient a discount for Lovenox and save patient a $100.  Start Warfarin at 5 mg daily and check INR on Friday.      ACC RN reviewed subcutaneous injections teaching and administered first Lovenox injection in clinic (left side of abdomen). AC education reviewed in entirety. Daily " appts made with [facility] Triage for Lovenox injection. Hematology appt arranged by triage (see 5/4 TE).             OBJECTIVE    INR was 1.09 on 4/30/20.      ASSESSMENT / PLAN  INR assessment SUB    Recheck INR In: 3 DAYS    INR Location Clinic      Anticoagulation Summary  As of 5/5/2020    INR goal:   2.0-3.0   TTR:   --   INR used for dosing:   No new INR was available at the time of this encounter.   Warfarin maintenance plan:   No maintenance plan   Full warfarin instructions:   5/5: 5 mg; 5/6: 5 mg; 5/7: 5 mg   Plan last modified:   Monse Jackson RN (5/7/2020)   Next INR check:      Target end date:       Indications    Deep vein thrombosis (DVT) (H) [I82.409]             Anticoagulation Episode Summary     INR check location:       Preferred lab:       Resolved date:   7/27/2020    Resolved reason:   Therapy  Complete    Send INR reminders to:   KRYSTLE WEINER    Comments:         Anticoagulation Care Providers     Provider Role Specialty Phone number    Yecenia Dickerson MD Referring [facility] [phone]            See the Encounter Report to view Anticoagulation Flowsheet and Dosing Calendar (Go to Encounters tab in chart review, and find the Anticoagulation Therapy Visit)    Dosage adjustment made based on physician directed care plan.    Monse Jackson, RN

## 2020-07-30 NOTE — TELEPHONE ENCOUNTER
Patient had a virtual visit with Payal Rice PA-C at the [facility] on 7/27/20. Warfarin discontinued. See 7/28/20 ACC encounter or hematology notes for full details. CRISTIAN GalvanN, RN

## 2020-08-24 ENCOUNTER — APPOINTMENT (OUTPATIENT)
Dept: ULTRASOUND IMAGING | Facility: CLINIC | Age: 85
End: 2020-08-24
Attending: INTERNAL MEDICINE
Payer: MEDICARE

## 2020-08-24 ENCOUNTER — ANCILLARY PROCEDURE (OUTPATIENT)
Dept: ULTRASOUND IMAGING | Facility: CLINIC | Age: 85
End: 2020-08-24
Attending: INTERNAL MEDICINE

## 2020-08-24 ENCOUNTER — HOSPITAL ENCOUNTER (EMERGENCY)
Facility: CLINIC | Age: 85
Discharge: HOME | End: 2020-08-24
Attending: INTERNAL MEDICINE | Admitting: INTERNAL MEDICINE
Payer: MEDICARE

## 2020-08-24 ENCOUNTER — APPOINTMENT (OUTPATIENT)
Dept: GENERAL RADIOLOGY | Facility: CLINIC | Age: 85
End: 2020-08-24
Attending: INTERNAL MEDICINE
Payer: MEDICARE

## 2020-08-24 ENCOUNTER — APPOINTMENT (OUTPATIENT)
Dept: CT IMAGING | Facility: CLINIC | Age: 85
End: 2020-08-24
Attending: INTERNAL MEDICINE
Payer: MEDICARE

## 2020-08-24 VITALS
BODY MASS INDEX: 32.5 KG/M2 | SYSTOLIC BLOOD PRESSURE: 174 MMHG | TEMPERATURE: 97.9 F | DIASTOLIC BLOOD PRESSURE: 83 MMHG | RESPIRATION RATE: 16 BRPM | HEART RATE: 82 BPM | HEIGHT: 61 IN | OXYGEN SATURATION: 97 %

## 2020-08-24 DIAGNOSIS — N39.0 URINARY TRACT INFECTION WITHOUT HEMATURIA, SITE UNSPECIFIED: ICD-10-CM

## 2020-08-24 DIAGNOSIS — R42 DIZZINESS: ICD-10-CM

## 2020-08-24 LAB
ALBUMIN SERPL-MCNC: 3.2 G/DL (ref 3.4–5)
ALBUMIN UR-MCNC: NEGATIVE MG/DL
ALP SERPL-CCNC: 62 U/L (ref 40–150)
ALT SERPL W P-5'-P-CCNC: 23 U/L (ref 0–50)
ANION GAP SERPL CALCULATED.3IONS-SCNC: 4 MMOL/L (ref 3–14)
APPEARANCE UR: CLEAR
AST SERPL W P-5'-P-CCNC: 22 U/L (ref 0–45)
BACTERIA #/AREA URNS HPF: ABNORMAL /HPF
BASOPHILS # BLD AUTO: 0 10E9/L (ref 0–0.2)
BASOPHILS NFR BLD AUTO: 0.7 %
BILIRUB SERPL-MCNC: 0.2 MG/DL (ref 0.2–1.3)
BILIRUB UR QL STRIP: NEGATIVE
BUN SERPL-MCNC: 23 MG/DL (ref 7–30)
CALCIUM SERPL-MCNC: 8.9 MG/DL (ref 8.5–10.1)
CHLORIDE SERPL-SCNC: 113 MMOL/L (ref 94–109)
CO2 SERPL-SCNC: 26 MMOL/L (ref 20–32)
COLOR UR AUTO: YELLOW
CREAT SERPL-MCNC: 1.26 MG/DL (ref 0.52–1.04)
D DIMER PPP FEU-MCNC: 1.7 UG/ML FEU (ref 0–0.5)
DIFFERENTIAL METHOD BLD: ABNORMAL
EOSINOPHIL # BLD AUTO: 0.2 10E9/L (ref 0–0.7)
EOSINOPHIL NFR BLD AUTO: 4.4 %
ERYTHROCYTE [DISTWIDTH] IN BLOOD BY AUTOMATED COUNT: 15.5 % (ref 10–15)
GFR SERPL CREATININE-BSD FRML MDRD: 39 ML/MIN/{1.73_M2}
GLUCOSE SERPL-MCNC: 106 MG/DL (ref 70–99)
GLUCOSE UR STRIP-MCNC: NEGATIVE MG/DL
HCT VFR BLD AUTO: 37.4 % (ref 35–47)
HGB BLD-MCNC: 10.9 G/DL (ref 11.7–15.7)
HGB UR QL STRIP: NEGATIVE
HYALINE CASTS #/AREA URNS LPF: 1 /LPF (ref 0–2)
IMM GRANULOCYTES # BLD: 0 10E9/L (ref 0–0.4)
IMM GRANULOCYTES NFR BLD: 0.2 %
INR PPP: 1.04 (ref 0.86–1.14)
KETONES UR STRIP-MCNC: NEGATIVE MG/DL
LACTATE BLD-SCNC: 1.6 MMOL/L (ref 0.7–2)
LEUKOCYTE ESTERASE UR QL STRIP: ABNORMAL
LYMPHOCYTES # BLD AUTO: 1.3 10E9/L (ref 0.8–5.3)
LYMPHOCYTES NFR BLD AUTO: 27.7 %
MCH RBC QN AUTO: 24.1 PG (ref 26.5–33)
MCHC RBC AUTO-ENTMCNC: 29.1 G/DL (ref 31.5–36.5)
MCV RBC AUTO: 83 FL (ref 78–100)
MONOCYTES # BLD AUTO: 0.4 10E9/L (ref 0–1.3)
MONOCYTES NFR BLD AUTO: 7.9 %
MUCOUS THREADS #/AREA URNS LPF: PRESENT /LPF
NEUTROPHILS # BLD AUTO: 2.7 10E9/L (ref 1.6–8.3)
NEUTROPHILS NFR BLD AUTO: 59.1 %
NITRATE UR QL: NEGATIVE
NRBC # BLD AUTO: 0 10*3/UL
NRBC BLD AUTO-RTO: 0 /100
NT-PROBNP SERPL-MCNC: 701 PG/ML (ref 0–1800)
PH UR STRIP: 5.5 PH (ref 5–7)
PLATELET # BLD AUTO: 170 10E9/L (ref 150–450)
POTASSIUM SERPL-SCNC: 4.2 MMOL/L (ref 3.4–5.3)
PROT SERPL-MCNC: 6.7 G/DL (ref 6.8–8.8)
RBC # BLD AUTO: 4.52 10E12/L (ref 3.8–5.2)
RBC #/AREA URNS AUTO: 2 /HPF (ref 0–2)
SODIUM SERPL-SCNC: 143 MMOL/L (ref 133–144)
SOURCE: ABNORMAL
SP GR UR STRIP: 1.02 (ref 1–1.03)
SQUAMOUS #/AREA URNS AUTO: 3 /HPF (ref 0–1)
TROPONIN I SERPL-MCNC: <0.015 UG/L (ref 0–0.04)
UROBILINOGEN UR STRIP-MCNC: NORMAL MG/DL (ref 0–2)
WBC # BLD AUTO: 4.6 10E9/L (ref 4–11)
WBC #/AREA URNS AUTO: 12 /HPF (ref 0–5)

## 2020-08-24 PROCEDURE — 87086 URINE CULTURE/COLONY COUNT: CPT | Performed by: INTERNAL MEDICINE

## 2020-08-24 PROCEDURE — 25800030 ZZH RX IP 258 OP 636: Performed by: INTERNAL MEDICINE

## 2020-08-24 PROCEDURE — 85025 COMPLETE CBC W/AUTO DIFF WBC: CPT | Performed by: INTERNAL MEDICINE

## 2020-08-24 PROCEDURE — 83605 ASSAY OF LACTIC ACID: CPT | Performed by: INTERNAL MEDICINE

## 2020-08-24 PROCEDURE — 71275 CT ANGIOGRAPHY CHEST: CPT

## 2020-08-24 PROCEDURE — 96365 THER/PROPH/DIAG IV INF INIT: CPT | Mod: 59 | Performed by: INTERNAL MEDICINE

## 2020-08-24 PROCEDURE — 25000128 H RX IP 250 OP 636: Performed by: STUDENT IN AN ORGANIZED HEALTH CARE EDUCATION/TRAINING PROGRAM

## 2020-08-24 PROCEDURE — 81001 URINALYSIS AUTO W/SCOPE: CPT | Performed by: INTERNAL MEDICINE

## 2020-08-24 PROCEDURE — 93005 ELECTROCARDIOGRAM TRACING: CPT | Mod: 59 | Performed by: INTERNAL MEDICINE

## 2020-08-24 PROCEDURE — 25000128 H RX IP 250 OP 636: Performed by: INTERNAL MEDICINE

## 2020-08-24 PROCEDURE — 96361 HYDRATE IV INFUSION ADD-ON: CPT | Performed by: INTERNAL MEDICINE

## 2020-08-24 PROCEDURE — 99285 EMERGENCY DEPT VISIT HI MDM: CPT | Mod: 25 | Performed by: INTERNAL MEDICINE

## 2020-08-24 PROCEDURE — 80053 COMPREHEN METABOLIC PANEL: CPT | Performed by: INTERNAL MEDICINE

## 2020-08-24 PROCEDURE — 93308 TTE F-UP OR LMTD: CPT | Performed by: INTERNAL MEDICINE

## 2020-08-24 PROCEDURE — 93971 EXTREMITY STUDY: CPT | Mod: RT

## 2020-08-24 PROCEDURE — 93010 ELECTROCARDIOGRAM REPORT: CPT | Mod: 59 | Performed by: INTERNAL MEDICINE

## 2020-08-24 PROCEDURE — 93308 TTE F-UP OR LMTD: CPT | Mod: 26 | Performed by: INTERNAL MEDICINE

## 2020-08-24 PROCEDURE — 85379 FIBRIN DEGRADATION QUANT: CPT | Performed by: INTERNAL MEDICINE

## 2020-08-24 PROCEDURE — 40000556 ZZH STATISTIC PERIPHERAL IV START W US GUIDANCE

## 2020-08-24 PROCEDURE — 83880 ASSAY OF NATRIURETIC PEPTIDE: CPT | Performed by: INTERNAL MEDICINE

## 2020-08-24 PROCEDURE — 71045 X-RAY EXAM CHEST 1 VIEW: CPT

## 2020-08-24 PROCEDURE — 85610 PROTHROMBIN TIME: CPT | Performed by: INTERNAL MEDICINE

## 2020-08-24 PROCEDURE — 84484 ASSAY OF TROPONIN QUANT: CPT | Performed by: INTERNAL MEDICINE

## 2020-08-24 RX ORDER — CEFTRIAXONE 1 G/1
1 INJECTION, POWDER, FOR SOLUTION INTRAMUSCULAR; INTRAVENOUS ONCE
Status: COMPLETED | OUTPATIENT
Start: 2020-08-24 | End: 2020-08-24

## 2020-08-24 RX ORDER — CEFPODOXIME PROXETIL 200 MG/1
200 TABLET, FILM COATED ORAL DAILY
Qty: 10 TABLET | Refills: 0 | Status: SHIPPED | OUTPATIENT
Start: 2020-08-24 | End: 2020-09-03

## 2020-08-24 RX ORDER — IOPAMIDOL 755 MG/ML
61 INJECTION, SOLUTION INTRAVASCULAR ONCE
Status: COMPLETED | OUTPATIENT
Start: 2020-08-24 | End: 2020-08-24

## 2020-08-24 RX ADMIN — IOPAMIDOL 61 ML: 755 INJECTION, SOLUTION INTRAVENOUS at 13:04

## 2020-08-24 RX ADMIN — CEFTRIAXONE 1 G: 1 INJECTION, POWDER, FOR SOLUTION INTRAMUSCULAR; INTRAVENOUS at 14:48

## 2020-08-24 RX ADMIN — SODIUM CHLORIDE 1000 ML: 9 INJECTION, SOLUTION INTRAVENOUS at 09:37

## 2020-08-24 ASSESSMENT — ENCOUNTER SYMPTOMS
NECK STIFFNESS: 0
CONFUSION: 0
HEADACHES: 0
DIFFICULTY URINATING: 0
SHORTNESS OF BREATH: 0
ABDOMINAL PAIN: 0
DIZZINESS: 1
EYE REDNESS: 0
COLOR CHANGE: 0
ARTHRALGIAS: 0
FEVER: 0

## 2020-08-24 NOTE — ED NOTES
Pt c/o dizziness that started recently in the past week to days. She feels more dizzy with positional changes. A year ago she had the same thing happen. Recently treated for DVT in R leg, currently off Warfarin and Lovenox. Pt states she stopped warfarin 2-3 weeks ago and feels like maybe that is the issue with her dizziness.

## 2020-08-24 NOTE — ED PROVIDER NOTES
[place] EMERGENCY DEPARTMENT ([facility])  8/24/20    History     Chief Complaint   Patient presents with     Dizziness     The history is provided by the patient and medical records.     Lara Marc is a 85 year old female with a past medical history of hypertension, CKD stage III, and chronic deep vein thrombosis who presents to the Emergency Department for chief complaint of dizziness.  The patient states that she feels more dizzy with positional changes.  The patient states she is currently off of warfarin and Lovenox.  Per chart review, the patient was discontinued on warfarin therapy beginning 7/27.  The patient states that this weekend she fell a total of 3 times.  She states she landed on soft things each time and states she had no chest pain or shortness of breath prior to these episodes of falling.  Here in the ED, she admits to pain in the back of her right knee.  She denies any other symptoms.    I have reviewed the Medications, Allergies, Past Medical and Surgical History, and Social History in the [employer] system.  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     CKD (chronic kidney disease) stage 3, GFR 30-59 ml/min (H) 7/25/2011    has 1 kidney, the right kidney is present---gave left kidney to sister     Dyspnea on exertion      Gastro-oesophageal reflux disease      Hypertension goal BP (blood pressure) < 140/90 7/25/2011     Other and unspecified hyperlipidemia      Unspecified essential hypertension     not medicated at this time     Uterovaginal prolapse, complete 2004     resolved '06     Vaginal enterocele, congenital or acquired 2007     or cystocele       PAST SURGICAL HISTORY:   Past Surgical History:   Procedure Laterality Date     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      C VAGINAL HYSTERECTOMY  9/15/06    TVH/BSO/A&P repair/sacrospinus ligament fixation......childbirth x 9     childbirth X9[       CLOSED RX RIB FRACTURE  7/06    left     DAVINCI SACROCOLPOPEXY, MIDURETHRAL SLING, CYSTOSCOPY  2/8/2013    Procedure: DAVINCI SACROCOLPOPEXY, MIDURETHRAL SLING, CYSTOSCOPY;  DAVINCI SACROCOLPOPEXY, TVT EXACT SLING, RIGHT SALPINGO-OOPHERECTOMY, CYSTOSCOPY;  Surgeon: Bennie Galdamez MD;  Location:  OR     HC COLONOSCOPY THRU STOMA, DIAGNOSTIC  7/2005    diverticulosis,small polyp,recheck 5 yrs     LAPAROSCOPIC CHOLECYSTECTOMY  3/31/2011    Procedure:LAPAROSCOPIC CHOLECYSTECTOMY; Surgeon:DAVID MOLINA; Location:[facility] OR       Past medical history, past surgical history, medications, and allergies were reviewed with the patient. Additional pertinent items: None    FAMILY HISTORY:   Family History   Problem Relation Age of Onset     Heart Disease Father      Cancer - colorectal Brother      Prostate Cancer Brother      Diabetes Sister      Heart Disease Sister        SOCIAL HISTORY:   Social History     Tobacco Use     Smoking status: Former Smoker     Packs/day: 0.00     Years: 17.00     Pack years: 0.00     Smokeless tobacco: Never Used     Tobacco comment: quit when she was 29 years old   Substance Use Topics     Alcohol use: No     Social history was reviewed with the patient. Additional pertinent items: None      Patient's Medications   New Prescriptions    CEFPODOXIME (VANTIN) 200 MG TABLET    Take 1 tablet (200 mg) by mouth daily for 10 days   Previous Medications    ACETAMINOPHEN (TYLENOL) 325 MG TABLET    Take 2 tablets (650 mg) by mouth every 8 hours    ASPIRIN 81 MG EC TABLET    Take 81 mg by mouth daily    CHOLECALCIFEROL (VITAMIN D3 PO)    Take by mouth daily    LISINOPRIL (ZESTRIL) 10 MG TABLET    Take 1 tablet (10 mg) by mouth daily    MAGNESIUM 250 MG TABLET    Take 1 tablet by mouth daily    MULTIPLE VITAMINS-MINERALS (MULTIVITAMIN OR)    Take 1 tablet by mouth daily.  "  Modified Medications    No medications on file   Discontinued Medications    ENOXAPARIN ANTICOAGULANT (LOVENOX) 40 MG/0.4ML SYRINGE    Inject 0.4 mLs (40 mg) Subcutaneous daily    WARFARIN ANTICOAGULANT (COUMADIN) 2.5 MG TABLET    Take 1 tablet (2.5 mg) by mouth daily Current dose (6/22/20): 2.5 mg daily or as directed by ACC team.          Allergies   Allergen Reactions     Nkda [No Known Drug Allergies]      Seasonal Allergies      Itchy eyes and watery eyes        Review of Systems   Constitutional: Negative for fever.   HENT: Negative for congestion.    Eyes: Negative for redness.   Respiratory: Negative for shortness of breath.    Cardiovascular: Negative for chest pain.   Gastrointestinal: Negative for abdominal pain.   Genitourinary: Negative for difficulty urinating.   Musculoskeletal: Negative for arthralgias and neck stiffness.        Positive for pain behind rt knee     Skin: Negative for color change.   Neurological: Positive for dizziness. Negative for headaches.   Psychiatric/Behavioral: Negative for confusion.         Physical Exam   BP: (!) 195/88  Pulse: 79  Temp: 97.7  F (36.5  C)  Resp: 18  Height: 154.9 cm (5' 1\")  SpO2: 98 %      Physical Exam  Constitutional:       General: She is not in acute distress.     Appearance: She is not diaphoretic.   HENT:      Head: Atraumatic.      Mouth/Throat:      Pharynx: No oropharyngeal exudate.   Eyes:      General: No scleral icterus.     Pupils: Pupils are equal, round, and reactive to light.   Neck:      Musculoskeletal: Neck supple.   Cardiovascular:      Rate and Rhythm: Normal rate and regular rhythm.      Heart sounds: Normal heart sounds. No murmur. No friction rub. No gallop.    Pulmonary:      Effort: Pulmonary effort is normal. No respiratory distress.      Breath sounds: Normal breath sounds. No stridor. No wheezing, rhonchi or rales.   Chest:      Chest wall: No tenderness.   Abdominal:      General: Abdomen is flat. Bowel sounds are normal. " There is no distension.      Palpations: Abdomen is soft. There is no mass.      Tenderness: There is no abdominal tenderness. There is no right CVA tenderness, left CVA tenderness, guarding or rebound.      Hernia: No hernia is present.   Musculoskeletal:         General: No tenderness.   Skin:     General: Skin is warm.      Findings: No rash.   Neurological:      General: No focal deficit present.         ED Course   9:20 AM  The patient was seen and examined by Otis Méndez MD in Room ED25.      Procedures  Results for orders placed during the hospital encounter of 08/24/20   POC US ECHO LIMITED    Impression Limited Bedside Cardiac Ultrasound, performed and interpreted by me.   Indication: Weakness.  Parasternal long axis, parasternal short axis and apical 4 chamber views were acquired.   Image quality was satisfactory.    Findings:    Global left ventricular function appears intact.  Chambers do not appear dilated.  There is no evidence of free fluid within the pericardium.    IMPRESSION: Grossly normal left ventricular function and chamber size.  No pericardial effusion..              EKG Interpretation:      Interpreted by Dev Berg MD, MD  Time reviewed: on arrival  Symptoms at time of EKG: dizziness   Rhythm: normal sinus   Rate: normal  Axis: normal  Ectopy: none  Conduction: normal  ST Segments/ T Waves: No ST-T wave changes  Q Waves: none  Comparison to prior: Unchanged from 4/2020    Clinical Impression: normal EKG               Results for orders placed or performed during the hospital encounter of 08/24/20 (from the past 24 hour(s))   EKG 12 lead   Result Value Ref Range    Interpretation ECG Click View Image link to view waveform and result    CBC with platelets differential   Result Value Ref Range    WBC 4.6 4.0 - 11.0 10e9/L    RBC Count 4.52 3.8 - 5.2 10e12/L    Hemoglobin 10.9 (L) 11.7 - 15.7 g/dL    Hematocrit 37.4 35.0 - 47.0 %    MCV 83 78 - 100 fl    MCH 24.1 (L) 26.5 - 33.0 pg     MCHC 29.1 (L) 31.5 - 36.5 g/dL    RDW 15.5 (H) 10.0 - 15.0 %    Platelet Count 170 150 - 450 10e9/L    Diff Method Automated Method     % Neutrophils 59.1 %    % Lymphocytes 27.7 %    % Monocytes 7.9 %    % Eosinophils 4.4 %    % Basophils 0.7 %    % Immature Granulocytes 0.2 %    Nucleated RBCs 0 0 /100    Absolute Neutrophil 2.7 1.6 - 8.3 10e9/L    Absolute Lymphocytes 1.3 0.8 - 5.3 10e9/L    Absolute Monocytes 0.4 0.0 - 1.3 10e9/L    Absolute Eosinophils 0.2 0.0 - 0.7 10e9/L    Absolute Basophils 0.0 0.0 - 0.2 10e9/L    Abs Immature Granulocytes 0.0 0 - 0.4 10e9/L    Absolute Nucleated RBC 0.0    Comprehensive metabolic panel   Result Value Ref Range    Sodium 143 133 - 144 mmol/L    Potassium 4.2 3.4 - 5.3 mmol/L    Chloride 113 (H) 94 - 109 mmol/L    Carbon Dioxide 26 20 - 32 mmol/L    Anion Gap 4 3 - 14 mmol/L    Glucose 106 (H) 70 - 99 mg/dL    Urea Nitrogen 23 7 - 30 mg/dL    Creatinine 1.26 (H) 0.52 - 1.04 mg/dL    GFR Estimate 39 (L) >60 mL/min/[1.73_m2]    GFR Estimate If Black 45 (L) >60 mL/min/[1.73_m2]    Calcium 8.9 8.5 - 10.1 mg/dL    Bilirubin Total 0.2 0.2 - 1.3 mg/dL    Albumin 3.2 (L) 3.4 - 5.0 g/dL    Protein Total 6.7 (L) 6.8 - 8.8 g/dL    Alkaline Phosphatase 62 40 - 150 U/L    ALT 23 0 - 50 U/L    AST 22 0 - 45 U/L   INR   Result Value Ref Range    INR 1.04 0.86 - 1.14   Troponin I   Result Value Ref Range    Troponin I ES <0.015 0.000 - 0.045 ug/L   Lactic acid whole blood   Result Value Ref Range    Lactic Acid 1.6 0.7 - 2.0 mmol/L   D dimer quantitative   Result Value Ref Range    D Dimer 1.7 (H) 0.0 - 0.50 ug/ml FEU   Nt probnp inpatient   Result Value Ref Range    N-Terminal Pro BNP Inpatient 701 0 - 1,800 pg/mL   POC US ECHO LIMITED    Impression    Limited Bedside Cardiac Ultrasound, performed and interpreted by me.   Indication: Weakness.  Parasternal long axis, parasternal short axis and apical 4 chamber views were acquired.   Image quality was satisfactory.    Findings:    Global  left ventricular function appears intact.  Chambers do not appear dilated.  There is no evidence of free fluid within the pericardium.    IMPRESSION: Grossly normal left ventricular function and chamber size.  No pericardial effusion..    US Lower Extremity Venous Duplex Right    Narrative    EXAMINATION: DOPPLER VENOUS ULTRASOUND OF RIGHT LOWER EXTREMITY,  8/24/2020 10:01 AM     COMPARISON: None.    HISTORY: Edema    TECHNIQUE:  Gray-scale evaluation with compression, spectral flow and  color Doppler assessment of the deep venous system of the right leg  from groin to knee, and then at the ankles.    FINDINGS:  In the right lower extremity, the common femoral, femoral, popliteal  and posterior tibial veins demonstrate normal compressibility and  blood flow. Left common femoral vein is patent and compressible.      Impression    IMPRESSION:  1.  No evidence of deep venous thrombosis in right lower extremity.    I have personally reviewed the examination and initial interpretation  and I agree with the findings.    CLARA HAMILTON MD   XR Chest Port 1 View    Narrative    EXAM: XR CHEST PORT 1 VW  8/24/2020 10:03 AM     HISTORY:  sob       COMPARISON:  9/9/2019    FINDINGS: Single view of the chest. Cardiac silhouette is enlarged.  Prominent pulmonary vasculature. No focal pulmonary opacity, pleural  effusion or pneumothorax. Atherosclerotic calcification in the aortic  arch.       Impression    IMPRESSION: Enlarged cardiac silhouette. Mild pulmonary edema.    I have personally reviewed the examination and initial interpretation  and I agree with the findings.    JANIE JAY MD   UA reflex to Microscopic and Culture    Specimen: Urine clean catch; Catheterized Urine   Result Value Ref Range    Color Urine Yellow     Appearance Urine Clear     Glucose Urine Negative NEG^Negative mg/dL    Bilirubin Urine Negative NEG^Negative    Ketones Urine Negative NEG^Negative mg/dL    Specific Gravity Urine 1.017 1.003 -  1.035    Blood Urine Negative NEG^Negative    pH Urine 5.5 5.0 - 7.0 pH    Protein Albumin Urine Negative NEG^Negative mg/dL    Urobilinogen mg/dL Normal 0.0 - 2.0 mg/dL    Nitrite Urine Negative NEG^Negative    Leukocyte Esterase Urine Large (A) NEG^Negative    Source Catheterized Urine     RBC Urine 2 0 - 2 /HPF    WBC Urine 12 (H) 0 - 5 /HPF    Bacteria Urine Few (A) NEG^Negative /HPF    Squamous Epithelial /HPF Urine 3 (H) 0 - 1 /HPF    Mucous Urine Present (A) NEG^Negative /LPF    Hyaline Casts 1 0 - 2 /LPF   CT Chest Pulmonary Embolism w Contrast    Narrative    Examination: CT CHEST PULMONARY EMBOLISM W CONTRAST, 8/24/2020 1:08 PM    Comparison: 9/18/2013.    History: PE suspected, intermediate prob, positive D-dimer.    Technique: Axial thin slice images from the lung apices to the  diaphragm were obtained following the injection of intravenous  contrast media in the pulmonary arterial phase.     Contrast dose: 61 cc of isovue 370    Findings:   Mediastinum/hilum: The heart size is normal. The great vessels are  normal in caliber. There is no evidence of a filling defect in the  pulmonary arteries to suggest a pulmonary embolism. Right hilar lymph  node measures up to 1.2 cm in short axis (series 7, image 122). A few  other prominent mediastinal lymph nodes are present. No pericardial  effusion is noted. Heavy calcifications of the mitral valve. Moderate  size hiatal hernia thickening of the esophageal wall.    Lungs/pleura: 4 mm subpleural nodule on series 6, image 77 in the  right upper lobe. Scattered areas of mild bronchial wall thickening.  Mild centrilobular emphysematous changes of the lungs. Bibasilar  atelectasis. No evidence of pleural effusion is noted.    Upper Abdomen: Postoperative changes from cholecystectomy. Prominent  calcific atherosclerotic disease.    Bones/Soft tissues: Kyphotic configuration of the thoracic spine. No  acute osseous abnormalities.      Impression    IMPRESSION:   1. No  evidence for pulmonary embolism.  2. Heavy calcifications about the mitral annulus. An echocardiogram  could be performed if clinically indicated.  3. Mild centrilobular emphysematous disease with scattered areas of  bronchial wall thickening.  4. Small moderate sized hiatal hernia with thickening of the  esophageal wall.  5. There is also a slightly enlarged right hilar lymph node as well as  some other prominent mediastinal nodes. Findings are slightly  nonspecific in the setting of emphysematous changes.    I have personally reviewed the examination and initial interpretation  and I agree with the findings.    JACQUI HERNADEZ MD     Medications   0.9% sodium chloride BOLUS (0 mLs Intravenous Stopped 8/24/20 1333)   iopamidol (ISOVUE-370) solution 61 mL (61 mLs Intravenous Given 8/24/20 1304)   sodium chloride (PF) 0.9% PF flush 90 mL (90 mLs Intravenous Given 8/24/20 1304)   cefTRIAXone (ROCEPHIN) 1 g vial to attach to  mL bag for ADULTS or NS 50 mL bag for PEDS (0 g Intravenous Stopped 8/24/20 1504)             Assessments & Plan (with Medical Decision Making)    Dizziness of unclear etiology but noted to have UTI, EKG and trop neg, POCUS neg, D Dimer slight elevation=CT chest neg for PE or any acute pathologies, rocephin given and discharge with levaquin, follow up with her PMD within one week.         I have reviewed the nursing notes.    I have reviewed the findings, diagnosis, plan and need for follow up with the patient.    New Prescriptions    CEFPODOXIME (VANTIN) 200 MG TABLET    Take 1 tablet (200 mg) by mouth daily for 10 days       Final diagnoses:   Urinary tract infection without hematuria, site unspecified   Dizziness       8/24/2020   [facility], [place], EMERGENCY DEPARTMENT    Shreya VILLASEÑOR am serving as a trained medical scribe to document services personally performed by Dev Berg Md, MD, based on the provider's statements to me.     Dev VILLASEÑOR Md, MD, was physically present and  have reviewed and verified the accuracy of this note documented by Shreya Funes.       Dev Berg MD  08/24/20 [id number]

## 2020-08-24 NOTE — ED AVS SNAPSHOT
[facility], [place], Emergency Department  [address] [place] [id number]  Phone:  [phone]                                    Lara Marc   MRN: [id number]    Department:  [facility], Emergency Department   Date of Visit:  8/24/2020           After Visit Summary Signature Page    I have received my discharge instructions, and my questions have been answered. I have discussed any challenges I see with this plan with the nurse or doctor.    ..........................................................................................................................................  Patient/Patient Representative Signature      ..........................................................................................................................................  Patient Representative Print Name and Relationship to Patient    ..................................................               ................................................  Date                                   Time    ..........................................................................................................................................  Reviewed by Signature/Title    ...................................................              ..............................................  Date                                               Time          22EPIC Rev 08/18

## 2020-08-25 LAB — INTERPRETATION ECG - MUSE: NORMAL

## 2020-08-26 LAB
BACTERIA SPEC CULT: ABNORMAL
SPECIMEN SOURCE: ABNORMAL

## 2020-09-09 ENCOUNTER — OFFICE VISIT (OUTPATIENT)
Dept: FAMILY MEDICINE | Facility: CLINIC | Age: 85
End: 2020-09-09
Payer: MEDICARE

## 2020-09-09 VITALS
BODY MASS INDEX: 33.07 KG/M2 | RESPIRATION RATE: 18 BRPM | TEMPERATURE: 98.8 F | DIASTOLIC BLOOD PRESSURE: 88 MMHG | HEART RATE: 91 BPM | SYSTOLIC BLOOD PRESSURE: 178 MMHG | WEIGHT: 175 LBS | OXYGEN SATURATION: 98 %

## 2020-09-09 DIAGNOSIS — I10 HYPERTENSION GOAL BP (BLOOD PRESSURE) < 140/90: ICD-10-CM

## 2020-09-09 DIAGNOSIS — Z87.440 HISTORY OF UTI: Primary | ICD-10-CM

## 2020-09-09 PROCEDURE — 99214 OFFICE O/P EST MOD 30 MIN: CPT | Performed by: FAMILY MEDICINE

## 2020-09-09 NOTE — PROGRESS NOTES
Subjective     Lara Marc is a 85 year old female who presents to clinic today for the following health issues:    HPI       ER visit on 08/24/2020 - Noted to have UTI. Dizziness of unclear etiology but noted to have UTI, EKG and trop neg, POCUS neg, D Dimer slight elevation=CT chest neg for PE or any acute pathologies, rocephin given and discharge with levaquin.       Update -   No dizziness.   No dysuria, hematuria or abdominal pain.   Her daughter recently bought her blood pressure cuff. She hasn't really used it. She has intermittent headache in the morning which improves. No chest pain or shortness of breath.   Not monitoring salt intake. She has been eating more pretzels.   She is trying to stay active.       Review of Systems   Constitutional, HEENT, cardiovascular, pulmonary, gi and gu systems are negative, except as otherwise noted.      Objective    There were no vitals taken for this visit.  There is no height or weight on file to calculate BMI.  Physical Exam   BP (!) 178/88 (BP Location: Right arm, Patient Position: Sitting, Cuff Size: Adult Regular)   Pulse 91   Temp 98.8  F (37.1  C) (Tympanic)   Resp 18   Wt 79.4 kg (175 lb)   SpO2 98%   BMI 33.07 kg/m     BP (!) 178/88 (BP Location: Right arm, Patient Position: Sitting, Cuff Size: Adult Regular)   Pulse 91   Temp 98.8  F (37.1  C) (Tympanic)   Resp 18   Wt 79.4 kg (175 lb)   SpO2 98%   BMI 33.07 kg/m    GENERAL: healthy, alert and no distress  EYES: Eyes grossly normal to inspection  HENT:  nose and mouth without ulcers or lesions  NEURO: mentation intact and speech normal  PSYCH: mentation appears normal, affect normal    No results found for this or any previous visit (from the past 24 hour(s)).        Assessment & Plan     1. History of UTI  - symptoms improved    2. Hypertension goal BP (blood pressure) < 140/90  - BP elevated in clinic and scheduled for MA check in 2 weeks  - repeat tB/P on 09/23/2020 132/64  - no changes  needed on antihypertensive        Deqa Melissa Andres MD  [facility]

## 2020-09-23 ENCOUNTER — ALLIED HEALTH/NURSE VISIT (OUTPATIENT)
Dept: NURSING | Facility: CLINIC | Age: 85
End: 2020-09-23
Payer: MEDICARE

## 2020-09-23 VITALS — DIASTOLIC BLOOD PRESSURE: 64 MMHG | SYSTOLIC BLOOD PRESSURE: 132 MMHG

## 2020-09-23 DIAGNOSIS — Z01.30 BP CHECK: Primary | ICD-10-CM

## 2020-09-23 PROCEDURE — 99207 ZZC NO CHARGE NURSE ONLY: CPT

## 2020-10-20 DIAGNOSIS — I10 HYPERTENSION GOAL BP (BLOOD PRESSURE) < 140/90: ICD-10-CM

## 2020-10-23 NOTE — TELEPHONE ENCOUNTER
Last Comprehensive Metabolic Panel:  Sodium   Date Value Ref Range Status   08/24/2020 143 133 - 144 mmol/L Final     Potassium   Date Value Ref Range Status   08/24/2020 4.2 3.4 - 5.3 mmol/L Final     Chloride   Date Value Ref Range Status   08/24/2020 113 (H) 94 - 109 mmol/L Final     Carbon Dioxide   Date Value Ref Range Status   08/24/2020 26 20 - 32 mmol/L Final     Anion Gap   Date Value Ref Range Status   08/24/2020 4 3 - 14 mmol/L Final     Glucose   Date Value Ref Range Status   08/24/2020 106 (H) 70 - 99 mg/dL Final     Urea Nitrogen   Date Value Ref Range Status   08/24/2020 23 7 - 30 mg/dL Final     Creatinine   Date Value Ref Range Status   08/24/2020 1.26 (H) 0.52 - 1.04 mg/dL Final     GFR Estimate   Date Value Ref Range Status   08/24/2020 39 (L) >60 mL/min/[1.73_m2] Final     Comment:     Non  GFR Calc  Starting 12/18/2018, serum creatinine based estimated GFR (eGFR) will be   calculated using the Chronic Kidney Disease Epidemiology Collaboration   (CKD-EPI) equation.       Calcium   Date Value Ref Range Status   08/24/2020 8.9 8.5 - 10.1 mg/dL Final     Bilirubin Total   Date Value Ref Range Status   08/24/2020 0.2 0.2 - 1.3 mg/dL Final     Alkaline Phosphatase   Date Value Ref Range Status   08/24/2020 62 40 - 150 U/L Final     ALT   Date Value Ref Range Status   08/24/2020 23 0 - 50 U/L Final     AST   Date Value Ref Range Status   08/24/2020 22 0 - 45 U/L Final

## 2020-10-26 RX ORDER — LISINOPRIL 10 MG/1
TABLET ORAL
Qty: 90 TABLET | Refills: 0 | Status: SHIPPED | OUTPATIENT
Start: 2020-10-26 | End: 2021-01-19

## 2021-01-15 DIAGNOSIS — I10 HYPERTENSION GOAL BP (BLOOD PRESSURE) < 140/90: ICD-10-CM

## 2021-01-15 NOTE — TELEPHONE ENCOUNTER
Last Comprehensive Metabolic Panel:  Sodium   Date Value Ref Range Status   08/24/2020 143 133 - 144 mmol/L Final     Potassium   Date Value Ref Range Status   08/24/2020 4.2 3.4 - 5.3 mmol/L Final     Chloride   Date Value Ref Range Status   08/24/2020 113 (H) 94 - 109 mmol/L Final     Carbon Dioxide   Date Value Ref Range Status   08/24/2020 26 20 - 32 mmol/L Final     Anion Gap   Date Value Ref Range Status   08/24/2020 4 3 - 14 mmol/L Final     Glucose   Date Value Ref Range Status   08/24/2020 106 (H) 70 - 99 mg/dL Final     Urea Nitrogen   Date Value Ref Range Status   08/24/2020 23 7 - 30 mg/dL Final     Creatinine   Date Value Ref Range Status   08/24/2020 1.26 (H) 0.52 - 1.04 mg/dL Final     GFR Estimate   Date Value Ref Range Status   08/24/2020 39 (L) >60 mL/min/[1.73_m2] Final     Comment:     Non  GFR Calc  Starting 12/18/2018, serum creatinine based estimated GFR (eGFR) will be   calculated using the Chronic Kidney Disease Epidemiology Collaboration   (CKD-EPI) equation.       Calcium   Date Value Ref Range Status   08/24/2020 8.9 8.5 - 10.1 mg/dL Final

## 2021-01-19 RX ORDER — LISINOPRIL 10 MG/1
TABLET ORAL
Qty: 90 TABLET | Refills: 0 | Status: SHIPPED | OUTPATIENT
Start: 2021-01-19 | End: 2021-04-19

## 2021-04-16 DIAGNOSIS — I10 HYPERTENSION GOAL BP (BLOOD PRESSURE) < 140/90: ICD-10-CM

## 2021-04-19 RX ORDER — LISINOPRIL 10 MG/1
TABLET ORAL
Qty: 90 TABLET | Refills: 1 | Status: SHIPPED | OUTPATIENT
Start: 2021-04-19 | End: 2021-10-13

## 2021-04-22 ENCOUNTER — VIRTUAL VISIT (OUTPATIENT)
Dept: FAMILY MEDICINE | Facility: CLINIC | Age: 86
End: 2021-04-22
Payer: COMMERCIAL

## 2021-04-22 DIAGNOSIS — K62.5 RECTAL BLEEDING: ICD-10-CM

## 2021-04-22 DIAGNOSIS — K59.00 CONSTIPATION, UNSPECIFIED CONSTIPATION TYPE: Primary | ICD-10-CM

## 2021-04-22 DIAGNOSIS — K64.9 HEMORRHOIDS, UNSPECIFIED HEMORRHOID TYPE: ICD-10-CM

## 2021-04-22 DIAGNOSIS — R19.5 CHANGE IN STOOL CALIBER: ICD-10-CM

## 2021-04-22 DIAGNOSIS — K62.89 RECTAL PAIN: ICD-10-CM

## 2021-04-22 PROCEDURE — 99215 OFFICE O/P EST HI 40 MIN: CPT | Mod: 95 | Performed by: FAMILY MEDICINE

## 2021-04-22 NOTE — PROGRESS NOTES
"Lara is a 85 year old who is being evaluated via a billable telephone visit.      What phone number would you like to be contacted at? [phone] (M)   How would you like to obtain your AVS? 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   Constipation, unspecified constipation type     - COLORECTAL SURGERY REFERRAL    Change in stool caliber     - COLORECTAL SURGERY REFERRAL    Rectal pain     - COLORECTAL SURGERY REFERRAL    Hemorrhoids, unspecified hemorrhoid type     - COLORECTAL SURGERY REFERRAL    Rectal bleeding     - COLORECTAL SURGERY REFERRAL         50 minutes spent on the date of the encounter doing chart review, history and exam, documentation and further activities per the note        BMI:   Estimated body mass index is 33.07 kg/m  as calculated from the following:    Height as of 8/24/20: 1.549 m (5' 1\").    Weight as of 9/9/20: 79.4 kg (175 lb).            No follow-ups on file.    Yecenia Dickerson MD, MD  [facility] [place]    Ti Bermudez is a 85 year old who presents for the following health issues     HPI     constipation x 5 months, wants colonoscopy  Has been going regularly but bowel movements have been very small.        She is having trouble having bowel movements. Noticed over the past 5 months, maybe longer. Her stools have gotten smaller and smaller and her stool is flat. She feels like there is something in the way.     Prior to about 5 months ago, she had regular bowel movements up to 3 times per day. Then she noticed progressive constipation. Hard stools they seem longer and skinnier or flatter in shape. They are hard to eliminate. Occasional abdominal " discomfort, but no current abdominal pain. Some bloating. Diet has remained about the same. She had week long episode of diarrhea a few weeks ago, described as brown watery stool without melena or hematochezia. Does occasionally have blood in the stool and says she gets hemorrhoidal irritation sometimes. Appetite has remained about the same. Denies fevers, night sweats, weight loss. Otherwise says she is feeling very well. She did try dulcolax which is somewhat helpful. Has used miralax in the past, but does not like to use something she has to take with a bunch of liquid.         Review of Systems          Objective           Vitals:  No vitals were obtained today due to virtual visit.    Physical Exam   healthy, alert and no distress  PSYCH: Alert and oriented times 3; coherent speech, normal   rate and volume, able to articulate logical thoughts, able   to abstract reason, no tangential thoughts, no hallucinations   or delusions  Her affect is normal  RESP: No cough, no audible wheezing, able to talk in full sentences  Remainder of exam unable to be completed due to telephone visits                 Phone call duration: 26 minutes

## 2021-04-22 NOTE — PATIENT INSTRUCTIONS
You can also try senna, a laxative, to help with your constipation.  Make sure to hydrate well and get plenty of fiber in your diet. regular exercise is helpful for constipation as well.    Schedule with the colorectal specialist for further evaluation.     Patient Education     Constipation (Adult)  Constipation means that you have bowel movements that are less frequent than usual. Stools often become very hard and difficult to pass.  Constipation is very common. At some point in life, it affects almost everyone. Since everyone's bowel habits are different, what is constipation to one person may not be to another. Your healthcare provider may do tests to diagnose constipation. It depends on what he or she finds when evaluating you.    Symptoms of constipation include:    Abdominal pain    Bloating    Vomiting    Painful bowel movements    Itching, swelling, bleeding, or pain around the anus  Causes  Constipation can have many causes. These include:    Diet low in fiber    Too much dairy    Not drinking enough liquids    Lack of exercise or physical activity (especially true for older adults)    Changes in lifestyle or daily routine, including pregnancy, aging, work, and travel    Frequent use or misuse of laxatives    Ignoring the urge to have a bowel movement or delaying it until later    Medicines, such as certain prescription pain medicines, iron supplements, antacids, certain antidepressants, and calcium supplements    Diseases like irritable bowel syndrome, bowel obstructions, stroke, diabetes, thyroid disease, Parkinson disease, hemorrhoids, and colon cancer  Complications  Potential complications of constipation can include:    Hemorrhoids    Rectal bleeding from hemorrhoids or anal fissures (skin tears)    Hernias    Dependency on laxatives    Chronic constipation    Fecal impaction, a severe form of constipation in which a large amount of hard stool is in your rectum that you can't pass    Bowel  obstruction or perforation  Home care  All treatment should be done after talking with your healthcare provider. This is especially true if you have another medical problems, are taking prescription medicines, or are an older adult. Treatment most often involves lifestyle changes. You may also need medicines. Your healthcare provider will tell you which will work best for you. Follow the advice below to help avoid this problem in the future.  Lifestyle changes  These lifestyle changes can help prevent constipation:    Diet. Eat a high-fiber diet, with fresh fruit and vegetables, and reduce dairy intake, meats, and processed foods    Fluids. It's important to get enough fluids each day. Drink plenty of water when you eat more fiber. If you are on diet that limits the amount of fluid you can have, talk about this with your healthcare provider.    Regular exercise. Check with your healthcare provider first.  Medicines  Take any medicines as directed. Some laxatives are safe to use only every now and then. Others can be taken on a regular basis. While laxatives don't cause bowel dependence, they are treating the symptoms. So your constipation may return if you don't make other changes. Talk with your healthcare provider or pharmacist if you have questions.  Prescription pain medicines can cause constipation. If you are taking this kind of medicine, ask your healthcare provider if you should also take a stool softener.  Medicines you may take to treat constipation include:    Fiber supplements    Stool softeners    Laxatives    Enemas    Rectal suppositories  Follow-up care  Follow up with your healthcare provider if symptoms don't get better in the next few days. You may need to have more tests or see a specialist.  Call 911  Call 911 if any of these occur:    Trouble breathing    Stiff, rigid abdomen that is severely painful to touch    Confusion    Fainting or loss of consciousness    Rapid heart rate    Chest  pain  When to seek medical advice  Call your healthcare provider right away if any of these occur:    Fever of 100.4 F (38 C) or higher, or as directed by your healthcare provider    Failure to resume normal bowel movements    Pain in your abdomen or back gets worse    Nausea or vomiting    Swelling in your abdomen    Blood in the stool    Black, tarry stool    Involuntary weight loss    Weakness  Dev last reviewed this educational content on 6/1/2018 2000-2021 [employer]. All rights reserved. This information is not intended as a substitute for professional medical care. Always follow your healthcare professional's instructions.

## 2021-05-10 ENCOUNTER — TRANSFERRED RECORDS (OUTPATIENT)
Dept: HEALTH INFORMATION MANAGEMENT | Facility: CLINIC | Age: 86
End: 2021-05-10

## 2021-08-30 ENCOUNTER — VIRTUAL VISIT (OUTPATIENT)
Dept: FAMILY MEDICINE | Facility: CLINIC | Age: 86
End: 2021-08-30
Payer: COMMERCIAL

## 2021-08-30 DIAGNOSIS — K62.89 RECTAL PAIN: ICD-10-CM

## 2021-08-30 DIAGNOSIS — R15.2 FECAL URGENCY: ICD-10-CM

## 2021-08-30 DIAGNOSIS — I71.40 ABDOMINAL AORTIC ANEURYSM (AAA) WITHOUT RUPTURE (H): ICD-10-CM

## 2021-08-30 DIAGNOSIS — R19.8 ALTERNATING CONSTIPATION AND DIARRHEA: Primary | ICD-10-CM

## 2021-08-30 DIAGNOSIS — R19.5 CHANGE IN STOOL CALIBER: ICD-10-CM

## 2021-08-30 DIAGNOSIS — K64.9 HEMORRHOIDS, UNSPECIFIED HEMORRHOID TYPE: ICD-10-CM

## 2021-08-30 DIAGNOSIS — N18.30 STAGE 3 CHRONIC KIDNEY DISEASE, UNSPECIFIED WHETHER STAGE 3A OR 3B CKD (H): ICD-10-CM

## 2021-08-30 PROBLEM — Z86.718 PERSONAL HISTORY OF DVT (DEEP VEIN THROMBOSIS): Status: ACTIVE | Noted: 2020-05-26

## 2021-08-30 PROBLEM — K56.2 CECAL VOLVULUS (H): Status: RESOLVED | Noted: 2019-09-09 | Resolved: 2021-08-30

## 2021-08-30 PROBLEM — I82.409 DEEP VEIN THROMBOSIS (DVT) (H): Status: RESOLVED | Noted: 2020-05-07 | Resolved: 2021-08-30

## 2021-08-30 PROCEDURE — 99214 OFFICE O/P EST MOD 30 MIN: CPT | Mod: 95 | Performed by: FAMILY MEDICINE

## 2021-08-30 NOTE — PROGRESS NOTES
Lara is a 86 year old who is being evaluated via a billable telephone visit.      What phone number would you like to be contacted at? [phone]  How would you like to obtain your AVS? Mail a copy    Assessment & Plan     Alternating constipation and diarrhea  Fecal urgency  Change in stool caliber  Rectal pain  Hemorrhoids, unspecified hemorrhoid type  Difficult to assess abdominal rectal symptoms on the phone in someone never seen before. Limits ability to make recommendations. Noted has had this ongoing past 1 yr now. was advised a colonoscopy but hesitant to get. Advised to restart metamucil daily. See colorectal again, would benefit from a colonoscopy. If that is normal would benefit maybe form pelvic dysfunction therapy given prior prolapse and surgery. Also advised she make an inperson apt with PCP Dr Dickerson to get labs and see if ct abdomen needed. Diverticulitis and SBO currently not suspected but has had cecal volvulus suspected in the pats . Currently does not appear to be in distress. Advised she discuss follow up of known infrarenal aortic dilation with PCP. She seemed unaware of it. Was seen on ct in 2019. Advised If having fever, abdominal pain, blood in stools or getting worse in any way to go to the ER  - Colorectal Surgery Referral; Future    Abdominal aortic aneurysm (AAA) without rupture (H)  Advised she discuss follow up of known infrarenal aortic dilation with PCP. She seemed unaware of it. Was seen on ct in 2019.    Stage 3 chronic kidney disease, unspecified whether stage 3a or 3b CKD  One solitary kidney, hx of HTN also likely contributing. Avoid NSAIDs, stay hydrated. Check labs with PCP.    Ordering of each unique test  40 minutes spent on the date of the encounter doing chart review, history and exam, documentation and further activities per the note     Regular exercise  See Patient Instructions    Return in about 1 week (around 9/6/2021) for Follow up, in person PCP   House.    Shannan Nunez MD  [facility]    Ti Bermudez is a 86 year old who presents for the following health issues     HPI     Constipation  Onset/Duration: 1 WEEK AGO   Description:  Frequency of bowel movements: on a daily basis  Consistency of stool: small   Progression of Symptoms: improving  Accompanying signs and symptoms:    Abdominal pain: YES   Rectal pain: YES   Blood in stool: no   Nausea/Vomiting: YES   Weight loss or gain: no  History:   Similar problems in past: YES  History of abdominal surgery: YES  Chronic laxative use: no  New medications: no  Precipitating or alleviating factors: none   Therapies tried and outcome: walk, it helps     Diarrhea  Onset/Duration: 3 week ago   Description:       Consistency of stool: watery        Blood in stool: no       Number of loose stools past 24 hours: 6-8   Progression of Symptoms: waxing and waning  Accompanying signs and symptoms:       Fever: no       Nausea/Vomiting: YES       Abdominal pain: no       Weight loss: no       Episodes of constipation: YES  History   Ill contacts: no  Recent use of antibiotics: no  Recent travels: no  Recent medication-new or changes(Rx or OTC): no  Precipitating or alleviating factors: None  Therapies tried and outcome: none     Hx of HTN on asa and lisinopril, infra-renal aortic aneurysm seen on ct in , prior hx of DVT on no anticoagulation, CKD 3, acquired solitary kidney ( donated one to her sister),  x 9, S/p hysterectomy in  , then  CKD, GERD, 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 & a stable infrarenal aorta aneurysm measures 3.8 x 3.5 cm. On vit d, mag,  Mv, with hx of intermittent constipation under care of PCP Dr Dickerson  Did a virtual visit with PCP Dr Dickerson on 4/22/21 for 5 months of  Constipation with reduction in stool caliber, rectal pain, hematochezia. & referred to colorectal. Seen by colorectal after that and advised increased fiber with metamucil and to return for follow up and consider a colonoscopy but was hesitant to do.    Apt made with this provider as a phone visit to discuss recurrent constipation and diarrhea. New to this provider  Notes after seen by colorectal in April, got better and stopped metamucil and was doing fine till 1 to 3 weeks ago. Has tried increasing fiber in diet and eating healthier and moving more.     Feels a mixture of things. Better some days. Feels has to go to poop all the time & pressure down there all the time, goes a lot and if didn't have that pressure would feel better. Feels like a muscle spasm, that grows in intensity creating urgency to go to poop. Since started walking abdominal pain better. No abdominal pain currently, occasional heart burn, reflux, not tried anything for it but feels that what gives her sometimes chest discomfort. No chest pain currently. That is not her biggest concern right now. Reports no nausea or vomiting, has alternating diarrhea or constipation, no blood in stools or black stool currently, no weight loss or increased night sweats. No dysuria, hematuria, frequency, urgency, hesitancy, incontinence,  No fever or chills, no headache, now and then dizziness, no double or blurry vision, no facial pain, earache, sore throat, has allergies with related itchy eyes and runny nose, no post nasal drip, no trouble smelling, tasting or swallowing, no new cough only from allergies at times, no recent chest pain, once in a while in past every now and then gets  aching in chest, thinks from food, no trouble breathing or palpitations,  No leg swelling or joint pain. No rash.    Review of Systems   Constitutional, HEENT, cardiovascular, pulmonary, GI, , musculoskeletal, neuro, skin, endocrine and psych systems are negative, except as otherwise noted.      Objective           Vitals:  No vitals were obtained today due to virtual visit.    Physical Exam   healthy, alert, no distress and cooperative  PSYCH: Alert and oriented times 3; coherent speech, normal   rate and volume, able to articulate logical thoughts, able   to abstract reason, no tangential thoughts, no hallucinations   or delusions  Her affect is anxious  RESP: No cough, no audible wheezing, able to talk in full sentences  Remainder of exam unable to be completed due to telephone visits    No results found for any visits on 08/30/21.          Phone call duration: 15 minutes

## 2021-08-30 NOTE — PATIENT INSTRUCTIONS
Restart metamucil daily  See colorectal again, would benefit from a colonoscopy  Make an inperson apt with PCP Dr Dickerson to get labs and see if ct abdomen needed  Discuss follow up of known infrarenal aortic dilation with PCP   If having fever, abdominal pain, blood in stools to go to the ER

## 2021-08-31 ENCOUNTER — HOSPITAL ENCOUNTER (EMERGENCY)
Facility: CLINIC | Age: 86
Discharge: HOME | End: 2021-08-31
Attending: EMERGENCY MEDICINE | Admitting: EMERGENCY MEDICINE
Payer: COMMERCIAL

## 2021-08-31 ENCOUNTER — APPOINTMENT (OUTPATIENT)
Dept: GENERAL RADIOLOGY | Facility: CLINIC | Age: 86
End: 2021-08-31
Attending: EMERGENCY MEDICINE
Payer: COMMERCIAL

## 2021-08-31 VITALS
SYSTOLIC BLOOD PRESSURE: 136 MMHG | OXYGEN SATURATION: 96 % | WEIGHT: 171.9 LBS | BODY MASS INDEX: 32.48 KG/M2 | HEART RATE: 83 BPM | DIASTOLIC BLOOD PRESSURE: 80 MMHG | RESPIRATION RATE: 18 BRPM | TEMPERATURE: 96.9 F

## 2021-08-31 DIAGNOSIS — M25.551 HIP PAIN, RIGHT: ICD-10-CM

## 2021-08-31 PROCEDURE — 99282 EMERGENCY DEPT VISIT SF MDM: CPT | Performed by: EMERGENCY MEDICINE

## 2021-08-31 PROCEDURE — 73502 X-RAY EXAM HIP UNI 2-3 VIEWS: CPT

## 2021-08-31 PROCEDURE — 99283 EMERGENCY DEPT VISIT LOW MDM: CPT | Performed by: EMERGENCY MEDICINE

## 2021-08-31 ASSESSMENT — ENCOUNTER SYMPTOMS
FEVER: 0
BACK PAIN: 1
SHORTNESS OF BREATH: 0
ARTHRALGIAS: 1
COUGH: 0
COLOR CHANGE: 0
DIARRHEA: 1
NAUSEA: 0
ABDOMINAL PAIN: 0
SORE THROAT: 0
VOMITING: 0
DIFFICULTY URINATING: 0
NECK STIFFNESS: 0
HEADACHES: 0
CHILLS: 0
MYALGIAS: 1
CONFUSION: 0
CONSTIPATION: 1

## 2021-08-31 NOTE — ED PROVIDER NOTES
"    [facility] EMERGENCY DEPARTMENT ([facility])   August 31, 2021  ED 5 4:12 PM   History     Chief Complaint   Patient presents with     Back Pain     per pt in June she fell \" I was watering my garden was going up the deck but missed the step. My body twisted and then I landed on my back.\" Pt denies any LH,dizziness prior the fall, denies LOC after she fell. Pt takes daily \"baby aspirin\"     The history is provided by the patient and medical records.     Lara Marc is a 86 year old female who presents with back pain ever since she fell 2 months ago.  She states that in June she was watering her garden, went to walk up her deck but missed a step, spun around and fell onto her back. She states she fell hard, rolled over and tried to get up on her hands and knees but wasn't successful. Her sister brought her a chair and she was able to get herself up with this. Patient states that afterwards she has had intermittent episodes of right sided  low back pain, increasing in frequency and intensity. She also has had R sided groin pain with this. The pain comes on suddenly and randomly during the day \"once a while it'll jab me\" and when she tries to go to bed at night. Last night the low back pain worsened to point where she couldn't find a position of comfort and couldn't sleep, and so presents for evaluation.  Not taking anything for pain due to the unpredictable nature of the pain.  She does have hip tenderness that radiates to her knee with touch. No pain with range of motion of hip. No pain radiating down leg. No prior hip, leg or back surgeries.  She has been struggling with constipation and diarrhea. Denies urinary incontinence. She has been able to walk independently and lives independently in her own home with her sister. No chest pain or shortness of breath. She has seasonal allergies, has intermittent cough. No vomiting. No headaches.     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     Complete uterovaginal prolapse 2/8/2013     Deep vein thrombosis (DVT) (H) 5/7/2020     Dyspnea on exertion      Gastro-oesophageal reflux disease      Hypertension goal BP (blood pressure) < 140/90 7/25/2011     Other and unspecified hyperlipidemia      Unspecified essential hypertension     not medicated at this time     Uterovaginal prolapse, complete 2004 resolved '06     Vaginal enterocele, congenital or acquired 2007     or cystocele       PAST SURGICAL HISTORY:   Past Surgical History:   Procedure Laterality Date     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     C VAGINAL HYSTERECTOMY  9/15/06    TVH/BSO/A&P repair/sacrospinus ligament fixation......childbirth x 9     childbirth X9[       CLOSED RX RIB FRACTURE  7/06    left     DAVINCI SACROCOLPOPEXY, MIDURETHRAL SLING, CYSTOSCOPY  2/8/2013    Procedure: DAVINCI SACROCOLPOPEXY, MIDURETHRAL SLING, CYSTOSCOPY;  DAVINCI SACROCOLPOPEXY, TVT EXACT SLING, RIGHT SALPINGO-OOPHERECTOMY, CYSTOSCOPY;  Surgeon: Bennie Galdamez MD;  Location:  OR     LAPAROSCOPIC CHOLECYSTECTOMY  3/31/2011    Procedure:LAPAROSCOPIC CHOLECYSTECTOMY; Surgeon:DAVID MOLINA; Location: OR     [facility] COLONOSCOPY THRU STOMA, DIAGNOSTIC  7/2005    diverticulosis,small polyp,recheck 5 yrs       Past medical history, past surgical history, medications, and allergies were reviewed with the patient. Additional pertinent items: None    FAMILY HISTORY:   Family History   Problem Relation Age of Onset     Heart Disease Father      Cancer  - colorectal Brother      Prostate Cancer Brother      Diabetes Sister      Heart Disease Sister        SOCIAL HISTORY:   Social History     Tobacco Use     Smoking status: Former Smoker     Packs/day: 0.00     Years: 17.00     Pack years: 0.00     Smokeless tobacco: Never Used     Tobacco comment: quit when she was 29 years old   Substance Use Topics     Alcohol use: No     Social history was reviewed with the patient. Additional pertinent items: None      Patient's Medications   New Prescriptions    No medications on file   Previous Medications    ACETAMINOPHEN (TYLENOL) 325 MG TABLET    Take 2 tablets (650 mg) by mouth every 8 hours    ASPIRIN 81 MG EC TABLET    Take 81 mg by mouth daily    CHOLECALCIFEROL (VITAMIN D3 PO)    Take by mouth daily    LISINOPRIL (ZESTRIL) 10 MG TABLET    TAKE 1 TABLET BY MOUTH EVERY DAY    MAGNESIUM 250 MG TABLET    Take 1 tablet by mouth daily    MULTIPLE VITAMINS-MINERALS (MULTIVITAMIN OR)    Take 1 tablet by mouth daily.   Modified Medications    No medications on file   Discontinued Medications    No medications on file          Allergies   Allergen Reactions     Nkda [No Known Drug Allergies]      Seasonal Allergies      Itchy eyes and watery eyes        Review of Systems   Constitutional: Negative for chills and fever.   HENT: Negative for congestion and sore throat.    Respiratory: Negative for cough and shortness of breath.    Cardiovascular: Negative for chest pain.   Gastrointestinal: Positive for constipation and diarrhea. Negative for abdominal pain, nausea and vomiting.        Alternating diarrhea and constipation   Genitourinary: Negative for difficulty urinating.   Musculoskeletal: Positive for arthralgias, back pain and myalgias. Negative for neck stiffness.        R SI joint pain, R groin pain   Skin: Negative for color change.   Neurological: Negative for headaches.   Psychiatric/Behavioral: Negative for confusion.   All other systems reviewed and are negative.    A  complete review of systems was performed with pertinent positives and negatives noted in the HPI, and all other systems negative.    Physical Exam   BP: (!) 159/70  Pulse: 81  Temp: 96.9  F (36.1  C)  Resp: 16  Weight: 78 kg (171 lb 14.4 oz)  SpO2: 97 %      Physical Exam  Vitals and nursing note reviewed.   Constitutional:       General: She is not in acute distress.     Appearance: She is well-developed. She is not diaphoretic.   HENT:      Head: Normocephalic and atraumatic.      Mouth/Throat:      Mouth: Mucous membranes are moist.      Pharynx: Oropharynx is clear. No oropharyngeal exudate.   Eyes:      General: No scleral icterus.     Conjunctiva/sclera: Conjunctivae normal.      Pupils: Pupils are equal, round, and reactive to light.   Neck:      Comments: No posterior C spine TTP  Cardiovascular:      Rate and Rhythm: Normal rate and regular rhythm.      Heart sounds: Normal heart sounds.   Pulmonary:      Effort: Pulmonary effort is normal. No respiratory distress.      Breath sounds: Normal breath sounds. No wheezing or rales.   Abdominal:      General: Bowel sounds are normal. There is no distension.      Palpations: Abdomen is soft.      Tenderness: There is no abdominal tenderness.      Comments: Obese, soft, nontender to palpation   Musculoskeletal:      Cervical back: Neck supple.      Comments: Kyphosis noted  No midline TTP of back.   Some slight R SI  TTP  Moderately severe TTP of R groin with light touch (chronic), no masses or abnormalities noted upon palpation  No lateral R hip TTP. No pain with external/internal rotation of R hip.   Some sensitivity to touch of skin of the R shin, no sign of cellulitis.   Plantarflexion/dorsiflexion intact B, sensation intact to light touch   Skin:     General: Skin is warm and dry.      Coloration: Skin is not pale.      Findings: No erythema or rash.   Neurological:      Mental Status: She is alert and oriented to person, place, and time.         ED Course         Procedures          The medical record was reviewed and interpreted.  Current images reviewed and interpreted: no fx seen.                 Results for orders placed or performed during the hospital encounter of 08/31/21   XR Pelvis w Hip Right 1 View     Status: None    Narrative    EXAM: XR PELVIS AND HIP RIGHT 1 VIEW  LOCATION: [facility]  DATE/TIME: 8/31/2021 5:27 PM    INDICATION: fall 2 months ago, continued pain, eval for fx  COMPARISON: None.      Impression    IMPRESSION: Right femoral head minimal osteophytic spur. Hip joint space width is relatively well-preserved and symmetrical with the left. No evidence of fracture.       Medications - No data to display          Assessments & Plan (with Medical Decision Making)   Patient presents to the ED today with c/o pain after falling in June.  Surprisingly she has not yet seen anybody for this injury despite the fact that it happened 2 months ago.  She is noticing some occasional right sided buttock/SI pain as well as some right groin pain.  Differential diagnosis could include pelvic fracture, hip fracture, arthritis, contusion, sciatica, amongst others.  We did do a right hip and pelvic x-ray which does not show any evidence of acute fracture.  There is some osteophytic spurring of the right femoral head consistent with degenerative changes.    I did reassure the patient, advised Tylenol as needed for symptomatic care.  If she continues to notice symptoms she may benefit from physical therapy, this would be something that would need to be set up by her primary clinic.  Encouraged follow-up.    I have reviewed the nursing notes.    I have reviewed the findings, diagnosis, plan and need for follow up with the patient.    New Prescriptions    No medications on file       Final diagnoses:   Hip pain, right       I, Emilee Perez, am serving as a trained medical scribe to document services personally performed  by Kellie Sim MD based on the provider's statements to me on August 31, 2021.  This document has been checked and approved by the attending provider.    I, Kellie Sim MD, was physically present and have reviewed and verified the accuracy of this note documented by Emilee Perez, medical scribe.      Kellie Sim MD     8/31/2021   [facility] EMERGENCY DEPARTMENT     Kellie Sim MD  08/31/21 [id number]

## 2021-08-31 NOTE — DISCHARGE INSTRUCTIONS
You have been seen in the emergency department today after your fall over 2 months ago.  Your x-ray of your hip and pelvis looks normal, we do not see any sign of fracture or broken bone.  You do have some arthritis in the hip.  We recommend that you take Tylenol as needed for discomfort.  You should follow-up with your primary clinic if you continue to have bothersome symptoms as they can potentially refer you for physical therapy.

## 2021-09-02 ENCOUNTER — TELEPHONE (OUTPATIENT)
Dept: SURGERY | Facility: CLINIC | Age: 86
End: 2021-09-02

## 2021-09-02 NOTE — TELEPHONE ENCOUNTER
[facility] Call Center    Phone Message    May a detailed message be left on voicemail: yes     Reason for Call: Appointment Intake    Referring Provider Name: Shannan Nunez MD  Diagnosis and/or Symptoms: Alternating constipation and diarrhea, Change in stool caliber, Rectal pain, Hemorrhoids, unspecified hemorrhoid type, Fecal urgency - Other Comments: seen in may, advised colonoscopy if not better.     Sending TE for the DX Rectal Pain, per guidelines.     Action Taken: Message routed to:  [facility] Colon Rectal    Travel Screening: Not Applicable

## 2021-09-03 NOTE — TELEPHONE ENCOUNTER
Patient said she is going to have a visit with her PCP first to see what she should do and if she wants to be seen by us she will let us know.

## 2021-09-13 ENCOUNTER — OFFICE VISIT (OUTPATIENT)
Dept: FAMILY MEDICINE | Facility: CLINIC | Age: 86
End: 2021-09-13
Payer: COMMERCIAL

## 2021-09-13 VITALS
WEIGHT: 171.08 LBS | OXYGEN SATURATION: 98 % | HEART RATE: 81 BPM | TEMPERATURE: 98 F | DIASTOLIC BLOOD PRESSURE: 68 MMHG | BODY MASS INDEX: 32.33 KG/M2 | SYSTOLIC BLOOD PRESSURE: 146 MMHG

## 2021-09-13 DIAGNOSIS — R19.5 CHANGE IN STOOL CALIBER: ICD-10-CM

## 2021-09-13 DIAGNOSIS — N18.30 STAGE 3 CHRONIC KIDNEY DISEASE, UNSPECIFIED WHETHER STAGE 3A OR 3B CKD (H): ICD-10-CM

## 2021-09-13 DIAGNOSIS — R19.8 ALTERNATING CONSTIPATION AND DIARRHEA: Primary | ICD-10-CM

## 2021-09-13 DIAGNOSIS — K62.89 RECTAL PAIN: ICD-10-CM

## 2021-09-13 DIAGNOSIS — I71.40 ABDOMINAL AORTIC ANEURYSM (AAA) WITHOUT RUPTURE (H): ICD-10-CM

## 2021-09-13 DIAGNOSIS — Z86.718 PERSONAL HISTORY OF DVT (DEEP VEIN THROMBOSIS): ICD-10-CM

## 2021-09-13 DIAGNOSIS — M25.551 HIP PAIN, RIGHT: ICD-10-CM

## 2021-09-13 DIAGNOSIS — I10 HYPERTENSION GOAL BP (BLOOD PRESSURE) < 140/90: ICD-10-CM

## 2021-09-13 DIAGNOSIS — R15.2 FECAL URGENCY: ICD-10-CM

## 2021-09-13 DIAGNOSIS — K64.9 HEMORRHOIDS, UNSPECIFIED HEMORRHOID TYPE: ICD-10-CM

## 2021-09-13 PROCEDURE — 80048 BASIC METABOLIC PNL TOTAL CA: CPT | Performed by: FAMILY MEDICINE

## 2021-09-13 PROCEDURE — 36415 COLL VENOUS BLD VENIPUNCTURE: CPT | Performed by: FAMILY MEDICINE

## 2021-09-13 PROCEDURE — 99215 OFFICE O/P EST HI 40 MIN: CPT | Performed by: FAMILY MEDICINE

## 2021-09-13 PROCEDURE — 82043 UR ALBUMIN QUANTITATIVE: CPT | Performed by: FAMILY MEDICINE

## 2021-09-13 RX ORDER — LISINOPRIL 20 MG/1
20 TABLET ORAL DAILY
Qty: 90 TABLET | Refills: 0 | Status: SHIPPED | OUTPATIENT
Start: 2021-09-13 | End: 2021-12-06

## 2021-09-13 RX ORDER — AMLODIPINE BESYLATE 5 MG/1
5 TABLET ORAL DAILY
Qty: 90 TABLET | Refills: 0 | Status: CANCELLED | OUTPATIENT
Start: 2021-09-13

## 2021-09-13 ASSESSMENT — PATIENT HEALTH QUESTIONNAIRE - PHQ9: SUM OF ALL RESPONSES TO PHQ QUESTIONS 1-9: 2

## 2021-09-13 NOTE — PATIENT INSTRUCTIONS
1) Increase your lisinopril to 20mg daily (I sent a new prescription for the 20mg pill)  2) Schedule with the colorectal doctor.   3) Consider scheduling with physical therapy for your hip.   4) Follow up in clinic for a blood pressure check in about 2 weeks.

## 2021-09-13 NOTE — PROGRESS NOTES
Assessment & Plan        Hip pain, right  Seen in ED. Intermittent hip pain. Evidence of degenerative changes on hip x-rays. She is interested in PT. Referral given   - [facility]      Alternating constipation and diarrhea  Fecal urgency  Change in stool caliber  Rectal pain  Hemorrhoids, unspecified hemorrhoid type  Recommended scheduling follow up with colorectal  She received a call from scheduling on 9/2/2021, but declined to schedule stating she would like to discuss during this visit. I encouraged her to schedule follow up.    Note history of possible intermittent cecal versus sigmoid volvulus with recommendation that pt follow up with gen surg 2 weeks after her hospitalization, but she did not do so    Difficult to assess abdominal rectal symptoms on the phone in someone never seen before. Limits ability to make recommendations. Noted has had this ongoing past 1 yr now. was advised a colonoscopy but hesitant to get. Advised to restart metamucil daily. See colorectal again, would benefit from a colonoscopy. If that is normal would benefit maybe form pelvic dysfunction therapy given prior prolapse and surgery. Also advised she make an inperson apt with PCP Dr Dickerson to get labs and see if ct abdomen needed. Diverticulitis and SBO currently not suspected but has had cecal volvulus suspected in the past. Currently does not appear to be in distress.  Advised If having fever, abdominal pain, blood in stools or getting worse in any way to go to the ER  - Colorectal Surgery Referral; Future     HTN   Uncontrolled. Recent readings over the past year have all been above goal. Will increase lisinopril to 20mg daily.   Will need to follow-up 2 to 3 weeks afterward for blood pressure check. Had dizziness, lightheadedness and constipation on hydrochlorothiazide in the past.       Abdominal aortic aneurysm (AAA) without rupture (H)  Due for follow up US next year. Per visit notes with me on 7/22/2020:    Stable infrarenal aortic aneurysm on CT 9/10/19 measuring 3.8cm x 3.5cm. Recommend surveillance by ultrasound in 3 years.         Stage 3 chronic kidney disease, unspecified whether stage 3a or 3b CKD  One solitary kidney, hx of HTN also likely contributing. Avoid NSAIDs, stay hydrated. BMP and urine albumin today      histoyr of Right leg DVT  -- unprovoked distal RLE DVT 4/30/2020.  Seen by hematology 7/2020 with recommenation to discontinue warfarin.  Per Hematology:  Will discontinue anticoagulation.  Anticoagulation clinic notified.  While this was an unprovoked clot, it was small and distal and thus recommendations support three months of anticoagulation over longer term anticoagulation. Lara is also very opposed to long term anticoagulation and understands risks of recurrent clotting events.  I support this decision as anticoagulation also comes with significant risk for her.  I did recommended she have repeat ultrasound to confirm resolution of clot but she kindly refused.    2. Discussed risk factors for deep vein thrombosis and stressed importance of moving around house and walking as much as possible.  She will also try to remain hydrated.    3. She will continue her ASA,  lisinopril and have her blood pressure monitored.      She declines tdap today. Declines COVID vaccine.        49 minutes spent on the date of the encounter doing chart review, history and exam, documentation and further activities per the note        Patient Instructions   1) Increase your lisinopril to 20mg daily (I sent a new prescription for the 20mg pill)  2) Schedule with the colorectal doctor.   3) Consider scheduling with physical therapy for your hip.   4) Follow up in clinic for a blood pressure check in about 2 weeks.       Return in about 2 weeks (around 9/27/2021) for Follow up.    Yecenia Dickerson MD   [facility]    Ti Bermudez is a 86 year old who presents for the following [facility]  "issues      HPI     ED/UC Followup:  Facility:  [facility] Emergency Department  Date of visit: 8/31/2021  Reason for visit:     Back Pain       per pt in June she fell \" I was watering my garden was going up the deck but missed the step. My body twisted and then I landed on my back.\" Pt denies any LH,dizziness prior the fall, denies LOC after she fell. Pt takes daily \"baby aspirin\"   Current Status: slightly better but would like to talk about arthritis      Worst issue is her bowels. Goes from constipation to loose stools. Sunday night took laxative and then the next night and then will be able to go, but sometimes goes straight to diarrhea. Lots of loud bowel sounds. Will get gut aches. tums can help too. Feels like she gets a muscle spasm in her bowels, like someone is pumping air into her abdomen.     Was having pain in her hip and leg, groin, thigh. Did take x-rays and she knows she has arthritis, was afraid she had broken her hip. Comes every now and then. Not a steady ache.     Has not been checking home blood pressure. Notes that the BP is painful and wonders if that elevates her blood pressure. It did hurt this time.     Review of Systems          Objective    BP (!) 146/68 (BP Location: Left arm, Patient Position: Sitting, Cuff Size: Adult Regular)   Pulse 81   Temp 98  F (36.7  C) (Oral)   Wt 77.6 kg (171 lb 1.3 oz)   SpO2 98%   BMI 32.33 kg/m    Body mass index is 32.33 kg/m .  Physical Exam   GENERAL:  alert and no distress                "

## 2021-09-14 LAB
CREAT UR-MCNC: 152 MG/DL
MICROALBUMIN UR-MCNC: 13 MG/L
MICROALBUMIN/CREAT UR: 8.55 MG/G CR (ref 0–25)

## 2021-09-15 ENCOUNTER — TELEPHONE (OUTPATIENT)
Dept: SURGERY | Facility: CLINIC | Age: 86
End: 2021-09-15

## 2021-09-15 LAB
ANION GAP SERPL CALCULATED.3IONS-SCNC: 3 MMOL/L (ref 3–14)
BUN SERPL-MCNC: 27 MG/DL (ref 7–30)
CALCIUM SERPL-MCNC: 9 MG/DL (ref 8.5–10.1)
CHLORIDE BLD-SCNC: 109 MMOL/L (ref 94–109)
CO2 SERPL-SCNC: 29 MMOL/L (ref 20–32)
CREAT SERPL-MCNC: 1.4 MG/DL (ref 0.52–1.04)
GFR SERPL CREATININE-BSD FRML MDRD: 34 ML/MIN/1.73M2
GLUCOSE BLD-MCNC: 87 MG/DL (ref 70–99)
POTASSIUM BLD-SCNC: 4.8 MMOL/L (ref 3.4–5.3)
SODIUM SERPL-SCNC: 141 MMOL/L (ref 133–144)

## 2021-09-15 NOTE — TELEPHONE ENCOUNTER
Appointment Information / Please Schedule This Appointment For:     With: Leda De León NP     Referring Provider: Yecenia Dickerson MD     For / Appt Notes: fecal urgency/ rectal pain     Appt Type: UMP New     Appt Date/Time: next available     Called pt to schedule ^ left CC number

## 2021-09-28 ENCOUNTER — TELEPHONE (OUTPATIENT)
Dept: FAMILY MEDICINE | Facility: CLINIC | Age: 86
End: 2021-09-28

## 2021-09-28 DIAGNOSIS — N18.32 STAGE 3B CHRONIC KIDNEY DISEASE (H): Primary | ICD-10-CM

## 2021-09-28 NOTE — TELEPHONE ENCOUNTER
Writer called patient and reviewed message per Dr. Dickerson, along with Nephrology scheduling number.    Patient verbalized understanding and in agreement with plan.    Follow up visit scheduled with Dr. Dickerson on 10/13/21.  Appt date, time and location confirmed with patient.    CRISTIAN GilmanN, RN  [facility]

## 2021-09-28 NOTE — TELEPHONE ENCOUNTER
RN-please call Lara with her results.  Her kidney function continues to decline.  She has a history of kidney donation and CKD 3B.  I recommend a visit with nephrology--please give scheduling information.  We need to do our best to preserve the function of her remaining kidney.  In addition, I recommend that she schedule a visit with me for follow-up of her blood pressure.  We recently adjusted her lisinopril.  She will be due for repeat metabolic panel at that time as well.  She does take aspirin with her history of DVT.  Make sure she is avoiding any other NSAID medications..    Yecenia Dickerson M.D.     Last Comprehensive Metabolic Panel:  Sodium   Date Value Ref Range Status   09/13/2021 141 133 - 144 mmol/L Final   08/24/2020 143 133 - 144 mmol/L Final     Potassium   Date Value Ref Range Status   09/13/2021 4.8 3.4 - 5.3 mmol/L Final   08/24/2020 4.2 3.4 - 5.3 mmol/L Final     Chloride   Date Value Ref Range Status   09/13/2021 109 94 - 109 mmol/L Final   08/24/2020 113 (H) 94 - 109 mmol/L Final     Carbon Dioxide   Date Value Ref Range Status   08/24/2020 26 20 - 32 mmol/L Final     Carbon Dioxide (CO2)   Date Value Ref Range Status   09/13/2021 29 20 - 32 mmol/L Final     Anion Gap   Date Value Ref Range Status   09/13/2021 3 3 - 14 mmol/L Final   08/24/2020 4 3 - 14 mmol/L Final     Glucose   Date Value Ref Range Status   09/13/2021 87 70 - 99 mg/dL Final   08/24/2020 106 (H) 70 - 99 mg/dL Final     Urea Nitrogen   Date Value Ref Range Status   09/13/2021 27 7 - 30 mg/dL Final   08/24/2020 23 7 - 30 mg/dL Final     Creatinine   Date Value Ref Range Status   09/13/2021 1.40 (H) 0.52 - 1.04 mg/dL Final   08/24/2020 1.26 (H) 0.52 - 1.04 mg/dL Final     GFR Estimate   Date Value Ref Range Status   09/13/2021 34 (L) >60 mL/min/1.73m2 Final     Comment:     As of July 11, 2021, eGFR is calculated by the CKD-EPI creatinine equation, without race adjustment. eGFR can be influenced by muscle mass, exercise, and  diet. The reported eGFR is an estimation only and is only applicable if the renal function is stable.   08/24/2020 39 (L) >60 mL/min/[1.73_m2] Final     Comment:     Non  GFR Calc  Starting 12/18/2018, serum creatinine based estimated GFR (eGFR) will be   calculated using the Chronic Kidney Disease Epidemiology Collaboration   (CKD-EPI) equation.       Calcium   Date Value Ref Range Status   09/13/2021 9.0 8.5 - 10.1 mg/dL Final   08/24/2020 8.9 8.5 - 10.1 mg/dL Final

## 2021-10-10 DIAGNOSIS — I10 HYPERTENSION GOAL BP (BLOOD PRESSURE) < 140/90: ICD-10-CM

## 2021-10-12 NOTE — TELEPHONE ENCOUNTER
RECORDS RECEIVED FROM: Alternating constipation and diarrhea, fecal urgency, change in stool caliber, rectal pain   Appt date: 12/6/2021   NOTES STATUS DETAILS   OFFICE NOTE from referring provider  Internal [facility]:  9/13/21, 4/22/21 - PCC OV with Dr. Dickerson   OFFICE NOTE from other specialist   Internal / Received [facility]:  8/30/21 - PCC VV with Dr. Nunez    [facility]:  5/10/21 - CR OV with SANTO Keenan   DISCHARGE SUMMARY from hospital  Internal [facility]:  9/9/19 - Admission with Dr. Yo   DISCHARGE REPORT from the ER Internal [facility]:  8/24/20 - ED OV with Dr. Berg   OPERATIVE REPORT  Internal [facility]:  2/8/13 - OP Note for SACROCOLPOPEXY, TVT EXACT SLING, SALPINGO-OOPHERECTOMY, CYSTOSCOPY with Dr. Galdamez  3/31/11 - OP Note for CHOLECYSTECTOMY, LAPAROSCOPIC with Dr. Vazquez   MEDICATION LIST Internal    LABS     PFC TESTING N/A    ANAL PAP N/A    BIOPSIES/PATHOLOGY RELATED TO DIAGNOSIS N/A    DIAGNOSTIC PROCEDURES     COLONOSCOPY N/A    UPPER ENDOSCOPY (EGD) N/A    FLEX SIGMOIDOSCOPY  N/A    ERCP N/A    IMAGING (DISC & REPORT)      CT  Internal [facility]:  9/10/19 - CT Abd/Pelvis  9/9/19 - CT Abd/Pelvis   MRI N/A    XRAY Internal [facility]:  8/31/21 - XR pelvis/Hip   ULTRASOUND (ENDOANAL/ENDORECTAL) N/A

## 2021-10-13 RX ORDER — LISINOPRIL 10 MG/1
TABLET ORAL
Qty: 30 TABLET | Refills: 0 | Status: SHIPPED | OUTPATIENT
Start: 2021-10-13 | End: 2021-11-15

## 2021-10-13 NOTE — TELEPHONE ENCOUNTER
Dr. Dickerson-please review, sign if agree and may close encounter.    Thank you!  CRISTIAN GilmanN, RN  [facility]      Creatinine   Date Value Ref Range Status   09/13/2021 1.40 (H) 0.52 - 1.04 mg/dL Final   08/24/2020 1.26 (H) 0.52 - 1.04 mg/dL Final       BP Readings from Last 3 Encounters:   09/13/21 (!) 146/68   08/31/21 136/80   09/23/20 132/64

## 2021-10-13 NOTE — TELEPHONE ENCOUNTER
She needs a repeat BMP after change in dose. Please have her return for lab visit. I see that she is scheduled with nephrology on November 8 as well.  Yecenia Dickerosn M.D.

## 2021-10-28 NOTE — TELEPHONE ENCOUNTER
RECORDS RECEIVED FROM: Internal   DATE RECEIVED: 11.08.2021     NOTES STATUS DETAILS   OFFICE NOTE from referring provider Internal 09.28.2021 Yecenia Dickerson MD   OFFICE NOTE from other specialist  Internal 08.30.2021 Shannan Nunez MD     *Only VASCULITIS or LUPUS gather office notes for the following N/A    *PULMONARY   N/A    *ENT N/A    *DERMATOLOGY N/A    *RHEUMATOLOGY N/A    DISCHARGE SUMMARY from hospital N/A    DISCHARGE REPORT from the ER N/A    MEDICATION LIST Internal    IMAGING  (NEED IMAGES AND REPORTS)     KIDNEY CT SCAN N/A    KIDNEY ULTRASOUND N/A    MR ABDOMEN N/A    NUCLEAR MEDICINE RENAL N/A    LABS     CBC Internal 08.24.2020   CMP Internal 08.24.2020   BMP Internal 09.13.2021   UA Internal 08.24.2020   URINE PROTEIN Internal 08.24.2020   RENAL PANEL N/A    BIOPSY     KIDNEY BIOPSY  N/A

## 2021-11-03 DIAGNOSIS — N18.30 STAGE 3 CHRONIC KIDNEY DISEASE, UNSPECIFIED WHETHER STAGE 3A OR 3B CKD (H): Primary | ICD-10-CM

## 2021-11-03 DIAGNOSIS — D63.1 ANEMIA IN CHRONIC KIDNEY DISEASE (CODE): ICD-10-CM

## 2021-11-08 ENCOUNTER — PRE VISIT (OUTPATIENT)
Dept: NEPHROLOGY | Facility: CLINIC | Age: 86
End: 2021-11-08

## 2021-11-11 DIAGNOSIS — I10 HYPERTENSION GOAL BP (BLOOD PRESSURE) < 140/90: ICD-10-CM

## 2021-11-12 NOTE — TELEPHONE ENCOUNTER
Routing refill request to provider for review/approval because:  Patient needs to be seen because:     Blood pressure under 140/90 in past 12 months    Normal serum creatinine on file in past 12 months     BP Readings from Last 3 Encounters:   09/13/21 (!) 146/68   08/31/21 136/80   09/23/20 132/64     Creatinine   Date Value Ref Range Status   09/13/2021 1.40 (H) 0.52 - 1.04 mg/dL Final   08/24/2020 1.26 (H) 0.52 - 1.04 mg/dL Final   Could not find documentation of changed dose from 20 mg  Last visit: 9/13/21  Future visit: none    Nallely Carter RN  [facility]

## 2021-11-15 RX ORDER — LISINOPRIL 10 MG/1
TABLET ORAL
Qty: 30 TABLET | Refills: 0 | Status: SHIPPED | OUTPATIENT
Start: 2021-11-15 | End: 2022-11-15

## 2021-12-03 ENCOUNTER — MYC MEDICAL ADVICE (OUTPATIENT)
Dept: SURGERY | Facility: CLINIC | Age: 86
End: 2021-12-03
Payer: COMMERCIAL

## 2021-12-03 ENCOUNTER — NURSE TRIAGE (OUTPATIENT)
Dept: FAMILY MEDICINE | Facility: CLINIC | Age: 86
End: 2021-12-03
Payer: COMMERCIAL

## 2021-12-03 DIAGNOSIS — I10 HYPERTENSION GOAL BP (BLOOD PRESSURE) < 140/90: ICD-10-CM

## 2021-12-03 NOTE — TELEPHONE ENCOUNTER
I would hold off on OTC imodium use.   Agree with clinic or  viist. She was last seen by Dr. Dickerson 9/13/21.  DM

## 2021-12-03 NOTE — TELEPHONE ENCOUNTER
Patient states she has slowly been seeing a decrease in the diameter and length of her stools.  Will have bouts of diarrhea for a few days and then better for a couple months and then it will return, but she has never had it this bad  Was afraid to go to sleep last night as she is not able to control and was afraid she would have an accident.  Passing pure liquid stool at this time and starting to have signs of dehydration.  Does not have a way to get to the Urgent Care and triage recommended being seen today.  Discussed clear liquids for 12 hours and then the BRAT diet.    Can patient take OTC Immodium or the like for the diarrhea? Writer hesitated to recommend as she has seen a small amount of blood in stool that she feels is from a hemorrhoid that is irritated from the diarrhea stools.  Please advise.  Shanae Noyola RN  [facility]    Diarrhea    Reason for Disposition    MODERATE diarrhea (e.g., 4-6 times / day more than normal) and present > 48 hours (2 days)    Additional Information    Negative: Shock suspected (e.g., cold/pale/clammy skin, too weak to stand, low BP, rapid pulse)    Negative: Difficult to awaken or acting confused (e.g., disoriented, slurred speech)    Negative: Sounds like a life-threatening emergency to the triager    Negative: Vomiting also present and worse than the diarrhea    Negative: Blood in stool and without diarrhea    Negative: SEVERE abdominal pain (e.g., excruciating) and present > 1 hour    Negative: SEVERE abdominal pain and age > 60    Negative: Bloody, black, or tarry bowel movements (Exception: chronic-unchanged black-grey bowel movements and is taking iron pills or Pepto-bismol)    Negative: SEVERE diarrhea (e.g., 7 or more times / day more than normal) and age > 60 years    Negative: Constant abdominal pain lasting > 2 hours    Negative: Drinking very little and has signs of dehydration (e.g., no urine > 12 hours, very dry mouth, very  "lightheaded)    Negative: Patient sounds very sick or weak to the triager    Answer Assessment - Initial Assessment Questions  1. DIARRHEA SEVERITY: \"How bad is the diarrhea?\" \"How many extra stools have you had in the past 24 hours than normal?\"     - NO DIARRHEA (SCALE 0)    - MILD (SCALE 1-3): Few loose or mushy BMs; increase of 1-3 stools over normal daily number of stools; mild increase in ostomy output.    -  MODERATE (SCALE 4-7): Increase of 4-6 stools daily over normal; moderate increase in ostomy output.  * SEVERE (SCALE 8-10; OR 'WORST POSSIBLE'): Increase of 7 or more stools daily over normal; moderate increase in ostomy output; incontinence.      Just passing waater - 4 episodes in the past 24 hours  2. ONSET: \"When did the diarrhea begin?\"       Noticed stools getting smaller and smaller and shorter and shorter.  Having trouble for quite a while this is the worst bout  3. BM CONSISTENCY: \"How loose or watery is the diarrhea?\"       Just water beginning yesterday  4. VOMITING: \"Are you also vomiting?\" If so, ask: \"How many times in the past 24 hours?\"       no  5. ABDOMINAL PAIN: \"Are you having any abdominal pain?\" If yes: \"What does it feel like?\" (e.g., crampy, dull, intermittent, constant)       Little, for past year her stomach gurgles so loud.  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 ABDOMINAL PAIN SEVERITY: If present, ask: \"How bad is the pain?\"  (e.g., Scale 1-10; mild, moderate, or severe)    - MILD (1-3): doesn't interfere with normal activities, abdomen soft and not tender to touch     - MODERATE (4-7): interferes with normal activities or awakens from sleep, tender to touch     - SEVERE (8-10): excruciating pain, doubled over, unable to do any normal activities        mild  7. ORAL INTAKE: If vomiting, \"Have you been able to drink liquids?\" \"How much fluids have you had in the past 24 hours?\"      Has been able to keep up with fluids and has been eating  8. HYDRATION: \"Any " "signs of dehydration?\" (e.g., dry mouth [not just dry lips], too weak to stand, dizziness, new weight loss) \"When did you last urinate?\"      Dry mouth, weak with standing and feels lightheaded.  9. EXPOSURE: \"Have you traveled to a foreign country recently?\" \"Have you been exposed to anyone with diarrhea?\" \"Could you have eaten any food that was spoiled?\"      No, no, possibly  10. ANTIBIOTIC USE: \"Are you taking antibiotics now or have you taken antibiotics in the past 2 months?\"        no  11. OTHER SYMPTOMS: \"Do you have any other symptoms?\" (e.g., fever, blood in stool)        Tiny bit of blood that she feels is from her hemorrhoid  12. PREGNANCY: \"Is there any chance you are pregnant?\" \"When was your last menstrual period?\"        no    Protocols used: DIARRHEA-A-OH      "

## 2021-12-03 NOTE — TELEPHONE ENCOUNTER
Patient informed as below.  Not sure if she can make it to Urgent Care today.  If not better by tomorrow will go.  Patient knows to call the clinic if symptoms are getting worse or if she develops a fever or sees increase of blood in her stool.  Shanae Noyola RN  [facility]

## 2021-12-03 NOTE — TELEPHONE ENCOUNTER
Routing refill request to provider for review/approval because:  Labs not current:     Blood pressure under 140/90 in past 12 months    Normal serum creatinine on file in past 12 months     BP Readings from Last 3 Encounters:   09/13/21 (!) 146/68   08/31/21 136/80   09/23/20 132/64     Creatinine   Date Value Ref Range Status   09/13/2021 1.40 (H) 0.52 - 1.04 mg/dL Final   08/24/2020 1.26 (H) 0.52 - 1.04 mg/dL Final     90 days of 20 mg given 9/13/21  30 days of 10 mg added on 11/15/21    Unclear if this was a reduction or increase in dose.    Nallely Carter RN  [facility]

## 2021-12-06 ENCOUNTER — PRE VISIT (OUTPATIENT)
Dept: SURGERY | Facility: CLINIC | Age: 86
End: 2021-12-06

## 2021-12-06 RX ORDER — LISINOPRIL 20 MG/1
TABLET ORAL
Qty: 90 TABLET | Refills: 0 | Status: SHIPPED | OUTPATIENT
Start: 2021-12-06 | End: 2022-03-02

## 2022-01-12 ENCOUNTER — OFFICE VISIT (OUTPATIENT)
Dept: OTHER | Facility: CLINIC | Age: 87
End: 2022-01-12
Payer: COMMERCIAL

## 2022-01-12 VITALS
BODY MASS INDEX: 32.66 KG/M2 | SYSTOLIC BLOOD PRESSURE: 164 MMHG | DIASTOLIC BLOOD PRESSURE: 86 MMHG | TEMPERATURE: 98.2 F | HEIGHT: 61 IN | WEIGHT: 173 LBS | HEART RATE: 82 BPM | RESPIRATION RATE: 18 BRPM | OXYGEN SATURATION: 97 %

## 2022-01-12 DIAGNOSIS — Z00.00 ENCOUNTER FOR MEDICARE ANNUAL WELLNESS EXAM: Primary | ICD-10-CM

## 2022-01-12 PROCEDURE — G0438 PPPS, INITIAL VISIT: HCPCS | Performed by: FAMILY MEDICINE

## 2022-01-12 ASSESSMENT — ACTIVITIES OF DAILY LIVING (ADL): CURRENT_FUNCTION: NO ASSISTANCE NEEDED

## 2022-01-12 ASSESSMENT — PAIN SCALES - GENERAL: PAINLEVEL: NO PAIN (0)

## 2022-01-12 ASSESSMENT — MIFFLIN-ST. JEOR: SCORE: 1162.1

## 2022-01-12 NOTE — PROGRESS NOTES
"Assessment & Plan   No diagnosis found.    Follow Up/Next Steps    No follow-ups on file.  Recommended that patient follow up with PCP to address the following : BP reading is 164/86. Patient is on BP medication. Per patient saw her PCP two months ago. Patient overdue Pneumococcal, Zoster, DTAP/TDAP/TD, Influenza and Covid -19 vaccines. Patient declined all vaccines and per patient never had it all. Overdue for lipid and Hemoglobin test, patient planned to make appts. to have these tests.    Counseling and Education  Reviewed preventive health counseling, as reflected in patient instructions      BMI:   Estimated body mass index is 32.69 kg/m  as calculated from the following:    Height as of this encounter: 1.549 m (5' 1\").    Weight as of this encounter: 78.5 kg (173 lb).   Weight management plan: Discussed healthy diet and exercise guidelines      Appropriate preventive services were discussed with this patient, including applicable screening as appropriate for cardiovascular disease, diabetes, osteopenia/osteoporosis, and glaucoma.  As appropriate for age/gender, discussed screening for colorectal cancer, prostate cancer, breast cancer, and cervical cancer. Checklist reviewing preventive services available has been given to the patient.    Reviewed patients plan of care and provided an AVS. The Basic Care Plan (routine screening as documented in Health Maintenance) for Lara meets the Care Plan requirement. This Care Plan has been established and reviewed with the Patient.    Visit Provider: Antonio Trevizo RN  Supervising Provider: Pricila Romo MD, MD  [facility]       Subjective   Lara is a 86 year old who is being seen for an Annual Wellness Visit  accompanied by her none.    Healthy Habits:     In general, how would you rate your overall health?  Good    Frequency of exercise:  None    Do you usually eat at least 4 servings of fruit and vegetables a day, " "include whole grains    & fiber and avoid regularly eating high fat or \"junk\" foods?  Yes    Taking medications regularly:  Yes    Medication side effects:  None    Ability to successfully perform activities of daily living:  No assistance needed    Home Safety:  Lack of grab bars in the bathroom    Hearing Impairment:  Difficulty following a conversation in a noisy restaurant or crowded room, feel that people are mumbling or not speaking clearly, difficulty following dialogue in the theater, difficult to understand a speaker at a public meeting or Nondenominational service, need to ask people to speak up or repeat themselves, find that men's voices are easier to understand than woman's and difficulty understanding soft or whispered speech    In the past 6 months, have you been bothered by leaking of urine?  No    In general, how would you rate your overall mental or emotional health?  Good      PHQ-2 Total Score: 0    Additional concerns today:  No    Do you feel safe in your environment? Yes    Have you ever done Advance Care Planning? (For example, a Health Directive, POLST, or a discussion with a medical provider or your loved ones about your wishes): No, advance care planning information given to patient to review.  Advanced care planning was discussed at today's visit.    Fall risk  Fallen 2 or more times in the past year?: Yes  Any fall with injury in the past year?: No  Timed Up and Go Test (>13.5 is fall risk; contact physician) : 3  Cognitive Screening   1) Repeat 3 items (Leader, Season, Table)    2) Clock draw: NORMAL  3) 3 item recall: Recalls 3 objects  Results: NORMAL clock, 1-2 items recalled: COGNITIVE IMPAIRMENT LESS LIKELY    Mini-CogTM Copyright SUZANNA Rea. Licensed by the author for use in [facility]; reprinted with permission (demian@.[facility]). All rights reserved.      Do you have sleep apnea, excessive snoring or daytime drowsiness?: yes    Reviewed and updated as needed this visit by " "clinical staff   Allergies  Meds  Problems    Fam Hx         Social History     Tobacco Use     Smoking status: Former Smoker     Packs/day: 0.00     Years: 17.00     Pack years: 0.00     Smokeless tobacco: Never Used     Tobacco comment: quit when she was 29 years old   Substance Use Topics     Alcohol use: No       Current providers sharing in care for this patient include:   Patient Care Team:  Yecenia Dickerson MD as PCP - General (Family Practice)  Payal Rice PA as Assigned Cancer Care Provider  Yecenia Dickerson MD as Assigned PCP  Leda De León APRN CNP as Nurse Practitioner (Colon & Rectal)    The following health maintenance items are reviewed in Epic and correct as of today:  Health Maintenance Due   Topic Date Due     ANNUAL REVIEW OF  ORDERS  Never done     COVID-19 Vaccine (1) Never done     DTAP/TDAP/TD IMMUNIZATION (1 - Tdap) Never done     ZOSTER IMMUNIZATION (1 of 2) Never done     MEDICARE ANNUAL WELLNESS VISIT  Never done     Pneumococcal Vaccine: 65+ Years (1 of 1 - PPSV23) Never done     ADVANCE CARE PLANNING  10/15/2018     LIPID  05/10/2019     FALL RISK ASSESSMENT  07/17/2021     INFLUENZA VACCINE (1) Never done     HEMOGLOBIN  08/24/2021       Patient stated had 30years ago. Patient declined to have a test because thinks the procedure is very painful and afraid not to get damage from the procedure.  Pertinent mammograms are reviewed under the imaging tab.        Objective    BP (!) 164/86 (BP Location: Left arm, Patient Position: Sitting, Cuff Size: Adult Regular)   Pulse 82   Temp 98.2  F (36.8  C) (Temporal)   Resp 18   Ht 1.549 m (5' 1\")   Wt 78.5 kg (173 lb)   SpO2 97%   BMI 32.69 kg/m   Estimated body mass index is 32.69 kg/m  as calculated from the following:    Height as of this encounter: 1.549 m (5' 1\").    Weight as of this encounter: 78.5 kg (173 lb).  Physical Exam  Patient appears comfortable and in no acute distress.        Identified Health " Risks:    She is at risk for falling and has been provided with information to reduce the risk of falling at home.

## 2022-01-12 NOTE — PATIENT INSTRUCTIONS
Patient Education   Personalized Prevention Plan  You are due for the preventive services outlined below.  Your care team is available to assist you in scheduling these services.  If you have already completed any of these items, please share that information with your care team to update in your medical record.  Health Maintenance Due   Topic Date Due     ANNUAL REVIEW OF HM ORDERS  Never done     COVID-19 Vaccine (1) Never done     Diptheria Tetanus Pertussis (DTAP/TDAP/TD) Vaccine (1 - Tdap) Never done     Zoster (Shingles) Vaccine (1 of 2) Never done     Pneumococcal Vaccine (1 of 1 - PPSV23) Never done     Discuss Advance Care Planning  10/15/2018     Cholesterol Lab  05/10/2019     FALL RISK ASSESSMENT  07/17/2021     Flu Vaccine (1) Never done     Hemoglobin  08/24/2021       Preventing Falls at Home  A person can fall for many reasons. Older adults may fall because reaction time slows down as we age. Your muscles and joints may get stiff, weak, or less flexible because of illness, medicines, or a physical condition.   Other health problems that make falls more likely include:     Arthritis    Dizziness or lightheadedness when you stand up (orthostatic hypotension)    History of a stroke    Dizziness    Anemia    Certain medicines taken for mental illness or to control blood pressure.    Problems with balance or gait    Bladder or urinary problems    History of falling    Changes in vision (vision impairment)    Changes in thinking skills and memory (cognitive impairment)  Injuries from a fall can include serious injuries such as broken bones, dislocated joints, internal bleeding and cuts. Injuries like these can limit your independence.   Prevention tips  To help prevent falls and fall-related injuries, follow the tips below.    Floors  To make floors safer:     Put nonskid pads under area rugs.    Remove small rugs.    Replace worn floor coverings.    Tack carpets firmly to each step on carpeted stairs.  Put nonskid strips on the edges of uncarpeted stairs.    Keep floors and stairs free of clutter and cords.    Arrange furniture so there are clear pathways.    Clean up any spills right away.  Bathrooms    To make bathrooms safer:     Install grab bars in the tub or shower.    Apply nonskid strips or put a nonskid rubber mat in the tub or shower.    Sit on a bath chair to bathe.    Use bathmats with nonskid backing.  Lighting  To improve visibility in your home:      Keep a flashlight in each room. Or put a lamp next to the bed within easy reach.    Put nightlights in the bedrooms, hallways, kitchen, and bathrooms.    Make sure all stairways have good lighting.    Take your time when going up and down stairs.    Put handrails on both sides of stairs and in walkways for more support. To prevent injury to your wrist or arm, don t use handrails to pull yourself up.    Install grab bars to pull yourself up.    Move or rearrange items that you use often. This will make them easier to find or reach.    Look at your home to find any safety hazards. Especially look at doorways, walkways, and the driveway. Remove or repair any safety problems that you find.  Other changes to make    Look around to find any safety hazards. Look closely at doorways, walkways, and the driveway. Remove or repair any safety problems that you find.    Wear shoes that fit well.    Take your time when going up and down stairs.    Put handrails on both sides of stairs and in walkways for more support. To prevent injury to your wrist or arm, don t use handrails to pull yourself up.    Install grab bars wherever needed to pull yourself up.    Arrange items that you use often. This will make them easier to find or reach.    [employer] last reviewed this educational content on 3/1/2020    4045-5787 [employer]. All rights reserved. This information is not intended as a substitute for professional medical care. Always follow your healthcare  professional's instructions.

## 2022-02-18 ENCOUNTER — TELEPHONE (OUTPATIENT)
Dept: FAMILY MEDICINE | Facility: CLINIC | Age: 87
End: 2022-02-18
Payer: COMMERCIAL

## 2022-02-18 ENCOUNTER — NURSE TRIAGE (OUTPATIENT)
Dept: NURSING | Facility: CLINIC | Age: 87
End: 2022-02-18
Payer: COMMERCIAL

## 2022-02-18 ENCOUNTER — VIRTUAL VISIT (OUTPATIENT)
Dept: FAMILY MEDICINE | Facility: CLINIC | Age: 87
End: 2022-02-18
Payer: COMMERCIAL

## 2022-02-18 DIAGNOSIS — R19.7 DIARRHEA, UNSPECIFIED TYPE: Primary | ICD-10-CM

## 2022-02-18 DIAGNOSIS — I71.40 ABDOMINAL AORTIC ANEURYSM (AAA) WITHOUT RUPTURE (H): ICD-10-CM

## 2022-02-18 DIAGNOSIS — N18.32 STAGE 3B CHRONIC KIDNEY DISEASE (H): ICD-10-CM

## 2022-02-18 DIAGNOSIS — D63.1 ANEMIA IN CHRONIC KIDNEY DISEASE (CODE): ICD-10-CM

## 2022-02-18 PROCEDURE — 99213 OFFICE O/P EST LOW 20 MIN: CPT | Mod: 95 | Performed by: NURSE PRACTITIONER

## 2022-02-18 ASSESSMENT — ENCOUNTER SYMPTOMS
WEAKNESS: 1
DIARRHEA: 1
FATIGUE: 1
ABDOMINAL PAIN: 1

## 2022-02-18 NOTE — TELEPHONE ENCOUNTER
Patient called asking for an appointment. She was triaged by FNA and directed to come into Urgent Care. Patient is worried about coming in physically due to bad diarrhea and possible accidents.  Scheduled patient for a virtual visit this afternoon.    Mya Christiansen RN  [facility]

## 2022-02-18 NOTE — TELEPHONE ENCOUNTER
"Diarrhea off and on for the last year.  Said she can control it with dietary changes eg brat diet.  \"My tummy growls all the time\".  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  Afebrile.    Bad diarrhea today.  Stated she cannot come in because of this.  I recommended she be seen today per the protocol.  UC if no appointments available.  We discussed taking imodium, wearing a pad, bringing a change of clothes with her.  Caller stated understanding and agreement.  All questions answered.  Transferred to scheduling.    Reason for Disposition    SEVERE diarrhea (e.g., 7 or more times / day more than normal) and present > 24 hours (1 day)    Additional Information    Negative: Shock suspected (e.g., cold/pale/clammy skin, too weak to stand, low BP, rapid pulse)    Negative: Difficult to awaken or acting confused (e.g., disoriented, slurred speech)    Negative: Sounds like a life-threatening emergency to the triager    Negative: SEVERE abdominal pain (e.g., excruciating) and present > 1 hour    Negative: SEVERE abdominal pain and age > 60    Negative: Bloody, black, or tarry bowel movements (Exception: chronic-unchanged black-grey bowel movements and is taking iron pills or Pepto-bismol)    Negative: SEVERE diarrhea (e.g., 7 or more times / day more than normal) and age > 60 years    Negative: Constant abdominal pain lasting > 2 hours    Negative: Drinking very little and has signs of dehydration (e.g., no urine > 12 hours, very dry mouth, very lightheaded)    Negative: Patient sounds very sick or weak to the triager    Protocols used: DIARRHEA-A-OH    Gladis OJEDA RN [facility]     "

## 2022-02-18 NOTE — PATIENT INSTRUCTIONS
Patient Education     Treating Diarrhea  Diarrhea happens when you have loose, watery, or frequent bowel movements. It is a common problem with many causes. Most cases of diarrhea clear up on their own. But certain cases may need treatment. Be sure to see your healthcare provider if your symptoms don't get better in a few days.   Getting relief  Treatment of diarrhea depends on its cause. Diarrhea caused by bacterial or parasite infection is often treated with antibiotics. Diarrhea caused by other factors, such as a stomach virus, often improves with simple home treatment. The tips below may also help ease your symptoms.       Drink plenty of fluids. This helps prevent too much fluid loss (dehydration). Water, clear soups, and electrolyte solutions are good choices. Don't take alcohol, coffee, tea, or milk. These can irritate your intestines and make symptoms worse.    Suck on ice chips if drinking makes you queasy.    Return to your normal diet slowly. You may want to eat bland foods at first, such as rice and toast. Also, you may need to stay away from certain foods for a while, such as dairy products. These can make symptoms worse. Ask your healthcare provider if there are any other foods you should stay away from.    If you were prescribed antibiotics, take them as directed.    Don't take anti-diarrhea medicines without asking your provider first.  Call your healthcare provider   Call your healthcare provider if you have any of the following:      A fever of 100.4  F ( 38.0 C) or higher, or as directed by your provider    Chills    Severe pain    Worsening diarrhea or diarrhea for more than 2 days    Bloody vomit or stool    Signs of dehydration (dizziness, dry mouth and tongue, rapid pulse, dark urine)  [employer] last reviewed this educational content on 6/1/2019 2000-2021 [employer]. All rights reserved. This information is not intended as a substitute for professional medical care. Always  follow your healthcare professional's instructions.

## 2022-02-18 NOTE — PROGRESS NOTES
Lara is a 86 year old who is being evaluated via a billable telephone visit.      What phone number would you like to be contacted at? [phone]      How would you like to obtain your AVS? Mail a copy    Assessment & Plan     Diarrhea, unspecified type  Ongoing issue that comes and goes.  She is previously referred to GI and did not follow-up.  Agreeable to referral today.  Advised her to continue using Imodium and bland/BRAT diet.  She should also stay hydrated and I suggested electrolyte sports drinks.  - Adult Gastro Ref - Consult Only; Future      Anemia in chronic kidney disease (CODE)  Stable, reviewed labs    Abdominal aortic aneurysm (AAA) without rupture (H)  Stable, reviewed imaging.    Stage 3b chronic kidney disease (H)  Hydration encouraged.          Return for Follow up with GI, return to clinic if symptoms worsen or persist..    BRIANNA Doyle CNP  [facility]    Ti Bermudez is a 86 year old who presents for the following health issues     Diarrhea  This is a recurrent problem. The current episode started in the past 7 days. The problem occurs every several days. The problem has been rapidly worsening. Associated symptoms include abdominal pain, fatigue and weakness. Nothing aggravates the symptoms. She has tried rest for the symptoms. The treatment provided no relief.        Ongoing for a long time off and on.  Gets better, then has another episode.  This episode lasting for 3 days.  Watery diarrhea, no blood.  Took 2 Imodium once.  Has not had GI work up.  Was going to see GI but symptoms got better.  Has had some apple sauce, feeling weak.  Small amount of cramping in stomach from going frequently, but overall no pain.  No fevers, no vomiting.         Review of Systems   Constitutional: Positive for fatigue.   Gastrointestinal: Positive for abdominal pain and diarrhea.   Neurological: Positive for weakness.      Constitutional, HEENT, cardiovascular,  "pulmonary, gi and gu systems are negative, except as otherwise noted.      Objective    Vitals - Patient Reported  Weight (Patient Reported): 78 kg (172 lb)  Height (Patient Reported): 154.9 cm (5' 1\")  BMI (Based on Pt Reported Ht/Wt): 32.5      Vitals:  No vitals were obtained today due to virtual visit.    Physical Exam   healthy, alert and no distress  PSYCH: Alert and oriented times 3; coherent speech, normal   rate and volume, able to articulate logical thoughts, able   to abstract reason, no tangential thoughts, no hallucinations   or delusions  Her affect is normal  RESP: No cough, no audible wheezing, able to talk in full sentences  Remainder of exam unable to be completed due to telephone visits                Phone call duration: 11 minutes  "

## 2022-03-01 DIAGNOSIS — I10 HYPERTENSION GOAL BP (BLOOD PRESSURE) < 140/90: ICD-10-CM

## 2022-03-02 RX ORDER — LISINOPRIL 20 MG/1
TABLET ORAL
Qty: 90 TABLET | Refills: 0 | Status: SHIPPED | OUTPATIENT
Start: 2022-03-02 | End: 2022-06-06

## 2022-03-02 NOTE — TELEPHONE ENCOUNTER
Routing refill request to provider for review/approval because:   Blood pressure under 140/90 in past 12 months    Normal serum creatinine on file in past 12 months     BP Readings from Last 3 Encounters:   01/12/22 (!) 164/86   09/13/21 (!) 146/68   08/31/21 136/80     Creatinine   Date Value Ref Range Status   09/13/2021 1.40 (H) 0.52 - 1.04 mg/dL Final   08/24/2020 1.26 (H) 0.52 - 1.04 mg/dL Final       Mya Christiansen RN  [facility]

## 2022-06-23 ENCOUNTER — NURSE TRIAGE (OUTPATIENT)
Dept: NURSING | Facility: CLINIC | Age: 87
End: 2022-06-23

## 2022-06-23 ENCOUNTER — OFFICE VISIT (OUTPATIENT)
Dept: URGENT CARE | Facility: URGENT CARE | Age: 87
End: 2022-06-23
Payer: COMMERCIAL

## 2022-06-23 VITALS
SYSTOLIC BLOOD PRESSURE: 150 MMHG | WEIGHT: 172 LBS | DIASTOLIC BLOOD PRESSURE: 74 MMHG | BODY MASS INDEX: 32.47 KG/M2 | RESPIRATION RATE: 18 BRPM | HEIGHT: 61 IN | TEMPERATURE: 97.9 F | OXYGEN SATURATION: 96 % | HEART RATE: 84 BPM

## 2022-06-23 DIAGNOSIS — M10.9 ACUTE GOUT INVOLVING TOE OF LEFT FOOT, UNSPECIFIED CAUSE: Primary | ICD-10-CM

## 2022-06-23 PROCEDURE — 99214 OFFICE O/P EST MOD 30 MIN: CPT | Performed by: PHYSICIAN ASSISTANT

## 2022-06-23 RX ORDER — PREDNISONE 20 MG/1
40 TABLET ORAL DAILY
Qty: 10 TABLET | Refills: 0 | Status: SHIPPED | OUTPATIENT
Start: 2022-06-23 | End: 2022-06-28

## 2022-06-23 RX ORDER — HYDROCODONE BITARTRATE AND ACETAMINOPHEN 5; 325 MG/1; MG/1
1 TABLET ORAL EVERY 6 HOURS PRN
Qty: 18 TABLET | Refills: 0 | Status: SHIPPED | OUTPATIENT
Start: 2022-06-23 | End: 2022-06-26

## 2022-06-23 ASSESSMENT — ENCOUNTER SYMPTOMS
FEVER: 0
NAUSEA: 0
JOINT SWELLING: 1
CHILLS: 0
VOMITING: 0
SHORTNESS OF BREATH: 0
ARTHRALGIAS: 1

## 2022-06-23 NOTE — PROGRESS NOTES
SUBJECTIVE:   Lara Marc is a 86 year old female presenting with a chief complaint of   Chief Complaint   Patient presents with     Urgent Care     Arthritis     Gout on Lt foot x1 week.       She is an established patient of [facility].    Lara is a 87 yo F who presents today with left big toe pain. She is concerned she has gout. Lara first noticed it 6 days ago. She was limping due to the pain.  She points to the left 1st MTPJ and hallux as the point of maximal tenderness. Lara rates her pain at worst 5/10. Pain when she first gets up in the morning. Very sensitivie to touch. The joint is hot. No known trauma to the toe. Has tried applying emu lotion, tylenol and sitting in cold pan of water to help manage her pain. Minimal improvement. Has had gout flares in the past.  No fevers.    Review of Systems   Constitutional: Negative for chills and fever.   Respiratory: Negative for shortness of breath.    Cardiovascular: Negative for chest pain.   Gastrointestinal: Negative for nausea and vomiting.   Musculoskeletal: Positive for arthralgias, gait problem and joint swelling.       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     Complete uterovaginal prolapse 2/8/2013     Deep vein thrombosis (DVT) (H) 5/7/2020     Dyspnea on exertion      Gastro-oesophageal reflux disease      Hypertension goal BP (blood pressure) < 140/90 7/25/2011     Other and unspecified hyperlipidemia      Unspecified essential hypertension     not medicated at this time     Uterovaginal prolapse,  "complete 2004     resolved '06     Vaginal enterocele, congenital or acquired 2007     or cystocele     Family History   Problem Relation Age of Onset     Heart Disease Father      Diabetes Sister      Heart Disease Sister      Cancer - colorectal Brother      Prostate Cancer Brother      Current Outpatient Medications   Medication Sig Dispense Refill     Cholecalciferol (VITAMIN D3 PO) Take by mouth daily       HYDROcodone-acetaminophen (NORCO) 5-325 MG tablet Take 1 tablet by mouth every 6 hours as needed for pain 18 tablet 0     lisinopril (ZESTRIL) 10 MG tablet TAKE 1 TABLET BY MOUTH EVERY DAY 30 tablet 0     lisinopril (ZESTRIL) 20 MG tablet TAKE 1 TABLET BY MOUTH EVERY DAY 90 tablet 0     Loperamide HCl (IMODIUM OR)        magnesium 250 MG tablet Take 1 tablet by mouth daily       Multiple Vitamins-Minerals (MULTIVITAMIN OR) Take 1 tablet by mouth daily.       predniSONE (DELTASONE) 20 MG tablet Take 2 tablets (40 mg) by mouth daily for 5 days 10 tablet 0     acetaminophen (TYLENOL) 325 MG tablet Take 2 tablets (650 mg) by mouth every 8 hours       aspirin 81 MG EC tablet Take 81 mg by mouth daily       Social History     Tobacco Use     Smoking status: Former Smoker     Packs/day: 0.00     Years: 17.00     Pack years: 0.00     Smokeless tobacco: Never Used     Tobacco comment: quit when she was 29 years old   Substance Use Topics     Alcohol use: No       OBJECTIVE  BP (!) 150/74   Pulse 84   Temp 97.9  F (36.6  C) (Temporal)   Resp 18   Ht 1.549 m (5' 1\")   Wt 78 kg (172 lb)   SpO2 96%   BMI 32.50 kg/m      Physical Exam  Vitals and nursing note reviewed.   Constitutional:       General: She is not in acute distress.     Appearance: Normal appearance. She is obese. She is not ill-appearing, toxic-appearing or diaphoretic.   HENT:      Head: Normocephalic.   Eyes:      Extraocular Movements: Extraocular movements intact.      Conjunctiva/sclera: Conjunctivae normal.   Cardiovascular:      Rate and " Rhythm: Normal rate and regular rhythm.   Pulmonary:      Effort: Pulmonary effort is normal.   Musculoskeletal:         General: Tenderness present.      Comments: Takes sandal off very slowly due to pain. Significant erythema and swelling noted to the 1st MTPJ and hallux of the left foot. Area is painful with light touch. Physical exam limited due to pain - unable to assess ROM of 1st MTPJ and IPJ of hallux.    Skin:     General: Skin is warm and dry.      Findings: Erythema present. No rash.   Neurological:      General: No focal deficit present.      Mental Status: She is alert and oriented to person, place, and time.   Psychiatric:         Mood and Affect: Mood normal.         Behavior: Behavior normal.         Thought Content: Thought content normal.         Judgment: Judgment normal.         Labs:  No results found for this or any previous visit (from the past 24 hour(s)).    X-Ray was not done.    ASSESSMENT:      ICD-10-CM    1. Acute gout involving toe of left foot, unspecified cause  M10.9 predniSONE (DELTASONE) 20 MG tablet     HYDROcodone-acetaminophen (NORCO) 5-325 MG tablet        Medical Decision Making:    Differential Diagnosis:  Gout vs. Pseudogout vs. Septic arthritis vs. osteoarthritis    Serious Comorbid Conditions:  Adult:  reviewed    PLAN:    Prescribed Prednisone to help reduce inflammation and swelling.   Prescribed Norco for pain management.  Discussed SE of narcotic.    Recommend to continue increasing water intake.  Discussed reasons to seek immediate medical attention.  Additionally if no improvement or worsening in one week, may follow up with PCP and/or UC.        Followup:    If not improving or if conditions worsens over the next 12-24 hours, go to the Emergency Department    There are no Patient Instructions on file for this visit.

## 2022-06-23 NOTE — TELEPHONE ENCOUNTER
"Pt calls to report \"gout.\"  Ongoing x one week.  L great toe.  \"Cut toenails the day before.\"  \"Clippers are old and ravin.\"  However \"no bleeding noticed when cutting nails.\"  No apparent breaks in skin.    \"Last gout flare was a long time ago.\"  \"Had gout total of about 4x.\"    Toe appears -> \"Awfully swollen.\"  \"Looks red.\"  \"Very bad pain.\"  Pt has been applying \"Emu\" topical pain relief cream; however \"doesn't really work.\"  Taking Tylenol \"quite often\".  \"Takes a little edge off, but not much.\"    Pt agrees to same-day clinical eval per triage disposition.  No open appt slots at pt's primary clinic per checking with a .  Pt states intention to go to [facility] which appears appropriate.    [facility] Nurse Advisor     Reason for Disposition    SEVERE pain (e.g., excruciating, unable to do any normal activities) and not improved after 2 hours of pain medicine    Additional Information    Negative: Followed an injury    Negative: Wound looks infected    Negative: Caused by an animal bite    Negative: Caused by frostbite    Negative: Athlete's Foot suspected (i.e., itchy red rash in web space between toes)    Negative: Foot pain is the main symptom    Negative: Foot is cool or blue in comparison to other foot    Negative: Purple or black skin on toe (Exception: simple recalled injury with bruise)    Negative: Patient sounds very sick or weak to the triager    Protocols used: TOE PAIN-A-OH    _______________________    COVID 19 Nurse Triage Plan/Patient Instructions    Please be aware that novel coronavirus (COVID-19) may be circulating in the community. If you develop symptoms such as fever, cough, or SOB or if you have concerns about the presence of another infection including coronavirus (COVID-19), please contact your health care provider or visit [email or web address].     Disposition/Instructions    Additional COVID19 information to add for patients.   How can I " "protect others?  If you have symptoms (fever, cough, body aches or trouble breathing): Stay home and away from others (self-isolate) until:    At least 10 days have passed since your symptoms started, And     You ve had no fever--and no medicine that reduces fever--for 1 full day (24 hours), And      Your other symptoms have resolved (gotten better).     If you don t have symptoms, but a test showed that you have COVID-19 (you tested positive):    Stay home and away from others (self-isolate). Follow the tips under \"How do I self-isolate?\" below for 10 days (20 days if you have a weak immune system).    You don't need to be retested for COVID-19 before going back to school or work. As long as you're fever-free and feeling better, you can go back to school, work and other activities after waiting the 10 or 20 days.     How do I self-isolate?    Stay in your own room, even for meals. Use your own bathroom if you can.     Stay away from others in your home. No hugging, kissing or shaking hands. No visitors.    Don t go to work, school or anywhere else.     Clean  high touch  surfaces often (doorknobs, counters, handles, etc.). Use a household cleaning spray or wipes. You ll find a full list on the EPA website:  www.epa.gov/pesticide-registration/list-n-disinfectants-use-against-sars-cov-2.    Cover your mouth and nose with a mask, tissue or washcloth to avoid spreading germs.    Wash your hands and face often. Use soap and water.    Caregivers in these groups are at risk for severe illness due to COVID-19:  o People 65 years and older  o People who live in a nursing home or long-term care facility  o People with chronic disease (lung, heart, cancer, diabetes, kidney, liver, immunologic)  o People who have a weakened immune system, including those who:  - Are in cancer treatment  - Take medicine that weakens the immune system, such as corticosteroids  - Had a bone marrow or organ transplant  - Have an immune " deficiency  - Have poorly controlled HIV or AIDS  - Are obese (body mass index of 40 or higher)  - Smoke regularly    Caregivers should wear gloves while washing dishes, handling laundry and cleaning bedrooms and bathrooms.    Use caution when washing and drying laundry: Don t shake dirty laundry, and use the warmest water setting that you can.    For more tips, go to www.cdc.gov/coronavirus/2019-ncov/downloads/10Things.pdf.    How can I take care of myself?  1. Get lots of rest. Drink extra fluids (unless a doctor has told you not to).     2. Take Tylenol (acetaminophen) for fever or pain. If you have liver or kidney problems, ask your family doctor if it s okay to take Tylenol.     Adults can take either:     650 mg (two 325 mg pills) every 4 to 6 hours, or     1,000 mg (two 500 mg pills) every 8 hours as needed.     Note: Don t take more than 3,000 mg in one day.   Acetaminophen is found in many medicines (both prescribed and over-the-counter medicines). Read all labels to be sure you don t take too much.     For children, check the Tylenol bottle for the right dose. The dose is based on the child s age or weight.    3. If you have other health problems (like cancer, heart failure, an organ transplant or severe kidney disease): Call your specialty clinic if you don t feel better in the next 2 days.    4. Know when to call 911: Emergency warning signs include:    Trouble breathing or shortness of breath    Pain or pressure in the chest that doesn t go away    Feeling confused like you haven t felt before, or not being able to wake up    Bluish-colored lips or face    What are the symptoms of COVID-19?     The most common symptoms are cough, fever and trouble breathing.     Less common symptoms include body aches, chills, diarrhea (loose, watery poops), fatigue (feeling very tired), headache, runny nose, sore throat and loss of smell.    COVID-19 can cause severe coughing (bronchitis) and lung infection  (pneumonia).    How does it spread?     The virus may spread when a person coughs or sneezes into the air. The virus can travel about 6 feet this way, and it can live on surfaces.      Common  (household disinfectants) will kill the virus.    Who is at risk?  Anyone can catch COVID-19 if they re around someone who has the virus.    How can others protect themselves?     Stay away from people who have COVID-19 (or symptoms of COVID-19).    Wash hands often with soap and water. Or, use hand  with at least 60% alcohol.    Avoid touching the eyes, nose or mouth.     Wear a face mask when you go out in public, when sick or when caring for a sick person.    Where can I get more information?     [facility]: About COVID-19: [email or web address]    CDC: What to Do If You re Sick: www.cdc.gov/coronavirus/2019-ncov/about/steps-when-sick.html    CDC: Ending Home Isolation: www.cdc.gov/coronavirus/2019-ncov/hcp/disposition-in-home-patients.html     CDC: Caring for Someone: www.cdc.gov/coronavirus/2019-ncov/if-you-are-sick/care-for-someone.html     [facility]: Interim Guidance for Hospital Discharge to Home: www.health.[facility].mn.us/diseases/coronavirus/hcp/hospdischarge.pdf    [facility] clinical trials (COVID-19 research studies): clinicalaffairs.[facility]-clinical-trials     Below are the COVID-19 hotlines at the [place] Department of Health ([facility]). Interpreters are available.   o For health questions: Call 968-386-4331 or 1-903.797.6711 (7 a.m. to 7 p.m.)  o For questions about schools and childcare: Call 100-731-2750 or 1-758.737.7201 (7 a.m. to 7 p.m.)          Thank you for taking steps to prevent the spread of this virus.  o Limit your contact with others.  o Wear a simple mask to cover your cough.  o Wash your hands well and often.    Resources    [facility]: About COVID-19: [email or web address]    CDC: What to Do If You're Sick:  www.cdc.gov/coronavirus/2019-ncov/about/steps-when-sick.html    CDC: Ending Home Isolation: www.cdc.gov/coronavirus/2019-ncov/hcp/disposition-in-home-patients.html     CDC: Caring for Someone: www.cdc.gov/coronavirus/2019-ncov/if-you-are-sick/care-for-someone.html     [facility]: Interim Guidance for Hospital Discharge to Home: www.[facility].mn./diseases/coronavirus/hcp/hospdischarge.pdf    [facility] clinical trials (COVID-19 research studies): clinicalaffairs.[facility]-clinical-trials     Below are the COVID-19 hotlines at the [place] Department of Health ([facility]). Interpreters are available.   o For health questions: Call 558-580-9695 or 1-385.128.6467 (7 a.m. to 7 p.m.)  o For questions about schools and childcare: Call 041-404-1360 or 1-405.531.6978 (7 a.m. to 7 p.m.)

## 2022-06-27 ENCOUNTER — OFFICE VISIT (OUTPATIENT)
Dept: URGENT CARE | Facility: URGENT CARE | Age: 87
End: 2022-06-27
Payer: COMMERCIAL

## 2022-06-27 VITALS — WEIGHT: 170 LBS | HEIGHT: 61 IN | OXYGEN SATURATION: 98 % | TEMPERATURE: 98.3 F | BODY MASS INDEX: 32.1 KG/M2

## 2022-06-27 DIAGNOSIS — M62.830 BACK SPASM: Primary | ICD-10-CM

## 2022-06-27 LAB
ALBUMIN UR-MCNC: NEGATIVE MG/DL
APPEARANCE UR: CLEAR
BACTERIA #/AREA URNS HPF: ABNORMAL /HPF
BILIRUB UR QL STRIP: NEGATIVE
COLOR UR AUTO: YELLOW
GLUCOSE UR STRIP-MCNC: NEGATIVE MG/DL
HGB UR QL STRIP: NEGATIVE
KETONES UR STRIP-MCNC: NEGATIVE MG/DL
LEUKOCYTE ESTERASE UR QL STRIP: ABNORMAL
NITRATE UR QL: NEGATIVE
PH UR STRIP: 5.5 [PH] (ref 5–7)
RBC #/AREA URNS AUTO: ABNORMAL /HPF
SP GR UR STRIP: 1.02 (ref 1–1.03)
UROBILINOGEN UR STRIP-ACNC: 0.2 E.U./DL
WBC #/AREA URNS AUTO: ABNORMAL /HPF

## 2022-06-27 PROCEDURE — 99214 OFFICE O/P EST MOD 30 MIN: CPT | Performed by: PHYSICIAN ASSISTANT

## 2022-06-27 PROCEDURE — 81001 URINALYSIS AUTO W/SCOPE: CPT

## 2022-06-27 NOTE — PATIENT INSTRUCTIONS
Patient was educated on the natural course of muscular spasms. UA was negative for a UTI. Conservative measures discussed including rest, heat, lidocaine patches, and over-the-counter analgesics (Tylenol) as needed. See your primary care provider if symptoms worsen or do not improve in 7 days. Seek emergency care if you develop severe pain.

## 2022-06-27 NOTE — PROGRESS NOTES
URGENT CARE VISIT:    SUBJECTIVE:   Lara Marc is a 86 year old female who presents for evaluation of back pain  Symptoms began 3 day(s) ago, have been onset gradual and are improving.  Pain is described as intermittent spasms that last less than a minute. Pain is located in the right mid back region, without radiation. Pain is exacerbated by: lying.  Pain is relieved by: standing. Associated symptoms include: none. Denies any fever, unintentional weight loss,bladder urgency, bladder incontinence and bowel incontinence. Recent injury:none recalled by the patient  Personal hx of back pain is recurrent self limited episodes of low back pain in the past.     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     Complete uterovaginal prolapse 2/8/2013     Deep vein thrombosis (DVT) (H) 5/7/2020     Dyspnea on exertion      Gastro-oesophageal reflux disease      Hypertension goal BP (blood pressure) < 140/90 7/25/2011     Other and unspecified hyperlipidemia      Unspecified essential hypertension     not medicated at this time     Uterovaginal prolapse, complete 2004     resolved '06     Vaginal enterocele, congenital or acquired 2007     or cystocele     Allergies: Nkda [no known drug allergies] and Seasonal allergies  Medications:   Current Outpatient Medications   Medication Sig Dispense Refill     acetaminophen (TYLENOL) 325 MG tablet Take 2 tablets (650 mg) by mouth every 8 hours       aspirin 81 MG EC tablet Take 81 mg by mouth daily       Cholecalciferol (VITAMIN D3 PO)  "Take by mouth daily       lisinopril (ZESTRIL) 10 MG tablet TAKE 1 TABLET BY MOUTH EVERY DAY 30 tablet 0     lisinopril (ZESTRIL) 20 MG tablet TAKE 1 TABLET BY MOUTH EVERY DAY 90 tablet 0     Loperamide HCl (IMODIUM OR)        magnesium 250 MG tablet Take 1 tablet by mouth daily       Multiple Vitamins-Minerals (MULTIVITAMIN OR) Take 1 tablet by mouth daily.       predniSONE (DELTASONE) 20 MG tablet Take 2 tablets (40 mg) by mouth daily for 5 days 10 tablet 0     Social History:   Social History     Tobacco Use     Smoking status: Former Smoker     Packs/day: 0.00     Years: 17.00     Pack years: 0.00     Smokeless tobacco: Never Used     Tobacco comment: quit when she was 29 years old   Substance Use Topics     Alcohol use: No       ROS: ROS otherwise found to be negative except as noted above.     OBJECTIVE:  Temp 98.3  F (36.8  C) (Temporal)   Ht 1.549 m (5' 1\")   Wt 77.1 kg (170 lb)   SpO2 98%   BMI 32.12 kg/m    General: WDWN in NAD.   Cardiac: RRR without murmurs, rubs, or gallops.  Respiratory: LCTAB without adventitious sounds. Non-labored breathing.  Musculoskeletal: Ambulating without difficulty. Back symmetric, no curvature. ROM normal. No CVA tenderness. No spinal or paraspinal TTP. Straight leg raise test: negative  Neurological: Normal strength and tone with no weakness or sensory deficit noted, reflexes normal.     ASSESSMENT:    ICD-10-CM    1. Back spasm  M62.830 UA macro with reflex to Microscopic and Culture - Clinc Collect     Urine Microscopic         PLAN:  30 minutes spent on the date of the encounter doing chart review, review of outside records, review of test results, interpretation of tests, patient visit and documentation.   Patient Instructions   Patient was educated on the natural course of muscular spasms. UA was negative for a UTI. Conservative measures discussed including rest, heat, lidocaine patches, and over-the-counter analgesics (Tylenol) as needed. See your primary care " provider if symptoms worsen or do not improve in 7 days. Seek emergency care if you develop severe pain.  Patient verbalized understanding and is agreeable to plan. The patient was discharged ambulatory and in stable condition.    Gregoria Stone PA-C ....................  6/27/2022   12:31 PM

## 2022-07-13 NOTE — TELEPHONE ENCOUNTER
REFERRAL INFORMATION:    Referring Provider:  Franco    Referring Clinic:  [facility]    Reason for Visit/Diagnosis: diarrhea      FUTURE VISIT INFORMATION:    Appointment Date: 10.19.22    Appointment Time: 4 PM     NOTES STATUS DETAILS   OFFICE NOTE from Referring Provider Internal 2.18.22 [facility]   OFFICE NOTE from Other Specialist N/A    HOSPITAL DISCHARGE SUMMARY/  ED VISITS N/A    OPERATIVE REPORT N/A    MEDICATION LIST Internal         ENDOSCOPY  N/A    COLONOSCOPY N/A    ERCP N/A    EUS N/A    STOOL TESTING N/A    PERTINENT LABS Internal    PATHOLOGY REPORTS (RELATED) N/A    IMAGING (CT, MRI, EGD, MRCP, Small Bowel Follow Through/SBT, MR/CT Enterography) Internal 8.31 XR pelvis  9.9.19 CT ab pelvis  9.9.19 XR ab     Action 7.13.22 MJ   Action Taken Called pt , no answer. Need to know if there is a colonoscopy available

## 2022-07-15 ENCOUNTER — OFFICE VISIT (OUTPATIENT)
Dept: URGENT CARE | Facility: URGENT CARE | Age: 87
End: 2022-07-15
Payer: COMMERCIAL

## 2022-07-15 VITALS
OXYGEN SATURATION: 96 % | SYSTOLIC BLOOD PRESSURE: 138 MMHG | TEMPERATURE: 97.7 F | DIASTOLIC BLOOD PRESSURE: 60 MMHG | HEART RATE: 78 BPM

## 2022-07-15 DIAGNOSIS — Z87.19 HISTORY OF CHRONIC CONSTIPATION: ICD-10-CM

## 2022-07-15 DIAGNOSIS — J06.9 VIRAL UPPER RESPIRATORY ILLNESS: Primary | ICD-10-CM

## 2022-07-15 PROCEDURE — 99213 OFFICE O/P EST LOW 20 MIN: CPT | Performed by: STUDENT IN AN ORGANIZED HEALTH CARE EDUCATION/TRAINING PROGRAM

## 2022-07-15 RX ORDER — BENZONATATE 100 MG/1
100 CAPSULE ORAL 3 TIMES DAILY PRN
Qty: 15 CAPSULE | Refills: 0 | Status: SHIPPED | OUTPATIENT
Start: 2022-07-15 | End: 2022-11-15

## 2022-07-15 NOTE — PROGRESS NOTES
URGENT CARE - [place]    Assessment & Plan    Lara Marc is a 86 year old with a significant past medical history of chronic alternating constipation and diarrhea, s/p cholecystectomy, CKD presenting with the following issues:    Problem List Items Addressed This Visit    None     Visit Diagnoses     Viral upper respiratory illness    -  Primary    Relevant Medications    benzonatate (TESSALON) 100 MG capsule    History of chronic constipation            Patient has exacerbation of chronic colonic issues with concomitant viral syndrome. Advised ongoing supportive cares and follow up with primary care provider to discuss management of IBS-like symptoms.    No follow-ups on file.   Return precautions discussed.    Suzanna Belle MD      Subjective     Woke up a few days ago with lots of coughing - nonproductive, fever x 2 days, stomach ache diarrhea - no blood no mucus. Endorses some intermittent chest pain and no new dyspnea. No vomiting, appetite decreased while sick. No sore throat, no rhinorrhea, no congestion.    GI symptoms are chronic, intermittent. Has been having alternating constipation and diarrhea for years. Endorses fatigue.    Took some immodium with improvement.    Negative COVID tests - last yesterday, no sick contacts.       Objective     /60   Pulse 78   Temp 97.7  F (36.5  C) (Temporal)   SpO2 96%    Physical Exam  Constitutional:       General: She is not in acute distress.     Appearance: Normal appearance. She is not ill-appearing or toxic-appearing.   HENT:      Mouth/Throat:      Mouth: Mucous membranes are moist.      Pharynx: Oropharynx is clear. No oropharyngeal exudate or posterior oropharyngeal erythema.   Eyes:      Extraocular Movements: Extraocular movements intact.      Conjunctiva/sclera: Conjunctivae normal.   Cardiovascular:      Rate and Rhythm: Normal rate and regular rhythm.      Heart sounds: Normal heart sounds.   Pulmonary:      Effort: Pulmonary effort is  normal. No respiratory distress.   Abdominal:      General: Abdomen is flat. There is no distension.      Palpations: Abdomen is soft. There is no mass.      Tenderness: There is abdominal tenderness. There is no guarding or rebound (mild diffuse tenderness).   Musculoskeletal:      Cervical back: Neck supple.   Lymphadenopathy:      Cervical: No cervical adenopathy.   Neurological:      General: No focal deficit present.      Mental Status: She is alert.          Labs and imaging notable for: n/a

## 2022-07-15 NOTE — PATIENT INSTRUCTIONS
You can use honey or benzonatate for cough. A cool-mist humidifier for cough is very helpful as well.    If you develop worsening abdominal pain, dehydration, or blood/mucus in your poop, stop taking loperamide and see a doctor.

## 2022-07-26 ENCOUNTER — OFFICE VISIT (OUTPATIENT)
Dept: URGENT CARE | Facility: URGENT CARE | Age: 87
End: 2022-07-26
Payer: COMMERCIAL

## 2022-07-26 VITALS
OXYGEN SATURATION: 99 % | SYSTOLIC BLOOD PRESSURE: 118 MMHG | RESPIRATION RATE: 15 BRPM | TEMPERATURE: 97.2 F | HEART RATE: 67 BPM | DIASTOLIC BLOOD PRESSURE: 78 MMHG

## 2022-07-26 DIAGNOSIS — R29.6 FALLS FREQUENTLY: ICD-10-CM

## 2022-07-26 DIAGNOSIS — R26.89 BALANCE PROBLEMS: ICD-10-CM

## 2022-07-26 DIAGNOSIS — R42 DIZZINESS: Primary | ICD-10-CM

## 2022-07-26 LAB
ALBUMIN SERPL-MCNC: 3.3 G/DL (ref 3.4–5)
ALBUMIN UR-MCNC: NEGATIVE MG/DL
ALP SERPL-CCNC: 66 U/L (ref 40–150)
ALT SERPL W P-5'-P-CCNC: 34 U/L (ref 0–50)
ANION GAP SERPL CALCULATED.3IONS-SCNC: 5 MMOL/L (ref 3–14)
APPEARANCE UR: CLEAR
AST SERPL W P-5'-P-CCNC: 24 U/L (ref 0–45)
BACTERIA #/AREA URNS HPF: ABNORMAL /HPF
BASOPHILS # BLD AUTO: 0 10E3/UL (ref 0–0.2)
BASOPHILS NFR BLD AUTO: 0 %
BILIRUB SERPL-MCNC: 0.3 MG/DL (ref 0.2–1.3)
BILIRUB UR QL STRIP: NEGATIVE
BUN SERPL-MCNC: 22 MG/DL (ref 7–30)
CALCIUM SERPL-MCNC: 9.4 MG/DL (ref 8.5–10.1)
CHLORIDE BLD-SCNC: 108 MMOL/L (ref 94–109)
CO2 SERPL-SCNC: 27 MMOL/L (ref 20–32)
COLOR UR AUTO: YELLOW
CREAT SERPL-MCNC: 1.21 MG/DL (ref 0.52–1.04)
EOSINOPHIL # BLD AUTO: 0.2 10E3/UL (ref 0–0.7)
EOSINOPHIL NFR BLD AUTO: 4 %
ERYTHROCYTE [DISTWIDTH] IN BLOOD BY AUTOMATED COUNT: 14.8 % (ref 10–15)
GFR SERPL CREATININE-BSD FRML MDRD: 43 ML/MIN/1.73M2
GLUCOSE BLD-MCNC: 96 MG/DL (ref 70–99)
GLUCOSE UR STRIP-MCNC: NEGATIVE MG/DL
HCT VFR BLD AUTO: 36.1 % (ref 35–47)
HGB BLD-MCNC: 11 G/DL (ref 11.7–15.7)
HGB UR QL STRIP: ABNORMAL
IMM GRANULOCYTES # BLD: 0 10E3/UL
IMM GRANULOCYTES NFR BLD: 1 %
KETONES UR STRIP-MCNC: NEGATIVE MG/DL
LEUKOCYTE ESTERASE UR QL STRIP: ABNORMAL
LYMPHOCYTES # BLD AUTO: 1.4 10E3/UL (ref 0.8–5.3)
LYMPHOCYTES NFR BLD AUTO: 25 %
MCH RBC QN AUTO: 24.7 PG (ref 26.5–33)
MCHC RBC AUTO-ENTMCNC: 30.5 G/DL (ref 31.5–36.5)
MCV RBC AUTO: 81 FL (ref 78–100)
MONOCYTES # BLD AUTO: 0.5 10E3/UL (ref 0–1.3)
MONOCYTES NFR BLD AUTO: 8 %
NEUTROPHILS # BLD AUTO: 3.5 10E3/UL (ref 1.6–8.3)
NEUTROPHILS NFR BLD AUTO: 62 %
NITRATE UR QL: NEGATIVE
PH UR STRIP: 6 [PH] (ref 5–7)
PLATELET # BLD AUTO: 183 10E3/UL (ref 150–450)
POTASSIUM BLD-SCNC: 4.9 MMOL/L (ref 3.4–5.3)
PROT SERPL-MCNC: 7.1 G/DL (ref 6.8–8.8)
RBC # BLD AUTO: 4.46 10E6/UL (ref 3.8–5.2)
RBC #/AREA URNS AUTO: ABNORMAL /HPF
SODIUM SERPL-SCNC: 140 MMOL/L (ref 133–144)
SP GR UR STRIP: 1.02 (ref 1–1.03)
SQUAMOUS #/AREA URNS AUTO: ABNORMAL /LPF
UROBILINOGEN UR STRIP-ACNC: 0.2 E.U./DL
WBC # BLD AUTO: 5.6 10E3/UL (ref 4–11)
WBC #/AREA URNS AUTO: ABNORMAL /HPF

## 2022-07-26 PROCEDURE — 36415 COLL VENOUS BLD VENIPUNCTURE: CPT

## 2022-07-26 PROCEDURE — 99214 OFFICE O/P EST MOD 30 MIN: CPT | Performed by: FAMILY MEDICINE

## 2022-07-26 PROCEDURE — 81001 URINALYSIS AUTO W/SCOPE: CPT

## 2022-07-26 PROCEDURE — 93000 ELECTROCARDIOGRAM COMPLETE: CPT | Performed by: FAMILY MEDICINE

## 2022-07-26 PROCEDURE — 80053 COMPREHEN METABOLIC PANEL: CPT | Performed by: FAMILY MEDICINE

## 2022-07-26 PROCEDURE — 85025 COMPLETE CBC W/AUTO DIFF WBC: CPT

## 2022-07-26 NOTE — PROGRESS NOTES
Chief Complaint   Patient presents with     Urgent Care     Dizziness     Pt pt states she has been having episodes of dizziness in the past two days she has had 2 falls. Pt states she has two bruise due to falls and thru the day has been having episodes of shakiness, and pressure headache in the morning frontal.        ASSESMENT AND PLAN   Lara was seen today for urgent care and dizziness.    Diagnoses and all orders for this visit:    Dizziness  -     CBC with platelets and differential; Future  -     Comprehensive metabolic panel (BMP + Alb, Alk Phos, ALT, AST, Total. Bili, TP); Future  -     UA Macro with Reflex to Micro and Culture - lab collect; Future  -     EKG 12-lead complete w/read - Clinics  -     CBC with platelets and differential  -     Comprehensive metabolic panel (BMP + Alb, Alk Phos, ALT, AST, Total. Bili, TP)  -     UA Macro with Reflex to Micro and Culture - lab collect  -     Urine Microscopic    Falls frequently    Balance problems      Reviewed with patient and her daughter in details about the lab work EKG did not show any changes from previous EKGs  Also reviewed part of the symptoms could be related to labyrinthitis  Recommended physical therapy to help with symptoms.  Encourage patient to use walker or cane to help prevent fall.  All labs stable no abnormal electrolytes and also stable gfr     Initial differential diagnosis included but was not restricted to   Differential Diagnosis:  UTI, electrolyte imbalance, viral URI, ET dysfunction, muscle weakness,  Medical Decision Making:  Reviewed with patient as symptoms are persisting for a while should get testing for blood work and also get an EKG done.  Discussed if symptoms do not get better over the next 1 week time should consider following up with primary and also considering physical therapy.  To help with her balance.  Patient was advised to use some kind of walking aid at home either a cane or a walker    Routine discharge  counseling was given to the patient and the patient understands that worsening, changing or persistent symptoms should prompt an immediate call or follow up with their primary physician or the emergency department. The importance of appropriate follow up was also discussed with the patient.     I have reviewed the nursing notes.  Review of the result(s) of each unique test -   Results for orders placed or performed in visit on 07/26/22   Comprehensive metabolic panel (BMP + Alb, Alk Phos, ALT, AST, Total. Bili, TP)     Status: Abnormal   Result Value Ref Range    Sodium 140 133 - 144 mmol/L    Potassium 4.9 3.4 - 5.3 mmol/L    Chloride 108 94 - 109 mmol/L    Carbon Dioxide (CO2) 27 20 - 32 mmol/L    Anion Gap 5 3 - 14 mmol/L    Urea Nitrogen 22 7 - 30 mg/dL    Creatinine 1.21 (H) 0.52 - 1.04 mg/dL    Calcium 9.4 8.5 - 10.1 mg/dL    Glucose 96 70 - 99 mg/dL    Alkaline Phosphatase 66 40 - 150 U/L    AST 24 0 - 45 U/L    ALT 34 0 - 50 U/L    Protein Total 7.1 6.8 - 8.8 g/dL    Albumin 3.3 (L) 3.4 - 5.0 g/dL    Bilirubin Total 0.3 0.2 - 1.3 mg/dL    GFR Estimate 43 (L) >60 mL/min/1.73m2   UA Macro with Reflex to Micro and Culture - lab collect     Status: Abnormal    Specimen: Urine, Midstream   Result Value Ref Range    Color Urine Yellow Colorless, Straw, Light Yellow, Yellow    Appearance Urine Clear Clear    Glucose Urine Negative Negative mg/dL    Bilirubin Urine Negative Negative    Ketones Urine Negative Negative mg/dL    Specific Gravity Urine 1.020 1.003 - 1.035    Blood Urine Trace (A) Negative    pH Urine 6.0 5.0 - 7.0    Protein Albumin Urine Negative Negative mg/dL    Urobilinogen Urine 0.2 0.2, 1.0 E.U./dL    Nitrite Urine Negative Negative    Leukocyte Esterase Urine Small (A) Negative   CBC with platelets and differential     Status: Abnormal   Result Value Ref Range    WBC Count 5.6 4.0 - 11.0 10e3/uL    RBC Count 4.46 3.80 - 5.20 10e6/uL    Hemoglobin 11.0 (L) 11.7 - 15.7 g/dL    Hematocrit 36.1 35.0  - 47.0 %    MCV 81 78 - 100 fL    MCH 24.7 (L) 26.5 - 33.0 pg    MCHC 30.5 (L) 31.5 - 36.5 g/dL    RDW 14.8 10.0 - 15.0 %    Platelet Count 183 150 - 450 10e3/uL    % Neutrophils 62 %    % Lymphocytes 25 %    % Monocytes 8 %    % Eosinophils 4 %    % Basophils 0 %    % Immature Granulocytes 1 %    Absolute Neutrophils 3.5 1.6 - 8.3 10e3/uL    Absolute Lymphocytes 1.4 0.8 - 5.3 10e3/uL    Absolute Monocytes 0.5 0.0 - 1.3 10e3/uL    Absolute Eosinophils 0.2 0.0 - 0.7 10e3/uL    Absolute Basophils 0.0 0.0 - 0.2 10e3/uL    Absolute Immature Granulocytes 0.0 <=0.4 10e3/uL   Urine Microscopic     Status: Abnormal   Result Value Ref Range    Bacteria Urine Few (A) None Seen /HPF    RBC Urine None Seen 0-2 /HPF /HPF    WBC Urine 0-5 0-5 /HPF /HPF    Squamous Epithelials Urine Few (A) None Seen /LPF    Narrative    Microscopic performed on unspun sample due to QNS  Urine Culture not indicated   CBC with platelets and differential     Status: Abnormal    Narrative    The following orders were created for panel order CBC with platelets and differential.  Procedure                               Abnormality         Status                     ---------                               -----------         ------                     CBC with platelets and d...[[id number]]  Abnormal            Final result                 Please view results for these tests on the individual orders.       X-Ray was not done.    Time  spent on the date of the encounter doing chart review, review of test results, interpretation of tests, patient visit and documentation   see orders in Epic  Pt verbalized and agreed with the plan and is aware of the worsening symptoms for which would need to follow up .  Pt was stable during time of discharge from the clinic     SUBJECTIVE     Lara Marc is a 86 year old female presenting with a chief complaint of    Chief Complaint   Patient presents with     Urgent Care     Dizziness     Pt pt states she has been  having episodes of dizziness in the past two days she has had 2 falls. Pt states she has two bruise due to falls and thru the day has been having episodes of shakiness, and pressure headache in the morning frontal.            Lara Marc is a 86 year old female presenting with a chief complaint of dizziness which she has been noticing for a while but for last couple of days has been noticing worsening dizziness.  And also had 2 falls in her own home.  She did not hit her head has some bruising over the left lateral upper thigh area and also over the right lateral forearm area.  She does have some cold symptoms mostly with sinus congestion.  Denies any plugged feeling in the ear has no chest pain shortness of breath.  Patient lives with her sister and does a lot of yard work but denies any regular exercising.she is an established patient of [facility].  Onset of symptoms was 2 day(s) ago.  Course of illness is waxing and waning.    Severity moderate  Current and Associated symptoms: dizziness  Treatment measures tried include None tried.  Predisposing factors include None.    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     Complete uterovaginal prolapse 2/8/2013     Deep vein thrombosis (DVT) (H) 5/7/2020     Dyspnea on exertion      Gastro-oesophageal reflux disease      Hypertension goal BP (blood pressure) < 140/90 7/25/2011     Other and unspecified hyperlipidemia      Unspecified essential hypertension     not medicated at this time     Uterovaginal prolapse, complete 2004      resolved '06     Vaginal enterocele, congenital or acquired 2007     or cystocele     Current Outpatient Medications   Medication Sig Dispense Refill     acetaminophen (TYLENOL) 325 MG tablet Take 2 tablets (650 mg) by mouth every 8 hours       aspirin 81 MG EC tablet Take 81 mg by mouth daily       Cholecalciferol (VITAMIN D3 PO) Take by mouth daily       lisinopril (ZESTRIL) 20 MG tablet TAKE 1 TABLET BY MOUTH EVERY DAY 90 tablet 0     Loperamide HCl (IMODIUM OR)        magnesium 250 MG tablet Take 1 tablet by mouth daily       Multiple Vitamins-Minerals (MULTIVITAMIN OR) Take 1 tablet by mouth daily.       benzonatate (TESSALON) 100 MG capsule Take 1 capsule (100 mg) by mouth 3 times daily as needed for cough (Patient not taking: Reported on 7/26/2022) 15 capsule 0     lisinopril (ZESTRIL) 10 MG tablet TAKE 1 TABLET BY MOUTH EVERY DAY (Patient not taking: Reported on 7/26/2022) 30 tablet 0     Social History     Tobacco Use     Smoking status: Former Smoker     Packs/day: 0.00     Years: 17.00     Pack years: 0.00     Smokeless tobacco: Never Used     Tobacco comment: quit when she was 29 years old   Substance Use Topics     Alcohol use: No     Family History   Problem Relation Age of Onset     Heart Disease Father      Diabetes Sister      Heart Disease Sister      Cancer - colorectal Brother      Prostate Cancer Brother          ROS:    10 point ROS of systems including Constitutional, Eyes, Respiratory, Cardiovascular, Gastroenterology, Genitourinary, Integumentary, Muscularskeletal, Psychiatric ,neurological were all negative except for pertinent positives noted in my HPI         OBJECTIVE:    /78   Pulse 67   Temp 97.2  F (36.2  C) (Skin)   Resp 15   SpO2 99%   GENERAL APPEARANCE: healthy, alert and no distress  EYES: EOMI,  PERRL, conjunctiva clear  HENT: ear canals and TM's normal.  Nose and mouth without ulcers, erythema or lesions  NECK: supple, nontender, no lymphadenopathy  RESP: lungs  clear to auscultation - no rales, rhonchi or wheezes  CV: regular rates and rhythm, normal S1 S2, no murmur noted  NEURO: Normal strength and tone, sensory exam grossly normal,  normal speech and mentation, finger to  nose test intact rapid altering movements within normal limits  PSYCH: mentation appears normal, affect normal/bright, judgment and insight intact   Ext-lateral thigh there is a 7 cm x 5 cm skin bruise with hematoma, right lateral forearm there is a 3 cm x 4 cm bruise noted.  Physical Exam      (Note was completed, in part, with [employer] voice-recognition software. Documentation reviewed, but some grammatical, spelling, and word errors may remain.)          Gertrude Palacios MD

## 2022-08-02 ENCOUNTER — NURSE TRIAGE (OUTPATIENT)
Dept: FAMILY MEDICINE | Facility: CLINIC | Age: 87
End: 2022-08-02

## 2022-08-02 NOTE — TELEPHONE ENCOUNTER
Call received from patient:  1. Stool varies between diarrhea and constipation   A. Ongoing for months  2. No fever nor bloody/black stools  3. Feels abdominal pressure but not pain   A. Unsure if intermittent or constant  4. Every now and then feels nauseous   5 No emesis  6. Diarrhea for the last 5-7 days    7. Tries to keep up on fluid intake   A. Urination frequency is baseline  8. Difficulty holding in bowel movements and has had fecal incontinence-this happens rather often  9. Only wants visit with Dr. Dickerson    Suggested virtual visit with Dr. Dickerson and offered appt on 8/4/22.  Patient verbalized understanding and in agreement with plan.    Phone visit scheduled with Dr. Dickerson on 8/4/22.  Visit date, time and contact number confirmed with patient.      Reason for Disposition    MODERATE diarrhea (e.g., 4-6 times / day more than normal) and age > 70 years    Additional Information    Negative: Shock suspected (e.g., cold/pale/clammy skin, too weak to stand, low BP, rapid pulse)    Negative: Difficult to awaken or acting confused (e.g., disoriented, slurred speech)    Negative: Sounds like a life-threatening emergency to the triager    Negative: Vomiting also present and worse than the diarrhea    Negative: Blood in stool and without diarrhea    Negative: SEVERE abdominal pain (e.g., excruciating) and present > 1 hour    Negative: SEVERE abdominal pain and age > 60    Negative: Bloody, black, or tarry bowel movements (Exception: chronic-unchanged black-grey bowel movements and is taking iron pills or Pepto-bismol)    Negative: SEVERE diarrhea (e.g., 7 or more times / day more than normal) and age > 60 years    Negative: Constant abdominal pain lasting > 2 hours    Negative: Drinking very little and has signs of dehydration (e.g., no urine > 12 hours, very dry mouth, very lightheaded)    Negative: Patient sounds very sick or weak to the triager    Negative: SEVERE diarrhea (e.g., 7 or more times / day more than  normal) and present > 24 hours (1 day)    Negative: MODERATE diarrhea (e.g., 4-6 times / day more than normal) and present > 48 hours (2 days)    Protocols used: DIARRHEA-CRISTIAN WhiteN, RN  [facility]

## 2022-08-04 ENCOUNTER — VIRTUAL VISIT (OUTPATIENT)
Dept: FAMILY MEDICINE | Facility: CLINIC | Age: 87
End: 2022-08-04
Payer: COMMERCIAL

## 2022-08-04 DIAGNOSIS — R19.7 DIARRHEA, UNSPECIFIED TYPE: Primary | ICD-10-CM

## 2022-08-04 DIAGNOSIS — K59.00 CONSTIPATION, UNSPECIFIED CONSTIPATION TYPE: ICD-10-CM

## 2022-08-04 PROCEDURE — 99442 PR PHYSICIAN TELEPHONE EVALUATION 11-20 MIN: CPT | Mod: 95 | Performed by: FAMILY MEDICINE

## 2022-08-04 NOTE — PROGRESS NOTES
"Lara is a 86 year old who is being evaluated via a billable telephone visit.      What phone number would you like to be contacted at? [phone]  How would you like to obtain your AVS? Mail a copy    Assessment & Plan     Diarrhea, unspecified type  Constipation, unspecified constipation type  unclear etiology with uncertain prognosis, requires further workup    Symptoms may be medication-induced due to her pattern of treating lack of bowel movement after three days with laxatives, then developing diarrhea which she treats with immodium.     I recommended trial of daily psyllium fiber supplements and avoiding the other medications for now.  I also recommended scheduling with GI for further evaluation given symptoms have been chronic.  She is reluctant to schedule with any specialist because she does not want to be traveling for visits, but we discussed that many visits now can be done virtually.    Will check TSH as well. Recent CBC and CMP completed and stable.    - Adult GI  Referral - Consult Only  - TSH with free T4 reflex    CKD  Schedule lab visit to complete the labs that were ordered by nephrology.    Anemia  Stable.  No reported blood in stool.          BMI:   Estimated body mass index is 32.12 kg/m  as calculated from the following:    Height as of 6/27/22: 1.549 m (5' 1\").    Weight as of 6/27/22: 77.1 kg (170 lb).        Patient Instructions   1.  Start psyllium fiber supplement daily.  Take a full glass of water every day and drink any of water through the day.  2.  Continue to increase fruits and vegetables in your diet and get some exercise every day--going for a walk with your son is a great idea.   3.  Schedule a lab visit to do a thyroid test and to do the tests of the kidney doctor ordered.  4. Schedule with the GI doctor         No follow-ups on file.    Yecenia Dickerson MD   [facility]    Subjective   Lara is a 86 year old , presenting for the " "following health issues:    No chief complaint on file.      History of Present Illness       Reason for visit:  Diarrhea and constipation    She eats 0-1 servings of fruits and vegetables daily.She consumes 0 sweetened beverage(s) daily.She exercises with enough effort to increase her heart rate 9 or less minutes per day.  She exercises with enough effort to increase her heart rate 3 or less days per week.   She is taking medications regularly.     Right now she is feeling fine.   She says she seems to go into patterns of constipation and then diarrhea. Back and forth like this for months. She goes on the BRAT diet when she gets diarrhea, but not always. She is trying to drink more water. Son comes over when he can to take her for a walk.     When an episode of what she calls constipation she has a few days without having a bowel movement, then she will try to do things to \"make it better\". She will take a stool softener. Not sure the name. Takes 2 the first night, then the next night she takes one and usually if she cannot have a bowel movement by then she takes a little bit of a suppository and it helps her have a bm. At first, it is diarrhea, then it becomes more solid. ,She says it seems as if each day her stool gets a little looser until it is water. She has been taking immodium if she needs to go someplace.     After the third day she thinks to herself that she should not wait longer to have a bowel movement. Does have some cramping or bloating and worries she will have an accident when she is outside the house. Sometimes when it becomes diarrheal stool it will be urgent.     She says that she is not sure if she has diarrhea without having taken medications first, but cannot remember.     Was taking fiber for awhile. [employer] had run out of it so she had not bought any more.           From 2/18/22 virtual visit:   Diarrhea  This is a recurrent problem. The current episode started in the past 7 days. The problem " occurs every several days. The problem has been rapidly worsening. Associated symptoms include abdominal pain, fatigue and weakness. Nothing aggravates the symptoms. She has tried rest for the symptoms. The treatment provided no relief.         Ongoing for a long time off and on.  Gets better, then has another episode.  This episode lasting for 3 days.  Watery diarrhea, no blood.  Took 2 Imodium once.  Has not had GI work up.  Was going to see GI but symptoms got better.  Has had some apple sauce, feeling weak.  Small amount of cramping in stomach from going frequently, but overall no pain.  No fevers, no vomiting.             Review of Systems          Objective           Vitals:  No vitals were obtained today due to virtual visit.    Physical Exam   healthy, alert and no distress  PSYCH: Alert and oriented times 3; coherent speech, normal   rate and volume, able to articulate logical thoughts, able   to abstract reason, no tangential thoughts, no hallucinations   or delusions  Her affect is normal  RESP: No cough, no audible wheezing, able to talk in full sentences  Remainder of exam unable to be completed due to telephone visits    Office Visit on 07/26/2022   Component Date Value Ref Range Status     Sodium 07/26/2022 140  133 - 144 mmol/L Final     Potassium 07/26/2022 4.9  3.4 - 5.3 mmol/L Final     Chloride 07/26/2022 108  94 - 109 mmol/L Final     Carbon Dioxide (CO2) 07/26/2022 27  20 - 32 mmol/L Final     Anion Gap 07/26/2022 5  3 - 14 mmol/L Final     Urea Nitrogen 07/26/2022 22  7 - 30 mg/dL Final     Creatinine 07/26/2022 1.21 (A) 0.52 - 1.04 mg/dL Final     Calcium 07/26/2022 9.4  8.5 - 10.1 mg/dL Final     Glucose 07/26/2022 96  70 - 99 mg/dL Final     Alkaline Phosphatase 07/26/2022 66  40 - 150 U/L Final     AST 07/26/2022 24  0 - 45 U/L Final     ALT 07/26/2022 34  0 - 50 U/L Final     Protein Total 07/26/2022 7.1  6.8 - 8.8 g/dL Final     Albumin 07/26/2022 3.3 (A) 3.4 - 5.0 g/dL Final      Bilirubin Total 07/26/2022 0.3  0.2 - 1.3 mg/dL Final     GFR Estimate 07/26/2022 43 (A) >60 mL/min/1.73m2 Final    Effective December 21, 2021 eGFRcr in adults is calculated using the 2021 CKD-EPI creatinine equation which includes age and gender (Tia et al., [place], DOI: 10.1056/KKHHcp1514778)     Color Urine 07/26/2022 Yellow  Colorless, Straw, Light Yellow, Yellow Final     Appearance Urine 07/26/2022 Clear  Clear Final     Glucose Urine 07/26/2022 Negative  Negative mg/dL Final     Bilirubin Urine 07/26/2022 Negative  Negative Final     Ketones Urine 07/26/2022 Negative  Negative mg/dL Final     Specific Gravity Urine 07/26/2022 1.020  1.003 - 1.035 Final     Blood Urine 07/26/2022 Trace (A) Negative Final     pH Urine 07/26/2022 6.0  5.0 - 7.0 Final     Protein Albumin Urine 07/26/2022 Negative  Negative mg/dL Final     Urobilinogen Urine 07/26/2022 0.2  0.2, 1.0 E.U./dL Final     Nitrite Urine 07/26/2022 Negative  Negative Final     Leukocyte Esterase Urine 07/26/2022 Small (A) Negative Final     WBC Count 07/26/2022 5.6  4.0 - 11.0 10e3/uL Final     RBC Count 07/26/2022 4.46  3.80 - 5.20 10e6/uL Final     Hemoglobin 07/26/2022 11.0 (A) 11.7 - 15.7 g/dL Final     Hematocrit 07/26/2022 36.1  35.0 - 47.0 % Final     MCV 07/26/2022 81  78 - 100 fL Final     MCH 07/26/2022 24.7 (A) 26.5 - 33.0 pg Final     MCHC 07/26/2022 30.5 (A) 31.5 - 36.5 g/dL Final     RDW 07/26/2022 14.8  10.0 - 15.0 % Final     Platelet Count 07/26/2022 183  150 - 450 10e3/uL Final     % Neutrophils 07/26/2022 62  % Final     % Lymphocytes 07/26/2022 25  % Final     % Monocytes 07/26/2022 8  % Final     % Eosinophils 07/26/2022 4  % Final     % Basophils 07/26/2022 0  % Final     % Immature Granulocytes 07/26/2022 1  % Final     Absolute Neutrophils 07/26/2022 3.5  1.6 - 8.3 10e3/uL Final     Absolute Lymphocytes 07/26/2022 1.4  0.8 - 5.3 10e3/uL Final     Absolute Monocytes 07/26/2022 0.5  0.0 - 1.3 10e3/uL Final     Absolute Eosinophils  07/26/2022 0.2  0.0 - 0.7 10e3/uL Final     Absolute Basophils 07/26/2022 0.0  0.0 - 0.2 10e3/uL Final     Absolute Immature Granulocytes 07/26/2022 0.0  <=0.4 10e3/uL Final     Bacteria Urine 07/26/2022 Few (A) None Seen /HPF Final     RBC Urine 07/26/2022 None Seen  0-2 /HPF /HPF Final     WBC Urine 07/26/2022 0-5  0-5 /HPF /HPF Final     Squamous Epithelials Urine 07/26/2022 Few (A) None Seen /LPF Final                Phone call duration: 19 minutes    .  ..

## 2022-08-04 NOTE — PATIENT INSTRUCTIONS
1.  Start psyllium fiber supplement daily.  Take a full glass of water every day and drink any of water through the day.  2.  Continue to increase fruits and vegetables in your diet and get some exercise every day--going for a walk with your son is a great idea.   3.  Schedule a lab visit to do a thyroid test and to do the tests of the kidney doctor ordered.  4. Schedule with the GI doctor

## 2022-08-08 ENCOUNTER — OFFICE VISIT (OUTPATIENT)
Dept: URGENT CARE | Facility: URGENT CARE | Age: 87
End: 2022-08-08
Payer: COMMERCIAL

## 2022-08-08 ENCOUNTER — LAB (OUTPATIENT)
Dept: LAB | Facility: CLINIC | Age: 87
End: 2022-08-08
Payer: COMMERCIAL

## 2022-08-08 VITALS
HEART RATE: 72 BPM | OXYGEN SATURATION: 97 % | SYSTOLIC BLOOD PRESSURE: 165 MMHG | DIASTOLIC BLOOD PRESSURE: 83 MMHG | TEMPERATURE: 97.7 F

## 2022-08-08 DIAGNOSIS — R19.7 DIARRHEA, UNSPECIFIED TYPE: ICD-10-CM

## 2022-08-08 DIAGNOSIS — N18.30 STAGE 3 CHRONIC KIDNEY DISEASE, UNSPECIFIED WHETHER STAGE 3A OR 3B CKD (H): ICD-10-CM

## 2022-08-08 DIAGNOSIS — D63.1 ANEMIA IN CHRONIC KIDNEY DISEASE (CODE): ICD-10-CM

## 2022-08-08 DIAGNOSIS — M10.9 ACUTE GOUT OF LEFT FOOT, UNSPECIFIED CAUSE: ICD-10-CM

## 2022-08-08 DIAGNOSIS — K59.00 CONSTIPATION, UNSPECIFIED CONSTIPATION TYPE: ICD-10-CM

## 2022-08-08 DIAGNOSIS — N18.30 STAGE 3 CHRONIC KIDNEY DISEASE, UNSPECIFIED WHETHER STAGE 3A OR 3B CKD (H): Primary | ICD-10-CM

## 2022-08-08 DIAGNOSIS — Z13.220 SCREENING FOR HYPERLIPIDEMIA: Primary | ICD-10-CM

## 2022-08-08 DIAGNOSIS — Z52.4 KIDNEY DONOR: ICD-10-CM

## 2022-08-08 LAB
ALBUMIN MFR UR ELPH: 7.5 MG/DL
ALBUMIN SERPL-MCNC: 3.3 G/DL (ref 3.4–5)
ALBUMIN UR-MCNC: NEGATIVE MG/DL
ANION GAP SERPL CALCULATED.3IONS-SCNC: 6 MMOL/L (ref 3–14)
APPEARANCE UR: CLEAR
BACTERIA #/AREA URNS HPF: ABNORMAL /HPF
BILIRUB UR QL STRIP: NEGATIVE
BUN SERPL-MCNC: 26 MG/DL (ref 7–30)
CALCIUM SERPL-MCNC: 9.5 MG/DL (ref 8.5–10.1)
CHLORIDE BLD-SCNC: 111 MMOL/L (ref 94–109)
CHOLEST SERPL-MCNC: 177 MG/DL
CO2 SERPL-SCNC: 25 MMOL/L (ref 20–32)
COLOR UR AUTO: YELLOW
CREAT SERPL-MCNC: 1.45 MG/DL (ref 0.52–1.04)
CREAT UR-MCNC: 83.4 MG/DL
DEPRECATED CALCIDIOL+CALCIFEROL SERPL-MC: 90 UG/L (ref 20–75)
ERYTHROCYTE [DISTWIDTH] IN BLOOD BY AUTOMATED COUNT: 15.3 % (ref 10–15)
FASTING STATUS PATIENT QL REPORTED: NO
FERRITIN SERPL-MCNC: 29 NG/ML (ref 8–252)
GFR SERPL CREATININE-BSD FRML MDRD: 35 ML/MIN/1.73M2
GLUCOSE BLD-MCNC: 105 MG/DL (ref 70–99)
GLUCOSE UR STRIP-MCNC: NEGATIVE MG/DL
HCT VFR BLD AUTO: 35 % (ref 35–47)
HDLC SERPL-MCNC: 41 MG/DL
HGB BLD-MCNC: 10.7 G/DL (ref 11.7–15.7)
HGB UR QL STRIP: NEGATIVE
IRON SATN MFR SERPL: 9 % (ref 15–46)
IRON SERPL-MCNC: 29 UG/DL (ref 35–180)
KETONES UR STRIP-MCNC: NEGATIVE MG/DL
LDLC SERPL CALC-MCNC: 114 MG/DL
LEUKOCYTE ESTERASE UR QL STRIP: ABNORMAL
MCH RBC QN AUTO: 25.2 PG (ref 26.5–33)
MCHC RBC AUTO-ENTMCNC: 30.6 G/DL (ref 31.5–36.5)
MCV RBC AUTO: 82 FL (ref 78–100)
NITRATE UR QL: NEGATIVE
NONHDLC SERPL-MCNC: 136 MG/DL
PH UR STRIP: 5 [PH] (ref 5–7)
PHOSPHATE SERPL-MCNC: 3.2 MG/DL (ref 2.5–4.5)
PLATELET # BLD AUTO: 240 10E3/UL (ref 150–450)
POTASSIUM BLD-SCNC: 4.8 MMOL/L (ref 3.4–5.3)
PROT/CREAT 24H UR: 0.09 MG/MG CR (ref 0–0.2)
PTH-INTACT SERPL-MCNC: 31 PG/ML (ref 15–65)
RBC # BLD AUTO: 4.25 10E6/UL (ref 3.8–5.2)
RBC #/AREA URNS AUTO: ABNORMAL /HPF
SODIUM SERPL-SCNC: 142 MMOL/L (ref 133–144)
SP GR UR STRIP: 1.01 (ref 1–1.03)
SQUAMOUS #/AREA URNS AUTO: ABNORMAL /LPF
TIBC SERPL-MCNC: 316 UG/DL (ref 240–430)
TRIGL SERPL-MCNC: 108 MG/DL
TSH SERPL DL<=0.005 MIU/L-ACNC: 0.79 MU/L (ref 0.4–4)
UROBILINOGEN UR STRIP-ACNC: 0.2 E.U./DL
WBC # BLD AUTO: 4.7 10E3/UL (ref 4–11)
WBC #/AREA URNS AUTO: ABNORMAL /HPF

## 2022-08-08 PROCEDURE — 83970 ASSAY OF PARATHORMONE: CPT

## 2022-08-08 PROCEDURE — 80069 RENAL FUNCTION PANEL: CPT

## 2022-08-08 PROCEDURE — 81001 URINALYSIS AUTO W/SCOPE: CPT

## 2022-08-08 PROCEDURE — 82306 VITAMIN D 25 HYDROXY: CPT

## 2022-08-08 PROCEDURE — 36415 COLL VENOUS BLD VENIPUNCTURE: CPT

## 2022-08-08 PROCEDURE — 85027 COMPLETE CBC AUTOMATED: CPT

## 2022-08-08 PROCEDURE — 83550 IRON BINDING TEST: CPT

## 2022-08-08 PROCEDURE — 99214 OFFICE O/P EST MOD 30 MIN: CPT | Performed by: PHYSICIAN ASSISTANT

## 2022-08-08 PROCEDURE — 84443 ASSAY THYROID STIM HORMONE: CPT

## 2022-08-08 PROCEDURE — 80061 LIPID PANEL: CPT

## 2022-08-08 PROCEDURE — 84156 ASSAY OF PROTEIN URINE: CPT

## 2022-08-08 PROCEDURE — 82728 ASSAY OF FERRITIN: CPT

## 2022-08-08 RX ORDER — HYDROCODONE BITARTRATE AND ACETAMINOPHEN 5; 325 MG/1; MG/1
1 TABLET ORAL EVERY 6 HOURS PRN
Qty: 10 TABLET | Refills: 0 | Status: SHIPPED | OUTPATIENT
Start: 2022-08-08 | End: 2022-08-11

## 2022-08-08 RX ORDER — PREDNISONE 20 MG/1
20 TABLET ORAL DAILY
Qty: 5 TABLET | Refills: 0 | Status: SHIPPED | OUTPATIENT
Start: 2022-08-08 | End: 2022-08-13

## 2022-08-08 ASSESSMENT — ENCOUNTER SYMPTOMS: ARTHRALGIAS: 1

## 2022-08-08 NOTE — LETTER
August 25, 2022      Lara TORRES Monico  [address]  [facility] [address]        Dear ,      We are writing to inform you of your test results. Your thyroid test was normal.       Resulted Orders   Lipid panel reflex to direct LDL Non-fasting   Result Value Ref Range    Cholesterol 177 <200 mg/dL    Triglycerides 108 <150 mg/dL    Direct Measure HDL 41 (L) >=50 mg/dL    LDL Cholesterol Calculated 114 (H) <=100 mg/dL    Non HDL Cholesterol 136 (H) <130 mg/dL    Patient Fasting > 8hrs? No     Narrative    Cholesterol  Desirable:  <200 mg/dL    Triglycerides  Normal:  Less than 150 mg/dL  Borderline High:  150-199 mg/dL  High:  200-499 mg/dL  Very High:  Greater than or equal to 500 mg/dL    Direct Measure HDL  Female:  Greater than or equal to 50 mg/dL   Male:  Greater than or equal to 40 mg/dL    LDL Cholesterol  Desirable:  <100mg/dL  Above Desirable:  100-129 mg/dL   Borderline High:  130-159 mg/dL   High:  160-189 mg/dL   Very High:  >= 190 mg/dL    Non HDL Cholesterol  Desirable:  130 mg/dL  Above Desirable:  130-159 mg/dL  Borderline High:  160-189 mg/dL  High:  190-219 mg/dL  Very High:  Greater than or equal to 220 mg/dL   TSH with free T4 reflex   Result Value Ref Range    TSH 0.79 0.40 - 4.00 mU/L       If you have any questions or concerns, please call the clinic at the number listed above.       Sincerely,      Yecenia Dickerson MD/

## 2022-08-08 NOTE — LETTER
August 11, 2022      Lara TORRES Monico  [address]  [facility] [address]        Dear ,    We are writing to inform you of your test results. Good news! Your labs are within normal limits and there are no actions we need to take at this time.  We will plan to recheck you labs in 1 year at your next preventative visit or sooner if you have issues.       Resulted Orders   Lipid panel reflex to direct LDL Non-fasting   Result Value Ref Range    Cholesterol 177 <200 mg/dL    Triglycerides 108 <150 mg/dL    Direct Measure HDL 41 (L) >=50 mg/dL    LDL Cholesterol Calculated 114 (H) <=100 mg/dL    Non HDL Cholesterol 136 (H) <130 mg/dL    Patient Fasting > 8hrs? No     Narrative    Cholesterol  Desirable:  <200 mg/dL    Triglycerides  Normal:  Less than 150 mg/dL  Borderline High:  150-199 mg/dL  High:  200-499 mg/dL  Very High:  Greater than or equal to 500 mg/dL    Direct Measure HDL  Female:  Greater than or equal to 50 mg/dL   Male:  Greater than or equal to 40 mg/dL    LDL Cholesterol  Desirable:  <100mg/dL  Above Desirable:  100-129 mg/dL   Borderline High:  130-159 mg/dL   High:  160-189 mg/dL   Very High:  >= 190 mg/dL    Non HDL Cholesterol  Desirable:  130 mg/dL  Above Desirable:  130-159 mg/dL  Borderline High:  160-189 mg/dL  High:  190-219 mg/dL  Very High:  Greater than or equal to 220 mg/dL       If you have any questions or concerns, please call the clinic at the number listed above.       Sincerely,      RUCHI/BRIANNA Garsia CNP

## 2022-08-08 NOTE — PROGRESS NOTES
SUBJECTIVE:   Lara Marc is a 87 year old female presenting with a chief complaint of   Chief Complaint   Patient presents with     Arthritis     Pts left foot is very red and swollen, pain at times, at the end of the day pt is feeling a lot of pain, pt started seeing symptoms about a week ago, pt has been taking herbal celery and cherry pills for the pain and swelling       She is an established patient of [facility].  Patient presents with left foot erythema and pain x 4-5 days.  Treatment:  Celery and cherry pills, soaking, tylenol.  Last episode was 6/23.  Treated with prednisone and hydrocodone.  Patient states she only took 1 prednisone tablet because it made her feel funny.  She is willing to take prednisone now.  Patient has one kidney.  No trauma to the foot, no fevers.  She does not know the cause of the gout flare.          Review of Systems   Musculoskeletal: Positive for arthralgias.   All other systems reviewed and are negative.      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     Complete uterovaginal prolapse 2/8/2013     Deep vein thrombosis (DVT) (H) 5/7/2020     Dyspnea on exertion      Gastro-oesophageal reflux disease      Hypertension goal BP (blood pressure) < 140/90 7/25/2011     Other and unspecified hyperlipidemia      Unspecified essential hypertension     not medicated at this time     Uterovaginal prolapse, complete 2004     resolved '06     Vaginal enterocele, congenital or acquired 2007     or cystocele     Family History   Problem Relation Age  of Onset     Heart Disease Father      Diabetes Sister      Heart Disease Sister      Cancer - colorectal Brother      Prostate Cancer Brother      Current Outpatient Medications   Medication Sig Dispense Refill     acetaminophen (TYLENOL) 325 MG tablet Take 2 tablets (650 mg) by mouth every 8 hours       aspirin 81 MG EC tablet Take 81 mg by mouth daily       Cholecalciferol (VITAMIN D3 PO) Take by mouth daily       HYDROcodone-acetaminophen (NORCO) 5-325 MG tablet Take 1 tablet by mouth every 6 hours as needed for severe pain 10 tablet 0     lisinopril (ZESTRIL) 20 MG tablet TAKE 1 TABLET BY MOUTH EVERY DAY 90 tablet 0     Loperamide HCl (IMODIUM OR)        magnesium 250 MG tablet Take 1 tablet by mouth daily       Multiple Vitamins-Minerals (MULTIVITAMIN OR) Take 1 tablet by mouth daily.       predniSONE (DELTASONE) 20 MG tablet Take 1 tablet (20 mg) by mouth daily for 5 days 5 tablet 0     benzonatate (TESSALON) 100 MG capsule Take 1 capsule (100 mg) by mouth 3 times daily as needed for cough (Patient not taking: No sig reported) 15 capsule 0     lisinopril (ZESTRIL) 10 MG tablet TAKE 1 TABLET BY MOUTH EVERY DAY (Patient not taking: No sig reported) 30 tablet 0     Social History     Tobacco Use     Smoking status: Former Smoker     Packs/day: 0.00     Years: 17.00     Pack years: 0.00     Smokeless tobacco: Never Used     Tobacco comment: quit when she was 29 years old   Substance Use Topics     Alcohol use: No       OBJECTIVE  BP (!) 165/83 (BP Location: Right arm, Patient Position: Sitting, Cuff Size: Adult Regular)   Pulse 72   Temp 97.7  F (36.5  C) (Oral)   SpO2 97%     Physical Exam  Vitals and nursing note reviewed.   Constitutional:       Appearance: Normal appearance. She is obese.   Eyes:      Extraocular Movements: Extraocular movements intact.      Conjunctiva/sclera: Conjunctivae normal.   Cardiovascular:      Rate and Rhythm: Normal rate.   Skin:     Findings: Erythema present.      Comments:  Left foot:  Distal foot, from base of toes distally, erythematous.  Full ROM.  DP palpated.     Neurological:      General: No focal deficit present.      Mental Status: She is alert.   Psychiatric:         Mood and Affect: Mood normal.         Labs:  Results for orders placed or performed in visit on 08/08/22 (from the past 24 hour(s))   UA with Microscopic   Result Value Ref Range    Color Urine Yellow Colorless, Straw, Light Yellow, Yellow    Appearance Urine Clear Clear    Glucose Urine Negative Negative mg/dL    Bilirubin Urine Negative Negative    Ketones Urine Negative Negative mg/dL    Specific Gravity Urine 1.015 1.003 - 1.035    Blood Urine Negative Negative    pH Urine 5.0 5.0 - 7.0    Protein Albumin Urine Negative Negative mg/dL    Urobilinogen Urine 0.2 0.2, 1.0 E.U./dL    Nitrite Urine Negative Negative    Leukocyte Esterase Urine Moderate (A) Negative   CBC with platelets   Result Value Ref Range    WBC Count 4.7 4.0 - 11.0 10e3/uL    RBC Count 4.25 3.80 - 5.20 10e6/uL    Hemoglobin 10.7 (L) 11.7 - 15.7 g/dL    Hematocrit 35.0 35.0 - 47.0 %    MCV 82 78 - 100 fL    MCH 25.2 (L) 26.5 - 33.0 pg    MCHC 30.6 (L) 31.5 - 36.5 g/dL    RDW 15.3 (H) 10.0 - 15.0 %    Platelet Count 240 150 - 450 10e3/uL   Urine Microscopic Exam   Result Value Ref Range    Bacteria Urine Few (A) None Seen /HPF    RBC Urine None Seen 0-2 /HPF /HPF    WBC Urine 0-5 0-5 /HPF /HPF    Squamous Epithelials Urine Few (A) None Seen /LPF       X-Ray was not done.    ASSESSMENT:      ICD-10-CM    1. Stage 3 chronic kidney disease, unspecified whether stage 3a or 3b CKD (H)  N18.30    2. 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 Acute gout of left foot, unspecified cause  M10.9 predniSONE (DELTASONE) 20 MG tablet     HYDROcodone-acetaminophen (NORCO) 5-325 MG tablet        Medical Decision Making:    Differential Diagnosis:  gout    Serious Comorbid Conditions:  Adult:  reviewed    PLAN:    Rx for prednisone and norco,  Discussed SE of medication and  discussed expected course.  I've also asked patient to discuss the frequency of her gout attacks as she may benefit from preventative treatment.  Discussed reasons to seek immediate medical attention.        Followup:    If not improving or if condition worsens, follow up with your Primary Care Provider, If not improving or if conditions worsens over the next 12-24 hours, go to the Emergency Department    There are no Patient Instructions on file for this visit.

## 2022-09-01 DIAGNOSIS — I10 HYPERTENSION GOAL BP (BLOOD PRESSURE) < 140/90: ICD-10-CM

## 2022-09-01 NOTE — TELEPHONE ENCOUNTER
Routing refill request to provider for review/approval because:   Blood pressure under 140/90 in past 12 months    Normal serum creatinine on file in past 12 months     BP Readings from Last 3 Encounters:   08/08/22 (!) 165/83   07/26/22 118/78   07/15/22 138/60     Creatinine   Date Value Ref Range Status   08/08/2022 1.45 (H) 0.52 - 1.04 mg/dL Final   08/24/2020 1.26 (H) 0.52 - 1.04 mg/dL Final     Last Written Prescription Date:  6/6/22  Last Fill Quantity: 90,  # refills: 0   Last office visit: 8/4/22 virtual with House   Future Office Visit:      MEGHAN Carter RN  [facility]

## 2022-09-06 RX ORDER — LISINOPRIL 20 MG/1
TABLET ORAL
Qty: 90 TABLET | Refills: 0 | Status: SHIPPED | OUTPATIENT
Start: 2022-09-06 | End: 2022-12-06

## 2022-09-07 ENCOUNTER — TELEPHONE (OUTPATIENT)
Dept: GASTROENTEROLOGY | Facility: CLINIC | Age: 87
End: 2022-09-07

## 2022-09-07 NOTE — TELEPHONE ENCOUNTER
LVMx1 pt does not have my chart. Let the pt know that thy need to reschedule due to the provider not being available.     Reschedule with Madelyn Cordova

## 2022-10-19 ENCOUNTER — PRE VISIT (OUTPATIENT)
Dept: GASTROENTEROLOGY | Facility: CLINIC | Age: 87
End: 2022-10-19

## 2022-11-10 ENCOUNTER — HOSPITAL ENCOUNTER (EMERGENCY)
Facility: CLINIC | Age: 87
Discharge: HOME | End: 2022-11-11
Attending: EMERGENCY MEDICINE | Admitting: EMERGENCY MEDICINE
Payer: COMMERCIAL

## 2022-11-10 DIAGNOSIS — I71.40 ABDOMINAL ANEURYSM (H): ICD-10-CM

## 2022-11-10 DIAGNOSIS — Z11.52 ENCOUNTER FOR SCREENING LABORATORY TESTING FOR SEVERE ACUTE RESPIRATORY SYNDROME CORONAVIRUS 2 (SARS-COV-2): ICD-10-CM

## 2022-11-10 DIAGNOSIS — N30.00 ACUTE CYSTITIS WITHOUT HEMATURIA: ICD-10-CM

## 2022-11-10 PROCEDURE — 99284 EMERGENCY DEPT VISIT MOD MDM: CPT | Mod: CS | Performed by: EMERGENCY MEDICINE

## 2022-11-10 PROCEDURE — 99285 EMERGENCY DEPT VISIT HI MDM: CPT | Mod: CS,25

## 2022-11-10 PROCEDURE — C9803 HOPD COVID-19 SPEC COLLECT: HCPCS

## 2022-11-10 PROCEDURE — 96361 HYDRATE IV INFUSION ADD-ON: CPT

## 2022-11-10 PROCEDURE — 96375 TX/PRO/DX INJ NEW DRUG ADDON: CPT

## 2022-11-10 PROCEDURE — 96365 THER/PROPH/DIAG IV INF INIT: CPT

## 2022-11-11 ENCOUNTER — APPOINTMENT (OUTPATIENT)
Dept: CT IMAGING | Facility: CLINIC | Age: 87
End: 2022-11-11
Attending: EMERGENCY MEDICINE
Payer: COMMERCIAL

## 2022-11-11 VITALS
BODY MASS INDEX: 32.59 KG/M2 | DIASTOLIC BLOOD PRESSURE: 93 MMHG | HEART RATE: 68 BPM | TEMPERATURE: 97.9 F | RESPIRATION RATE: 16 BRPM | OXYGEN SATURATION: 100 % | SYSTOLIC BLOOD PRESSURE: 142 MMHG | HEIGHT: 60 IN | WEIGHT: 166 LBS

## 2022-11-11 LAB
ALBUMIN SERPL-MCNC: 3.7 G/DL (ref 3.4–5)
ALBUMIN UR-MCNC: 20 MG/DL
ALP SERPL-CCNC: 69 U/L (ref 40–150)
ALT SERPL W P-5'-P-CCNC: 25 U/L (ref 0–50)
ANION GAP SERPL CALCULATED.3IONS-SCNC: 5 MMOL/L (ref 3–14)
APPEARANCE UR: ABNORMAL
AST SERPL W P-5'-P-CCNC: 28 U/L (ref 0–45)
BACTERIA #/AREA URNS HPF: ABNORMAL /HPF
BASOPHILS # BLD AUTO: 0 10E3/UL (ref 0–0.2)
BASOPHILS NFR BLD AUTO: 1 %
BILIRUB SERPL-MCNC: 0.2 MG/DL (ref 0.2–1.3)
BILIRUB UR QL STRIP: NEGATIVE
BUN SERPL-MCNC: 32 MG/DL (ref 7–30)
CALCIUM SERPL-MCNC: 9.6 MG/DL (ref 8.5–10.1)
CHLORIDE BLD-SCNC: 108 MMOL/L (ref 94–109)
CO2 SERPL-SCNC: 28 MMOL/L (ref 20–32)
COLOR UR AUTO: YELLOW
CREAT SERPL-MCNC: 1.41 MG/DL (ref 0.52–1.04)
EOSINOPHIL # BLD AUTO: 0.3 10E3/UL (ref 0–0.7)
EOSINOPHIL NFR BLD AUTO: 4 %
ERYTHROCYTE [DISTWIDTH] IN BLOOD BY AUTOMATED COUNT: 14.6 % (ref 10–15)
GFR SERPL CREATININE-BSD FRML MDRD: 36 ML/MIN/1.73M2
GLUCOSE BLD-MCNC: 109 MG/DL (ref 70–99)
GLUCOSE UR STRIP-MCNC: NEGATIVE MG/DL
HCT VFR BLD AUTO: 39 % (ref 35–47)
HGB BLD-MCNC: 12 G/DL (ref 11.7–15.7)
HGB UR QL STRIP: NEGATIVE
HYALINE CASTS: 12 /LPF
IMM GRANULOCYTES # BLD: 0 10E3/UL
IMM GRANULOCYTES NFR BLD: 0 %
KETONES UR STRIP-MCNC: NEGATIVE MG/DL
LACTATE SERPL-SCNC: 0.8 MMOL/L (ref 0.7–2)
LEUKOCYTE ESTERASE UR QL STRIP: ABNORMAL
LIPASE SERPL-CCNC: 189 U/L (ref 73–393)
LYMPHOCYTES # BLD AUTO: 2 10E3/UL (ref 0.8–5.3)
LYMPHOCYTES NFR BLD AUTO: 28 %
MCH RBC QN AUTO: 25.5 PG (ref 26.5–33)
MCHC RBC AUTO-ENTMCNC: 30.8 G/DL (ref 31.5–36.5)
MCV RBC AUTO: 83 FL (ref 78–100)
MONOCYTES # BLD AUTO: 0.6 10E3/UL (ref 0–1.3)
MONOCYTES NFR BLD AUTO: 9 %
MUCOUS THREADS #/AREA URNS LPF: PRESENT /LPF
NEUTROPHILS # BLD AUTO: 4.3 10E3/UL (ref 1.6–8.3)
NEUTROPHILS NFR BLD AUTO: 58 %
NITRATE UR QL: NEGATIVE
NRBC # BLD AUTO: 0 10E3/UL
NRBC BLD AUTO-RTO: 0 /100
PH UR STRIP: 5.5 [PH] (ref 5–7)
PLATELET # BLD AUTO: 231 10E3/UL (ref 150–450)
POTASSIUM BLD-SCNC: 5.3 MMOL/L (ref 3.4–5.3)
PROT SERPL-MCNC: 7.6 G/DL (ref 6.8–8.8)
RBC # BLD AUTO: 4.71 10E6/UL (ref 3.8–5.2)
RBC URINE: 10 /HPF
SARS-COV-2 RNA RESP QL NAA+PROBE: NEGATIVE
SODIUM SERPL-SCNC: 141 MMOL/L (ref 133–144)
SP GR UR STRIP: 1.03 (ref 1–1.03)
SQUAMOUS EPITHELIAL: 25 /HPF
TRANSITIONAL EPI: 1 /HPF
UROBILINOGEN UR STRIP-MCNC: 2 MG/DL
WBC # BLD AUTO: 7.3 10E3/UL (ref 4–11)
WBC URINE: 85 /HPF

## 2022-11-11 PROCEDURE — 36415 COLL VENOUS BLD VENIPUNCTURE: CPT | Performed by: EMERGENCY MEDICINE

## 2022-11-11 PROCEDURE — 250N000011 HC RX IP 250 OP 636: Performed by: EMERGENCY MEDICINE

## 2022-11-11 PROCEDURE — 80053 COMPREHEN METABOLIC PANEL: CPT | Performed by: EMERGENCY MEDICINE

## 2022-11-11 PROCEDURE — 74176 CT ABD & PELVIS W/O CONTRAST: CPT

## 2022-11-11 PROCEDURE — 82040 ASSAY OF SERUM ALBUMIN: CPT | Performed by: EMERGENCY MEDICINE

## 2022-11-11 PROCEDURE — 83690 ASSAY OF LIPASE: CPT | Performed by: EMERGENCY MEDICINE

## 2022-11-11 PROCEDURE — 83605 ASSAY OF LACTIC ACID: CPT | Performed by: EMERGENCY MEDICINE

## 2022-11-11 PROCEDURE — 81001 URINALYSIS AUTO W/SCOPE: CPT | Performed by: EMERGENCY MEDICINE

## 2022-11-11 PROCEDURE — U0005 INFEC AGEN DETEC AMPLI PROBE: HCPCS | Performed by: EMERGENCY MEDICINE

## 2022-11-11 PROCEDURE — 85025 COMPLETE CBC W/AUTO DIFF WBC: CPT | Performed by: EMERGENCY MEDICINE

## 2022-11-11 PROCEDURE — 87086 URINE CULTURE/COLONY COUNT: CPT | Performed by: EMERGENCY MEDICINE

## 2022-11-11 PROCEDURE — 258N000003 HC RX IP 258 OP 636

## 2022-11-11 RX ORDER — CEFTRIAXONE 1 G/1
1 INJECTION, POWDER, FOR SOLUTION INTRAMUSCULAR; INTRAVENOUS ONCE
Status: COMPLETED | OUTPATIENT
Start: 2022-11-11 | End: 2022-11-11

## 2022-11-11 RX ORDER — SODIUM CHLORIDE 9 MG/ML
INJECTION, SOLUTION INTRAVENOUS
Status: COMPLETED
Start: 2022-11-11 | End: 2022-11-11

## 2022-11-11 RX ORDER — ONDANSETRON 2 MG/ML
4 INJECTION INTRAMUSCULAR; INTRAVENOUS ONCE
Status: COMPLETED | OUTPATIENT
Start: 2022-11-11 | End: 2022-11-11

## 2022-11-11 RX ORDER — CEPHALEXIN 500 MG/1
500 CAPSULE ORAL 4 TIMES DAILY
Qty: 28 CAPSULE | Refills: 0 | Status: SHIPPED | OUTPATIENT
Start: 2022-11-11 | End: 2022-11-18

## 2022-11-11 RX ADMIN — ONDANSETRON 4 MG: 2 INJECTION INTRAMUSCULAR; INTRAVENOUS at 00:45

## 2022-11-11 RX ADMIN — SODIUM CHLORIDE 500 ML: 9 INJECTION, SOLUTION INTRAVENOUS at 00:45

## 2022-11-11 RX ADMIN — CEFTRIAXONE SODIUM 1 G: 1 INJECTION, POWDER, FOR SOLUTION INTRAMUSCULAR; INTRAVENOUS at 01:42

## 2022-11-11 RX ADMIN — Medication 500 ML: at 00:45

## 2022-11-11 ASSESSMENT — ENCOUNTER SYMPTOMS
FREQUENCY: 0
NAUSEA: 1
DIFFICULTY URINATING: 0
VOMITING: 0
ABDOMINAL PAIN: 1
DYSURIA: 0
DIARRHEA: 1
CHILLS: 0
FEVER: 0
CONSTIPATION: 1
HEMATURIA: 0

## 2022-11-11 ASSESSMENT — ACTIVITIES OF DAILY LIVING (ADL)
ADLS_ACUITY_SCORE: 37

## 2022-11-11 NOTE — ED PROVIDER NOTES
[facility] EMERGENCY DEPARTMENT ([place] campus)    11/10/22          History     Chief Complaint   Patient presents with     Abdominal Pain     Burning pain in stomach all day.  Took tylenol a couple of hours ago and feels better unless eaying.     The history is provided by the patient.     Lara Marc is a 87 year old female with history of stage 3 CKD, GERD, AAA, HTN, and DVT. She presents to the ED with burning abdominal pain. She is also currently experiencing pain in lower right greater than left abdomen and denies having an appetite. Symptoms tend to worsen with food. Patient states that she is either always experiencing diarrhrea or constipation and that it cycles throughout the week. She states that this cycling has been occurring for years. Patient has been experiencing loss of appetite and experienced a weight loss of 10 lbs one month ago due to diarrhea. Patient reports having gas and belching. She has been having diarrhea for the last couple days but no bowel movement today. Patient notes that lost bowel control a couple days ago. Patient reports nausea but no vomiting. She also states that urinary habits and breathing patterns are regular. Patient reports previous history of cholecystectomy and hysterectomy. No history of bowel surgery. Pt is not on anti-coagulants. She denies history of cardiac issues.       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     Complete uterovaginal prolapse 2/8/2013     Deep vein thrombosis (DVT) (H)  5/7/2020     Dyspnea on exertion      Gastro-oesophageal reflux disease      Hypertension goal BP (blood pressure) < 140/90 7/25/2011     Other and unspecified hyperlipidemia      Unspecified essential hypertension     not medicated at this time     Uterovaginal prolapse, complete 2004     resolved '06     Vaginal enterocele, congenital or acquired 2007     or cystocele     Past Surgical History:   Procedure Laterality Date     childbirth X9[       CLOSED RX RIB FRACTURE  7/06    left     DAVINCI SACROCOLPOPEXY, MIDURETHRAL SLING, CYSTOSCOPY  2/8/2013    Procedure: DAVINCI SACROCOLPOPEXY, MIDURETHRAL SLING, CYSTOSCOPY;  DAVINCI SACROCOLPOPEXY, TVT EXACT SLING, RIGHT SALPINGO-OOPHERECTOMY, CYSTOSCOPY;  Surgeon: Bennie Galdamez MD;  Location:  OR     LAPAROSCOPIC CHOLECYSTECTOMY  3/31/2011    Procedure:LAPAROSCOPIC CHOLECYSTECTOMY; Surgeon:DAVID MOLINA; Location:[facility] OR     [facility] 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     ZZC VAGINAL HYSTERECTOMY  9/15/06    TVH/BSO/A&P repair/sacrospinus ligament fixation......childbirth x 9     ZZ COLONOSCOPY THRU STOMA, DIAGNOSTIC  7/2005    diverticulosis,small polyp,recheck 5 yrs     aspirin 81 MG EC tablet  cephALEXin (KEFLEX) 500 MG capsule  Cholecalciferol (VITAMIN D3 PO)  lisinopril (ZESTRIL) 20 MG tablet  Loperamide HCl (IMODIUM OR)  magnesium 250 MG tablet  Multiple Vitamins-Minerals (MULTIVITAMIN OR)  acetaminophen (TYLENOL) 325 MG tablet  benzonatate (TESSALON) 100 MG capsule  lisinopril (ZESTRIL) 10 MG tablet      Allergies   Allergen Reactions     Nkda [No Known Drug Allergies]      Seasonal Allergies      Itchy eyes and watery eyes     Family History  Family History   Problem Relation Age of Onset     Heart Disease Father      Diabetes Sister      Heart Disease Sister      Cancer - colorectal Brother      Prostate Cancer Brother      Social History   Social History     Tobacco Use     Smoking status: Former     Packs/day: 0.00     Years: 17.00      Pack years: 0.00     Types: Cigarettes     Smokeless tobacco: Never     Tobacco comments:     quit when she was 29 years old   Substance Use Topics     Alcohol use: No     Drug use: No      Past medical history, past surgical history, medications, allergies, family history, and social history were reviewed with the patient. No additional pertinent items.       Review of Systems   Constitutional: Negative for chills and fever.   Gastrointestinal: Positive for abdominal pain, constipation, diarrhea and nausea. Negative for vomiting.   Genitourinary: Negative for difficulty urinating, dysuria, frequency, hematuria and urgency.   All other systems reviewed and are negative.    A complete review of systems was performed with pertinent positives and negatives noted in the HPI, and all other systems negative.    Physical Exam   BP: (!) 166/90  Pulse: 72  Temp: 97.5  F (36.4  C)  Resp: 16  Height: 152.4 cm (5')  Weight: 75.3 kg (166 lb)  SpO2: 96 %  Physical Exam  Vitals and nursing note reviewed.   Constitutional:       General: She is not in acute distress.     Appearance: She is normal weight. She is not ill-appearing, toxic-appearing or diaphoretic.   HENT:      Head: Normocephalic and atraumatic.      Mouth/Throat:      Comments: Slight dry mucous membrane  Eyes:      General: No scleral icterus.     Extraocular Movements: Extraocular movements intact.   Cardiovascular:      Rate and Rhythm: Normal rate and regular rhythm.      Heart sounds: Normal heart sounds.   Pulmonary:      Effort: Pulmonary effort is normal.      Breath sounds: Normal breath sounds.   Abdominal:      General: Abdomen is flat. Bowel sounds are normal.      Palpations: Abdomen is soft.      Tenderness: There is no abdominal tenderness. There is no right CVA tenderness, left CVA tenderness, guarding or rebound. Negative signs include Strauss's sign and McBurney's sign.      Hernia: No hernia is present.   Skin:     General: Skin is warm and dry.    Neurological:      General: No focal deficit present.      Mental Status: She is alert and oriented to person, place, and time.   Psychiatric:         Mood and Affect: Mood normal.         Behavior: Behavior normal.         ED Course     12:32 AM  The patient was seen and examined by Debbie Hope MD   in Room ED06.     Procedures       The medical record was reviewed and interpreted.  Current labs reviewed and interpreted.  Previous labs reviewed and interpreted.  Current images reviewed and interpreted: ct abd/pelvis.              Results for orders placed or performed during the hospital encounter of 11/10/22   CT Abdomen Pelvis w/o Contrast     Status: None    Narrative    EXAM: CT ABDOMEN PELVIS W/O CONTRAST  LOCATION: [facility]  DATE/TIME: 11/11/2022 2:03 AM    INDICATION: lower abdominal pain  COMPARISON: CT abdomen 09/09/2019.  TECHNIQUE: CT scan of the abdomen and pelvis was performed without IV contrast. Multiplanar reformats were obtained. Dose reduction techniques were used.  CONTRAST: None.    FINDINGS:   LOWER CHEST: Mild scarring/atelectasis of the lung bases.    HEPATOBILIARY: A few small stable probable benign hepatic cysts. Post cholecystectomy.    PANCREAS: Normal.    SPLEEN: Normal.    ADRENAL GLANDS: Normal.    KIDNEYS/BLADDER: Post left nephrectomy. Normal right kidney and bladder.    BOWEL: Rectum distended with dense stool suggesting impaction. Redundant colon. No bowel obstruction or inflammation. Normal appendix. Small periampullary duodenal diverticulum. Small hiatal hernia.    LYMPH NODES: Normal.    VASCULATURE: 4.0 cm infrarenal abdominal aortic aneurysm has enlarged from 3.3 cm on 09/09/2019.    PELVIC ORGANS: Post hysterectomy.    MUSCULOSKELETAL: Small fat-containing right inguinal hernia. Demineralized skeleton. Degenerative changes of the spine.      Impression    IMPRESSION:   1.  Rectum distended with stool suggesting impaction.  2.   4.0 cm infrarenal abdominal aortic aneurysm has enlarged from 3.3 cm on 09/09/2019.  3.  Small hiatal hernia.  4.  Postcholecystectomy, left nephrectomy, and hysterectomy.     Comprehensive metabolic panel     Status: Abnormal   Result Value Ref Range    Sodium 141 133 - 144 mmol/L    Potassium 5.3 3.4 - 5.3 mmol/L    Chloride 108 94 - 109 mmol/L    Carbon Dioxide (CO2) 28 20 - 32 mmol/L    Anion Gap 5 3 - 14 mmol/L    Urea Nitrogen 32 (H) 7 - 30 mg/dL    Creatinine 1.41 (H) 0.52 - 1.04 mg/dL    Calcium 9.6 8.5 - 10.1 mg/dL    Glucose 109 (H) 70 - 99 mg/dL    Alkaline Phosphatase 69 40 - 150 U/L    AST 28 0 - 45 U/L    ALT 25 0 - 50 U/L    Protein Total 7.6 6.8 - 8.8 g/dL    Albumin 3.7 3.4 - 5.0 g/dL    Bilirubin Total 0.2 0.2 - 1.3 mg/dL    GFR Estimate 36 (L) >60 mL/min/1.73m2   Lipase     Status: Normal   Result Value Ref Range    Lipase 189 73 - 393 U/L   Lactic acid whole blood     Status: Normal   Result Value Ref Range    Lactic Acid 0.8 0.7 - 2.0 mmol/L   UA with Microscopic reflex to Culture     Status: Abnormal    Specimen: Urine, Midstream   Result Value Ref Range    Color Urine Yellow Colorless, Straw, Light Yellow, Yellow    Appearance Urine Slightly Cloudy (A) Clear    Glucose Urine Negative Negative mg/dL    Bilirubin Urine Negative Negative    Ketones Urine Negative Negative mg/dL    Specific Gravity Urine 1.028 1.003 - 1.035    Blood Urine Negative Negative    pH Urine 5.5 5.0 - 7.0    Protein Albumin Urine 20 (A) Negative mg/dL    Urobilinogen Urine 2.0 Normal, 2.0 mg/dL    Nitrite Urine Negative Negative    Leukocyte Esterase Urine Large (A) Negative    Bacteria Urine Few (A) None Seen /HPF    Mucus Urine Present (A) None Seen /LPF    RBC Urine 10 (H) <=2 /HPF    WBC Urine 85 (H) <=5 /HPF    Squamous Epithelials Urine 25 (H) <=1 /HPF    Transitional Epithelials Urine 1 <=1 /HPF    Hyaline Casts Urine 12 (H) <=2 /LPF    Narrative    Urine Culture ordered based on laboratory criteria   CBC with  platelets and differential     Status: Abnormal   Result Value Ref Range    WBC Count 7.3 4.0 - 11.0 10e3/uL    RBC Count 4.71 3.80 - 5.20 10e6/uL    Hemoglobin 12.0 11.7 - 15.7 g/dL    Hematocrit 39.0 35.0 - 47.0 %    MCV 83 78 - 100 fL    MCH 25.5 (L) 26.5 - 33.0 pg    MCHC 30.8 (L) 31.5 - 36.5 g/dL    RDW 14.6 10.0 - 15.0 %    Platelet Count 231 150 - 450 10e3/uL    % Neutrophils 58 %    % Lymphocytes 28 %    % Monocytes 9 %    % Eosinophils 4 %    % Basophils 1 %    % Immature Granulocytes 0 %    NRBCs per 100 WBC 0 <1 /100    Absolute Neutrophils 4.3 1.6 - 8.3 10e3/uL    Absolute Lymphocytes 2.0 0.8 - 5.3 10e3/uL    Absolute Monocytes 0.6 0.0 - 1.3 10e3/uL    Absolute Eosinophils 0.3 0.0 - 0.7 10e3/uL    Absolute Basophils 0.0 0.0 - 0.2 10e3/uL    Absolute Immature Granulocytes 0.0 <=0.4 10e3/uL    Absolute NRBCs 0.0 10e3/uL   Asymptomatic COVID-19 Virus (Coronavirus) by PCR Nasopharyngeal     Status: Normal    Specimen: Nasopharyngeal; Swab   Result Value Ref Range    SARS CoV2 PCR Negative Negative    Narrative    Testing was performed using the Xpert Xpress SARS-CoV-2 Assay on the   Cepheid Gene-Xpert Instrument Systems. Additional information about   this Emergency Use Authorization (EUA) assay can be found via the Lab   Guide. This test should be ordered for the detection of SARS-CoV-2 in   individuals who meet SARS-CoV-2 clinical and/or epidemiological   criteria. Test performance is unknown in asymptomatic patients. This   test is for in vitro diagnostic use under the FDA EUA for   laboratories certified under CLIA to perform high complexity testing.   This test has not been FDA cleared or approved. A negative result   does not rule out the presence of PCR inhibitors in the specimen or   target RNA in concentration below the limit of detection for the   assay. The possibility of a false negative should be considered if   the patient's recent exposure or clinical presentation suggests   COVID-19. This  test was validated by the [facility]. This laboratory is certified under the Clinical Laboratory Improvement Amendments of 1988 (CLIA-88) as qualified to perform high complexity laboratory testing.     CBC with platelets differential     Status: Abnormal    Narrative    The following orders were created for panel order CBC with platelets differential.  Procedure                               Abnormality         Status                     ---------                               -----------         ------                     CBC with platelets and d...[[id number]]  Abnormal            Final result                 Please view results for these tests on the individual orders.     Medications   0.9% sodium chloride BOLUS (0 mLs Intravenous Stopped 11/11/22 0211)   ondansetron (ZOFRAN) injection 4 mg (4 mg Intravenous Given 11/11/22 0045)   cefTRIAXone (ROCEPHIN) 1 g vial to attach to  mL bag for ADULTS or NS 50 mL bag for PEDS (0 g Intravenous Stopped 11/11/22 0223)        Assessments & Plan (with Medical Decision Making)   Lara Marc is a 87 year old female with history of stage 3 CKD, GERD, AAA, HTN, and DVT. Presents with lower abdominal discomfort.  Also diarrhea which she says alternates between constipation and diarrhea and is not new for her.   Abdominal exam is soft and no mass.  She appears well.   Labs and imaging ordered and reviewed. Chose to do ct without contrast given creatinine level and she only has one kidney.  (per vascular staff without contract is still good for evaluating for aortic rupture concerns)  UA suggest uti.  Given rocephin and keflex for home.  UC sent.   Discussed with vascular staff.  Can f/u in 6 mo or just f/u with pmd.  Says increased 2-3 mm per year and based on age wouldn't offer repair for her until her aneurysm in 5.5 cm.  Offered option and she wants to just f/u with pmd.   Ct also shows possible impacted stool.  She says she has  been having diarrhea.  She declines enema here or me trying to remove stool manually.  She wants to go home and use her stool softeners.   She will f/u with her pmd regarding all these issues in 1 week.     I have reviewed the nursing notes. I have reviewed the findings, diagnosis, plan and need for follow up with the patient.    Discharge Medication List as of 11/11/2022  4:46 AM      START taking these medications    Details   cephALEXin (KEFLEX) 500 MG capsule Take 1 capsule (500 mg) by mouth 4 times daily for 7 days, Disp-28 capsule, R-0, E-Prescribe             Final diagnoses:   Acute cystitis without hematuria   Abdominal aneurysm   I, Shabnam Martinez, am serving as a trained medical scribe to document services personally performed by Debbie Hope MD, based on the provider's statements to me.     I, Debbie Hope MD, was physically present and have reviewed and verified the accuracy of this note documented by Shabnam Martinez.      --  Debbie Hope MD  [facility] EMERGENCY DEPARTMENT  11/10/2022     Debbie Hope MD  11/11/22 0519

## 2022-11-11 NOTE — DISCHARGE INSTRUCTIONS
Use a stool softener suppository daily for the next 4 days to see if you can pass more solid stool seen on xray.  (Has this at home)    Take keflex as prescribed for urinary tract infection.     You have an aneurysm seen on imaging today.  Discuss this with your primary care doctor and discuss needed follow up with them.     Follow up with your primary care doctor in 1 week for recheck.  Have them recheck your constipation seen on imaging, the aneurysm seen on imaging and check your urine.     Please return if worsening/concerns.

## 2022-11-12 LAB — BACTERIA UR CULT: NORMAL

## 2022-11-15 ENCOUNTER — VIRTUAL VISIT (OUTPATIENT)
Dept: FAMILY MEDICINE | Facility: CLINIC | Age: 87
End: 2022-11-15
Payer: COMMERCIAL

## 2022-11-15 ENCOUNTER — TELEPHONE (OUTPATIENT)
Dept: VASCULAR SURGERY | Facility: CLINIC | Age: 87
End: 2022-11-15

## 2022-11-15 DIAGNOSIS — R19.7 DIARRHEA, UNSPECIFIED TYPE: ICD-10-CM

## 2022-11-15 DIAGNOSIS — I71.43 INFRARENAL ABDOMINAL AORTIC ANEURYSM (AAA) WITHOUT RUPTURE (H): Primary | ICD-10-CM

## 2022-11-15 DIAGNOSIS — I71.40 ABDOMINAL AORTIC ANEURYSM (AAA) WITHOUT RUPTURE (H): Primary | ICD-10-CM

## 2022-11-15 DIAGNOSIS — K59.00 CONSTIPATION, UNSPECIFIED CONSTIPATION TYPE: ICD-10-CM

## 2022-11-15 DIAGNOSIS — R15.2 FECAL URGENCY: ICD-10-CM

## 2022-11-15 PROCEDURE — 99443 PR PHYSICIAN TELEPHONE EVALUATION 21-30 MIN: CPT | Performed by: FAMILY MEDICINE

## 2022-11-15 NOTE — TELEPHONE ENCOUNTER
New vascular AAA referral   Referral Type: Vascular Surgery   Preferred Location: [facility]   Scheduling Instructions: Please call to schedule      Referring provider: Yecenia Dickerson MD     Most recent imaging completed: 11/1/22: CT abdomen/pelvis w/o contrast (in PACS)    Aneurysm measurements: 4.0 cm (previously 3.3cm in 2019)    Most recent creatinine: 1.41 on 11/11/22    Additional info: Was seen in ED recently for lower abdominal pain. Vascular was consulted for AAA and re commendation was to follow-up with repeat CT in 6 months.    Documentation routed to MD for review.    ALICIA Chavez, RN  [facility] - [facility] V[facility]  Ph: [phone]  Fax: [phone]

## 2022-11-15 NOTE — PROGRESS NOTES
"Lara is a 87 year old who is being evaluated via a billable telephone visit.           Assessment & Plan     Infrarenal abdominal aortic aneurysm (AAA) without rupture 4cm  Will plan on follow up with vascular in 6 months  Per ED visit notes: \"Discussed with vascular staff.  Can f/u in 6 mo or just f/u with pmd.  Says increased 2-3 mm per year and based on age wouldn't offer repair for her until her aneurysm in 5.5 cm.  Offered option and she wants to just f/u with pmd. \"  - Vascular Surgery Referral  - CT Abdomen w/o Contrast    Fecal urgency     Diarrhea, unspecified type     Constipation, unspecified constipation type  Had been scheduled with GI in October, but GI canceled. Re-referred. Discussed CT evidence of possible impaction. She declined disimpaction or enema in ER. Discussed trial of enema at home, schedule with GI.   - Adult GI  Referral - Consult Only      Multiple referrals have been placed in the past for her to visit with GI and with colorectal, but she has not seen either. She had an appt scheduled for 10/19/22 with GI, but it was canceled by GI.               MED REC REQUIRED   Post Medication Reconciliation Status:  Discharge medications reconciled and changed, see notes/orders    BMI:   Estimated body mass index is 32.42 kg/m  as calculated from the following:    Height as of 11/11/22: 1.524 m (5').    Weight as of 11/11/22: 75.3 kg (166 lb).             No follow-ups on file.   Follow-up Visit   Expected date:  May 15, 2023 (Approximate)      Follow Up Appointment Details:     Follow-up with whom?: Me    Follow-Up for what?: Medicare Wellness    Welcome or Annual?: Annual Wellness    How?: In Person    Is this an as-needed follow-up?: No                    Yecenia Dickerson MD   [facility]    Subjective   Lara is a 87 year old  presenting for the following health issues:  No chief complaint on file.      HPI     ED/UC Followup:    Facility:  [facility]  Date " "of visit: 11/10/22  Reason for visit: abdominal pain  Current Status: resolved       She reports she continues to have alternating constipation and diarrhea.  Did have a GI appointment scheduled, but the GI clinic called to cancel it.  She has not tried any enema when constipated.     Abdominal pain has resolved.  She is currently taking the Keflex as instructed.    Has questions about aortic aneurysm follow up  From ED visit notes:   \"Lara Marc is a 87 year old female with history of stage 3 CKD, GERD, AAA, HTN, and DVT. She presents to the ED with burning abdominal pain. She is also currently experiencing pain in lower right greater than left abdomen and denies having an appetite. Symptoms tend to worsen with food. Patient states that she is either always experiencing diarrhrea or constipation and that it cycles throughout the week. She states that this cycling has been occurring for years. Patient has been experiencing loss of appetite and experienced a weight loss of 10 lbs one month ago due to diarrhea. Patient reports having gas and belching. She has been having diarrhea for the last couple days but no bowel movement today. Patient notes that lost bowel control a couple days ago. Patient reports nausea but no vomiting. She also states that urinary habits and breathing patterns are regular. Patient reports previous history of cholecystectomy and hysterectomy. No history of bowel surgery. Pt is not on anti-coagulants. She denies history of cardiac issues. \"  \"  Assessments & Plan (with Medical Decision Making)   Lara Marc is a 87 year old female with history of stage 3 CKD, GERD, AAA, HTN, and DVT. Presents with lower abdominal discomfort.  Also diarrhea which she says alternates between constipation and diarrhea and is not new for her.   Abdominal exam is soft and no mass.  She appears well.   Labs and imaging ordered and reviewed. Chose to do ct without contrast given creatinine level and she only " "has one kidney.  (per vascular staff without contract is still good for evaluating for aortic rupture concerns)  UA suggest uti.  Given rocephin and keflex for home.  UC sent.   Discussed with vascular staff.  Can f/u in 6 mo or just f/u with pmd.  Says increased 2-3 mm per year and based on age wouldn't offer repair for her until her aneurysm in 5.5 cm.  Offered option and she wants to just f/u with pmd.   Ct also shows possible impacted stool.  She says she has been having diarrhea.  She declines enema here or me trying to remove stool manually.  She wants to go home and use her stool softeners.   She will f/u with her pmd regarding all these issues in 1 week.      \"  Review of Systems          Objective           Vitals:  No vitals were obtained today due to virtual visit.    Physical Exam   healthy, alert and no distress  PSYCH: Alert and oriented times 3; coherent speech, normal   rate and volume, able to articulate logical thoughts, able   to abstract reason, no tangential thoughts, no hallucinations   or delusions  Her affect is normal  RESP: No cough, no audible wheezing, able to talk in full sentences  Remainder of exam unable to be completed due to telephone visits    Admission on 11/10/2022, Discharged on 11/11/2022   Component Date Value Ref Range Status     Sodium 11/11/2022 141  133 - 144 mmol/L Final     Potassium 11/11/2022 5.3  3.4 - 5.3 mmol/L Final     Chloride 11/11/2022 108  94 - 109 mmol/L Final     Carbon Dioxide (CO2) 11/11/2022 28  20 - 32 mmol/L Final     Anion Gap 11/11/2022 5  3 - 14 mmol/L Final     Urea Nitrogen 11/11/2022 32 (H)  7 - 30 mg/dL Final     Creatinine 11/11/2022 1.41 (H)  0.52 - 1.04 mg/dL Final     Calcium 11/11/2022 9.6  8.5 - 10.1 mg/dL Final     Glucose 11/11/2022 109 (H)  70 - 99 mg/dL Final     Alkaline Phosphatase 11/11/2022 69  40 - 150 U/L Final     AST 11/11/2022 28  0 - 45 U/L Final     ALT 11/11/2022 25  0 - 50 U/L Final     Protein Total 11/11/2022 7.6  6.8 - " 8.8 g/dL Final     Albumin 11/11/2022 3.7  3.4 - 5.0 g/dL Final     Bilirubin Total 11/11/2022 0.2  0.2 - 1.3 mg/dL Final     GFR Estimate 11/11/2022 36 (L)  >60 mL/min/1.73m2 Final    Effective December 21, 2021 eGFRcr in adults is calculated using the 2021 CKD-EPI creatinine equation which includes age and gender (Tia et al., [facility], DOI: 10.1056/AQAZou7524854)     Lipase 11/11/2022 189  73 - 393 U/L Final     Lactic Acid 11/11/2022 0.8  0.7 - 2.0 mmol/L Final     Color Urine 11/11/2022 Yellow  Colorless, Straw, Light Yellow, Yellow Final     Appearance Urine 11/11/2022 Slightly Cloudy (A)  Clear Final     Glucose Urine 11/11/2022 Negative  Negative mg/dL Final     Bilirubin Urine 11/11/2022 Negative  Negative Final     Ketones Urine 11/11/2022 Negative  Negative mg/dL Final     Specific Gravity Urine 11/11/2022 1.028  1.003 - 1.035 Final     Blood Urine 11/11/2022 Negative  Negative Final     pH Urine 11/11/2022 5.5  5.0 - 7.0 Final     Protein Albumin Urine 11/11/2022 20 (A)  Negative mg/dL Final     Urobilinogen Urine 11/11/2022 2.0  Normal, 2.0 mg/dL Final     Nitrite Urine 11/11/2022 Negative  Negative Final     Leukocyte Esterase Urine 11/11/2022 Large (A)  Negative Final     Bacteria Urine 11/11/2022 Few (A)  None Seen /HPF Final     Mucus Urine 11/11/2022 Present (A)  None Seen /LPF Final     RBC Urine 11/11/2022 10 (H)  <=2 /HPF Final     WBC Urine 11/11/2022 85 (H)  <=5 /HPF Final     Squamous Epithelials Urine 11/11/2022 25 (H)  <=1 /HPF Final     Transitional Epithelials Urine 11/11/2022 1  <=1 /HPF Final     Hyaline Casts Urine 11/11/2022 12 (H)  <=2 /LPF Final     WBC Count 11/11/2022 7.3  4.0 - 11.0 10e3/uL Final     RBC Count 11/11/2022 4.71  3.80 - 5.20 10e6/uL Final     Hemoglobin 11/11/2022 12.0  11.7 - 15.7 g/dL Final     Hematocrit 11/11/2022 39.0  35.0 - 47.0 % Final     MCV 11/11/2022 83  78 - 100 fL Final     MCH 11/11/2022 25.5 (L)  26.5 - 33.0 pg Final     MCHC 11/11/2022 30.8 (L)  31.5 -  36.5 g/dL Final     RDW 11/11/2022 14.6  10.0 - 15.0 % Final     Platelet Count 11/11/2022 231  150 - 450 10e3/uL Final     % Neutrophils 11/11/2022 58  % Final     % Lymphocytes 11/11/2022 28  % Final     % Monocytes 11/11/2022 9  % Final     % Eosinophils 11/11/2022 4  % Final     % Basophils 11/11/2022 1  % Final     % Immature Granulocytes 11/11/2022 0  % Final     NRBCs per 100 WBC 11/11/2022 0  <1 /100 Final     Absolute Neutrophils 11/11/2022 4.3  1.6 - 8.3 10e3/uL Final     Absolute Lymphocytes 11/11/2022 2.0  0.8 - 5.3 10e3/uL Final     Absolute Monocytes 11/11/2022 0.6  0.0 - 1.3 10e3/uL Final     Absolute Eosinophils 11/11/2022 0.3  0.0 - 0.7 10e3/uL Final     Absolute Basophils 11/11/2022 0.0  0.0 - 0.2 10e3/uL Final     Absolute Immature Granulocytes 11/11/2022 0.0  <=0.4 10e3/uL Final     Absolute NRBCs 11/11/2022 0.0  10e3/uL Final     SARS CoV2 PCR 11/11/2022 Negative  Negative Final    NEGATIVE: SARS-CoV-2 (COVID-19) RNA not detected, presumed negative.     Culture 11/11/2022 >100,000 CFU/mL Mixture of urogenital genesis   Final              Phone call duration: 23 minutes

## 2022-11-16 NOTE — TELEPHONE ENCOUNTER
REFERRAL INFORMATION:    Referring Provider:  Dr. Yecenia Dickerson     Referring Clinic:  [facility]     Reason for Visit/Diagnosis: Fecal urgency, Diarrhea, Constipation      FUTURE VISIT INFORMATION:    Appointment Date: 1/6/2023    Appointment Time: 8 AM      NOTES STATUS DETAILS   OFFICE NOTE from Referring Provider Internal 11/15/2022, 8/4/2022, 9/13/2021, 4/22/2021 Office visit with Dr. Dickerson      OFFICE NOTE from Other Specialist Internal/ Received  7/15/2022 Office visit with Dr. Suzanna Belle ([facility])     2/18/2022 Office visit with BRIANNA Mercado CNP ([facility])     8/30/2021 Office visit with Dr. Shannan Nunez ([facility])    5/10/2021 Office visit with Laverne Marshall PA-C ([facility])      HOSPITAL DISCHARGE SUMMARY/  ED VISITS N/A    OPERATIVE REPORT N/A    MEDICATION LIST Internal         ENDOSCOPY  N/A    COLONOSCOPY N/A    ERCP N/A    EUS N/A    STOOL TESTING N/A    PERTINENT LABS Internal    PATHOLOGY REPORTS (RELATED) N/A    IMAGING (CT, MRI, EGD, MRCP, Small Bowel Follow Through/SBT, MR/CT Enterography) Internal CT Abdomen Pelvis: 11/11/2022, 9/10/19

## 2022-12-03 DIAGNOSIS — I10 HYPERTENSION GOAL BP (BLOOD PRESSURE) < 140/90: ICD-10-CM

## 2022-12-06 NOTE — TELEPHONE ENCOUNTER
Dr. Dickerson-Please review, sign if agree and may close encounter.    Routing refill request to provider for review/approval because:  BP elevated and creatinine abnormal  Creatinine   Date Value Ref Range Status   11/11/2022 1.41 (H) 0.52 - 1.04 mg/dL Final   08/24/2020 1.26 (H) 0.52 - 1.04 mg/dL Final       BP Readings from Last 3 Encounters:   11/11/22 (!) 142/93   08/08/22 (!) 165/83   07/26/22 118/78     Last Written Prescription Date:  9/6/22  Last Fill Quantity: 90,  # refills: 0   Last office visit: 11/15/22 with prescribing provider:  Dr. Dickerson   Future Office Visit:      Thank you!  CRISTIAN GilmanN, RN-[facility]

## 2022-12-07 RX ORDER — LISINOPRIL 20 MG/1
TABLET ORAL
Qty: 90 TABLET | Refills: 3 | Status: SHIPPED | OUTPATIENT
Start: 2022-12-07 | End: 2023-12-21

## 2022-12-12 ENCOUNTER — TELEPHONE (OUTPATIENT)
Dept: VASCULAR SURGERY | Facility: CLINIC | Age: 87
End: 2022-12-12

## 2022-12-12 NOTE — TELEPHONE ENCOUNTER
I spoke with the pt concerning the referral for a vascular ordered by Yecenia Dickerson. The pt is to have imaging and a consult with  in May of 2023..   The pt is stating that she would like to wait another month to decide if she is ready to schedule these appointment.  I will defer the this refer out a month.  Ian Lewis on 12/12/2022 at 2:51 PM

## 2022-12-27 ENCOUNTER — PATIENT OUTREACH (OUTPATIENT)
Dept: GASTROENTEROLOGY | Facility: CLINIC | Age: 87
End: 2022-12-27

## 2022-12-27 ENCOUNTER — TELEPHONE (OUTPATIENT)
Dept: GASTROENTEROLOGY | Facility: CLINIC | Age: 87
End: 2022-12-27

## 2022-12-27 NOTE — TELEPHONE ENCOUNTER
Called to remind patient of their upcoming appointment with our GI clinic, on Friday 1/6/2023 at 7:45AM with Shanique Pena. This appointment is scheduled as an in-person appt. Please arrive 15 minutes early to check in for your appointment. , if your appointment is virtual (video or telephone) you need to be in [place] for the visit. To reschedule or cancel patient to call [phone].    Chloe Mcmahon MA

## 2022-12-27 NOTE — TELEPHONE ENCOUNTER
Contacted patient to discuss her concerns   Patient had voiced concerns to the staff member that had called her to remind her of the upcoming appointment      Discussed with patient that appointment will be discussion of her symptoms and history and a plan will be discussed with the next steps   If any labs, imaging etc is deemed necessary patient will be assisted with the scheduling and any questions she make have  Patient had no further questions

## 2023-01-06 ENCOUNTER — PRE VISIT (OUTPATIENT)
Dept: GASTROENTEROLOGY | Facility: CLINIC | Age: 88
End: 2023-01-06

## 2023-02-07 ENCOUNTER — TELEPHONE (OUTPATIENT)
Dept: OBGYN | Facility: CLINIC | Age: 88
End: 2023-02-07
Payer: COMMERCIAL

## 2023-06-02 NOTE — TELEPHONE ENCOUNTER
Last Comprehensive Metabolic Panel:  Sodium   Date Value Ref Range Status   08/24/2020 143 133 - 144 mmol/L Final     Potassium   Date Value Ref Range Status   08/24/2020 4.2 3.4 - 5.3 mmol/L Final     Chloride   Date Value Ref Range Status   08/24/2020 113 (H) 94 - 109 mmol/L Final     Carbon Dioxide   Date Value Ref Range Status   08/24/2020 26 20 - 32 mmol/L Final     Anion Gap   Date Value Ref Range Status   08/24/2020 4 3 - 14 mmol/L Final     Glucose   Date Value Ref Range Status   08/24/2020 106 (H) 70 - 99 mg/dL Final     Urea Nitrogen   Date Value Ref Range Status   08/24/2020 23 7 - 30 mg/dL Final     Creatinine   Date Value Ref Range Status   08/24/2020 1.26 (H) 0.52 - 1.04 mg/dL Final     GFR Estimate   Date Value Ref Range Status   08/24/2020 39 (L) >60 mL/min/[1.73_m2] Final     Comment:     Non  GFR Calc  Starting 12/18/2018, serum creatinine based estimated GFR (eGFR) will be   calculated using the Chronic Kidney Disease Epidemiology Collaboration   (CKD-EPI) equation.       Calcium   Date Value Ref Range Status   08/24/2020 8.9 8.5 - 10.1 mg/dL Final     Last appointment 9/9/20   V-Y Flap Text: The defect edges were debeveled with a #15 scalpel blade. Given the location of the defect, shape of the defect and the proximity to free margins a V-Y flap was deemed most appropriate. Using a sterile surgical marker, an appropriate advancement flap was drawn incorporating the defect and placing the expected incisions within the relaxed skin tension lines where possible. The area thus outlined was incised deep to adipose tissue with a #15 scalpel blade. The skin margins were undermined to an appropriate distance in all directions utilizing iris scissors. Following this, the designed flap was advanced and carried over into the primary defect and sutured into place.

## 2023-06-03 ENCOUNTER — NURSE TRIAGE (OUTPATIENT)
Dept: NURSING | Facility: CLINIC | Age: 88
End: 2023-06-03
Payer: COMMERCIAL

## 2023-06-03 ENCOUNTER — OFFICE VISIT (OUTPATIENT)
Dept: URGENT CARE | Facility: URGENT CARE | Age: 88
End: 2023-06-03
Payer: COMMERCIAL

## 2023-06-03 VITALS
SYSTOLIC BLOOD PRESSURE: 166 MMHG | DIASTOLIC BLOOD PRESSURE: 76 MMHG | HEART RATE: 66 BPM | OXYGEN SATURATION: 97 % | TEMPERATURE: 48.7 F

## 2023-06-03 DIAGNOSIS — T14.8XXA PUNCTURE WOUND: Primary | ICD-10-CM

## 2023-06-03 PROCEDURE — 90715 TDAP VACCINE 7 YRS/> IM: CPT | Performed by: STUDENT IN AN ORGANIZED HEALTH CARE EDUCATION/TRAINING PROGRAM

## 2023-06-03 PROCEDURE — 90471 IMMUNIZATION ADMIN: CPT | Performed by: STUDENT IN AN ORGANIZED HEALTH CARE EDUCATION/TRAINING PROGRAM

## 2023-06-03 PROCEDURE — 99212 OFFICE O/P EST SF 10 MIN: CPT | Mod: 25 | Performed by: STUDENT IN AN ORGANIZED HEALTH CARE EDUCATION/TRAINING PROGRAM

## 2023-06-03 NOTE — PATIENT INSTRUCTIONS
Good seeing you in clinic today. We gave you a tetanus shot. If you notice worsening redness, pain, discharge or fevers please come back to be seen urgently.

## 2023-06-03 NOTE — PROGRESS NOTES
Assessment & Plan     Puncture wound  Patient does not appear to have a superinfection of her puncture wound at this time. No evidence of ongoing bleeding. Return precautions given for worsening pain, redness, fevers. Tetanus vaccine given in clinic today.    Apple Graff MD  [facility] [place]    Ti Bermudez is a 87 year old, presenting for the following health issues:  Urgent Care and Musculoskeletal Problem (Pt stepped on a nail earlier this morning would like tetanus vaccine done today, )         View : No data to display.              HPI     Patient reports that she stepped on a nail was morning at 1 about 1/4 inch into the sole of her right foot. She has not had any ongoing fevers, worsening pain, worsening redness to the area since then. She presented to urgent care requesting a tetanus shot because she does not remember last time she had a tetanus vaccine.      Review of Systems   Constitutional, HEENT, cardiovascular, pulmonary, gi and gu systems are negative, except as otherwise noted.      Objective    BP (!) 166/76   Pulse 66   Temp (!) 48.7  F (9.3  C) (Temporal)   SpO2 97%   There is no height or weight on file to calculate BMI.  Physical Exam   GENERAL: healthy, alert and no distress  RESP: No increased work of breathing  CV: Appears well perfused  ABDOMEN: soft, nontender, no hepatosplenomegaly, no masses and bowel sounds normal  MS: Scabbed over small puncture wound in the bottom of the right mid foot. No erythema, discharge, significant tenderness to palpation of the area. No gross musculoskeletal defects noted, no edema

## 2023-06-03 NOTE — TELEPHONE ENCOUNTER
Telephone call  Patient calling she stepped on a nail she pulled it out herself it bled for a bit but she soaked it in Hydrogen peroxide  And put a band aid on it.  She does not have a record of getting a tetanus vaccine so recommended she go to urgent care for a tetnus vaccine.  She would like a antibiotic as well.  So she is going to the Urgent care.    Per protocol see HCP in 4 hours Care advice given.  Verbalizes understanding and agrees with plan.  She is going to Urgent care    Nery Marie RN   [facility] Nurse Advisor  10:57 AM 6/3/2023        Reason for Disposition    [1] Puncture wound of foot AND [2] puncture went through shoe (e.g., tennis shoe)    Additional Information    Negative: [1] Puncture wound of head, neck, chest, back, or abdomen AND [2] sounds life-threatening to the triager    Negative: Shock suspected (e.g., cold/pale/clammy skin, too weak to stand, low BP, rapid pulse)    Negative: Sounds like a life-threatening emergency to the triager    Negative: [1] Caused by a needlestick or other sharp object AND [2] possible exposure to another person's body fluids    Negative: Caused by an animal bite    Negative: Caused by a human bite    Negative: Caused by a marine animal sting or bite    Negative: Skin is cut or scraped, not punctured    Negative: Puncture wound of eye or eyelid    Negative: Foreign body is still in the skin (e.g., splinter, sliver, fishhook)    Negative: [1] Puncture wound of head, neck, chest, abdomen, or overlying a joint AND [2] could be deep    Negative: High pressure injection injury (e.g., from grease gun or paint gun, usually work-related)    Negative: Sounds like a serious injury to the triager    Negative: [1] Puncture wound of bare foot (no shoes) AND [2] setting was dirty  (Exception: shallow puncture)    Negative: [1] Puncture wound of foot AND [2] hurts too much to walk on it (i.e., unable to bear weight, severe limp)    Negative: [1] SEVERE pain AND  [2] not improved 2 hours after pain medicine    Protocols used: PUNCTURE WOUND-A-AH

## 2023-06-29 ENCOUNTER — TELEPHONE (OUTPATIENT)
Dept: VASCULAR SURGERY | Facility: CLINIC | Age: 88
End: 2023-06-29
Payer: COMMERCIAL

## 2023-06-29 DIAGNOSIS — I71.43 INFRARENAL ABDOMINAL AORTIC ANEURYSM (AAA) WITHOUT RUPTURE (H): Primary | ICD-10-CM

## 2023-06-29 NOTE — TELEPHONE ENCOUNTER
I contacted Lara dejah to attempt to discuss her referral. Pt was not ready to schedule 1 month ago and wanted some time to consider. I was unable to reach her and her VM is full.     I will attempt to follow-up with her again in a week or so.    CRISTIAN ChavezN, RN  RN Care Coordinator  [facility] Vascular Surgery - [facility] phone: [phone]  Fax: [phone]

## 2023-07-12 NOTE — TELEPHONE ENCOUNTER
Discussed recommendations with pt. Education provided regarding AAA enlargement and high mortality rate w/ AAA rupture. Pt in agreement with aortic US only (does not want groin/iliacs imaged dt poor experience with this in the past). Will set pt up with  provider.    CRISTIAN ChavezN, RN  RN Care Coordinator  [facility] Vascular Surgery - [facility] phone: [phone]  Fax: [phone]

## 2023-07-19 ENCOUNTER — TELEPHONE (OUTPATIENT)
Dept: VASCULAR SURGERY | Facility: CLINIC | Age: 88
End: 2023-07-19
Payer: COMMERCIAL

## 2023-07-19 NOTE — TELEPHONE ENCOUNTER
I spoke to the pt on 7/17/23 an the pt  declined to schedule,stating that she   does not want to do the imaging.  The pt states that the last time this type of ultrasound was done it was painful to her. The pt is wondering if there is another imaging that can be done or does the imaging have to be done?  Ian Lewis on 7/19/2023 at 4:45 PM

## 2023-07-25 NOTE — TELEPHONE ENCOUNTER
RC 7/17 the pt declined to schedule the pt does not want to do the imaging.  RC 7/19 See telephone encounter  RC 7/24 Left message to call at pt home.

## 2023-08-07 NOTE — TELEPHONE ENCOUNTER
I have been unsuccessful in my efforts to contact the pt by phone and Mychart to schedule the requested appointments.  I am sending out a letter to the pt today.  Ian Lewis on 8/7/2023 at 6:11 PM

## 2023-09-23 ENCOUNTER — NURSE TRIAGE (OUTPATIENT)
Dept: NURSING | Facility: CLINIC | Age: 88
End: 2023-09-23
Payer: COMMERCIAL

## 2023-09-23 ENCOUNTER — OFFICE VISIT (OUTPATIENT)
Dept: URGENT CARE | Facility: URGENT CARE | Age: 88
End: 2023-09-23
Payer: COMMERCIAL

## 2023-09-23 VITALS
BODY MASS INDEX: 32.42 KG/M2 | DIASTOLIC BLOOD PRESSURE: 72 MMHG | TEMPERATURE: 98.1 F | SYSTOLIC BLOOD PRESSURE: 121 MMHG | HEART RATE: 89 BPM | WEIGHT: 166 LBS | OXYGEN SATURATION: 99 %

## 2023-09-23 DIAGNOSIS — K64.9 HEMORRHOIDS, UNSPECIFIED HEMORRHOID TYPE: Primary | ICD-10-CM

## 2023-09-23 PROCEDURE — 99213 OFFICE O/P EST LOW 20 MIN: CPT | Performed by: PHYSICIAN ASSISTANT

## 2023-09-23 NOTE — PROGRESS NOTES
Hemorrhoids, unspecified hemorrhoid type       Hemorrhoids: Care Instructions  Overview     Hemorrhoids are swollen veins that develop in the anal canal. Bleeding during bowel movements, itching, and rectal pain are the most common symptoms. Hemorrhoids can be uncomfortable at times, but rarely are they a serious problem.  Most of the time, you can treat them with simple changes to your diet and bowel habits. These changes include eating more fiber and not straining to pass stools. Most hemorrhoids don't need surgery or other treatment unless they are very large and painful or bleed a lot.  Follow-up care is a key part of your treatment and safety. Be sure to make and go to all appointments, and call your doctor if you are having problems. It's also a good idea to know your test results and keep a list of the medicines you take.  How can you care for yourself at home?  Sit in a few inches of warm water (sitz bath) 3 times a day and after bowel movements. The warm water helps with pain and itching.  Put ice on your anal area several times a day for 10 minutes at a time. Put a thin cloth between the ice and your skin. Follow this by placing a warm, wet towel on the area for another 10 to 20 minutes.  Take pain medicines exactly as directed.  If the doctor gave you a prescription medicine for pain, take it as prescribed.  If you are not taking a prescription pain medicine, ask your doctor if you can take an over-the-counter medicine.  Keep the anal area clean, but be gentle. Use water and a fragrance-free soap, or use baby wipes or medicated pads such as Tucks.  Wear cotton underwear and loose clothing to decrease moisture in the anal area.  Eat more fiber. Include foods such as whole-grain breads and cereals, raw vegetables, raw and dried fruits, and beans.  Drink plenty of fluids. If you have kidney, heart, or liver disease and have to limit fluids, talk with your doctor before you increase the amount of fluids you  "drink.  Use a stool softener that contains bran or psyllium. You can save money by buying bran or psyllium (available in bulk at most health food stores) and sprinkling it on foods or stirring it into fruit juice. Or you can use a product such as Metamucil or Hydrocil.  Practice healthy bowel habits.  Go to the bathroom as soon as you have the urge.  Avoid straining to pass stools. Relax and give yourself time to let things happen naturally.  Do not hold your breath while passing stools.  Do not read while sitting on the toilet. Get off the toilet as soon as you have finished.  Take your medicines exactly as prescribed. Call your doctor if you think you are having a problem with your medicine.  When should you call for help?   Call 911 anytime you think you may need emergency care. For example, call if:    You pass maroon or very bloody stools.   Call your doctor now or seek immediate medical care if:    You have increased pain.     You have increased bleeding.   Watch closely for changes in your health, and be sure to contact your doctor if:    Your symptoms have not improved after 3 or 4 days.   Where can you learn more?  Go to [email or web address]/patiented  Enter F228 in the search box to learn more about \"Hemorrhoids: Care Instructions.\"  Current as of: March 22, 2023               Content Version: 13.7    1555-7271 [employer].   Care instructions adapted under license by your healthcare professional. If you have questions about a medical condition or this instruction, always ask your healthcare professional. [employer] disclaims any warranty or liability for your use of this information.             Patient was advised to return to clinic for reevaluation (either UC or PCP) if symptoms do not improve in 14 days. Patient educated on red flag symptoms and asked to go directly to the ED if these symptoms present themselves.         [facility] URGENT " CARE    Subjective   88 year old who presents to clinic today for the following health issues:    Diarrhea and Urgent Care       HPI       Onset/Duration: C/O diarrhea and rectal pain for 3 days- Patient states that she has been dealing with diarrhea and constipation for many years. She recently used a laxative which likely caused the diarrhea. Patient has began to have a burning and throbbing anal pain.   Diarrhea has been slowly improving lately. Her main concern is the rectal pain. Patient has noticed a small amount of blood once. No current abdomen pain.    Progression of Symptoms: improving  Accompanying signs and symptoms:       Fever: No       Nausea/Vomiting: No       Abdominal pain: No       Weight loss: No       Episodes of constipation: YES  History   Ill contacts: No  Recent use of antibiotics: No  Recent travels: No  Recent medication-new or changes(Rx or OTC): No  Precipitating or alleviating factors: None  Therapies tried and outcome: Senna and laxative suppository.     Review of Systems   Review of Systems   See HPI    Objective    Temp: 98.1  F (36.7  C) Temp src: Tympanic BP: 121/72 Pulse: 89     SpO2: 99 %       Physical Exam   Physical Exam  Constitutional:       General: She is not in acute distress.     Appearance: Normal appearance. She is normal weight. She is not ill-appearing, toxic-appearing or diaphoretic.   HENT:      Head: Normocephalic and atraumatic.   Cardiovascular:      Rate and Rhythm: Normal rate.      Pulses: Normal pulses.   Pulmonary:      Effort: Pulmonary effort is normal. No respiratory distress.   Neurological:      General: No focal deficit present.      Mental Status: She is alert and oriented to person, place, and time. Mental status is at baseline.      Gait: Gait normal.   Psychiatric:         Mood and Affect: Mood normal.         Behavior: Behavior normal.         Thought Content: Thought content normal.         Judgment: Judgment normal.          No results found  for this or any previous visit (from the past 24 hour(s)).

## 2023-09-23 NOTE — TELEPHONE ENCOUNTER
Nurse Triage SBAR    Is this a 2nd Level Triage? NO    Situation: Pain/ BM changes     Background: Pt initially c/o having spells of diarrhea off and on for several months. After discussing further pt stated she was constipated earlier this wk so she gave herself a suppository x2 on Tuesday. The diarrhea started after that. She hasn't had any loose stools today and is stating her stools are starting to firm up.    Assessment: Reports stools are no longer loose today, and are starting to firm up. C/o now having a pain in her butt. The pain comes when she's trying to have a BM. Reports she does have a hx of hemorrhoids. Small bld sometimes noted when she wipes. Reports moderate to severe sharp/burning pain when trying for BM. Still able to pass stool.     Protocol Recommended Disposition:   See PCP Within 24 Hours    Recommendation: See PCP within 24 hrs. Pt plans to be seen in urgent care today. Protocol and care advice reviewed. Advised to call back with any new or worsening signs, symptoms, concerns, or questions. They verbalized understanding and agreed to follow advice given.      Reason for Disposition   MODERATE-SEVERE rectal pain (i.e., interferes with school, work, or sleep)    Additional Information   Negative: Foreign body in rectum   Negative: Diarrhea is main symptom   Negative: Constipation is main symptom (e.g., pain or discomfort caused by passage of hard BMs)   Negative: Blood in or on bowel movement is main symptom   Negative: Pregnant   Negative: Pinworms are suspected (rectal itching; (white thread-like worm about size of a staple, moves)   Negative: [1] Mpox suspected (e.g., direct skin contact such as sex, recent travel to [place] or [place]) AND [2] symptoms of Mpox (e.g., rectal rash or sores, fever, muscle aches, or swollen lymph nodes)   Negative: [1] At risk for Mpox (men-who-have-sex-with-men) AND [2] possible exposure (e.g., multiple sex partners in past 21 days) AND [3] symptoms of  Mpox (e.g., rectal rash or sores, fever, muscle aches, or swollen lymph nodes)   Negative: Sexual assault or rape (sexual intercourse or activity occurs without freely given consent), known or suspected   Negative: Injury to rectum   Negative: Large mass protruding out of rectum   Negative: Patient sounds very sick or weak to the triager   Negative: SEVERE rectal pain (e.g., excruciating, unable to have a bowel movement)   Negative: [1] Rectal pain or redness AND [2] fever   Negative: [1] Sudden onset rectal pain AND [2] constipated (straining, rectal pressure or fullness) AND [3] NOT better after SITZ bath, suppository or enema    Protocols used: Rectal Symptoms-A-    Linda Ortez RN on 9/23/2023 at 2:29 PM

## 2023-12-21 DIAGNOSIS — I10 HYPERTENSION GOAL BP (BLOOD PRESSURE) < 140/90: ICD-10-CM

## 2023-12-21 RX ORDER — LISINOPRIL 20 MG/1
TABLET ORAL
Qty: 90 TABLET | Refills: 0 | Status: SHIPPED | OUTPATIENT
Start: 2023-12-21 | End: 2024-03-18

## 2023-12-21 NOTE — TELEPHONE ENCOUNTER
Patient is due for an appointment.   I sent a kevin refill for 90 days.   Reception: Please call to schedule patient for an appointment.   Thank you!!   AH

## 2024-03-17 ENCOUNTER — HOSPITAL ENCOUNTER (EMERGENCY)
Facility: CLINIC | Age: 89
Discharge: HOME | End: 2024-03-17
Attending: EMERGENCY MEDICINE | Admitting: EMERGENCY MEDICINE
Payer: COMMERCIAL

## 2024-03-17 ENCOUNTER — APPOINTMENT (OUTPATIENT)
Dept: CT IMAGING | Facility: CLINIC | Age: 89
End: 2024-03-17
Attending: EMERGENCY MEDICINE
Payer: COMMERCIAL

## 2024-03-17 VITALS
HEART RATE: 82 BPM | TEMPERATURE: 97.7 F | SYSTOLIC BLOOD PRESSURE: 185 MMHG | RESPIRATION RATE: 16 BRPM | OXYGEN SATURATION: 96 % | DIASTOLIC BLOOD PRESSURE: 94 MMHG

## 2024-03-17 DIAGNOSIS — R10.30 LOWER ABDOMINAL PAIN: ICD-10-CM

## 2024-03-17 DIAGNOSIS — Z13.220 LIPID SCREENING: ICD-10-CM

## 2024-03-17 DIAGNOSIS — I71.43 INFRARENAL ABDOMINAL AORTIC ANEURYSM (AAA) WITHOUT RUPTURE (H): ICD-10-CM

## 2024-03-17 DIAGNOSIS — M10.9 GOUT INVOLVING TOE, UNSPECIFIED CAUSE, UNSPECIFIED CHRONICITY, UNSPECIFIED LATERALITY: ICD-10-CM

## 2024-03-17 DIAGNOSIS — N18.32 STAGE 3B CHRONIC KIDNEY DISEASE (H): ICD-10-CM

## 2024-03-17 LAB
ALBUMIN SERPL BCG-MCNC: 3.9 G/DL (ref 3.5–5.2)
ALBUMIN UR-MCNC: NEGATIVE MG/DL
ALP SERPL-CCNC: 63 U/L (ref 40–150)
ALT SERPL W P-5'-P-CCNC: 11 U/L (ref 0–50)
ANION GAP SERPL CALCULATED.3IONS-SCNC: 9 MMOL/L (ref 7–15)
APPEARANCE UR: CLEAR
AST SERPL W P-5'-P-CCNC: 22 U/L (ref 0–45)
BASOPHILS # BLD AUTO: 0 10E3/UL (ref 0–0.2)
BASOPHILS NFR BLD AUTO: 0 %
BILIRUB SERPL-MCNC: 0.2 MG/DL
BILIRUB UR QL STRIP: NEGATIVE
BUN SERPL-MCNC: 19.8 MG/DL (ref 8–23)
CALCIUM SERPL-MCNC: 9.8 MG/DL (ref 8.8–10.2)
CHLORIDE SERPL-SCNC: 106 MMOL/L (ref 98–107)
COLOR UR AUTO: YELLOW
CREAT BLD-MCNC: 1.4 MG/DL (ref 0.5–1)
CREAT SERPL-MCNC: 1.29 MG/DL (ref 0.51–0.95)
DEPRECATED HCO3 PLAS-SCNC: 25 MMOL/L (ref 22–29)
EGFRCR SERPLBLD CKD-EPI 2021: 36 ML/MIN/1.73M2
EGFRCR SERPLBLD CKD-EPI 2021: 40 ML/MIN/1.73M2
EOSINOPHIL # BLD AUTO: 0.1 10E3/UL (ref 0–0.7)
EOSINOPHIL NFR BLD AUTO: 2 %
ERYTHROCYTE [DISTWIDTH] IN BLOOD BY AUTOMATED COUNT: 15 % (ref 10–15)
GLUCOSE SERPL-MCNC: 83 MG/DL (ref 70–99)
GLUCOSE UR STRIP-MCNC: NEGATIVE MG/DL
HCT VFR BLD AUTO: 37.5 % (ref 35–47)
HGB BLD-MCNC: 11.3 G/DL (ref 11.7–15.7)
HGB UR QL STRIP: NEGATIVE
IMM GRANULOCYTES # BLD: 0 10E3/UL
IMM GRANULOCYTES NFR BLD: 0 %
KETONES UR STRIP-MCNC: NEGATIVE MG/DL
LACTATE SERPL-SCNC: 1.1 MMOL/L (ref 0.7–2)
LEUKOCYTE ESTERASE UR QL STRIP: ABNORMAL
LIPASE SERPL-CCNC: 27 U/L (ref 13–60)
LYMPHOCYTES # BLD AUTO: 1.7 10E3/UL (ref 0.8–5.3)
LYMPHOCYTES NFR BLD AUTO: 27 %
MCH RBC QN AUTO: 25.2 PG (ref 26.5–33)
MCHC RBC AUTO-ENTMCNC: 30.1 G/DL (ref 31.5–36.5)
MCV RBC AUTO: 84 FL (ref 78–100)
MONOCYTES # BLD AUTO: 0.5 10E3/UL (ref 0–1.3)
MONOCYTES NFR BLD AUTO: 7 %
MUCOUS THREADS #/AREA URNS LPF: PRESENT /LPF
NEUTROPHILS # BLD AUTO: 4 10E3/UL (ref 1.6–8.3)
NEUTROPHILS NFR BLD AUTO: 64 %
NITRATE UR QL: NEGATIVE
NRBC # BLD AUTO: 0 10E3/UL
NRBC BLD AUTO-RTO: 0 /100
PH UR STRIP: 5 [PH] (ref 5–7)
PLATELET # BLD AUTO: 252 10E3/UL (ref 150–450)
POTASSIUM SERPL-SCNC: 4.5 MMOL/L (ref 3.4–5.3)
PROT SERPL-MCNC: 7 G/DL (ref 6.4–8.3)
RBC # BLD AUTO: 4.49 10E6/UL (ref 3.8–5.2)
RBC URINE: 2 /HPF
SODIUM SERPL-SCNC: 140 MMOL/L (ref 135–145)
SP GR UR STRIP: 1.02 (ref 1–1.03)
SQUAMOUS EPITHELIAL: 2 /HPF
TRANSITIONAL EPI: <1 /HPF
UROBILINOGEN UR STRIP-MCNC: NORMAL MG/DL
WBC # BLD AUTO: 6.3 10E3/UL (ref 4–11)
WBC URINE: 14 /HPF

## 2024-03-17 PROCEDURE — 83605 ASSAY OF LACTIC ACID: CPT | Performed by: EMERGENCY MEDICINE

## 2024-03-17 PROCEDURE — 74176 CT ABD & PELVIS W/O CONTRAST: CPT

## 2024-03-17 PROCEDURE — 81001 URINALYSIS AUTO W/SCOPE: CPT | Performed by: EMERGENCY MEDICINE

## 2024-03-17 PROCEDURE — 80061 LIPID PANEL: CPT

## 2024-03-17 PROCEDURE — 87086 URINE CULTURE/COLONY COUNT: CPT | Performed by: EMERGENCY MEDICINE

## 2024-03-17 PROCEDURE — 84550 ASSAY OF BLOOD/URIC ACID: CPT

## 2024-03-17 PROCEDURE — 82565 ASSAY OF CREATININE: CPT | Mod: 91

## 2024-03-17 PROCEDURE — 83690 ASSAY OF LIPASE: CPT | Performed by: EMERGENCY MEDICINE

## 2024-03-17 PROCEDURE — 99284 EMERGENCY DEPT VISIT MOD MDM: CPT | Mod: 25 | Performed by: EMERGENCY MEDICINE

## 2024-03-17 PROCEDURE — 80053 COMPREHEN METABOLIC PANEL: CPT | Performed by: EMERGENCY MEDICINE

## 2024-03-17 PROCEDURE — 85025 COMPLETE CBC W/AUTO DIFF WBC: CPT | Performed by: EMERGENCY MEDICINE

## 2024-03-17 PROCEDURE — 82043 UR ALBUMIN QUANTITATIVE: CPT

## 2024-03-17 PROCEDURE — 99284 EMERGENCY DEPT VISIT MOD MDM: CPT | Performed by: EMERGENCY MEDICINE

## 2024-03-17 PROCEDURE — 36415 COLL VENOUS BLD VENIPUNCTURE: CPT | Performed by: EMERGENCY MEDICINE

## 2024-03-17 ASSESSMENT — ACTIVITIES OF DAILY LIVING (ADL)
ADLS_ACUITY_SCORE: 35
ADLS_ACUITY_SCORE: 37

## 2024-03-17 ASSESSMENT — COLUMBIA-SUICIDE SEVERITY RATING SCALE - C-SSRS
2. HAVE YOU ACTUALLY HAD ANY THOUGHTS OF KILLING YOURSELF IN THE PAST MONTH?: NO
6. HAVE YOU EVER DONE ANYTHING, STARTED TO DO ANYTHING, OR PREPARED TO DO ANYTHING TO END YOUR LIFE?: NO
1. IN THE PAST MONTH, HAVE YOU WISHED YOU WERE DEAD OR WISHED YOU COULD GO TO SLEEP AND NOT WAKE UP?: NO

## 2024-03-17 NOTE — LETTER
April 11, 2024      Lara TORRES Monico  [address]  [facility] [address]        Dear ,    We are writing to inform you of your test results.    The results of your recent lipid (cholesterol) profile were abnormal.     Resulted Orders   Uric acid   Result Value Ref Range    Uric Acid 8.6 (H) 2.4 - 5.7 mg/dL   Lipid panel reflex to direct LDL Non-fasting   Result Value Ref Range    Cholesterol 220 (H) <200 mg/dL    Triglycerides 110 <150 mg/dL    Direct Measure HDL 44 (L) >=50 mg/dL    LDL Cholesterol Calculated 154 (H) <=100 mg/dL    Non HDL Cholesterol 176 (H) <130 mg/dL    Narrative    Cholesterol  Desirable:  <200 mg/dL    Triglycerides  Normal:  Less than 150 mg/dL  Borderline High:  150-199 mg/dL  High:  200-499 mg/dL  Very High:  Greater than or equal to 500 mg/dL    Direct Measure HDL  Female:  Greater than or equal to 50 mg/dL   Male:  Greater than or equal to 40 mg/dL    LDL Cholesterol  Desirable:  <100mg/dL  Above Desirable:  100-129 mg/dL   Borderline High:  130-159 mg/dL   High:  160-189 mg/dL   Very High:  >= 190 mg/dL    Non HDL Cholesterol  Desirable:  130 mg/dL  Above Desirable:  130-159 mg/dL  Borderline High:  160-189 mg/dL  High:  190-219 mg/dL  Very High:  Greater than or equal to 220 mg/dL   Albumin Random Urine Quantitative with Creat Ratio   Result Value Ref Range    Creatinine Urine mg/dL 118.0 mg/dL      Comment:      The reference ranges have not been established in urine creatinine. The results should be integrated into the clinical context for interpretation.    Albumin Urine mg/L <12.0 mg/L      Comment:      The reference ranges have not been established in urine albumin. The results should be integrated into the clinical context for interpretation.    Albumin Urine mg/g Cr        Comment:      Unable to calculate, urine albumin and/or urine creatinine is outside detectable limits.  Microalbuminuria is defined as an albumin:creatinine ratio of 17 to 299 for males and 25  to 299 for females. A ratio of albumin:creatinine of 300 or higher is indicative of overt proteinuria.  Due to biologic variability, positive results should be confirmed by a second, first-morning random or 24-hour timed urine specimen. If there is discrepancy, a third specimen is recommended. When 2 out of 3 results are in the microalbuminuria range, this is evidence for incipient nephropathy and warrants increased efforts at glucose control, blood pressure control, and institution of therapy with an angiotensin-converting-enzyme (ACE) inhibitor (if the patient can tolerate it).         If you have any questions or concerns, please call the clinic at the number listed above.       Sincerely,      Yecenia Dickerson MD    IM

## 2024-03-17 NOTE — ED TRIAGE NOTES
"Abdominal pain, ongoing for \"a long time\", but the pain is getting worse. Intermittent pain, comes in waves, sharp, radiates down into pelvis area.     Triage Assessment (Adult)       Row Name 03/17/24 1522          Triage Assessment    Airway WDL WDL        Respiratory WDL    Respiratory WDL WDL        Skin Circulation/Temperature WDL    Skin Circulation/Temperature WDL WDL        Cardiac WDL    Cardiac WDL WDL        Peripheral/Neurovascular WDL    Peripheral Neurovascular WDL WDL        Cognitive/Neuro/Behavioral WDL    Cognitive/Neuro/Behavioral WDL WDL                     "

## 2024-03-17 NOTE — ED PROVIDER NOTES
"ED Provider Note  [facility]      History     Chief Complaint   Patient presents with    Abdominal Pain     Abd pain, ongoing for \"a long time\", but the pain is getting worse. Intermittent pain, comes in waves, sharp, radiates down into pelvis area     HPI  Lara Marc is a 88 year old female with a history of CKD 3, GERD, AAA, HTN, and DVT presents to the emergency department due to ongoing abdominal pain.    She states that she has been dealing with intermittent abdominal pain \"for years.\"  She states at least greater than 5 years long.  It does not happen all the time.  She states that it is not helpful to follow-up with primary care doctor regarding this, because it takes so long to get an appointment and by that time her abdominal pain might have resolved.  A few weeks ago she was having lower abdominal pain and back pain and her  was driving her to the emergency department.  However on the route, her symptoms went away entirely so they just went home.    She states this is the exact same abdominal pain she has been having for years, and it is slowly \"getting worse over time.\"  It is sharp, occurs in waves.   it starts in the sides of her abdomen, and moved to the center, and down toward the center of her pelvis.  Currently she denies any sharp pain however states that she feels bloated and feels like she has a lot of gas.  She does take \"gas pain medicine\" when this happens.  She took 2 earlier today.  Sometimes she takes Tylenol for it.  She has not taken any today.    Over the years she has had chronic diarrhea and constipation.  She had 1 episode of diarrhea, which was this morning.  She states that she never has a normal bowel movement.  Whenever she has constipation, she takes laxatives, whenever she has diarrhea she takes medication to slow her bowel movements.  She has seen a gastroenterologist in the past but states \"I know how to deal with my pain.\"  And has not " had follow-up.  She has had colonoscopies, the last one was 15 years ago.  She states that they found benign polyps.    Sometimes if she drinks too much water too quickly she will become nauseated.  Denies any chest pain, shortness of breath, history of heart problems.  She denies any nausea now.  She denies any dysuria, urinary urgency or frequency.  Denies any history of kidney stones.  Denies any melena, hematochezia, back pain.  Denies any trauma, fevers, chills.  She has a history of cholecystectomy years ago.  She was a kidney donor previously.  She had a history of hysterectomy.      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    CKD (chronic kidney disease) stage 3, GFR 30-59 ml/min (H) 7/25/2011    has 1 kidney, the right kidney is present---gave left kidney to sister    Complete uterovaginal prolapse 2/8/2013    Deep vein thrombosis (DVT) (H) 5/7/2020    Dyspnea on exertion     Gastro-oesophageal reflux disease     Hypertension goal BP (blood pressure) < 140/90 7/25/2011    Other and unspecified hyperlipidemia     Unspecified essential hypertension     not medicated at this time    Uterovaginal prolapse, complete 2004     resolved '06    Vaginal enterocele, congenital or acquired 2007     or cystocele     Past Surgical History:   Procedure Laterality Date    childbirth X9[      CLOSED RX RIB FRACTURE  7/06    left    DAVINCI SACROCOLPOPEXY, MIDURETHRAL SLING, CYSTOSCOPY  2/8/2013    Procedure: DAVINCI SACROCOLPOPEXY, MIDURETHRAL SLING, CYSTOSCOPY;  DAVINCI SACROCOLPOPEXY, TVT EXACT SLING, RIGHT SALPINGO-OOPHERECTOMY, CYSTOSCOPY;  Surgeon: Bennie Galdamez MD;  Location: [facility] OR    LAPAROSCOPIC  CHOLECYSTECTOMY  3/31/2011    Procedure:LAPAROSCOPIC CHOLECYSTECTOMY; Surgeon:DAVID MOLINA; Location:[facility] OR    [facility] 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    [facility]CHAUC VAGINAL HYSTERECTOMY  9/15/06    TVH/BSO/A&P repair/sacrospinus ligament fixation......childbirth x 9    ZZ COLONOSCOPY THRU STOMA, DIAGNOSTIC  7/2005    diverticulosis,small polyp,recheck 5 yrs     acetaminophen (TYLENOL) 325 MG tablet  aspirin 81 MG EC tablet  Cholecalciferol (VITAMIN D3 PO)  lisinopril (ZESTRIL) 20 MG tablet  Loperamide HCl (IMODIUM OR)  magnesium 250 MG tablet  Multiple Vitamins-Minerals (MULTIVITAMIN OR)      Allergies   Allergen Reactions    Nkda [No Known Drug Allergy]     Seasonal Allergies      Itchy eyes and watery eyes     Family History  Family History   Problem Relation Age of Onset    Heart Disease Father     Diabetes Sister     Heart Disease Sister     Cancer - colorectal Brother     Prostate Cancer Brother      Social History   Social History     Tobacco Use    Smoking status: Former     Packs/day: 0.00     Years: 17.00     Additional pack years: 0.00     Total pack years: 0.00     Types: Cigarettes     Passive exposure: Never    Smokeless tobacco: Never    Tobacco comments:     quit when she was 29 years old   Substance Use Topics    Alcohol use: No    Drug use: No         A medically appropriate review of systems was performed with pertinent positives and negatives noted in the HPI, and all other systems negative.    Physical Exam   BP: (!) 163/78  Pulse: 83  Temp: 98.3  F (36.8  C)  Resp: 16  SpO2: 99 %  Physical Exam  General: no acute distress.    HENT: Normocephalic and atraumatic. Trachea midline. Normal voice.  Eyes: EOMI, conjunctivae normal.    Cardiovascular:  Normal rate and regular rhythm.   known murmur.  Radial pulses 2+ bilaterally.  Pulmonary:  No respiratory distress. Normal breath sounds bilaterally.  Abdominal: Mild distention without fluid wave.  Normal bowel sounds..  Abdomen is soft.  There is no mass.  Moderate abdominal tenderness of right lateral lower quadrant.  No rebound or guarding.  Musculoskeletal:    Moving all extremities spontaneously.  No cyanosis, clubbing, or edema.  Skin: Warm, dry, and well perfused. Good turgor.  Neurological:  No focal deficit present.    Psychiatric:   The patient is awake, alert.  Appropriate mood and affect.    ED Course, Procedures, & Data      Procedures               No results found for any visits on 03/17/24.  Medications - No data to display  Labs Ordered and Resulted from Time of ED Arrival to Time of ED Departure - No data to display  No orders to display          Critical care was not performed.     Medical Decision Making  The patient's presentation was of high complexity (a chronic illness severe exacerbation, progression, or side effect of treatment).     The patient's evaluation involved:  review of external note(s) from 3+ sources (see separate area of note for details)  review of 3+ test result(s) ordered prior to this encounter (see separate area of note for details)  ordering and/or review of 3+ test(s) in this encounter (see separate area of note for details)  independent interpretation of testing performed by another health professional (see separate area of note for details)    The patient's management necessitated only low risk treatment.    Assessment & Plan    Patient arrives to the emergency department of intermittent chronic abdominal pain, worsening over the past 5 years.  On exam she is afebrile and in no acute distress.  She had some mild tenderness over the right lateral lower quadrant without any rebound or guarding.    Differential diagnosis includes but is not limited to ruptured AAA, inflammatory bowel disease, IBS, appendicitis, small bowel obstruction, diverticulitis, stool impaction, pancreatitis.  Previous CT imaging from November 2022 reviewed which showed a 4 cm infrarenal abdominal aortic aneurysm, stool impaction.   Per PCP note, they discussed this aneurysm with vascular staff, and the patient could either follow-up with PCP or see vascular surgery in 6 months.  They would not offer repair until her aneurysm is 5.5 cm.  She opted for following up with PCP.    She declines any Tylenol.  Blood work without leukocytosis, lactic acidosis or electrolyte derangements.Creatinine is 1.29, similar to baseline.  UA negative for UTI.  It did show moderate leukocytes, will send this for culture.    CT is typically done without contrast due to chronic kidney disease.  Even without contrast, it would be good for evaluating for aortic rupture per radiology.  CT abdomen pelvis today revealed moderate gas distention, moderate formed stool in the rectum.  4.3 cm infrarenal abdominal aortic aneurysm.    Discussed results with the patient.  Discussed drinking more fluids and can try an over-the-counter stool softener for her stool impaction.  she is aware of this aneurysm, and continues to up to follow-up with primary care for monitoring.  She will set up an appointment with them later this week.  Discussed return precautions.  Patient was discharged in stable condition.    I have reviewed the nursing notes. I have reviewed the findings, diagnosis, plan and need for follow up with the patient.    New Prescriptions    No medications on file       Final diagnoses:   None       [facility] EMERGENCY DEPARTMENT  3/17/2024     Kerry Beatty MD  04/03/24 [id number]

## 2024-03-18 ENCOUNTER — TELEPHONE (OUTPATIENT)
Dept: FAMILY MEDICINE | Facility: CLINIC | Age: 89
End: 2024-03-18
Payer: COMMERCIAL

## 2024-03-18 DIAGNOSIS — I10 HYPERTENSION GOAL BP (BLOOD PRESSURE) < 140/90: ICD-10-CM

## 2024-03-18 RX ORDER — LISINOPRIL 20 MG/1
20 TABLET ORAL DAILY
Qty: 90 TABLET | Refills: 0 | Status: SHIPPED | OUTPATIENT
Start: 2024-03-18 | End: 2024-03-20

## 2024-03-18 NOTE — DISCHARGE INSTRUCTIONS
Continue taking MiraLAX daily to keep your stools soft.  It is recommended that you continue workup of your chronic abdominal pain with a gastroenterologist.  They may need to repeat a colonoscopy.    Your known AAA (aortic aneurysm) has increased in size a little bit.  It increased from 4 cm up to 4.3 cm.  It was recommended last time that you follow-up in 6 months with your primary care doctor or surgeon for reevaluation.

## 2024-03-19 LAB — BACTERIA UR CULT: NORMAL

## 2024-03-20 ENCOUNTER — OFFICE VISIT (OUTPATIENT)
Dept: FAMILY MEDICINE | Facility: CLINIC | Age: 89
End: 2024-03-20
Payer: COMMERCIAL

## 2024-03-20 VITALS
SYSTOLIC BLOOD PRESSURE: 124 MMHG | HEIGHT: 60 IN | WEIGHT: 161.3 LBS | BODY MASS INDEX: 31.67 KG/M2 | HEART RATE: 85 BPM | DIASTOLIC BLOOD PRESSURE: 68 MMHG | RESPIRATION RATE: 14 BRPM | TEMPERATURE: 99 F | OXYGEN SATURATION: 97 %

## 2024-03-20 DIAGNOSIS — K59.00 CONSTIPATION, UNSPECIFIED CONSTIPATION TYPE: Primary | ICD-10-CM

## 2024-03-20 DIAGNOSIS — I71.43 INFRARENAL ABDOMINAL AORTIC ANEURYSM (AAA) WITHOUT RUPTURE (H): ICD-10-CM

## 2024-03-20 DIAGNOSIS — Z52.4 KIDNEY DONOR: ICD-10-CM

## 2024-03-20 DIAGNOSIS — Z90.5 ACQUIRED ABSENCE OF KIDNEY: ICD-10-CM

## 2024-03-20 DIAGNOSIS — N18.32 STAGE 3B CHRONIC KIDNEY DISEASE (H): ICD-10-CM

## 2024-03-20 DIAGNOSIS — R15.2 FECAL URGENCY: ICD-10-CM

## 2024-03-20 DIAGNOSIS — R19.7 OVERFLOW DIARRHEA: ICD-10-CM

## 2024-03-20 DIAGNOSIS — Z87.19 HISTORY OF FECAL IMPACTION: ICD-10-CM

## 2024-03-20 DIAGNOSIS — M10.9 GOUT INVOLVING TOE, UNSPECIFIED CAUSE, UNSPECIFIED CHRONICITY, UNSPECIFIED LATERALITY: ICD-10-CM

## 2024-03-20 DIAGNOSIS — Z13.220 LIPID SCREENING: ICD-10-CM

## 2024-03-20 DIAGNOSIS — I10 HYPERTENSION GOAL BP (BLOOD PRESSURE) < 140/90: ICD-10-CM

## 2024-03-20 LAB
CHOLEST SERPL-MCNC: 220 MG/DL
CREAT UR-MCNC: 118 MG/DL
HDLC SERPL-MCNC: 44 MG/DL
LDLC SERPL CALC-MCNC: 154 MG/DL
MICROALBUMIN UR-MCNC: <12 MG/L
MICROALBUMIN/CREAT UR: NORMAL MG/G{CREAT}
NONHDLC SERPL-MCNC: 176 MG/DL
TRIGL SERPL-MCNC: 110 MG/DL
URATE SERPL-MCNC: 8.6 MG/DL (ref 2.4–5.7)

## 2024-03-20 PROCEDURE — 99215 OFFICE O/P EST HI 40 MIN: CPT | Performed by: FAMILY MEDICINE

## 2024-03-20 RX ORDER — LISINOPRIL 20 MG/1
20 TABLET ORAL DAILY
Qty: 90 TABLET | Refills: 0 | Status: SHIPPED | OUTPATIENT
Start: 2024-03-20 | End: 2024-09-08

## 2024-03-20 ASSESSMENT — PAIN SCALES - GENERAL: PAINLEVEL: EXTREME PAIN (9)

## 2024-03-20 NOTE — PROGRESS NOTES
"  {PROVIDER CHARTING PREFERENCE:313941}    Ti Bermudez is a 88 year old, presenting for the following health issues:  Hospital F/U      3/20/2024    10:23 AM   Additional Questions   Roomed by Evelyne SHAH   Accompanied by self     HPI     {MA/LPN/RN Pre-Provider Visit Orders- hCG/UA/Strep (Optional):747399}    Hospital Follow-up Visit:    Hospital/Nursing Home/IP Rehab Facility: [facility] Emergency Department  Date of Admission: 3/17/2024   Date of Discharge: 3/17/2024   Reason(s) for Admission: Abdominal Pain     Was your hospitalization related to COVID-19? No   Problems taking medications regularly:  None  Medication changes since discharge: None  Problems adhering to non-medication therapy:  None    Summary of hospitalization:  {:746060}  Diagnostic Tests/Treatments reviewed.  Follow up needed: {:118426:}  Other Healthcare Providers Involved in Patient s Care:         {those currently involved after discharge:535866::\"None\"}  Update since discharge: {:200308} {TIP  Include information from family/caregivers, SNF, Care Coordination :479846}        Plan of care communicated with {:710826}     {Reference  Coding guidelines- Moderate Complexity F2F/Video within 7 - 14 days of discharge [id number], High Complexity F2F/Video within 7 days [id number] or [id number] days [id number] :162139}      {additonal problems for provider to add (Optional):631208}  {additonal problems for provider to add (Optional):977256}    {ROS Picklists (Optional):750091}      Objective    There were no vitals taken for this visit.  There is no height or weight on file to calculate BMI.  Physical Exam   {Exam List (Optional):049006}    {Diagnostic Test Results (Optional):530201}        Signed Electronically by: Yecenia Dickerson MD, MD  {Email feedback regarding this note to [email or web address]   :622615}  "

## 2024-03-20 NOTE — PATIENT INSTRUCTIONS
Start metamucil fiber supplement every day.   Schedule with the gastroenterologist (GI) and with the colorectal doctor.

## 2024-03-20 NOTE — PROGRESS NOTES
"  Assessment & Plan     ICD-10-CM    1. Constipation, unspecified constipation type  K59.00       2. Overflow diarrhea  R19.7       3. History of fecal impaction  Z87.19       4. Hypertension goal BP (blood pressure) < 140/90  I10       5. CKD (chronic kidney disease) stage 3, GFR 30-59 ml/min (H)  N18.30       6. Kidney donor  Z52.4       7. Acquired absence of kidney  Z90.5       8. Infrarenal abdominal aortic aneurysm (AAA) without rupture (H24)  I71.43       9. Stage 3b chronic kidney disease (H)  N18.32               Fecal urgency  Diarrhea, suspect overflow  Constipation, unspecified constipation type  History of impacted stool on CT 11/22  Alternating constipation and diarrhea for years. Referred to colorectal and GI in the past multiple times and she has not scheduled.   She will try daily metamucil for now. She is open to scheduling with colorectal and GI at this point.   Abdomen is nontender today.     Had been scheduled with GI in October 2022, but GI canceled. Re-referred. Discussed CT evidence of possible impaction. She declined disimpaction or enema in ER. Discussed trial of enema at home, schedule with GI.   - Adult GI  Referral - Consult Only     Hypertension  Continues lisinopril 20mg daily.   Recent BMP reviewed.     CKD 3, history of kidney donation  Not taking NSAIDs  Continues ACEI  BMP stable  Urine albumin today    Gout  Episodes of toe redness and pain about every other month resolved with an herbal supplement she uses.   Will add uric acid level to labs today  Schedule follow up in a month     Infrarenal abdominal aortic aneurysm (AAA) without rupture 4.3cm   I recommended scheduling with vascular surgery.     Per ED visit notes: \"Discussed with vascular staff.  Can f/u in 6 mo or just f/u with pmd.  Says increased 2-3 mm per year and based on age wouldn't offer repair for her until her aneurysm in 5.5 cm.  Offered option and she wants to just f/u with pmd. \"    - Vascular Surgery " "Referral  - CT Abdomen w/o Contrast    Multiple referrals have been placed in the past for her to visit with GI and with colorectal, but she has not seen either. Her last visit with me was a virtual visit in 11/22 for ER follow up addressing the same concerns she was seen in the ER for last week.      She had an appt scheduled for 10/19/22 with GI, but it was canceled by GI.       Follow up with me in a month       53 minutes spent on the date of the encounter doing chart review, history and exam, documentation and further activities per the note   MED REC REQUIRED   Post Medication Reconciliation Status:  Discharge medications reconciled, continue medications without change       Subjective   Lara is a 88 year old, presenting for the following health issues:  Hospital F/U    HPI     ER notes are not yet complete.      Stomach ache comes and goes. So used to having tummy ache or pressure in her stomach. It goes away. Says she has accepted this over time. This time the pain kept repeating itself every 10 or 15 minutes she would have another \"attack\" and her stomach would feel like a rock and then come to a point where it was very painful. Seemed to go out her bottom. Somewhat similar to labor pain. As the day wore on it lessened. Also has a constant gurgling and growling. It is loud - other people can hear it. Most of the time right now stools are diarrhea. Every now and then will get a little constipated.     Before she goes to bed at night, she takes all of her medications. She is taking lisinopril, vitamin D, multivitamin, B complex, baby aspirin, magnesium.     Takes immodium once in awhile if she is going somewhere.     Has not taken other medicines for her bowels for a long time.     Says every now and then her toe gets red and she has an herbal mixture with celery something and tart cherry. Notes her history of gout. This herbal mixture works quickly. Has an episode every couple of months. The herbal " "tincture seems to work. 2 days later feels better. She does have hamburger twice a week. No alcohol.             Objective    /68 (BP Location: Right arm, Patient Position: Sitting, Cuff Size: Adult Regular)   Pulse 85   Temp 99  F (37.2  C) (Temporal)   Resp 14   Ht 1.527 m (5' 0.1\")   Wt 73.2 kg (161 lb 4.8 oz)   SpO2 97%   BMI 31.40 kg/m    Body mass index is 31.4 kg/m .  Physical Exam   GENERAL: alert and no distress  ABDOMEN: soft, nontender, no hepatosplenomegaly, no masses and bowel sounds normal    Admission on 03/17/2024, Discharged on 03/17/2024   Component Date Value Ref Range Status    Color Urine 03/17/2024 Yellow  Colorless, Straw, Light Yellow, Yellow Final    Appearance Urine 03/17/2024 Clear  Clear Final    Glucose Urine 03/17/2024 Negative  Negative mg/dL Final    Bilirubin Urine 03/17/2024 Negative  Negative Final    Ketones Urine 03/17/2024 Negative  Negative mg/dL Final    Specific Gravity Urine 03/17/2024 1.020  1.003 - 1.035 Final    Blood Urine 03/17/2024 Negative  Negative Final    pH Urine 03/17/2024 5.0  5.0 - 7.0 Final    Protein Albumin Urine 03/17/2024 Negative  Negative mg/dL Final    Urobilinogen Urine 03/17/2024 Normal  Normal, 2.0 mg/dL Final    Nitrite Urine 03/17/2024 Negative  Negative Final    Leukocyte Esterase Urine 03/17/2024 Moderate (A)  Negative Final    Mucus Urine 03/17/2024 Present (A)  None Seen /LPF Final    RBC Urine 03/17/2024 2  <=2 /HPF Final    WBC Urine 03/17/2024 14 (H)  <=5 /HPF Final    Squamous Epithelials Urine 03/17/2024 2 (H)  <=1 /HPF Final    Transitional Epithelials Urine 03/17/2024 <1  <=1 /HPF Final    Sodium 03/17/2024 140  135 - 145 mmol/L Final    Reference intervals for this test were updated on 09/26/2023 to more accurately reflect our healthy population. There may be differences in the flagging of prior results with similar values performed with this method. Interpretation of those prior results can be made in the context of the " updated reference intervals.     Potassium 03/17/2024 4.5  3.4 - 5.3 mmol/L Final    Carbon Dioxide (CO2) 03/17/2024 25  22 - 29 mmol/L Final    Anion Gap 03/17/2024 9  7 - 15 mmol/L Final    Urea Nitrogen 03/17/2024 19.8  8.0 - 23.0 mg/dL Final    Creatinine 03/17/2024 1.29 (H)  0.51 - 0.95 mg/dL Final    GFR Estimate 03/17/2024 40 (L)  >60 mL/min/1.73m2 Final    Calcium 03/17/2024 9.8  8.8 - 10.2 mg/dL Final    Chloride 03/17/2024 106  98 - 107 mmol/L Final    Glucose 03/17/2024 83  70 - 99 mg/dL Final    Alkaline Phosphatase 03/17/2024 63  40 - 150 U/L Final    Reference intervals for this test were updated on 11/14/2023 to more accurately reflect our healthy population. There may be differences in the flagging of prior results with similar values performed with this method. Interpretation of those prior results can be made in the context of the updated reference intervals.    AST 03/17/2024 22  0 - 45 U/L Final    Reference intervals for this test were updated on 6/12/2023 to more accurately reflect our healthy population. There may be differences in the flagging of prior results with similar values performed with this method. Interpretation of those prior results can be made in the context of the updated reference intervals.    ALT 03/17/2024 11  0 - 50 U/L Final    Reference intervals for this test were updated on 6/12/2023 to more accurately reflect our healthy population. There may be differences in the flagging of prior results with similar values performed with this method. Interpretation of those prior results can be made in the context of the updated reference intervals.      Protein Total 03/17/2024 7.0  6.4 - 8.3 g/dL Final    Albumin 03/17/2024 3.9  3.5 - 5.2 g/dL Final    Bilirubin Total 03/17/2024 0.2  <=1.2 mg/dL Final    Lipase 03/17/2024 27  13 - 60 U/L Final    On 02/07/2023 the assay method at [facility] laboratory was changed to the Roche Lipase method on the [employer] c6000.  Results obtained with different assay methods or kits cannot be used interchangeably, and therefore, direct comparison to results obtained from this laboratory prior to 02/07/2023 should be interpreted with caution, with each result interpreted in the context of its own reference interval.    Lactic Acid 03/17/2024 1.1  0.7 - 2.0 mmol/L Final    WBC Count 03/17/2024 6.3  4.0 - 11.0 10e3/uL Final    RBC Count 03/17/2024 4.49  3.80 - 5.20 10e6/uL Final    Hemoglobin 03/17/2024 11.3 (L)  11.7 - 15.7 g/dL Final    Hematocrit 03/17/2024 37.5  35.0 - 47.0 % Final    MCV 03/17/2024 84  78 - 100 fL Final    MCH 03/17/2024 25.2 (L)  26.5 - 33.0 pg Final    MCHC 03/17/2024 30.1 (L)  31.5 - 36.5 g/dL Final    RDW 03/17/2024 15.0  10.0 - 15.0 % Final    Platelet Count 03/17/2024 252  150 - 450 10e3/uL Final    % Neutrophils 03/17/2024 64  % Final    % Lymphocytes 03/17/2024 27  % Final    % Monocytes 03/17/2024 7  % Final    % Eosinophils 03/17/2024 2  % Final    % Basophils 03/17/2024 0  % Final    % Immature Granulocytes 03/17/2024 0  % Final    NRBCs per 100 WBC 03/17/2024 0  <1 /100 Final    Absolute Neutrophils 03/17/2024 4.0  1.6 - 8.3 10e3/uL Final    Absolute Lymphocytes 03/17/2024 1.7  0.8 - 5.3 10e3/uL Final    Absolute Monocytes 03/17/2024 0.5  0.0 - 1.3 10e3/uL Final    Absolute Eosinophils 03/17/2024 0.1  0.0 - 0.7 10e3/uL Final    Absolute Basophils 03/17/2024 0.0  0.0 - 0.2 10e3/uL Final    Absolute Immature Granulocytes 03/17/2024 0.0  <=0.4 10e3/uL Final    Absolute NRBCs 03/17/2024 0.0  10e3/uL Final    Creatinine POCT 03/17/2024 1.4 (H)  0.5 - 1.0 mg/dL Final    GFR, ESTIMATED POCT 03/17/2024 36 (L)  >60 mL/min/1.73m2 Final    Culture 03/17/2024 10,000-50,000 CFU/mL Mixture of Urogenital Jacey   Final           Signed Electronically by: Yecenia Dickerson MD

## 2024-03-21 ENCOUNTER — TELEPHONE (OUTPATIENT)
Dept: GASTROENTEROLOGY | Facility: CLINIC | Age: 89
End: 2024-03-21
Payer: COMMERCIAL

## 2024-03-21 NOTE — TELEPHONE ENCOUNTER
[facility] Call Center    Phone Message    May a detailed message be left on voicemail: Yes    Reason for Call: Other: Patient is currently scheduled on May 8th, as visit type New GI Urgent. This is outside the expected timeline for this referral. Patient has been added to the waitlist.  Patient wants in-person at [facility] [place] only.     Action Taken: Message routed to:  Other: GI REFERRAL TRIAGE POOL     Travel Screening: Not Applicable

## 2024-03-25 NOTE — TELEPHONE ENCOUNTER
Writer called to help get Pt scheduled for a sooner appointment. Pt was unable to talk. Writer will call back later today.

## 2024-03-26 NOTE — TELEPHONE ENCOUNTER
Writer called and talked with Pt. Pt is rescheduled to see Nuris Akers on 4/24/2024 at 9 AM for an in-person clinic visit.

## 2024-04-11 NOTE — RESULT ENCOUNTER NOTE
The results of your recent lipid (cholesterol) profile were abnormal.    Here are the results:  Lab Results       Component                Value               Date                       CHOL                     220                 03/17/2024                 CHOL                     207                 05/10/2018            Lab Results       Component                Value               Date                       HDL                      44                  03/17/2024                 HDL                      38                  05/10/2018            Lab Results       Component                Value               Date                       LDL                      154                 03/17/2024                 LDL                      143                 05/10/2018            Lab Results       Component                Value               Date                       TRIG                     110                 03/17/2024                 TRIG                     128                 05/10/2018            Lab Results       Component                Value               Date                       CHOLHDLRATIO             5.5                 08/18/2015              Desired or goal levels are:  CHOLESTEROL: Desirable is less than 200.   HDL (Good Cholesterol): Desirable is greater than 40 (for men) greater than 50 (for women).  LDL (Bad Cholesterol): Desirable is less than 130 (or less than 100 if you have heart disease or diabetes). Borderline 130-160.  TRIGLYCERIDES: Desirable is less than 150.  Borderline is 150-200.    Your HDL (the good cholesterol) level is low, no medication is required at this point. Reducing your total fat intake, increasing exercise level, reducing weight, adding olive oil to your diet, etc, may help to increase this level.  To help lower your LDL (bad cholesterol) you could start adding ground flax seed to your diet. The recommended dose is 2 heaping tablespoonfuls daily, you may want to start with 1  tablespoonful and increase to 2 heaping tablespoonfuls. You can mix or add ground flax seed to hot cereals, yogurt, applesauce, cottage cheese or any sauce mixture that is your portion. Ground flax seed can be found in the baking aisle near the flour.  .     As you may know, an elevated cholesterol is one factor that increases your risk for heart disease and stroke. You can improve your cholesterol by controlling the amount and type of fat you eat and by increasing your daily activity level.    Here are some ways to improve your nutrition:  Eat less fat (especially butter, Crisco and other saturated fats)  Buy lean cuts of meat, reduce your portions of red meat or substitute poultry or fish  Use skim milk and low-fat dairy products  Eat no more than 4 egg yolks per week  Avoid fried or fast foods that are high in fat  Eat more fruits and vegetables      Also consider starting or increasing your aerobic activity. Aerobic activity is the best way to improve HDL (good) cholesterol. If this would be new to you, please talk with me first about what activities are safe for you.      Other lab results:    Your uric acid level was high. This can increase your risk of gout flares. I recommend scheduling an appointment with me to talk about the possibility of starting medication to help lower your uric acid level and reduce risk of gout flares.     Please feel free to contact us with any questions or if you would like more information.

## 2024-04-23 NOTE — PROGRESS NOTES
GI CLINIC VISIT    CC/REFERRING MD:  Yecenia Dickerson  REASON FOR CONSULTATION:   Lara Marc is a 88 year old female who I was asked to see in consultation at the request of Dr. Yecenia Dickerson for   Chief Complaint   Patient presents with    New Patient       ASSESSMENT/PLAN:  1. Constipation, unspecified constipation type  2. Overflow diarrhea  3. History of fecal impaction  88-year-old female with history of bladder sling placed in 2013, status post hysterectomy and longstanding constipation presents for constipation with likely overflow diarrhea and history of fecal impaction seen on CT in 2022.  She typically has mostly daily bowel movements [place] type IV-V with occasional [place] type III and [place] type VII stools without melena or hematochezia.  She does have some small amounts of fecal incontinence at times.  She has had to digitally disimpact at times.  She finds that she has the urge to have a bowel movement but then once she gets into the bathroom the urge goes away.  This does improve if she leans forward while sitting on the toilet.  She reports gas pains that come and go within minutes.  She uses Gas-X for this which has been helpful.  She started on 3 chewable tablets of Metamucil last month which has been helpful to prevent significant constipation symptoms.  Last colonoscopy was in 2005 at age 69 with diverticulosis and 1 small polyp.  She has had 9 vaginal spontaneous deliveries.  We discussed symptoms are likely due to constipation with overflow diarrhea and pelvic floor dysfunction. She has been feeling that her bladder may be prolapsing again which is a possible contributor as well.    We reviewed the following plan:  -- Use a toilet stool to bring your knees above the level of your hips   -- Continue the Metamucil, 3 chews per day   -- Use a glycerin/fleet suppository every 3 days or so to clear out the rectum, insert the rounded end first   -- If you skip 3 days with no bowel movement,  take 1/2 capful of miralax   -- It is okay to use Gas X as needed  -- It is okay to take Pepto Bismol chews prior to leaving the house to help prevent diarrhea. It is okay to use imodium sparingly as well if pepto bismol is not effective  -- Follow up in 2-3 months   -- Future considerations include referral to Urogyn, Pelvic Floor Center or  Pelvic Floor PT, bowel cleanse      - Adult GI  Referral - Consult Only  - Adult GI Clinic Follow-Up Order (Blank); Future       Specialty Problems          Gastroenterology Diagnoses    GERD (gastroesophageal reflux disease)           RTC 2-3 months      Thank you for this consultation. It was a pleasure to participate in the care of this patient; please contact us with any further questions.  A total of 54 face-to-face minutes was spent with this patient, >50% of which was counseling regarding the above delineated issues. An additional 20 minutes was spent on the date of the encounter doing chart review, documentation, and further activities as noted above.    Nuris Akers PA-C  Division of Gastroenterology, Hepatology and Nutrition  [facility]    ---------------------------------------------------------------------------------------------------  HPI:  Lara Marc is a 88 year old female with past medical history significant for GERD, CKD stage 3, AAA without rupture, who presents for fecal urgency, constipation with overflow, history of stool impaction.      Today, she reports she has had alternating constipation and diarrhea for years. She reports having started metamucil about 1 month ago. She is taking 3 chewable metamucil tablets daily. Since then, she has not been feeling constipated.  Reports she typically has a daily bowel movement but will skip a day or so here and there.  Usually has [place] type IV-V stools but will alternate between [place] type II to [place] type VII at times.  She usually has 1-2 bowel movements per day since being on  the Metamucil.  She denies any melena, hematochezia.  She reports years ago she would be a lot more constipated and would need to use senna as needed which did not always work.  Reports the need for digital disimpaction at times.  She denies any trouble with hemorrhoids.  States she gets the urge to have a bowel movement and then once she gets into the bathroom the urge goes away.  If she sits on the toilet and bends forward the urge will come back.  She also notes that while she is wiping she has more of an urge for more stool to come out.  She has a history of bladder prolapse status post repair with sling in 2013.  She has also had a hysterectomy.  She has been feeling like her pelvic organs are starting to prolapse again.  She does have small amounts of fecal incontinence.  When she is going to leave the house she will typically take 1 tablet of Imodium preventatively.    Gets gas pains for which she takes Gas X if it gets bad. A lot of times she rides it out. Pain will last just a couple minutes. Feels a rolling, migratory pain, that rides up under her ribs. It isnt always painful but feels a lot of movement throughout the abdomen. Has been told she has a long colon. Last colonoscopy was many years ago-2005 at age 69.  She had a small polyp and diverticulosis, recommended to repeat in 5 years.    Has been trying to drink 5 cups of water per day.  She reports that she feels like she produces a lot of saliva and has to wipe her mouth at times.  She does report she needs to chew very well in order to avoid difficulty swallowing solid foods.  She does have known environmental allergies and reports a chronic runny nose.  She has some mild heartburn.  She does not take anything for this.  She notes that if she drinks too many sips of water at once she will get nauseous but has not had any vomiting.    Family history: sister with similar symptoms, constipated, mom with constipation,   Has 9 children, vaginal  deliveries. She has 28 grandchildren.     Hx abd surgeries: bladder prolapse repair, donated a kidney to her sister, cholecystectomy, hysterectomy   Hx colonoscopy  - 2005: diverticulosis, small polyp, recheck 5 yrs       ROS:    A 10 point review of systems was performed and is negative except as noted in the HPI.     PROBLEM LIST  Patient Active Problem List    Diagnosis Date Noted    Personal history of DVT (deep vein thrombosis) 05/26/2020     Priority: Medium    Abdominal aortic aneurysm (AAA) without rupture (H24) 09/17/2019     Priority: Medium     Stable infrarenal aortic aneurysm on CT 9/10/19 measuring 3.8cm x 3.5cm. Recommend surveillance by ultrasound in 3 years.       Gout 10/20/2014     Priority: Medium     Problem list name updated by automated process. Provider to review      Advanced directives, counseling/discussion 10/15/2013     Priority: Medium    GERD (gastroesophageal reflux disease) 10/02/2012     Priority: Medium    Adjustment disorder with depressed mood 10/26/2011     Priority: Medium    Hypertension goal BP (blood pressure) < 140/90 07/25/2011     Priority: Medium    CKD (chronic kidney disease) stage 3, GFR 30-59 ml/min (H) 07/25/2011     Priority: Medium    Acquired absence of kidney 09/08/2006     Priority: Medium    Kidney donor 07/11/2006     Priority: Medium         PREVIOUS SURGERIES:  Past Surgical History:   Procedure Laterality Date    childbirth X9[      CLOSED RX RIB FRACTURE  7/06    left    DAVINCI SACROCOLPOPEXY, MIDURETHRAL SLING, CYSTOSCOPY  2/8/2013    Procedure: DAVINCI SACROCOLPOPEXY, MIDURETHRAL SLING, CYSTOSCOPY;  DAVINCI SACROCOLPOPEXY, TVT EXACT SLING, RIGHT SALPINGO-OOPHERECTOMY, CYSTOSCOPY;  Surgeon: Bennie Galdamez MD;  Location:  OR    LAPAROSCOPIC CHOLECYSTECTOMY  3/31/2011    Procedure:LAPAROSCOPIC CHOLECYSTECTOMY; Surgeon:DAVID MOLINA; Location:[facility]    [facility] 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    [facility]C VAGINAL HYSTERECTOMY  9/15/06     TVH/BSO/A&P repair/sacrospinus ligament fixation......childbirth x 9    ZZHC COLONOSCOPY THRU STOMA, DIAGNOSTIC  7/2005    diverticulosis,small polyp,recheck 5 yrs       PREVIOUS ENDOSCOPY:  2005, small polyp, diverticulosis, recommended to repeat in 5 years    ALLERGIES:     Allergies   Allergen Reactions    Nkda [No Known Drug Allergy]     Seasonal Allergies      Itchy eyes and watery eyes       PERTINENT MEDICATIONS:  Current Outpatient Medications   Medication Sig Dispense Refill    acetaminophen (TYLENOL) 325 MG tablet Take 2 tablets (650 mg) by mouth every 8 hours      aspirin 81 MG EC tablet Take 81 mg by mouth daily      Cholecalciferol (VITAMIN D3 PO) Take by mouth daily      lisinopril (ZESTRIL) 20 MG tablet Take 1 tablet (20 mg) by mouth daily 90 tablet 0    Loperamide HCl (IMODIUM OR)       magnesium 250 MG tablet Take 1 tablet by mouth daily      Multiple Vitamins-Minerals (MULTIVITAMIN OR) Take 1 tablet by mouth daily       No current facility-administered medications for this visit.       FAMILY HISTORY:  Family History   Problem Relation Age of Onset    Heart Disease Father     Diabetes Sister     Heart Disease Sister     Cancer - colorectal Brother     Prostate Cancer Brother        Past/family/social history reviewed and no changes    PHYSICAL EXAMINATION:  Vitals BP (!) 167/91   Pulse 80   Ht 1.524 m (5')   Wt 72.6 kg (160 lb)   SpO2 98%   BMI 31.25 kg/m     Wt   Wt Readings from Last 2 Encounters:   04/24/24 72.6 kg (160 lb)   03/20/24 73.2 kg (161 lb 4.8 oz)      Gen: Pt sitting up on exam table in NAD, interactive and cooperative on exam  Eyes: sclerae anicteric, no injection  Cardiac: RRR, soft systolic murmur heard  Resp: Clear on anterior exam  GI: Normoactive BS, abd soft, nontender throughout all quadrants, no organomegaly or masses palpated  Skin: Warm, dry, no jaundice, nails appear healthy  Neuro: alert, oriented, answers questions appropriately    Previous Imaging:     CT  abd/pelvis 11/11/22:   IMPRESSION:   1.  Rectum distended with stool suggesting impaction.  2.  4.0 cm infrarenal abdominal aortic aneurysm has enlarged from 3.3 cm on 09/09/2019.  3.  Small hiatal hernia.  4.  Postcholecystectomy, left nephrectomy, and hysterectomy.    CT abd/pelvis 3/17/24:  IMPRESSION:   1.  Moderate gas distention of the redundant sigmoid colon. No obstruction appreciated. There is also moderate formed stool in the rectum.  2.  4.3 cm infrarenal abdominal aortic aneurysm. This is increased in size since the prior study where it measured 4 cm. Luminal aspect of the aneurysm cannot be assessed without IV contrast.  3.  Cholecystectomy. Mildly dilated cystic duct remnant containing a stone is unchanged. No inflammation.  4.  Duodenal diverticulum.

## 2024-04-24 ENCOUNTER — OFFICE VISIT (OUTPATIENT)
Dept: GASTROENTEROLOGY | Facility: CLINIC | Age: 89
End: 2024-04-24
Attending: FAMILY MEDICINE
Payer: COMMERCIAL

## 2024-04-24 VITALS
HEART RATE: 80 BPM | DIASTOLIC BLOOD PRESSURE: 91 MMHG | WEIGHT: 160 LBS | OXYGEN SATURATION: 98 % | HEIGHT: 60 IN | BODY MASS INDEX: 31.41 KG/M2 | SYSTOLIC BLOOD PRESSURE: 167 MMHG

## 2024-04-24 DIAGNOSIS — Z87.19 HISTORY OF FECAL IMPACTION: ICD-10-CM

## 2024-04-24 DIAGNOSIS — R19.7 OVERFLOW DIARRHEA: ICD-10-CM

## 2024-04-24 DIAGNOSIS — K59.00 CONSTIPATION, UNSPECIFIED CONSTIPATION TYPE: ICD-10-CM

## 2024-04-24 PROCEDURE — 99215 OFFICE O/P EST HI 40 MIN: CPT | Performed by: STUDENT IN AN ORGANIZED HEALTH CARE EDUCATION/TRAINING PROGRAM

## 2024-04-24 ASSESSMENT — PAIN SCALES - GENERAL: PAINLEVEL: NO PAIN (0)

## 2024-04-24 NOTE — NURSING NOTE
Chief Complaint   Patient presents with    New Patient       Vitals:    04/24/24 0839   BP: (!) 167/91   Pulse: 80   SpO2: 98%   Weight: 72.6 kg (160 lb)   Height: 1.524 m (5')       Body mass index is 31.25 kg/m .    Blood pressure elevated. Provider notified. Recheck offered.       Constanza Clay

## 2024-04-24 NOTE — PATIENT INSTRUCTIONS
It was a pleasure taking care of you today.  I've included a brief summary of our discussion and care plan from today's visit below.  Please review this information with your primary care provider.  _______________________________________________________________________     My recommendations are summarized as follows:   Goal: Improvement in stool frequency to reduce occurrence of straining, bloating, and abdominal discomfort.    Daily constipation plan:  -- Use a toilet stool to bring your knees above the level of your hips   -- Metamucil 3 chews per day   -- Use a glycerin/fleet suppository every 3 days or so to clear out the rectum, insert the rounded end   -- If you skip 3 days with no bowel movement, take some miralax as below:   -- Miralax: Miralax 1/2 capful daily, titrating to goal, up to 3 capfuls daily. If you have not had a bowel movement for 2-3 days, or if you feel that you straining, incompletely evacuating, plan to augment your daily plan by increasing Miralax dose, up to 3 capfuls daily, until stooling as resumed, and then return to previous dosing.  -- It is okay to use Gas X as needed  -- It is okay to take Pepto Bismol chews prior to leaving the house to help prevent diarrhea. It is okay to use imodium sparingly as well   -- Increase dietary fiber as able to tolerate, with goal 20-30 grams daily. Prunes can be a great source of fiber and can be used daily to help with bowel movements. If certain foods are difficult for you to tolerate, consider meeting with our nutrition team to help you.  -- Fiber: After using Miralax for about 2 weeks, you can add in soluble fiber supplement (such as Citrucel, Metamucil, psyllium husk). Start with 1/2 tablespoon in 8 oz water daily, increase slowly to effect. A good goal is 1 tablespoon daily, with a maximum of 3 tablespoons daily. This helps with stool consistency.  - Try to stay active with at least 150 minutes of moderate intensity activity per week. This can  include brisk walking, active gardening, cycling, yoga, pilates, etc.   - Try to stay hydrated, goal 48-64 oz of non-caffeinated fluid daily    Constipation back-up plan: If you have not had a bowel movement for 2-3 days, or if you feel that you straining, incompletely evacuating, plan to augment your daily plan by:  - Miralax: Increasing Miralax to up to 3 capfuls daily, returning to daily maintenance dose above, once stools have resumed  - Magnesium: Add in magnesium oxide 400 mg once to twice daily  - Products containing senna (ie Senakot, SmoothMove tea) can be used over a short period of time, but please be mindful that your bowels can develop a dependence to senna products, meaning your bowels will rely on Senna to produce a bowel movement.    Emergency back-up plan: If you continue to feel constipated and need more help:  -- If it has been 3 days without a BM, consider taking 1 bottle of magnesium citrate and/or 1 glycerin suppository (insert rounded end, not pointed end, first)  - If this is not effective, please call us.     -- Follow up in 2-3 months   -- If you have any questions, please don't hesitate to contact me through our GI RN Clinic Coordinator, Monica Alfaro, at [phone].      ______________________________________________________________________     How do I schedule labs, imaging studies, or procedures that were ordered in clinic today?      Labs: To schedule lab appointment you can contact your local [facility] or call [phone] to schedule at any convenient [facility] location.      Procedures: If a colonoscopy, upper endoscopy, breath test, esophageal manometry, or pH impedence was ordered today, our endoscopy team will call you to schedule this. If you have not heard from our endoscopy team within a week, please call [phone] to schedule.      Imaging Studies: If you were scheduled for a CT scan, X-ray, MRI, ultrasound, HIDA scan or other imaging study, please  call [phone] to have this scheduled.      Referral: If a referral to another specialty was ordered, expect a phone call or follow instructions above. If you have not heard from anyone regarding your referral in a week, please call our clinic to check the status.      Who do I call with any questions after my visit?  Please be in touch if there are any further questions that arise following today's visit.  There are multiple ways to contact your gastroenterology care team.       During business hours, you may reach a Gastroenterology nurse at [phone]     To schedule or reschedule an appointment, please call [phone].      You can always send a secure message through [employer].  [employer] messages are answered by your nurse or doctor typically within 24 hours.  Please allow extra time on weekends and holidays.       For urgent/emergent questions after business hours, you may reach the on-call GI Fellow by contacting the [facility]  at [phone].     How will I get the results of any tests ordered?    You will receive all of your results.  If you have signed up for datatrackert, any tests ordered at your visit will be available to you after your physician reviews them.  Typically this takes 1-2 weeks.  If there are urgent results that require a change in your care plan, your physician or nurse will call you to discuss the next steps.       What is [employer]?  [employer] is a secure way for you to access all of your healthcare records from the [facility].  It is a web based computer program, so you can sign on to it from any location.  It also allows you to send secure messages to your care team.  I recommend signing up for [employer] access if you have not already done so and are comfortable with using a computer.       How to I schedule a follow-up visit?  If you did not schedule a follow-up visit today, please call [phone] to schedule a follow-up office visit.

## 2024-04-24 NOTE — LETTER
4/24/2024         RE: Lara Marc  [address]  [facility] [address]        Dear Colleague,    Thank you for referring your patient, Lara Marc, to the [facility] [place]. Please see a copy of my visit note below.    GI CLINIC VISIT    CC/REFERRING MD:  Yecenia Dickerson  REASON FOR CONSULTATION:   Lara Marc is a 88 year old female who I was asked to see in consultation at the request of Dr. Yecenia Dickerson for   Chief Complaint   Patient presents with    New Patient       ASSESSMENT/PLAN:  1. Constipation, unspecified constipation type  2. Overflow diarrhea  3. History of fecal impaction  88-year-old female with history of bladder sling placed in 2013, status post hysterectomy and longstanding constipation presents for constipation with likely overflow diarrhea and history of fecal impaction seen on CT in 2022.  She typically has mostly daily bowel movements [place] type IV-V with occasional [place] type III and [place] type VII stools without melena or hematochezia.  She does have some small amounts of fecal incontinence at times.  She has had to digitally disimpact at times.  She finds that she has the urge to have a bowel movement but then once she gets into the bathroom the urge goes away.  This does improve if she leans forward while sitting on the toilet.  She reports gas pains that come and go within minutes.  She uses Gas-X for this which has been helpful.  She started on 3 chewable tablets of Metamucil last month which has been helpful to prevent significant constipation symptoms.  Last colonoscopy was in 2005 at age 69 with diverticulosis and 1 small polyp.  She has had 9 vaginal spontaneous deliveries.  We discussed symptoms are likely due to constipation with overflow diarrhea and pelvic floor dysfunction. She has been feeling that her bladder may be prolapsing again which is a possible contributor as well.    We reviewed the following plan:  --  Use a toilet stool to bring your knees above the level of your hips   -- Continue the Metamucil, 3 chews per day   -- Use a glycerin/fleet suppository every 3 days or so to clear out the rectum, insert the rounded end first   -- If you skip 3 days with no bowel movement, take 1/2 capful of miralax   -- It is okay to use Gas X as needed  -- It is okay to take Pepto Bismol chews prior to leaving the house to help prevent diarrhea. It is okay to use imodium sparingly as well if pepto bismol is not effective  -- Follow up in 2-3 months   -- Future considerations include referral to Urogyn, Pelvic Floor Center or  Pelvic Floor PT, bowel cleanse      - Adult GI  Referral - Consult Only  - Adult GI Clinic Follow-Up Order (Blank); Future       Specialty Problems          Gastroenterology Diagnoses    GERD (gastroesophageal reflux disease)           RTC 2-3 months      Thank you for this consultation. It was a pleasure to participate in the care of this patient; please contact us with any further questions.  A total of 54 face-to-face minutes was spent with this patient, >50% of which was counseling regarding the above delineated issues. An additional 20 minutes was spent on the date of the encounter doing chart review, documentation, and further activities as noted above.    Nuris Akers PA-C  Division of Gastroenterology, Hepatology and Nutrition  [facility]    ---------------------------------------------------------------------------------------------------  HPI:  Lara Marc is a 88 year old female with past medical history significant for GERD, CKD stage 3, AAA without rupture, who presents for fecal urgency, constipation with overflow, history of stool impaction.      Today, she reports she has had alternating constipation and diarrhea for years. She reports having started metamucil about 1 month ago. She is taking 3 chewable metamucil tablets daily. Since then, she has not been feeling  constipated.  Reports she typically has a daily bowel movement but will skip a day or so here and there.  Usually has [place] type IV-V stools but will alternate between [place] type II to [place] type VII at times.  She usually has 1-2 bowel movements per day since being on the Metamucil.  She denies any melena, hematochezia.  She reports years ago she would be a lot more constipated and would need to use senna as needed which did not always work.  Reports the need for digital disimpaction at times.  She denies any trouble with hemorrhoids.  States she gets the urge to have a bowel movement and then once she gets into the bathroom the urge goes away.  If she sits on the toilet and bends forward the urge will come back.  She also notes that while she is wiping she has more of an urge for more stool to come out.  She has a history of bladder prolapse status post repair with sling in 2013.  She has also had a hysterectomy.  She has been feeling like her pelvic organs are starting to prolapse again.  She does have small amounts of fecal incontinence.  When she is going to leave the house she will typically take 1 tablet of Imodium preventatively.    Gets gas pains for which she takes Gas X if it gets bad. A lot of times she rides it out. Pain will last just a couple minutes. Feels a rolling, migratory pain, that rides up under her ribs. It isnt always painful but feels a lot of movement throughout the abdomen. Has been told she has a long colon. Last colonoscopy was many years ago-2005 at age 69.  She had a small polyp and diverticulosis, recommended to repeat in 5 years.    Has been trying to drink 5 cups of water per day.  She reports that she feels like she produces a lot of saliva and has to wipe her mouth at times.  She does report she needs to chew very well in order to avoid difficulty swallowing solid foods.  She does have known environmental allergies and reports a chronic runny nose.  She has some mild  heartburn.  She does not take anything for this.  She notes that if she drinks too many sips of water at once she will get nauseous but has not had any vomiting.    Family history: sister with similar symptoms, constipated, mom with constipation,   Has 9 children, vaginal deliveries. She has 28 grandchildren.     Hx abd surgeries: bladder prolapse repair, donated a kidney to her sister, cholecystectomy, hysterectomy   Hx colonoscopy  - 2005: diverticulosis, small polyp, recheck 5 yrs       ROS:    A 10 point review of systems was performed and is negative except as noted in the HPI.     PROBLEM LIST  Patient Active Problem List    Diagnosis Date Noted    Personal history of DVT (deep vein thrombosis) 05/26/2020     Priority: Medium    Abdominal aortic aneurysm (AAA) without rupture (H24) 09/17/2019     Priority: Medium     Stable infrarenal aortic aneurysm on CT 9/10/19 measuring 3.8cm x 3.5cm. Recommend surveillance by ultrasound in 3 years.       Gout 10/20/2014     Priority: Medium     Problem list name updated by automated process. Provider to review      Advanced directives, counseling/discussion 10/15/2013     Priority: Medium    GERD (gastroesophageal reflux disease) 10/02/2012     Priority: Medium    Adjustment disorder with depressed mood 10/26/2011     Priority: Medium    Hypertension goal BP (blood pressure) < 140/90 07/25/2011     Priority: Medium    CKD (chronic kidney disease) stage 3, GFR 30-59 ml/min (H) 07/25/2011     Priority: Medium    Acquired absence of kidney 09/08/2006     Priority: Medium    Kidney donor 07/11/2006     Priority: Medium         PREVIOUS SURGERIES:  Past Surgical History:   Procedure Laterality Date    childbirth X9[      CLOSED RX RIB FRACTURE  7/06    left    DAVINCI SACROCOLPOPEXY, MIDURETHRAL SLING, CYSTOSCOPY  2/8/2013    Procedure: DAVINCI SACROCOLPOPEXY, MIDURETHRAL SLING, CYSTOSCOPY;  DAVINCI SACROCOLPOPEXY, TVT EXACT SLING, RIGHT SALPINGO-OOPHERECTOMY, CYSTOSCOPY;   Surgeon: Bennie Galdamez MD;  Location: [facility] OR    LAPAROSCOPIC CHOLECYSTECTOMY  3/31/2011    Procedure:LAPAROSCOPIC CHOLECYSTECTOMY; Surgeon:DAVID MOLINA; Location:[facility] OR    Z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    ZZC VAGINAL HYSTERECTOMY  9/15/06    TVH/BSO/A&P repair/sacrospinus ligament fixation......childbirth x 9    ZZHC COLONOSCOPY THRU STOMA, DIAGNOSTIC  7/2005    diverticulosis,small polyp,recheck 5 yrs       PREVIOUS ENDOSCOPY:  2005, small polyp, diverticulosis, recommended to repeat in 5 years    ALLERGIES:     Allergies   Allergen Reactions    Nkda [No Known Drug Allergy]     Seasonal Allergies      Itchy eyes and watery eyes       PERTINENT MEDICATIONS:  Current Outpatient Medications   Medication Sig Dispense Refill    acetaminophen (TYLENOL) 325 MG tablet Take 2 tablets (650 mg) by mouth every 8 hours      aspirin 81 MG EC tablet Take 81 mg by mouth daily      Cholecalciferol (VITAMIN D3 PO) Take by mouth daily      lisinopril (ZESTRIL) 20 MG tablet Take 1 tablet (20 mg) by mouth daily 90 tablet 0    Loperamide HCl (IMODIUM OR)       magnesium 250 MG tablet Take 1 tablet by mouth daily      Multiple Vitamins-Minerals (MULTIVITAMIN OR) Take 1 tablet by mouth daily       No current facility-administered medications for this visit.       FAMILY HISTORY:  Family History   Problem Relation Age of Onset    Heart Disease Father     Diabetes Sister     Heart Disease Sister     Cancer - colorectal Brother     Prostate Cancer Brother        Past/family/social history reviewed and no changes    PHYSICAL EXAMINATION:  Vitals BP (!) 167/91   Pulse 80   Ht 1.524 m (5')   Wt 72.6 kg (160 lb)   SpO2 98%   BMI 31.25 kg/m     Wt   Wt Readings from Last 2 Encounters:   04/24/24 72.6 kg (160 lb)   03/20/24 73.2 kg (161 lb 4.8 oz)      Gen: Pt sitting up on exam table in NAD, interactive and cooperative on exam  Eyes: sclerae anicteric, no injection  Cardiac: RRR, soft systolic murmur  heard  Resp: Clear on anterior exam  GI: Normoactive BS, abd soft, nontender throughout all quadrants, no organomegaly or masses palpated  Skin: Warm, dry, no jaundice, nails appear healthy  Neuro: alert, oriented, answers questions appropriately    Previous Imaging:     CT abd/pelvis 11/11/22:   IMPRESSION:   1.  Rectum distended with stool suggesting impaction.  2.  4.0 cm infrarenal abdominal aortic aneurysm has enlarged from 3.3 cm on 09/09/2019.  3.  Small hiatal hernia.  4.  Postcholecystectomy, left nephrectomy, and hysterectomy.    CT abd/pelvis 3/17/24:  IMPRESSION:   1.  Moderate gas distention of the redundant sigmoid colon. No obstruction appreciated. There is also moderate formed stool in the rectum.  2.  4.3 cm infrarenal abdominal aortic aneurysm. This is increased in size since the prior study where it measured 4 cm. Luminal aspect of the aneurysm cannot be assessed without IV contrast.  3.  Cholecystectomy. Mildly dilated cystic duct remnant containing a stone is unchanged. No inflammation.  4.  Duodenal diverticulum.          Again, thank you for allowing me to participate in the care of your patient.      Sincerely,    Nuris Akers PA-C

## 2024-04-29 ENCOUNTER — TELEPHONE (OUTPATIENT)
Dept: GASTROENTEROLOGY | Facility: CLINIC | Age: 89
End: 2024-04-29

## 2024-04-29 ENCOUNTER — OFFICE VISIT (OUTPATIENT)
Dept: FAMILY MEDICINE | Facility: CLINIC | Age: 89
End: 2024-04-29
Payer: COMMERCIAL

## 2024-04-29 VITALS
OXYGEN SATURATION: 98 % | SYSTOLIC BLOOD PRESSURE: 126 MMHG | WEIGHT: 162.1 LBS | BODY MASS INDEX: 30.61 KG/M2 | HEART RATE: 91 BPM | DIASTOLIC BLOOD PRESSURE: 78 MMHG | RESPIRATION RATE: 18 BRPM | HEIGHT: 61 IN | TEMPERATURE: 97.2 F

## 2024-04-29 DIAGNOSIS — I10 HYPERTENSION GOAL BP (BLOOD PRESSURE) < 140/90: ICD-10-CM

## 2024-04-29 DIAGNOSIS — I71.43 INFRARENAL ABDOMINAL AORTIC ANEURYSM (AAA) WITHOUT RUPTURE (H): ICD-10-CM

## 2024-04-29 DIAGNOSIS — K59.00 CONSTIPATION, UNSPECIFIED CONSTIPATION TYPE: Primary | ICD-10-CM

## 2024-04-29 DIAGNOSIS — M10.9 GOUT INVOLVING TOE, UNSPECIFIED CAUSE, UNSPECIFIED CHRONICITY, UNSPECIFIED LATERALITY: ICD-10-CM

## 2024-04-29 DIAGNOSIS — Z52.4 KIDNEY DONOR: ICD-10-CM

## 2024-04-29 DIAGNOSIS — Z87.19 HISTORY OF FECAL IMPACTION: ICD-10-CM

## 2024-04-29 DIAGNOSIS — N18.32 STAGE 3B CHRONIC KIDNEY DISEASE (H): ICD-10-CM

## 2024-04-29 DIAGNOSIS — R15.2 FECAL URGENCY: ICD-10-CM

## 2024-04-29 DIAGNOSIS — Z90.5 ACQUIRED ABSENCE OF KIDNEY: ICD-10-CM

## 2024-04-29 DIAGNOSIS — R19.7 OVERFLOW DIARRHEA: ICD-10-CM

## 2024-04-29 PROCEDURE — 99215 OFFICE O/P EST HI 40 MIN: CPT | Performed by: FAMILY MEDICINE

## 2024-04-29 RX ORDER — RESPIRATORY SYNCYTIAL VIRUS VACCINE 120MCG/0.5
0.5 KIT INTRAMUSCULAR ONCE
Qty: 1 EACH | Refills: 0 | Status: CANCELLED | OUTPATIENT
Start: 2024-04-29 | End: 2024-04-29

## 2024-04-29 ASSESSMENT — PAIN SCALES - GENERAL: PAINLEVEL: NO PAIN (0)

## 2024-04-29 NOTE — TELEPHONE ENCOUNTER
Talked with patient - she does not wish to schedule at this time and will mathieu back if she wants

## 2024-04-29 NOTE — PATIENT INSTRUCTIONS
You could try a buckwheat or rice pillow to see if you can create an indentation for your face so there is less pressure on your face. You could also try a memory foam pillow.    You could try online face exercises to help the corners of your mouth.   Let me know if you are interested in seeing the neurologist about your tremor.   Here are the recommendations from the GI doctor for constipation:  -- Use a toilet stool to bring your knees above the level of your hips   -- Continue the Metamucil, 3 chews per day   -- Use a glycerin/fleet suppository every 3 days or so to clear out the rectum, insert the rounded end first   -- If you skip 3 days with no bowel movement, take 1/2 capful of miralax   -- It is okay to use Gas X as needed  -- It is okay to take Pepto Bismol chews prior to leaving the house to help prevent diarrhea. It is okay to use imodium sparingly as well if pepto bismol is not effective  -- Follow up in 2-3 months   -- Future considerations include referral to Urogyn, Pelvic Floor Center or  Pelvic Floor PT, bowel cleanse

## 2024-04-29 NOTE — PROGRESS NOTES
"  Assessment & Plan      Constipation, unspecified constipation type  Fecal urgency  Diarrhea, suspect overflow  History of impacted stool on CT 11/22  She saw GI on 4/24/24 - note reviewed today. Has a plan for managing constipation and was recommended to follow up in 2-3 months. Reviewed GI management plan today          Hypertension  Continues lisinopril 20mg daily.   CMP stable     CKD 3, history of kidney donation  Stable  Not taking NSAIDs  Continues ACEI  Urine albumin normal.      Gout  Episodes of toe redness and pain about every other month resolved with an herbal supplement with celery and cherry she uses. She prefers not to take a daily medication at this point.   Uric acid level elevated at 8.6 on 3/17/24.   Discussed prophylactic treatment with allopurinol today and dietary recommendations      Infrarenal abdominal aortic aneurysm (AAA) without rupture 4.3cm  I recommended scheduling with vascular surgery. She has an appointment scheduled      Per ED visit notes: \"Discussed with vascular staff.  Can f/u in 6 mo or just f/u with pmd.  Says increased 2-3 mm per year and based on age wouldn't offer repair for her until her aneurysm in 5.5 cm.  Offered option and she wants to just f/u with pmd. \"    - Vascular Surgery Referral  - CT Abdomen w/o Contrast    History of DVT  Unprovoked distal right lower extremity DVT 4/30/2020. Was on anticoagulation.    Per 7/27/2020 hematology note:   \"Plan:  1. Will discontinue anticoagulation.  Anticoagulation clinic notified.  While this was an unprovoked clot, it was small and distal and thus recommendations support three months of anticoagulation over longer term anticoagulation. Lara is also very opposed to long term anticoagulation and understands risks of recurrent clotting events.  I support this decision as anticoagulation also comes with significant risk for her.  I did recommended she have repeat ultrasound to confirm resolution of clot but she kindly " "refused.    2. Discussed risk factors for deep vein thrombosis and stressed importance of moving around house and walking as much as possible.  She will also try to remain hydrated.    3. She will continue her ASA,  lisinopril and have her blood pressure monitored.    \"  Resting tremor in both hands and sometimes whole body   Mentioned by her as a side note rather than concern.   Notices it several times a day.   Offered neurology referral today, but she declined for now.   Does notice some mild memory changes. Also trips easily. She does not want further eval right now.      Schedule medicare wellness visit.      42 minutes spent on the date of the encounter doing chart review, history and exam, documentation and further activities per the note     Patient Instructions   You could try a buckwheat or rice pillow to see if you can create an indentation for your face so there is less pressure on your face. You could also try a memory foam pillow.    You could try online face exercises to help the corners of your mouth.   Let me know if you are interested in seeing the neurologist about your tremor.   Here are the recommendations from the GI doctor for constipation:  -- Use a toilet stool to bring your knees above the level of your hips   -- Continue the Metamucil, 3 chews per day   -- Use a glycerin/fleet suppository every 3 days or so to clear out the rectum, insert the rounded end first   -- If you skip 3 days with no bowel movement, take 1/2 capful of miralax   -- It is okay to use Gas X as needed  -- It is okay to take Pepto Bismol chews prior to leaving the house to help prevent diarrhea. It is okay to use imodium sparingly as well if pepto bismol is not effective  -- Follow up in 2-3 months   -- Future considerations include referral to Urogyn, Pelvic Floor Center or  Pelvic Floor PT, bowel cleanse        BMI  Estimated body mass index is 30.64 kg/m  as calculated from the following:    Height as of this " "encounter: 1.549 m (5' 0.98\").    Weight as of this encounter: 73.5 kg (162 lb 1.6 oz).            Ti Bermudez is a 88 year old, presenting for the following health issues:  Recheck Medication    History of Present Illness       Reason for visit:  Med check                  Objective    /78 (BP Location: Right arm, Patient Position: Sitting, Cuff Size: Adult Regular)   Pulse 91   Temp 97.2  F (36.2  C) (Temporal)   Resp 18   Ht 1.549 m (5' 0.98\")   Wt 73.5 kg (162 lb 1.6 oz)   LMP  (LMP Unknown)   SpO2 98%   BMI 30.64 kg/m    Body mass index is 30.64 kg/m .  Physical Exam       Admission on 03/17/2024, Discharged on 03/17/2024   Component Date Value Ref Range Status    Color Urine 03/17/2024 Yellow  Colorless, Straw, Light Yellow, Yellow Final    Appearance Urine 03/17/2024 Clear  Clear Final    Glucose Urine 03/17/2024 Negative  Negative mg/dL Final    Bilirubin Urine 03/17/2024 Negative  Negative Final    Ketones Urine 03/17/2024 Negative  Negative mg/dL Final    Specific Gravity Urine 03/17/2024 1.020  1.003 - 1.035 Final    Blood Urine 03/17/2024 Negative  Negative Final    pH Urine 03/17/2024 5.0  5.0 - 7.0 Final    Protein Albumin Urine 03/17/2024 Negative  Negative mg/dL Final    Urobilinogen Urine 03/17/2024 Normal  Normal, 2.0 mg/dL Final    Nitrite Urine 03/17/2024 Negative  Negative Final    Leukocyte Esterase Urine 03/17/2024 Moderate (A)  Negative Final    Mucus Urine 03/17/2024 Present (A)  None Seen /LPF Final    RBC Urine 03/17/2024 2  <=2 /HPF Final    WBC Urine 03/17/2024 14 (H)  <=5 /HPF Final    Squamous Epithelials Urine 03/17/2024 2 (H)  <=1 /HPF Final    Transitional Epithelials Urine 03/17/2024 <1  <=1 /HPF Final    Sodium 03/17/2024 140  135 - 145 mmol/L Final    Reference intervals for this test were updated on 09/26/2023 to more accurately reflect our healthy population. There may be differences in the flagging of prior results with similar values performed with this " method. Interpretation of those prior results can be made in the context of the updated reference intervals.     Potassium 03/17/2024 4.5  3.4 - 5.3 mmol/L Final    Carbon Dioxide (CO2) 03/17/2024 25  22 - 29 mmol/L Final    Anion Gap 03/17/2024 9  7 - 15 mmol/L Final    Urea Nitrogen 03/17/2024 19.8  8.0 - 23.0 mg/dL Final    Creatinine 03/17/2024 1.29 (H)  0.51 - 0.95 mg/dL Final    GFR Estimate 03/17/2024 40 (L)  >60 mL/min/1.73m2 Final    Calcium 03/17/2024 9.8  8.8 - 10.2 mg/dL Final    Chloride 03/17/2024 106  98 - 107 mmol/L Final    Glucose 03/17/2024 83  70 - 99 mg/dL Final    Alkaline Phosphatase 03/17/2024 63  40 - 150 U/L Final    Reference intervals for this test were updated on 11/14/2023 to more accurately reflect our healthy population. There may be differences in the flagging of prior results with similar values performed with this method. Interpretation of those prior results can be made in the context of the updated reference intervals.    AST 03/17/2024 22  0 - 45 U/L Final    Reference intervals for this test were updated on 6/12/2023 to more accurately reflect our healthy population. There may be differences in the flagging of prior results with similar values performed with this method. Interpretation of those prior results can be made in the context of the updated reference intervals.    ALT 03/17/2024 11  0 - 50 U/L Final    Reference intervals for this test were updated on 6/12/2023 to more accurately reflect our healthy population. There may be differences in the flagging of prior results with similar values performed with this method. Interpretation of those prior results can be made in the context of the updated reference intervals.      Protein Total 03/17/2024 7.0  6.4 - 8.3 g/dL Final    Albumin 03/17/2024 3.9  3.5 - 5.2 g/dL Final    Bilirubin Total 03/17/2024 0.2  <=1.2 mg/dL Final    Lipase 03/17/2024 27  13 - 60 U/L Final    On 02/07/2023 the assay method at [facility] laboratory was changed to the Roche Lipase method on the Conrad c6000. Results obtained with different assay methods or kits cannot be used interchangeably, and therefore, direct comparison to results obtained from this laboratory prior to 02/07/2023 should be interpreted with caution, with each result interpreted in the context of its own reference interval.    Lactic Acid 03/17/2024 1.1  0.7 - 2.0 mmol/L Final    WBC Count 03/17/2024 6.3  4.0 - 11.0 10e3/uL Final    RBC Count 03/17/2024 4.49  3.80 - 5.20 10e6/uL Final    Hemoglobin 03/17/2024 11.3 (L)  11.7 - 15.7 g/dL Final    Hematocrit 03/17/2024 37.5  35.0 - 47.0 % Final    MCV 03/17/2024 84  78 - 100 fL Final    MCH 03/17/2024 25.2 (L)  26.5 - 33.0 pg Final    MCHC 03/17/2024 30.1 (L)  31.5 - 36.5 g/dL Final    RDW 03/17/2024 15.0  10.0 - 15.0 % Final    Platelet Count 03/17/2024 252  150 - 450 10e3/uL Final    % Neutrophils 03/17/2024 64  % Final    % Lymphocytes 03/17/2024 27  % Final    % Monocytes 03/17/2024 7  % Final    % Eosinophils 03/17/2024 2  % Final    % Basophils 03/17/2024 0  % Final    % Immature Granulocytes 03/17/2024 0  % Final    NRBCs per 100 WBC 03/17/2024 0  <1 /100 Final    Absolute Neutrophils 03/17/2024 4.0  1.6 - 8.3 10e3/uL Final    Absolute Lymphocytes 03/17/2024 1.7  0.8 - 5.3 10e3/uL Final    Absolute Monocytes 03/17/2024 0.5  0.0 - 1.3 10e3/uL Final    Absolute Eosinophils 03/17/2024 0.1  0.0 - 0.7 10e3/uL Final    Absolute Basophils 03/17/2024 0.0  0.0 - 0.2 10e3/uL Final    Absolute Immature Granulocytes 03/17/2024 0.0  <=0.4 10e3/uL Final    Absolute NRBCs 03/17/2024 0.0  10e3/uL Final    Creatinine POCT 03/17/2024 1.4 (H)  0.5 - 1.0 mg/dL Final    GFR, ESTIMATED POCT 03/17/2024 36 (L)  >60 mL/min/1.73m2 Final    Culture 03/17/2024 10,000-50,000 CFU/mL Mixture of Urogenital Jacey   Final    Uric Acid 03/17/2024 8.6 (H)  2.4 - 5.7 mg/dL Final    Cholesterol 03/17/2024 220 (H)  <200 mg/dL Final    Triglycerides  03/17/2024 110  <150 mg/dL Final    Direct Measure HDL 03/17/2024 44 (L)  >=50 mg/dL Final    LDL Cholesterol Calculated 03/17/2024 154 (H)  <=100 mg/dL Final    Non HDL Cholesterol 03/17/2024 176 (H)  <130 mg/dL Final    Creatinine Urine mg/dL 03/17/2024 118.0  mg/dL Final    The reference ranges have not been established in urine creatinine. The results should be integrated into the clinical context for interpretation.    Albumin Urine mg/L 03/17/2024 <12.0  mg/L Final    The reference ranges have not been established in urine albumin. The results should be integrated into the clinical context for interpretation.    Albumin Urine mg/g Cr 03/17/2024    Final    Unable to calculate, urine albumin and/or urine creatinine is outside detectable limits.  Microalbuminuria is defined as an albumin:creatinine ratio of 17 to 299 for males and 25 to 299 for females. A ratio of albumin:creatinine of 300 or higher is indicative of overt proteinuria.  Due to biologic variability, positive results should be confirmed by a second, first-morning random or 24-hour timed urine specimen. If there is discrepancy, a third specimen is recommended. When 2 out of 3 results are in the microalbuminuria range, this is evidence for incipient nephropathy and warrants increased efforts at glucose control, blood pressure control, and institution of therapy with an angiotensin-converting-enzyme (ACE) inhibitor (if the patient can tolerate it).             Signed Electronically by: Yecenia Dickerson MD

## 2024-07-26 ENCOUNTER — APPOINTMENT (OUTPATIENT)
Dept: CT IMAGING | Facility: CLINIC | Age: 89
DRG: 393 | End: 2024-07-26
Attending: STUDENT IN AN ORGANIZED HEALTH CARE EDUCATION/TRAINING PROGRAM
Payer: COMMERCIAL

## 2024-07-26 ENCOUNTER — HOSPITAL ENCOUNTER (INPATIENT)
Facility: CLINIC | Age: 89
LOS: 3 days | Discharge: HOME | DRG: 393 | End: 2024-07-29
Attending: STUDENT IN AN ORGANIZED HEALTH CARE EDUCATION/TRAINING PROGRAM | Admitting: INTERNAL MEDICINE
Payer: COMMERCIAL

## 2024-07-26 ENCOUNTER — NURSE TRIAGE (OUTPATIENT)
Dept: FAMILY MEDICINE | Facility: CLINIC | Age: 89
End: 2024-07-26

## 2024-07-26 DIAGNOSIS — K62.89 PROCTITIS: ICD-10-CM

## 2024-07-26 DIAGNOSIS — Z87.19 PERSONAL HISTORY OF DIGESTIVE SYSTEM DISEASE: ICD-10-CM

## 2024-07-26 DIAGNOSIS — Z86.79 PERSONAL HISTORY OF OTHER DISEASES OF THE CIRCULATORY SYSTEM: ICD-10-CM

## 2024-07-26 DIAGNOSIS — Z90.710 ACQUIRED ABSENCE OF UTERUS: ICD-10-CM

## 2024-07-26 DIAGNOSIS — Z98.890 OTHER SPECIFIED POSTPROCEDURAL STATES: ICD-10-CM

## 2024-07-26 DIAGNOSIS — N39.0 URINARY TRACT INFECTION WITHOUT HEMATURIA, SITE UNSPECIFIED: Primary | ICD-10-CM

## 2024-07-26 LAB
ALBUMIN SERPL BCG-MCNC: 3.6 G/DL (ref 3.5–5.2)
ALBUMIN UR-MCNC: NEGATIVE MG/DL
ALP SERPL-CCNC: 71 U/L (ref 40–150)
ALT SERPL W P-5'-P-CCNC: 9 U/L (ref 0–50)
ANION GAP SERPL CALCULATED.3IONS-SCNC: 13 MMOL/L (ref 7–15)
APPEARANCE UR: CLEAR
AST SERPL W P-5'-P-CCNC: 15 U/L (ref 0–45)
BASOPHILS # BLD AUTO: 0 10E3/UL (ref 0–0.2)
BASOPHILS NFR BLD AUTO: 0 %
BILIRUB SERPL-MCNC: 0.2 MG/DL
BILIRUB UR QL STRIP: NEGATIVE
BUN SERPL-MCNC: 32 MG/DL (ref 8–23)
CALCIUM SERPL-MCNC: 9.2 MG/DL (ref 8.8–10.4)
CHLORIDE SERPL-SCNC: 106 MMOL/L (ref 98–107)
COLOR UR AUTO: ABNORMAL
CREAT SERPL-MCNC: 1.63 MG/DL (ref 0.51–0.95)
CRP SERPL-MCNC: 29.17 MG/L
EGFRCR SERPLBLD CKD-EPI 2021: 30 ML/MIN/1.73M2
EOSINOPHIL # BLD AUTO: 0.2 10E3/UL (ref 0–0.7)
EOSINOPHIL NFR BLD AUTO: 3 %
ERYTHROCYTE [DISTWIDTH] IN BLOOD BY AUTOMATED COUNT: 15 % (ref 10–15)
ERYTHROCYTE [SEDIMENTATION RATE] IN BLOOD BY WESTERGREN METHOD: 26 MM/HR (ref 0–30)
GLUCOSE SERPL-MCNC: 96 MG/DL (ref 70–99)
GLUCOSE UR STRIP-MCNC: NEGATIVE MG/DL
HCO3 SERPL-SCNC: 22 MMOL/L (ref 22–29)
HCT VFR BLD AUTO: 34.8 % (ref 35–47)
HGB BLD-MCNC: 10.9 G/DL (ref 11.7–15.7)
HGB UR QL STRIP: NEGATIVE
IMM GRANULOCYTES # BLD: 0 10E3/UL
IMM GRANULOCYTES NFR BLD: 0 %
INR PPP: 1.08 (ref 0.85–1.15)
KETONES UR STRIP-MCNC: NEGATIVE MG/DL
LEUKOCYTE ESTERASE UR QL STRIP: ABNORMAL
LIPASE SERPL-CCNC: 19 U/L (ref 13–60)
LYMPHOCYTES # BLD AUTO: 1.2 10E3/UL (ref 0.8–5.3)
LYMPHOCYTES NFR BLD AUTO: 21 %
MAGNESIUM SERPL-MCNC: 2.2 MG/DL (ref 1.7–2.3)
MCH RBC QN AUTO: 26.4 PG (ref 26.5–33)
MCHC RBC AUTO-ENTMCNC: 31.3 G/DL (ref 31.5–36.5)
MCV RBC AUTO: 84 FL (ref 78–100)
MONOCYTES # BLD AUTO: 0.6 10E3/UL (ref 0–1.3)
MONOCYTES NFR BLD AUTO: 10 %
MUCOUS THREADS #/AREA URNS LPF: PRESENT /LPF
NEUTROPHILS # BLD AUTO: 3.8 10E3/UL (ref 1.6–8.3)
NEUTROPHILS NFR BLD AUTO: 66 %
NITRATE UR QL: NEGATIVE
NRBC # BLD AUTO: 0 10E3/UL
NRBC BLD AUTO-RTO: 0 /100
PH UR STRIP: 5.5 [PH] (ref 5–7)
PHOSPHATE SERPL-MCNC: 3.3 MG/DL (ref 2.5–4.5)
PLATELET # BLD AUTO: 184 10E3/UL (ref 150–450)
POTASSIUM SERPL-SCNC: 5.3 MMOL/L (ref 3.4–5.3)
PROCALCITONIN SERPL IA-MCNC: 0.08 NG/ML
PROT SERPL-MCNC: 6.7 G/DL (ref 6.4–8.3)
RBC # BLD AUTO: 4.13 10E6/UL (ref 3.8–5.2)
RBC URINE: 1 /HPF
SODIUM SERPL-SCNC: 141 MMOL/L (ref 135–145)
SP GR UR STRIP: 1.01 (ref 1–1.03)
SQUAMOUS EPITHELIAL: 3 /HPF
TROPONIN T SERPL HS-MCNC: 24 NG/L
UROBILINOGEN UR STRIP-MCNC: NORMAL MG/DL
WBC # BLD AUTO: 5.8 10E3/UL (ref 4–11)
WBC URINE: 26 /HPF

## 2024-07-26 PROCEDURE — 99223 1ST HOSP IP/OBS HIGH 75: CPT | Mod: FS | Performed by: PHYSICIAN ASSISTANT

## 2024-07-26 PROCEDURE — 87086 URINE CULTURE/COLONY COUNT: CPT | Performed by: STUDENT IN AN ORGANIZED HEALTH CARE EDUCATION/TRAINING PROGRAM

## 2024-07-26 PROCEDURE — 85652 RBC SED RATE AUTOMATED: CPT | Performed by: STUDENT IN AN ORGANIZED HEALTH CARE EDUCATION/TRAINING PROGRAM

## 2024-07-26 PROCEDURE — 83735 ASSAY OF MAGNESIUM: CPT | Performed by: PHYSICIAN ASSISTANT

## 2024-07-26 PROCEDURE — 84100 ASSAY OF PHOSPHORUS: CPT | Performed by: PHYSICIAN ASSISTANT

## 2024-07-26 PROCEDURE — 84484 ASSAY OF TROPONIN QUANT: CPT | Performed by: PHYSICIAN ASSISTANT

## 2024-07-26 PROCEDURE — 258N000003 HC RX IP 258 OP 636: Performed by: STUDENT IN AN ORGANIZED HEALTH CARE EDUCATION/TRAINING PROGRAM

## 2024-07-26 PROCEDURE — 99418 PROLNG IP/OBS E/M EA 15 MIN: CPT | Mod: FS | Performed by: PHYSICIAN ASSISTANT

## 2024-07-26 PROCEDURE — 250N000013 HC RX MED GY IP 250 OP 250 PS 637: Performed by: STUDENT IN AN ORGANIZED HEALTH CARE EDUCATION/TRAINING PROGRAM

## 2024-07-26 PROCEDURE — 87040 BLOOD CULTURE FOR BACTERIA: CPT | Performed by: PHYSICIAN ASSISTANT

## 2024-07-26 PROCEDURE — 250N000011 HC RX IP 250 OP 636: Performed by: STUDENT IN AN ORGANIZED HEALTH CARE EDUCATION/TRAINING PROGRAM

## 2024-07-26 PROCEDURE — 84145 PROCALCITONIN (PCT): CPT | Performed by: PHYSICIAN ASSISTANT

## 2024-07-26 PROCEDURE — 250N000011 HC RX IP 250 OP 636: Performed by: PHYSICIAN ASSISTANT

## 2024-07-26 PROCEDURE — 36415 COLL VENOUS BLD VENIPUNCTURE: CPT | Performed by: PHYSICIAN ASSISTANT

## 2024-07-26 PROCEDURE — 36556 INSERT NON-TUNNEL CV CATH: CPT | Performed by: STUDENT IN AN ORGANIZED HEALTH CARE EDUCATION/TRAINING PROGRAM

## 2024-07-26 PROCEDURE — 96360 HYDRATION IV INFUSION INIT: CPT | Mod: 59 | Performed by: STUDENT IN AN ORGANIZED HEALTH CARE EDUCATION/TRAINING PROGRAM

## 2024-07-26 PROCEDURE — 76937 US GUIDE VASCULAR ACCESS: CPT | Mod: 26 | Performed by: STUDENT IN AN ORGANIZED HEALTH CARE EDUCATION/TRAINING PROGRAM

## 2024-07-26 PROCEDURE — 85610 PROTHROMBIN TIME: CPT | Performed by: STUDENT IN AN ORGANIZED HEALTH CARE EDUCATION/TRAINING PROGRAM

## 2024-07-26 PROCEDURE — 80053 COMPREHEN METABOLIC PANEL: CPT | Performed by: STUDENT IN AN ORGANIZED HEALTH CARE EDUCATION/TRAINING PROGRAM

## 2024-07-26 PROCEDURE — 81001 URINALYSIS AUTO W/SCOPE: CPT | Performed by: STUDENT IN AN ORGANIZED HEALTH CARE EDUCATION/TRAINING PROGRAM

## 2024-07-26 PROCEDURE — 93010 ELECTROCARDIOGRAM REPORT: CPT | Mod: 59 | Performed by: STUDENT IN AN ORGANIZED HEALTH CARE EDUCATION/TRAINING PROGRAM

## 2024-07-26 PROCEDURE — 120N000002 HC R&B MED SURG/OB UMMC

## 2024-07-26 PROCEDURE — 74177 CT ABD & PELVIS W/CONTRAST: CPT

## 2024-07-26 PROCEDURE — 99285 EMERGENCY DEPT VISIT HI MDM: CPT | Mod: 25 | Performed by: STUDENT IN AN ORGANIZED HEALTH CARE EDUCATION/TRAINING PROGRAM

## 2024-07-26 PROCEDURE — 76937 US GUIDE VASCULAR ACCESS: CPT

## 2024-07-26 PROCEDURE — 99207 PR APP CREDIT; MD BILLING SHARED VISIT: CPT | Performed by: INTERNAL MEDICINE

## 2024-07-26 PROCEDURE — 93005 ELECTROCARDIOGRAM TRACING: CPT | Performed by: STUDENT IN AN ORGANIZED HEALTH CARE EDUCATION/TRAINING PROGRAM

## 2024-07-26 PROCEDURE — 86140 C-REACTIVE PROTEIN: CPT | Performed by: STUDENT IN AN ORGANIZED HEALTH CARE EDUCATION/TRAINING PROGRAM

## 2024-07-26 PROCEDURE — 85025 COMPLETE CBC W/AUTO DIFF WBC: CPT | Performed by: STUDENT IN AN ORGANIZED HEALTH CARE EDUCATION/TRAINING PROGRAM

## 2024-07-26 PROCEDURE — 83690 ASSAY OF LIPASE: CPT | Performed by: STUDENT IN AN ORGANIZED HEALTH CARE EDUCATION/TRAINING PROGRAM

## 2024-07-26 PROCEDURE — 250N000009 HC RX 250: Performed by: STUDENT IN AN ORGANIZED HEALTH CARE EDUCATION/TRAINING PROGRAM

## 2024-07-26 PROCEDURE — 36415 COLL VENOUS BLD VENIPUNCTURE: CPT | Performed by: STUDENT IN AN ORGANIZED HEALTH CARE EDUCATION/TRAINING PROGRAM

## 2024-07-26 RX ORDER — DOCUSATE SODIUM 100 MG/1
100 CAPSULE, LIQUID FILLED ORAL 2 TIMES DAILY
Status: DISCONTINUED | OUTPATIENT
Start: 2024-07-26 | End: 2024-07-29 | Stop reason: HOSPADM

## 2024-07-26 RX ORDER — ONDANSETRON 4 MG/1
4 TABLET, ORALLY DISINTEGRATING ORAL EVERY 6 HOURS PRN
Status: DISCONTINUED | OUTPATIENT
Start: 2024-07-26 | End: 2024-07-29 | Stop reason: HOSPADM

## 2024-07-26 RX ORDER — ACETAMINOPHEN 325 MG/1
650 TABLET ORAL EVERY 4 HOURS PRN
Status: DISCONTINUED | OUTPATIENT
Start: 2024-07-26 | End: 2024-07-29 | Stop reason: HOSPADM

## 2024-07-26 RX ORDER — CALCIUM CARBONATE 500 MG/1
1000 TABLET, CHEWABLE ORAL 4 TIMES DAILY PRN
Status: DISCONTINUED | OUTPATIENT
Start: 2024-07-26 | End: 2024-07-29 | Stop reason: HOSPADM

## 2024-07-26 RX ORDER — AMOXICILLIN 250 MG
1 CAPSULE ORAL 2 TIMES DAILY PRN
Status: DISCONTINUED | OUTPATIENT
Start: 2024-07-26 | End: 2024-07-29 | Stop reason: HOSPADM

## 2024-07-26 RX ORDER — LIDOCAINE 40 MG/G
CREAM TOPICAL
Status: DISCONTINUED | OUTPATIENT
Start: 2024-07-26 | End: 2024-07-29 | Stop reason: HOSPADM

## 2024-07-26 RX ORDER — OXYCODONE HYDROCHLORIDE 5 MG/1
5 TABLET ORAL EVERY 4 HOURS PRN
Status: DISCONTINUED | OUTPATIENT
Start: 2024-07-26 | End: 2024-07-29 | Stop reason: HOSPADM

## 2024-07-26 RX ORDER — SENNOSIDES 8.6 MG
8.6 TABLET ORAL 2 TIMES DAILY PRN
Status: DISCONTINUED | OUTPATIENT
Start: 2024-07-26 | End: 2024-07-26

## 2024-07-26 RX ORDER — AMOXICILLIN 250 MG
2 CAPSULE ORAL 2 TIMES DAILY PRN
Status: DISCONTINUED | OUTPATIENT
Start: 2024-07-26 | End: 2024-07-29 | Stop reason: HOSPADM

## 2024-07-26 RX ORDER — CEFTRIAXONE 2 G/1
2 INJECTION, POWDER, FOR SOLUTION INTRAMUSCULAR; INTRAVENOUS EVERY 24 HOURS
Status: DISCONTINUED | OUTPATIENT
Start: 2024-07-26 | End: 2024-07-29 | Stop reason: HOSPADM

## 2024-07-26 RX ORDER — ACETAMINOPHEN 650 MG/1
650 SUPPOSITORY RECTAL EVERY 4 HOURS PRN
Status: DISCONTINUED | OUTPATIENT
Start: 2024-07-26 | End: 2024-07-29 | Stop reason: HOSPADM

## 2024-07-26 RX ORDER — ONDANSETRON 2 MG/ML
4 INJECTION INTRAMUSCULAR; INTRAVENOUS EVERY 6 HOURS PRN
Status: DISCONTINUED | OUTPATIENT
Start: 2024-07-26 | End: 2024-07-29 | Stop reason: HOSPADM

## 2024-07-26 RX ORDER — IOPAMIDOL 755 MG/ML
100 INJECTION, SOLUTION INTRAVASCULAR ONCE
Status: COMPLETED | OUTPATIENT
Start: 2024-07-26 | End: 2024-07-26

## 2024-07-26 RX ORDER — SODIUM CHLORIDE 9 MG/ML
INJECTION, SOLUTION INTRAVENOUS CONTINUOUS
Status: ACTIVE | OUTPATIENT
Start: 2024-07-26 | End: 2024-07-27

## 2024-07-26 RX ORDER — POLYETHYLENE GLYCOL 3350 17 G/17G
17 POWDER, FOR SOLUTION ORAL DAILY
Status: DISCONTINUED | OUTPATIENT
Start: 2024-07-27 | End: 2024-07-29 | Stop reason: HOSPADM

## 2024-07-26 RX ADMIN — SODIUM CHLORIDE 40 ML: 9 INJECTION, SOLUTION INTRAVENOUS at 19:43

## 2024-07-26 RX ADMIN — FAMOTIDINE 20 MG: 10 INJECTION, SOLUTION INTRAVENOUS at 22:49

## 2024-07-26 RX ADMIN — SODIUM CHLORIDE 500 ML: 9 INJECTION, SOLUTION INTRAVENOUS at 18:37

## 2024-07-26 RX ADMIN — CEFTRIAXONE SODIUM 2 G: 2 INJECTION, POWDER, FOR SOLUTION INTRAMUSCULAR; INTRAVENOUS at 22:47

## 2024-07-26 RX ADMIN — IOPAMIDOL 79 ML: 755 INJECTION, SOLUTION INTRAVENOUS at 19:36

## 2024-07-26 RX ADMIN — DOCUSATE SODIUM 100 MG: 100 CAPSULE, LIQUID FILLED ORAL at 22:33

## 2024-07-26 ASSESSMENT — ACTIVITIES OF DAILY LIVING (ADL)
ADLS_ACUITY_SCORE: 37
ADLS_ACUITY_SCORE: 37
ADLS_ACUITY_SCORE: 35
ADLS_ACUITY_SCORE: 37

## 2024-07-26 ASSESSMENT — COLUMBIA-SUICIDE SEVERITY RATING SCALE - C-SSRS
6. HAVE YOU EVER DONE ANYTHING, STARTED TO DO ANYTHING, OR PREPARED TO DO ANYTHING TO END YOUR LIFE?: NO
1. IN THE PAST MONTH, HAVE YOU WISHED YOU WERE DEAD OR WISHED YOU COULD GO TO SLEEP AND NOT WAKE UP?: NO
2. HAVE YOU ACTUALLY HAD ANY THOUGHTS OF KILLING YOURSELF IN THE PAST MONTH?: NO

## 2024-07-26 NOTE — ED PROVIDER NOTES
"    [facility] EMERGENCY DEPARTMENT ([facility])    7/26/24      ED PROVIDER NOTE   Kandice RUSTY 5:10 PM   History     Chief Complaint   Patient presents with    Diarrhea     Pt complains of diarrhea and pain every few minutes. Has a history of hemmorrhoids. It does hurt when pt uses the restroom     The history is provided by the patient and medical records.     Lara Marc is a 88 year old female with history of infrarenal abdominal aortic aneurysm (4.3 cm in diameter, seen on CT abdomen pelvis 3/17/2024), GERD, cecal volvulus, hysterectomy in 2006, complete uterovaginal prolapse s/p sacrocolpopexy in 2013, pelvic floor disorder with recurrent issues with constipation alternating with diarrhea, recurrent stool impactions who presents to the emergency department with rectal pain and diarrhea for the past 4 days.  She had been constipated earlier, used a small amount of the fleets enema and has been having constant diarrhea since then.  She tried to slow her stools down with some Imodium but continued to have diarrhea.  Called into nurse triage line noting that she was unable to quantify how many episodes of diarrhea she has had, but has had to be by the toilet all day.  She also has had \"colon pain\" that is intermittent, peaks at a 7/10 in intensity and makes it feel like she will explode.  She has been trying to hydrate but her lips still feel dry.  No bloody stools, no fevers.  Does feel little lightheaded with this.  She now presents for evaluation.  Here patient reports having rectal pain that makes her feel like she has to go to the bathroom very badly but can't.  No rectal bleeding. She has abdominal pressure but no abdominal pain. No fever. She denies any hemorrhoid or rectal prolapse symptoms.    Epic records reviewed, she has had issues with constipation that led to her being seen in the emergency department in March of this year.  She did have CT of the abdomen pelvis that showed moderate gas " distention of the redundant sigmoid colon and moderate formed stool in the rectum.  She also has a CT scan from 11/11/2022 showing rectum distended with stool suggestive of impaction.    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    CKD (chronic kidney disease) stage 3, GFR 30-59 ml/min (H) 7/25/2011    has 1 kidney, the right kidney is present---gave left kidney to sister    Complete uterovaginal prolapse 2/8/2013    Deep vein thrombosis (DVT) (H) 5/7/2020    Dyspnea on exertion     Gastro-oesophageal reflux disease     Hypertension goal BP (blood pressure) < 140/90 7/25/2011    Other and unspecified hyperlipidemia     Unspecified essential hypertension     not medicated at this time    Uterovaginal prolapse, complete 2004     resolved '06    Vaginal enterocele, congenital or acquired 2007     or cystocele     Past Surgical History:   Procedure Laterality Date    childbirth X9[      CLOSED RX RIB FRACTURE  7/06    left    DAVINCI SACROCOLPOPEXY, MIDURETHRAL SLING, CYSTOSCOPY  2/8/2013    Procedure: DAVINCI SACROCOLPOPEXY, MIDURETHRAL SLING, CYSTOSCOPY;  DAVINCI SACROCOLPOPEXY, TVT EXACT SLING, RIGHT SALPINGO-OOPHERECTOMY, CYSTOSCOPY;  Surgeon: Bennie Galdamez MD;  Location:  OR    LAPAROSCOPIC CHOLECYSTECTOMY  3/31/2011    Procedure:LAPAROSCOPIC CHOLECYSTECTOMY; Surgeon:DAVID MOLINA; Location:[facility] OR    Z 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    ZZC VAGINAL HYSTERECTOMY  9/15/06    TVH/BSO/A&P repair/sacrospinus ligament fixation......childbirth x 9    [facility]  [facility]    diverticulosis,small polyp,recheck 5 yrs     acetaminophen (TYLENOL) 325 MG  tablet  aspirin 81 MG EC tablet  Cholecalciferol (VITAMIN D3 PO)  lisinopril (ZESTRIL) 20 MG tablet  Loperamide HCl (IMODIUM OR)  magnesium 250 MG tablet  Multiple Vitamins-Minerals (MULTIVITAMIN OR)      Allergies   Allergen Reactions    Seasonal Allergies      Itchy eyes and watery eyes     Family History  Family History   Problem Relation Age of Onset    Heart Disease Father     Diabetes Sister     Heart Disease Sister     Cancer - colorectal Brother     Prostate Cancer Brother      Social History   Social History     Tobacco Use    Smoking status: Former     Current packs/day: 0.00     Types: Cigarettes     Passive exposure: Never    Smokeless tobacco: Never    Tobacco comments:     quit when she was 29 years old   Substance Use Topics    Alcohol use: No    Drug use: No      A medically appropriate review of systems was performed with pertinent positives and negatives noted in the HPI, and all other systems negative.    Physical Exam   BP: (!) 157/81 (right lower forearm)  Pulse: 78  Temp: 97.9  F (36.6  C)  Resp: 16  SpO2: 94 %  Physical Exam  Vitals and nursing note reviewed.   Constitutional:       General: She is not in acute distress.     Appearance: Normal appearance. She is well-developed.   HENT:      Head: Normocephalic and atraumatic.   Eyes:      General: No scleral icterus.     Conjunctiva/sclera: Conjunctivae normal.   Cardiovascular:      Rate and Rhythm: Normal rate.   Pulmonary:      Effort: Pulmonary effort is normal. No respiratory distress.   Abdominal:      General: Abdomen is flat. There is no distension.      Palpations: There is no mass.      Tenderness: There is no guarding or rebound.      Hernia: No hernia is present.      Comments: Abdomen soft and nondistended but diffusely tender to palpation, mildly, no rebound or guarding   Musculoskeletal:      Cervical back: Normal range of motion and neck supple.   Skin:     General: Skin is warm and dry.      Findings: No rash.   Neurological:       Mental Status: She is alert and oriented to person, place, and time.         ED Course, Procedures, & Data      Procedures  Results for orders placed during the hospital encounter of 07/26/24    POC US GUIDANCE NEEDLE PLACEMENT    Impression  [facility] Procedure Note    Limited Bedside ED Ultrasound for Peripheral Vein Cannulation:    PROCEDURE: PERFORMED BY: Dr. Moncho Parker MD  INDICATIONS/SYMPTOM:  IV Access Needed for Multiple Medications  PROBE: High frequency linear probe  BODY LOCATION: The ultrasound was performed on the left upper arm.  FINDINGS: Artery and vein visualized.  Vein completely compressible (ie collapsible) and no thrombus visualized.  INTERPRETATION: Patent vein confirmed with US.  Central line inserted into vein utilizing real-time ultrasonic guidance.  IMAGE DOCUMENTATION: Images were archived to hard drive.          INDICATIONS/SYMPTOM:  PROBE: High frequency linear probe  BODY LOCATION: right upper arm  FINDINGS: Artery and vein visualized utilizing short axis views.  Vein completely compressible (ie collapsible) and no thrombus visualized.  INTERPRETATION: Patent vein confirmed with US.  Peripheral line inserted into vein utilizing real-time ultrasonic guidance.  IMAGE DOCUMENTATION: Images were archived to hard drive                Results for orders placed or performed during the hospital encounter of 07/26/24   CT Abdomen Pelvis w Contrast     Status: None    Narrative    EXAM: CT ABDOMEN PELVIS W CONTRAST  LOCATION: [facility]  DATE: 7/26/2024    INDICATION: Abdominal pain.  COMPARISON: 3/17/2024  TECHNIQUE: CT scan of the abdomen and pelvis was performed following injection of IV contrast. Multiplanar reformats were obtained. Dose reduction techniques were used.  CONTRAST: 79 mL Isovue 370    FINDINGS:   LOWER CHEST: Significant coronary artery and mitral annular calcification. Small blebs in the right middle  lobe.    HEPATOBILIARY: The gallbladder is surgically absent. There is a remnant of the cystic duct which contains a small stone. There are some scattered low-density areas in the liver, most typical for tiny cysts, with no follow-up recommended. The hepatic   veins, portal veins, and common bile duct are normal.    PANCREAS: Normal.    SPLEEN: Normal.    ADRENAL GLANDS: Normal.    KIDNEYS/BLADDER: The left kidney is surgically absent. There is a tiny benign cyst seen in the right kidney with no follow-up recommended. The urinary tract is otherwise normal.    BOWEL: There is a prominent amount of stool seen in the tortuous sigmoid colon and rectum. There is some mild circumferential wall thickening seen involving the rectum consistent with findings of proctitis. No abscess or free air is seen. Small   esophageal hiatal hernia. Duodenal diverticulum.    LYMPH NODES: Normal.    VASCULATURE: Stable 4.3 cm in greatest AP dimension infrarenal abdominal aortic aneurysm. Moderate vascular calcification.    PELVIC ORGANS: Surgically absent.    MUSCULOSKELETAL: Degenerative disc disease at L4 and L5.      Impression    IMPRESSION:   1.  Prominent amount of stool seen in the sigmoid colon and rectum with circumferential wall thickening of the rectum consistent with proctitis. No inflammation seen of the colon.    2.   Significant coronary artery and mitral annular calcification.    3.  Benign cysts in the right kidney with no follow-up recommended.    4.  The left kidney is surgically absent.    5.  The gallbladder is surgically absent.    6.  Cysts in the liver with no follow-up recommended.    7.  Cystic duct remnant which contains a small stone. No complications seen.    8.  Duodenal diverticulum.   POC US Guidance Needle Placement     Status: None    Impression    [facility] Procedure Note      Limited Bedside ED Ultrasound for Peripheral Vein Cannulation:    PROCEDURE: PERFORMED BY: Dr. Moncho Parker,  MD  INDICATIONS/SYMPTOM:  IV Access Needed for Multiple Medications  PROBE: High frequency linear probe  BODY LOCATION: The ultrasound was performed on the left upper arm.  FINDINGS: Artery and vein visualized.  Vein completely compressible (ie collapsible) and no thrombus visualized.  INTERPRETATION: Patent vein confirmed with US.  Central line inserted into vein utilizing real-time ultrasonic guidance.   IMAGE DOCUMENTATION: Images were archived to hard drive.          INDICATIONS/SYMPTOM:    PROBE: High frequency linear probe  BODY LOCATION: right upper arm  FINDINGS: Artery and vein visualized utilizing short axis views.  Vein completely compressible (ie collapsible) and no thrombus visualized.  INTERPRETATION: Patent vein confirmed with US.  Peripheral line inserted into vein utilizing real-time ultrasonic guidance.  IMAGE DOCUMENTATION: Images were archived to hard drive    INR     Status: Normal   Result Value Ref Range    INR 1.08 0.85 - 1.15   Comprehensive metabolic panel     Status: Abnormal   Result Value Ref Range    Sodium 141 135 - 145 mmol/L    Potassium 5.3 3.4 - 5.3 mmol/L    Carbon Dioxide (CO2) 22 22 - 29 mmol/L    Anion Gap 13 7 - 15 mmol/L    Urea Nitrogen 32.0 (H) 8.0 - 23.0 mg/dL    Creatinine 1.63 (H) 0.51 - 0.95 mg/dL    GFR Estimate 30 (L) >60 mL/min/1.73m2    Calcium 9.2 8.8 - 10.4 mg/dL    Chloride 106 98 - 107 mmol/L    Glucose 96 70 - 99 mg/dL    Alkaline Phosphatase 71 40 - 150 U/L    AST 15 0 - 45 U/L    ALT 9 0 - 50 U/L    Protein Total 6.7 6.4 - 8.3 g/dL    Albumin 3.6 3.5 - 5.2 g/dL    Bilirubin Total 0.2 <=1.2 mg/dL   Lipase     Status: Normal   Result Value Ref Range    Lipase 19 13 - 60 U/L   UA with Microscopic reflex to Culture     Status: Abnormal    Specimen: Urine, NOS   Result Value Ref Range    Color Urine Light Yellow Colorless, Straw, Light Yellow, Yellow    Appearance Urine Clear Clear    Glucose Urine Negative Negative mg/dL    Bilirubin Urine Negative Negative     Ketones Urine Negative Negative mg/dL    Specific Gravity Urine 1.010 1.003 - 1.035    Blood Urine Negative Negative    pH Urine 5.5 5.0 - 7.0    Protein Albumin Urine Negative Negative mg/dL    Urobilinogen Urine Normal Normal, 2.0 mg/dL    Nitrite Urine Negative Negative    Leukocyte Esterase Urine Large (A) Negative    Mucus Urine Present (A) None Seen /LPF    RBC Urine 1 <=2 /HPF    WBC Urine 26 (H) <=5 /HPF    Squamous Epithelials Urine 3 (H) <=1 /HPF    Narrative    Urine Culture ordered based on laboratory criteria   CBC with platelets and differential     Status: Abnormal   Result Value Ref Range    WBC Count 5.8 4.0 - 11.0 10e3/uL    RBC Count 4.13 3.80 - 5.20 10e6/uL    Hemoglobin 10.9 (L) 11.7 - 15.7 g/dL    Hematocrit 34.8 (L) 35.0 - 47.0 %    MCV 84 78 - 100 fL    MCH 26.4 (L) 26.5 - 33.0 pg    MCHC 31.3 (L) 31.5 - 36.5 g/dL    RDW 15.0 10.0 - 15.0 %    Platelet Count 184 150 - 450 10e3/uL    % Neutrophils 66 %    % Lymphocytes 21 %    % Monocytes 10 %    % Eosinophils 3 %    % Basophils 0 %    % Immature Granulocytes 0 %    NRBCs per 100 WBC 0 <1 /100    Absolute Neutrophils 3.8 1.6 - 8.3 10e3/uL    Absolute Lymphocytes 1.2 0.8 - 5.3 10e3/uL    Absolute Monocytes 0.6 0.0 - 1.3 10e3/uL    Absolute Eosinophils 0.2 0.0 - 0.7 10e3/uL    Absolute Basophils 0.0 0.0 - 0.2 10e3/uL    Absolute Immature Granulocytes 0.0 <=0.4 10e3/uL    Absolute NRBCs 0.0 10e3/uL   Erythrocyte sedimentation rate auto     Status: Normal   Result Value Ref Range    Erythrocyte Sedimentation Rate 26 0 - 30 mm/hr   CRP inflammation     Status: Abnormal   Result Value Ref Range    CRP Inflammation 29.17 (H) <5.00 mg/L   CBC with platelets differential     Status: Abnormal    Narrative    The following orders were created for panel order CBC with platelets differential.  Procedure                               Abnormality         Status                     ---------                               -----------         ------                      CBC with platelets and d...[[id number]]  Abnormal            Final result                 Please view results for these tests on the individual orders.     Medications   sennosides (SENOKOT) tablet 8.6 mg (has no administration in time range)   docusate sodium (COLACE) capsule 100 mg (has no administration in time range)   polyethylene glycol (MIRALAX) Packet 17 g (has no administration in time range)   sodium chloride 0.9% BOLUS 500 mL (0 mLs Intravenous Stopped 7/26/24 2000)   sodium chloride 0.9 % bag 100 mL for CT (40 mLs Intravenous $Given 7/26/24 1943)   iopamidol (ISOVUE-370) solution 100 mL (79 mLs Intravenous $Given 7/26/24 1936)     Labs Ordered and Resulted from Time of ED Arrival to Time of ED Departure   COMPREHENSIVE METABOLIC PANEL - Abnormal       Result Value    Sodium 141      Potassium 5.3      Carbon Dioxide (CO2) 22      Anion Gap 13      Urea Nitrogen 32.0 (*)     Creatinine 1.63 (*)     GFR Estimate 30 (*)     Calcium 9.2      Chloride 106      Glucose 96      Alkaline Phosphatase 71      AST 15      ALT 9      Protein Total 6.7      Albumin 3.6      Bilirubin Total 0.2     ROUTINE UA WITH MICROSCOPIC REFLEX TO CULTURE - Abnormal    Color Urine Light Yellow      Appearance Urine Clear      Glucose Urine Negative      Bilirubin Urine Negative      Ketones Urine Negative      Specific Gravity Urine 1.010      Blood Urine Negative      pH Urine 5.5      Protein Albumin Urine Negative      Urobilinogen Urine Normal      Nitrite Urine Negative      Leukocyte Esterase Urine Large (*)     Mucus Urine Present (*)     RBC Urine 1      WBC Urine 26 (*)     Squamous Epithelials Urine 3 (*)    CBC WITH PLATELETS AND DIFFERENTIAL - Abnormal    WBC Count 5.8      RBC Count 4.13      Hemoglobin 10.9 (*)     Hematocrit 34.8 (*)     MCV 84      MCH 26.4 (*)     MCHC 31.3 (*)     RDW 15.0      Platelet Count 184      % Neutrophils 66      % Lymphocytes 21      % Monocytes 10      % Eosinophils 3      %  Basophils 0      % Immature Granulocytes 0      NRBCs per 100 WBC 0      Absolute Neutrophils 3.8      Absolute Lymphocytes 1.2      Absolute Monocytes 0.6      Absolute Eosinophils 0.2      Absolute Basophils 0.0      Absolute Immature Granulocytes 0.0      Absolute NRBCs 0.0     CRP INFLAMMATION - Abnormal    CRP Inflammation 29.17 (*)    INR - Normal    INR 1.08     LIPASE - Normal    Lipase 19     ERYTHROCYTE SEDIMENTATION RATE AUTO - Normal    Erythrocyte Sedimentation Rate 26     PROCALCITONIN   URINE CULTURE   ENTERIC BACTERIA AND VIRUS PANEL BY PCR   C. DIFFICILE TOXIN B PCR WITH REFLEX TO C. DIFFICILE ANTIGEN AND TOXINS A/B EIA     CT Abdomen Pelvis w Contrast   Final Result   IMPRESSION:    1.  Prominent amount of stool seen in the sigmoid colon and rectum with circumferential wall thickening of the rectum consistent with proctitis. No inflammation seen of the colon.      2.   Significant coronary artery and mitral annular calcification.      3.  Benign cysts in the right kidney with no follow-up recommended.      4.  The left kidney is surgically absent.      5.  The gallbladder is surgically absent.      6.  Cysts in the liver with no follow-up recommended.      7.  Cystic duct remnant which contains a small stone. No complications seen.      8.  Duodenal diverticulum.      POC US Guidance Needle Placement   Final Result   [facility] Procedure Note        Limited Bedside ED Ultrasound for Peripheral Vein Cannulation:      PROCEDURE: PERFORMED BY: Dr. Moncho Parker MD   INDICATIONS/SYMPTOM:  IV Access Needed for Multiple Medications   PROBE: High frequency linear probe   BODY LOCATION: The ultrasound was performed on the left upper arm.   FINDINGS: Artery and vein visualized.  Vein completely compressible (ie collapsible) and no thrombus visualized.   INTERPRETATION: Patent vein confirmed with US.  Central line inserted into vein utilizing real-time ultrasonic guidance.    IMAGE DOCUMENTATION:  Images were archived to hard drive.               INDICATIONS/SYMPTOM:     PROBE: High frequency linear probe   BODY LOCATION: right upper arm   FINDINGS: Artery and vein visualized utilizing short axis views.  Vein completely compressible (ie collapsible) and no thrombus visualized.   INTERPRETATION: Patent vein confirmed with US.  Peripheral line inserted into vein utilizing real-time ultrasonic guidance.   IMAGE DOCUMENTATION: Images were archived to hard drive              Critical care was not performed.     Medical Decision Making  The patient's presentation was of high complexity (an acute health issue posing potential threat to life or bodily function).    The patient's evaluation involved:  review of external note(s) from 3+ sources (see separate area of note for details)  review of 3+ test result(s) ordered prior to this encounter (see separate area of note for details)  ordering and/or review of 3+ test(s) in this encounter (see separate area of note for details)  discussion of management or test interpretation with another health professional (hospitalist)    The patient's management necessitated high risk (a decision regarding hospitalization).    Assessment & Plan    Patient's labs as reviewed interpreted by me significant for an elevated CRP of 29, possible mild urinary tract infection with leukocytes and white blood cells in the urine, otherwise reassuring.  CT scan shows proctitis and large stool burden.  Considered antibiotics but discussed with inpatient physician and will defer to inpatient team  Ordered bowel regimen here in the emergency department  Patient admitted and signed out to Dr. Haile who accepted patient for admission for proctitis    I have reviewed the nursing notes. I have reviewed the findings, diagnosis, plan and need for follow up with the patient.    New Prescriptions    No medications on file       Final diagnoses:   Proctitis     I, Emilee Perez, am serving as a  trained medical scribe to document services personally performed by Moncho Parker MD based on the provider's statements to me on July 26, 2024.  This document has been checked and approved by the attending provider.    I, Moncho Parker MD, was physically present and have reviewed and verified the accuracy of this note documented by Emilee Perez, medical scribe.      Moncho Parker MD   [facility] EMERGENCY DEPARTMENT  7/26/2024        Moncho Parker MD  07/26/24 2055       Moncho Parker MD  07/26/24 2111

## 2024-07-26 NOTE — TELEPHONE ENCOUNTER
"Nurse Triage SBAR    Is this a 2nd Level Triage? YES, LICENSED PRACTITIONER REVIEW IS REQUIRED    Situation: diarrhea    Background:   - pt has been having diarrhea for 4 days. Pt stated she was constipated for a few days a took a small amount fleet and has been having diarrhea since then. Pt took imodium to try to stop that but still having diarrhea.    Assessment:   - pt does not know how many diarrhea she had today but stated she has to be by the toilet all day  - pt stated also has \"colon pain\" that comes and go. Pt stated it is not really abdominal pain. When pain comes it is at a 7 out of 10 and she \"feels like she will explode\"  - drinking as much water as she can. Lips feels dry  - no blood in stool  - no fever  - feels a little dizzy     Protocol Recommended Disposition:   Call ADS/Go to ED/UCC Now (Or To Office with PCP Approval)    Recommendation: advised pt to go ED or UCC. Pt prefers UCC and she will head to urgent care.    Routed to provider    Does the patient meet one of the following criteria for ADS visit consideration? 16+ years old, with an [facility] PCP     TIP  Providers, please consider if this condition is appropriate for management at one of our Acute and Diagnostic Services sites.     If patient is a good candidate, please use dotphrase <dot>triageresponse and select Refer to ADS to document.      Gerardo Broussard Cem Say, BSN RN  [facility]      Reason for Disposition   SEVERE diarrhea (e.g., 7 or more times / day more than normal) and age > 60 years    Additional Information   Negative: Shock suspected (e.g., cold/pale/clammy skin, too weak to stand, low BP, rapid pulse)   Negative: Difficult to awaken or acting confused (e.g., disoriented, slurred speech)   Negative: Sounds like a life-threatening emergency to the triager   Negative: Vomiting also present and worse than the diarrhea   Negative: Blood in stool and without diarrhea   Negative: SEVERE abdominal pain (e.g., " "excruciating) and present > 1 hour   Negative: SEVERE abdominal pain and age > 60 years   Negative: Bloody, black, or tarry bowel movements  (Exception: Chronic-unchanged black-grey bowel movements and is taking iron pills or Pepto-Bismol.)    Answer Assessment - Initial Assessment Questions  1. DIARRHEA SEVERITY: \"How bad is the diarrhea?\" \"How many more stools have you had in the past 24 hours than normal?\"     - NO DIARRHEA (SCALE 0)    - MILD (SCALE 1-3): Few loose or mushy BMs; increase of 1-3 stools over normal daily number of stools; mild increase in ostomy output.    -  MODERATE (SCALE 4-7): Increase of 4-6 stools daily over normal; moderate increase in ostomy output.    -  SEVERE (SCALE 8-10; OR \"WORST POSSIBLE\"): Increase of 7 or more stools daily over normal; moderate increase in ostomy output; incontinence.      Constantly has to the bathroom. Very painful    2. ONSET: \"When did the diarrhea begin?\"       4 days ago. Pt stated she was constipated for a few days a took a small amount fleet and has been having diarrhea since then. Pt took imodium to try to stop that but still having diarrhea.    3. BM CONSISTENCY: \"How loose or watery is the diarrhea?\"       Loose stool    4. VOMITING: \"Are you also vomiting?\" If Yes, ask: \"How many times in the past 24 hours?\"       No    5. ABDOMEN PAIN: \"Are you having any abdomen pain?\" If Yes, ask: \"What does it feel like?\" (e.g., crampy, dull, intermittent, constant)       Feels like colon hurts and burning pain    6. ABDOMEN PAIN SEVERITY: If present, ask: \"How bad is the pain?\"  (e.g., Scale 1-10; mild, moderate, or severe)    - MILD (1-3): doesn't interfere with normal activities, abdomen soft and not tender to touch     - MODERATE (4-7): interferes with normal activities or awakens from sleep, abdomen tender to touch     - SEVERE (8-10): excruciating pain, doubled over, unable to do any normal activities        7 and comes and go    7. ORAL INTAKE: If vomiting, " "\"Have you been able to drink liquids?\" \"How much liquids have you had in the past 24 hours?\"      Yes, try to     8. HYDRATION: \"Any signs of dehydration?\" (e.g., dry mouth [not just dry lips], too weak to stand, dizziness, new weight loss) \"When did you last urinate?\"      Dry mouth, dizzy    9. EXPOSURE: \"Have you traveled to a foreign country recently?\" \"Have you been exposed to anyone with diarrhea?\" \"Could you have eaten any food that was spoiled?\"      No    10. ANTIBIOTIC USE: \"Are you taking antibiotics now or have you taken antibiotics in the past 2 months?\"        No    11. OTHER SYMPTOMS: \"Do you have any other symptoms?\" (e.g., fever, blood in stool)        Dizzy    12. PREGNANCY: \"Is there any chance you are pregnant?\" \"When was your last menstrual period?\"        no    Protocols used: Diarrhea-A-OH    "

## 2024-07-26 NOTE — ED TRIAGE NOTES
Pt complains of diarrhea and pain every few minutes. Has a history of hemmorrhoids. It does hurt when pt uses the restroom        Triage Assessment (Adult)       Row Name 07/26/24 7310          Triage Assessment    Airway WDL WDL        Respiratory WDL    Respiratory WDL WDL        Skin Circulation/Temperature WDL    Skin Circulation/Temperature WDL WDL        Cardiac WDL    Cardiac WDL WDL        Peripheral/Neurovascular WDL    Peripheral Neurovascular WDL WDL        Cognitive/Neuro/Behavioral WDL    Cognitive/Neuro/Behavioral WDL WDL                      no

## 2024-07-27 LAB
ADV 40+41 DNA STL QL NAA+NON-PROBE: NEGATIVE
ALBUMIN SERPL BCG-MCNC: 3.7 G/DL (ref 3.5–5.2)
ALP SERPL-CCNC: 78 U/L (ref 40–150)
ALT SERPL W P-5'-P-CCNC: 6 U/L (ref 0–50)
ANION GAP SERPL CALCULATED.3IONS-SCNC: 12 MMOL/L (ref 7–15)
AST SERPL W P-5'-P-CCNC: 18 U/L (ref 0–45)
ASTRO TYP 1-8 RNA STL QL NAA+NON-PROBE: NEGATIVE
BILIRUB SERPL-MCNC: 0.3 MG/DL
BUN SERPL-MCNC: 27.4 MG/DL (ref 8–23)
C CAYETANENSIS DNA STL QL NAA+NON-PROBE: NEGATIVE
C DIFF TOX B STL QL: NEGATIVE
CALCIUM SERPL-MCNC: 9.1 MG/DL (ref 8.8–10.4)
CAMPYLOBACTER DNA SPEC NAA+PROBE: NEGATIVE
CHLORIDE SERPL-SCNC: 106 MMOL/L (ref 98–107)
CREAT SERPL-MCNC: 1.55 MG/DL (ref 0.51–0.95)
CRYPTOSP DNA STL QL NAA+NON-PROBE: NEGATIVE
E COLI O157 DNA STL QL NAA+NON-PROBE: ABNORMAL
E HISTOLYT DNA STL QL NAA+NON-PROBE: NEGATIVE
EAEC ASTA GENE ISLT QL NAA+PROBE: NEGATIVE
EC STX1+STX2 GENES STL QL NAA+NON-PROBE: NEGATIVE
EGFRCR SERPLBLD CKD-EPI 2021: 32 ML/MIN/1.73M2
EPEC EAE GENE STL QL NAA+NON-PROBE: POSITIVE
ERYTHROCYTE [DISTWIDTH] IN BLOOD BY AUTOMATED COUNT: 14.6 % (ref 10–15)
ETEC LTA+ST1A+ST1B TOX ST NAA+NON-PROBE: NEGATIVE
G LAMBLIA DNA STL QL NAA+NON-PROBE: NEGATIVE
GLUCOSE SERPL-MCNC: 94 MG/DL (ref 70–99)
HCO3 SERPL-SCNC: 21 MMOL/L (ref 22–29)
HCT VFR BLD AUTO: 36.8 % (ref 35–47)
HGB BLD-MCNC: 11.5 G/DL (ref 11.7–15.7)
MCH RBC QN AUTO: 26.3 PG (ref 26.5–33)
MCHC RBC AUTO-ENTMCNC: 31.3 G/DL (ref 31.5–36.5)
MCV RBC AUTO: 84 FL (ref 78–100)
NOROVIRUS GI+II RNA STL QL NAA+NON-PROBE: NEGATIVE
P SHIGELLOIDES DNA STL QL NAA+NON-PROBE: NEGATIVE
PLATELET # BLD AUTO: 199 10E3/UL (ref 150–450)
POTASSIUM SERPL-SCNC: 4.8 MMOL/L (ref 3.4–5.3)
PROT SERPL-MCNC: 6.8 G/DL (ref 6.4–8.3)
RBC # BLD AUTO: 4.37 10E6/UL (ref 3.8–5.2)
RVA RNA STL QL NAA+NON-PROBE: NEGATIVE
SALMONELLA SP RPOD STL QL NAA+PROBE: NEGATIVE
SAPO I+II+IV+V RNA STL QL NAA+NON-PROBE: NEGATIVE
SHIGELLA SP+EIEC IPAH ST NAA+NON-PROBE: NEGATIVE
SODIUM SERPL-SCNC: 139 MMOL/L (ref 135–145)
TROPONIN T SERPL HS-MCNC: 20 NG/L
V CHOLERAE DNA SPEC QL NAA+PROBE: NEGATIVE
VIBRIO DNA SPEC NAA+PROBE: NEGATIVE
WBC # BLD AUTO: 6.4 10E3/UL (ref 4–11)
Y ENTEROCOL DNA STL QL NAA+PROBE: NEGATIVE

## 2024-07-27 PROCEDURE — 36415 COLL VENOUS BLD VENIPUNCTURE: CPT | Performed by: INTERNAL MEDICINE

## 2024-07-27 PROCEDURE — 120N000002 HC R&B MED SURG/OB UMMC

## 2024-07-27 PROCEDURE — 250N000011 HC RX IP 250 OP 636: Performed by: INTERNAL MEDICINE

## 2024-07-27 PROCEDURE — 258N000003 HC RX IP 258 OP 636: Performed by: PHYSICIAN ASSISTANT

## 2024-07-27 PROCEDURE — 87507 IADNA-DNA/RNA PROBE TQ 12-25: CPT | Performed by: PHYSICIAN ASSISTANT

## 2024-07-27 PROCEDURE — 93005 ELECTROCARDIOGRAM TRACING: CPT

## 2024-07-27 PROCEDURE — 99232 SBSQ HOSP IP/OBS MODERATE 35: CPT | Performed by: INTERNAL MEDICINE

## 2024-07-27 PROCEDURE — 93010 ELECTROCARDIOGRAM REPORT: CPT | Performed by: INTERNAL MEDICINE

## 2024-07-27 PROCEDURE — 250N000013 HC RX MED GY IP 250 OP 250 PS 637: Performed by: PHYSICIAN ASSISTANT

## 2024-07-27 PROCEDURE — 250N000011 HC RX IP 250 OP 636: Performed by: PHYSICIAN ASSISTANT

## 2024-07-27 PROCEDURE — 250N000013 HC RX MED GY IP 250 OP 250 PS 637: Performed by: STUDENT IN AN ORGANIZED HEALTH CARE EDUCATION/TRAINING PROGRAM

## 2024-07-27 PROCEDURE — 36415 COLL VENOUS BLD VENIPUNCTURE: CPT | Performed by: PHYSICIAN ASSISTANT

## 2024-07-27 PROCEDURE — 84484 ASSAY OF TROPONIN QUANT: CPT | Performed by: INTERNAL MEDICINE

## 2024-07-27 PROCEDURE — 80053 COMPREHEN METABOLIC PANEL: CPT | Performed by: PHYSICIAN ASSISTANT

## 2024-07-27 PROCEDURE — 85014 HEMATOCRIT: CPT | Performed by: PHYSICIAN ASSISTANT

## 2024-07-27 PROCEDURE — 87493 C DIFF AMPLIFIED PROBE: CPT | Performed by: PHYSICIAN ASSISTANT

## 2024-07-27 RX ORDER — NALOXONE HYDROCHLORIDE 0.4 MG/ML
0.2 INJECTION, SOLUTION INTRAMUSCULAR; INTRAVENOUS; SUBCUTANEOUS
Status: DISCONTINUED | OUTPATIENT
Start: 2024-07-27 | End: 2024-07-29 | Stop reason: HOSPADM

## 2024-07-27 RX ORDER — HYDROMORPHONE HCL IN WATER/PF 6 MG/30 ML
0.2 PATIENT CONTROLLED ANALGESIA SYRINGE INTRAVENOUS
Status: DISCONTINUED | OUTPATIENT
Start: 2024-07-27 | End: 2024-07-29 | Stop reason: HOSPADM

## 2024-07-27 RX ORDER — NALOXONE HYDROCHLORIDE 0.4 MG/ML
0.4 INJECTION, SOLUTION INTRAMUSCULAR; INTRAVENOUS; SUBCUTANEOUS
Status: DISCONTINUED | OUTPATIENT
Start: 2024-07-27 | End: 2024-07-29 | Stop reason: HOSPADM

## 2024-07-27 RX ADMIN — SENNOSIDES AND DOCUSATE SODIUM 1 TABLET: 8.6; 5 TABLET ORAL at 10:49

## 2024-07-27 RX ADMIN — POLYETHYLENE GLYCOL 3350 17 G: 17 POWDER, FOR SOLUTION ORAL at 10:48

## 2024-07-27 RX ADMIN — PANTOPRAZOLE SODIUM 40 MG: 40 INJECTION, POWDER, FOR SOLUTION INTRAVENOUS at 09:25

## 2024-07-27 RX ADMIN — SODIUM CHLORIDE: 9 INJECTION, SOLUTION INTRAVENOUS at 00:37

## 2024-07-27 RX ADMIN — DOCUSATE SODIUM 100 MG: 100 CAPSULE, LIQUID FILLED ORAL at 19:36

## 2024-07-27 RX ADMIN — CEFTRIAXONE SODIUM 2 G: 2 INJECTION, POWDER, FOR SOLUTION INTRAMUSCULAR; INTRAVENOUS at 22:18

## 2024-07-27 RX ADMIN — SENNOSIDES AND DOCUSATE SODIUM 2 TABLET: 8.6; 5 TABLET ORAL at 10:47

## 2024-07-27 ASSESSMENT — ACTIVITIES OF DAILY LIVING (ADL)
ADLS_ACUITY_SCORE: 20
ADLS_ACUITY_SCORE: 37
ADLS_ACUITY_SCORE: 20
ADLS_ACUITY_SCORE: 37
ADLS_ACUITY_SCORE: 20

## 2024-07-27 NOTE — PROGRESS NOTES
Gold Service - Internal Medicine Daily Note   Date of Service: 7/27/2024  Patient: Lara Marc  MRN: [id number]  Admission Date: 7/26/2024  Hospital Day # 1    Assessment & Plan    Lara Marc is a 88 year old female admitted on 7/26/2024. 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 Patient admitted to Medicine for further evaluation and care of suspected proctitis.         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 Reports loose stool ~ q20 min for past 24h. Imodium w/o alleviation of sx. CT on admission w/ large amount of stool in sigmoid and rectum with thickening of rectum consistent of proctitis. No fever, chills, hematochezia, melena, mucus in stool or recent abx use.   - IVF  - Miralax and senna daily for now  - SSCE and C.diff PCR  - Enteric precautions  - Monitor lytes and replace as needed  - CRP and procal  - CBC, BMP in AM  - Zofran for nausea   - Add Mg and phos     ## UTI: Reports urinary frequency PTA. UA on admission: Large LE, 26 wbc. No fever, or leukocytosis, or tachycardia.   - Ceftriaxone 2g q24 h for now  - BCx1  - Please follow UC  - CRP and procal     ## Atypical CP: Reports 'chest heaviness' on exam. No sob, handley or CAD noted.   - STAT EKG and troponin  - Tele     ##  SIS on CKDIII, creatinine is trending down.  Patient encouraged to eat and drink.  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 Recent  BL appears 1.3. Likely related to large GI  losses and infection  - IVF hydration w/ NS @ 75ml/hr overnight. Please reassess IVF needs in AM  - Follow lytes and creatinine  - Monitor I/O's, daily weights  - Avoid nephrotoxic medications/IV contrast, hypotension, dehydration  - Renally dose medications     ## HTN: BP mildly elevated on admission. PTA lisinopril  - HOLD in setting of SIS  - Monitor        Diet:  CLD  DVT Prophylaxis: Pneumatic Compression Devices  Aggarwal Catheter: Not present  Lines: None     Cardiac Monitoring: None  Code Status:  Full Code     Yudy Mckeon MD  Internal Medicine Staff Hospitalist   [facility]   Pager: [phone]    Team: Anuja Arce 17  Page Cross Cover after 5 pm: pager [phone]   ___________________________________________________________________    Subjective & Interval Hx:      Patient awake alert, appeared comfortable and in no apparent distress.  She had 4 episodes of bowel movement.  Does not have any abdominal pain at this moment.  No any nausea or vomiting.  Afebrile.  Regular diet ordered  Anticipate discharge in 1 to 2 days.    Last 24 hr care team notes reviewed.   ROS:  4 point ROS including Respiratory, CV, GI and , other than that noted in the HPI, is negative.    Medications: Reviewed in EPIC. List below for reference    Current Facility-Administered Medications:     acetaminophen (TYLENOL) tablet 650 mg, 650 mg, Oral, Q4H PRN **OR** acetaminophen (TYLENOL) Suppository 650 mg, 650 mg, Rectal, Q4H PRN, Shanique Boss PA-C    calcium carbonate (TUMS) chewable tablet 1,000 mg, 1,000 mg, Oral, 4x Daily PRN, Shanique Boss PA-C    cefTRIAXone (ROCEPHIN) 2 g vial to attach to  ml bag for ADULTS or NS 50 ml bag for PEDS, 2 g, Intravenous, Q24H, Shanique Boss PA-C, Stopped at 07/26/24 2330    docusate sodium (COLACE) capsule 100 mg, 100 mg, Oral, BID, Moncho Parker MD, 100 mg at 07/26/24 2233    famotidine (PEPCID) injection 20 mg, 20 mg, Intravenous, Q12H,  "Shanique Boss PA-C, 20 mg at 07/26/24 2249    lidocaine (LMX4) cream, , Topical, Q1H PRN, Shanique Boss PA-C    lidocaine 1 % 0.1-1 mL, 0.1-1 mL, Other, Q1H PRN, Shanique Boss PA-C    ondansetron (ZOFRAN ODT) ODT tab 4 mg, 4 mg, Oral, Q6H PRN **OR** ondansetron (ZOFRAN) injection 4 mg, 4 mg, Intravenous, Q6H PRN, Shanique Boss PA-C    oxyCODONE (ROXICODONE) tablet 5 mg, 5 mg, Oral, Q4H PRN, Shanique Boss PA-C    oxyCODONE IR (ROXICODONE) half-tab 2.5 mg, 2.5 mg, Oral, Q4H PRN, Shanique Boss PA-C    polyethylene glycol (MIRALAX) Packet 17 g, 17 g, Oral, Daily, Moncho Parker MD    senna-docusate (SENOKOT-S/PERICOLACE) 8.6-50 MG per tablet 1 tablet, 1 tablet, Oral, BID PRN **OR** senna-docusate (SENOKOT-S/PERICOLACE) 8.6-50 MG per tablet 2 tablet, 2 tablet, Oral, BID PRN, Shanique Boss PA-C    sodium chloride (PF) 0.9% PF flush 3 mL, 3 mL, Intracatheter, Q8H, Shanique oBss PA-C, 3 mL at 07/26/24 2247    sodium chloride (PF) 0.9% PF flush 3 mL, 3 mL, Intracatheter, q1 min prn, Shanique Boss PA-C    Physical Exam:    BP (!) 147/86 (BP Location: Left arm, Patient Position: Supine, Cuff Size: Adult Regular)   Pulse 71   Temp 97.6  F (36.4  C) (Oral)   Resp 16   Ht 1.55 m (5' 1.02\")   Wt 68.9 kg (151 lb 14.4 oz)   LMP  (LMP Unknown)   SpO2 97%   BMI 28.68 kg/m       GA: Lying in bed, comfortable in no apparent distress.  Head: Normocephalic and atraumatic.   Eyes: Conjunctivae are normal. Pupils are equal, round, and reactive to light.  Pharynx has no erythema or exudate, mucous membranes are moist  Neck:   No adenopathy, no bony tenderness  Cardiovascular: Regular rate and rhythm without murmurs or gallops  Pulmonary/Chest: Clear to auscultation bilaterally, with no wheezes or retractions. No respiratory distress.  GI: Soft, nondistended bowel sounds are active.  Mmusculoskeletal:  No edema or clubbing   Skin: Skin is warm and dry. No rash " noted.   Neurological: Alert and oriented to person, place, and time. Nonfocal exam  Psychiatric:  Normal mood and affect.       Labs & Studies of Note: I personally reviewed the following studies:  CBC RESULTS:   Recent Labs   Lab Test 07/27/24 0624   WBC 6.4   RBC 4.37   HGB 11.5*   HCT 36.8   MCV 84   MCH 26.3*   MCHC 31.3*   RDW 14.6        Last Comprehensive Metabolic Panel:  Lab Results   Component Value Date     07/27/2024    POTASSIUM 4.8 07/27/2024    CHLORIDE 106 07/27/2024    CO2 21 (L) 07/27/2024    ANIONGAP 12 07/27/2024    GLC 94 07/27/2024    BUN 27.4 (H) 07/27/2024    CR 1.55 (H) 07/27/2024    GFRESTIMATED 32 (L) 07/27/2024    DARNELL 9.1 07/27/2024

## 2024-07-27 NOTE — ED NOTES
Attempted to call [facility] to given nurse to nurse report. The nurse to take this pt will call back when they are available.

## 2024-07-27 NOTE — H&P
[facility]    History and Physical - Hospitalist Service, GOLD TEAM        Date of Admission:  7/26/2024    Assessment & Plan      Lara Marc is a 88 year old female admitted on 7/26/2024. 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 Patient admitted to Medicine for further evaluation and care of suspected proctitis.       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 Reports loose stool ~ q20 min for past 24h. Imodium w/o alleviation of sx. CT on admission w/ large amount of stool in sigmoid and rectum with thickening of rectum consistent of proctitis. No fever, chills, hematochezia, melena, mucus in stool or recent abx use.   - IVF  - Miralax and senna daily for now  - SSCE and C.diff PCR  - Enteric precautions  - Monitor lytes and replace as needed  - CRP and procal  - CBC, BMP in AM  - Zofran for nausea   - Add Mg and phos    ## UTI: Reports urinary frequency PTA. UA on admission: Large LE, 26 wbc. No fever, or leukocytosis, or tachycardia.   - Ceftriaxone 2g q24 h for now  - BCx1  - Please follow UC  - CRP and procal    ## Atypical CP: Reports 'chest heaviness' on exam. No sob, handley or CAD noted.   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 Recent  BL appears 1.3. Likely related to large GI losses and infection  - IVF hydration w/ NS @ 75ml/hr overnight. Please reassess IVF needs in AM  - Follow  lytes and creatinine  - Monitor I/O's, daily weights  - Avoid nephrotoxic medications/IV contrast, hypotension, dehydration  - Renally dose medications    ## HTN: BP mildly elevated on admission. PTA lisinopril  - HOLD in setting of SIS  - Monitor        Diet:  CLD  DVT Prophylaxis: Pneumatic Compression Devices  Aggarwal Catheter: Not present  Lines: None     Cardiac Monitoring: None  Code Status:  Full Code     Clinically Significant Risk Factors Present on Admission                # Drug Induced Platelet Defect: home medication list includes an antiplatelet medication   # Hypertension: Noted on problem list    # Anemia: based on hgb <11                  Disposition Plan     Medically Ready for Discharge: Anticipated in 2-4 Days         The patient's care was discussed with the Attending Physician, Dr. Perez .    Shanique Boss PA-C  Hospitalist Service, [facility]  Securely message with Retrieve (more info)  Text page via [employer] Paging/Directory   See signed in provider for up to date coverage information    ______________________________________________________________________    Chief Complaint   Loose stool and rectal pain    History is obtained from the patient and EMR    History of Present Illness   Lara Marc is a 88 year old female w/ PMH as outlined above who presents to the ED for evaluation of ~-4 day hx of constipation and rectal pain/spasm followed by loose stool and ongoing rectal pain following rectal suppository taken ~ 24 hours ago. Reports loose stool ~ q20 min for past 24h. Imodium w/o alleviation of sx. She has constipation at BL and reports that sx of loose stool and rectal pain/spasm are intolerable, prompting ED evaluation. She also reports urinary frequency, though no dysuria, or hematuria noted.  No fever, chills, cough, HA, recent travel, known sick contacts, no nausea or vomiting.. She has been tolerating PO intake- water,  bananas, crackers, and applesauce. We discussed POC as outlined above and patient agreeable.       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    CKD (chronic kidney disease) stage 3, GFR 30-59 ml/min (H) 7/25/2011    has 1 kidney, the right kidney is present---gave left kidney to sister    Complete uterovaginal prolapse 2/8/2013    Deep vein thrombosis (DVT) (H) 5/7/2020    Dyspnea on exertion     Gastro-oesophageal reflux disease     Hypertension goal BP (blood pressure) < 140/90 7/25/2011    Other and unspecified hyperlipidemia     Unspecified essential hypertension     not medicated at this time    Uterovaginal prolapse, complete 2004     resolved '06    Vaginal enterocele, congenital or acquired 2007     or cystocele       Past Surgical History   Past Surgical History:   Procedure Laterality Date    childbirth X9[      CLOSED RX RIB FRACTURE  7/06    left    DAVINCI SACROCOLPOPEXY, MIDURETHRAL SLING, CYSTOSCOPY  2/8/2013    Procedure: DAVINCI SACROCOLPOPEXY, MIDURETHRAL SLING, CYSTOSCOPY;  DAVINCI SACROCOLPOPEXY, TVT EXACT SLING, RIGHT SALPINGO-OOPHERECTOMY, CYSTOSCOPY;  Surgeon: Bennie Galdamez MD;  Location:  OR    LAPAROSCOPIC CHOLECYSTECTOMY  3/31/2011    Procedure:LAPAROSCOPIC CHOLECYSTECTOMY; Surgeon:DAVID MOLINA; Location:[facility] OR    [facility] 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 Taking?   Cholecalciferol (VITAMIN D3 PO)   Yes No   Sig: Take by mouth daily   Loperamide HCl (IMODIUM OR)   Yes No   Multiple Vitamins-Minerals (MULTIVITAMIN OR)   Yes No   Sig: Take 1 tablet by mouth daily   acetaminophen (TYLENOL) 325 MG tablet   No No   Sig: Take 2 tablets (650 mg) by mouth every 8 hours   aspirin 81 MG EC tablet   Yes No   Sig: Take 81 mg by mouth daily   lisinopril (ZESTRIL) 20 MG tablet   No No   Sig: Take 1 tablet (20 mg) by mouth daily   magnesium 250 MG tablet   Yes No   Sig: Take 1 tablet by mouth daily      Facility-Administered Medications: None        Review of Systems    The 10 point Review of Systems is negative other than noted in the HPI or here.      Physical Exam   Vital Signs: Temp: 97.9  F (36.6  C) Temp src: Oral BP: (!) 157/81 (right lower forearm) Pulse: 78   Resp: 16 SpO2: 94 %      Weight: 0 lbs 0 oz      Physical Exam   Constitutional: Pleasant female lying in ED bed. Conversant. [place].   Well nourished, well developed, resting comfortably   HEENT:   Head: Normocephalic and atraumatic.   Eyes: Conjunctivae are normal. Pupils are equal, round, and reactive to light.  Pharynx has no erythema or exudate, mucous membranes are moist  Neck:   No adenopathy, no bony tenderness  Cardiovascular: Regular rate and rhythm without murmurs or gallops  Pulmonary/Chest: Clear to auscultation bilaterally, with no wheezes or retractions. No respiratory distress.  GI: Soft with good bowel sounds.  Non-tender, non-distended, with no guarding, no rebound, no peritoneal signs.   Back:  No bony or CVA tenderness   Musculoskeletal:  No edema or clubbing   Skin: Skin is warm and dry. No rash noted.   Neurological: Alert and oriented to person, place, and time. Nonfocal exam  Psychiatric:  Normal mood and affect.      Medical Decision Making       75 MINUTES SPENT BY ME on the date of service doing chart review, history, exam, documentation & further activities per the  note.      Data     I have personally reviewed the following data over the past 24 hrs:    5.8  \   10.9 (L)   / 184     141 106 32.0 (H) /  96   5.3 22 1.63 (H) \     ALT: 9 AST: 15 AP: 71 TBILI: 0.2   ALB: 3.6 TOT PROTEIN: 6.7 LIPASE: 19     Procal: N/A CRP: 29.17 (H) Lactic Acid: N/A       INR:  1.08 PTT:  N/A   D-dimer:  N/A Fibrinogen:  N/A       Imaging results reviewed over the past 24 hrs:   Recent Results (from the past 24 hour(s))   CT Abdomen Pelvis w Contrast    Narrative    EXAM: CT ABDOMEN PELVIS W CONTRAST  LOCATION: [facility]  DATE: 7/26/2024    INDICATION: Abdominal pain.  COMPARISON: 3/17/2024  TECHNIQUE: CT scan of the abdomen and pelvis was performed following injection of IV contrast. Multiplanar reformats were obtained. Dose reduction techniques were used.  CONTRAST: 79 mL Isovue 370    FINDINGS:   LOWER CHEST: Significant coronary artery and mitral annular calcification. Small blebs in the right middle lobe.    HEPATOBILIARY: The gallbladder is surgically absent. There is a remnant of the cystic duct which contains a small stone. There are some scattered low-density areas in the liver, most typical for tiny cysts, with no follow-up recommended. The hepatic   veins, portal veins, and common bile duct are normal.    PANCREAS: Normal.    SPLEEN: Normal.    ADRENAL GLANDS: Normal.    KIDNEYS/BLADDER: The left kidney is surgically absent. There is a tiny benign cyst seen in the right kidney with no follow-up recommended. The urinary tract is otherwise normal.    BOWEL: There is a prominent amount of stool seen in the tortuous sigmoid colon and rectum. There is some mild circumferential wall thickening seen involving the rectum consistent with findings of proctitis. No abscess or free air is seen. Small   esophageal hiatal hernia. Duodenal diverticulum.    LYMPH NODES: Normal.    VASCULATURE: Stable 4.3 cm in greatest AP dimension infrarenal abdominal  aortic aneurysm. Moderate vascular calcification.    PELVIC ORGANS: Surgically absent.    MUSCULOSKELETAL: Degenerative disc disease at L4 and L5.      Impression    IMPRESSION:   1.  Prominent amount of stool seen in the sigmoid colon and rectum with circumferential wall thickening of the rectum consistent with proctitis. No inflammation seen of the colon.    2.   Significant coronary artery and mitral annular calcification.    3.  Benign cysts in the right kidney with no follow-up recommended.    4.  The left kidney is surgically absent.    5.  The gallbladder is surgically absent.    6.  Cysts in the liver with no follow-up recommended.    7.  Cystic duct remnant which contains a small stone. No complications seen.    8.  Duodenal diverticulum.

## 2024-07-27 NOTE — PROGRESS NOTES
"  VS: BP (!) 147/86 (BP Location: Left arm, Patient Position: Supine, Cuff Size: Adult Regular)   Pulse 71   Temp 97.6  F (36.4  C) (Oral)   Resp 16   Ht 1.55 m (5' 1.02\")   Wt 68.9 kg (151 lb 14.4 oz)   LMP  (LMP Unknown)   SpO2 97%   BMI 28.68 kg/m      O2: Room air saturations 97%.    Output: Denies issues with urine output   Last BM: Pt states\" My stomach hurts every time I stand up then it feels like I have to go to the bathroom.\" Pt has had a total of 4 stools since 6 am this morning. Some of the stools have been loose and some have been semi formed reports patient. Pt was given barrier cream to apply to her tender rectal area.    Activity: Pt is up ad ton in room. Likes to walk a lot . Also likes to sit up in chair more then laying in bed.    Skin: No  skin issues except for tender rectal area   Pain: Tender rectal area   CMS: Intact   Dressing: NA   Diet: Regular. Tolerated well. No nausea   LDA: IV was SL this am.    Equipment: IV pole, Isolation   Plan: Pt lives alone in her own home. Pt has 9 children.    Additional Info: Pt needs some gentle reminding of what plan of cares are during this hospital stay. Status of patient will continue to be monitored.       "

## 2024-07-27 NOTE — PROGRESS NOTES
Admission Note    Reason for admission: Diarrhea/proctitis/ pain  Primary team notified of pt arrival. yes  Admitted from: [facility] ED  Via: cart  Accompanied by: N/A  Belongings: Placed in closet;   Admission Required Doc Completed: Yes  Teaching: Orientation to unit and call light- call light within reach, use of console, meal times, when to call for the RN, and enforced importance of safety: YES  IV Access: Right forearm- infusing  Telemetry: No  Ht./Wt.: Completed  Code Status verified on armband: Yes  2 RN Skin Assessment Completed with: May  Suction/Ambu bag/Flowmeter at bedside: Yes    Pt status: Coherent,alert and oriented/4, on room air, denies SOB, no cough noted, voids spontaneously in bathroom, continent of B/B, formed medium BM  this shift, Independent with cares, ambulated in room without any aid, stool samples collected and sent to lab, on enteric precautions pending lab result, glasses, cell phone & hand bag at bedside. Call light within reach, continue with POC    Critical lab: +VE for Enteropathogenic Ecoli     Temp:  [97.4  F (36.3  C)-98.5  F (36.9  C)] 98.5  F (36.9  C)  Pulse:  [69-83] 72  Resp:  [16-18] 16  BP: (157-163)/(75-81) 159/75  SpO2:  [90 %-97 %] 95 %

## 2024-07-27 NOTE — ED NOTES
"Pt is reporting that she has been having chest pain \"on and off\" today and is feeling some right now. Patient states it is mild and \"could be the way that I'm laying.\" Provider notified and repeat EKG order, patient will be moved into a room.   "

## 2024-07-28 LAB
ANION GAP SERPL CALCULATED.3IONS-SCNC: 13 MMOL/L (ref 7–15)
BACTERIA UR CULT: NORMAL
BUN SERPL-MCNC: 24.8 MG/DL (ref 8–23)
CALCIUM SERPL-MCNC: 9 MG/DL (ref 8.8–10.4)
CHLORIDE SERPL-SCNC: 106 MMOL/L (ref 98–107)
CREAT SERPL-MCNC: 1.41 MG/DL (ref 0.51–0.95)
EGFRCR SERPLBLD CKD-EPI 2021: 36 ML/MIN/1.73M2
ERYTHROCYTE [DISTWIDTH] IN BLOOD BY AUTOMATED COUNT: 14.8 % (ref 10–15)
GLUCOSE SERPL-MCNC: 113 MG/DL (ref 70–99)
HCO3 SERPL-SCNC: 22 MMOL/L (ref 22–29)
HCT VFR BLD AUTO: 35.4 % (ref 35–47)
HGB BLD-MCNC: 10.9 G/DL (ref 11.7–15.7)
HOLD SPECIMEN: NORMAL
MCH RBC QN AUTO: 26.1 PG (ref 26.5–33)
MCHC RBC AUTO-ENTMCNC: 30.8 G/DL (ref 31.5–36.5)
MCV RBC AUTO: 85 FL (ref 78–100)
PLATELET # BLD AUTO: 182 10E3/UL (ref 150–450)
POTASSIUM SERPL-SCNC: 4.5 MMOL/L (ref 3.4–5.3)
PROCALCITONIN SERPL IA-MCNC: 0.07 NG/ML
RBC # BLD AUTO: 4.17 10E6/UL (ref 3.8–5.2)
SODIUM SERPL-SCNC: 141 MMOL/L (ref 135–145)
WBC # BLD AUTO: 5.6 10E3/UL (ref 4–11)

## 2024-07-28 PROCEDURE — 250N000013 HC RX MED GY IP 250 OP 250 PS 637: Performed by: STUDENT IN AN ORGANIZED HEALTH CARE EDUCATION/TRAINING PROGRAM

## 2024-07-28 PROCEDURE — 85027 COMPLETE CBC AUTOMATED: CPT | Performed by: INTERNAL MEDICINE

## 2024-07-28 PROCEDURE — 120N000002 HC R&B MED SURG/OB UMMC

## 2024-07-28 PROCEDURE — 80048 BASIC METABOLIC PNL TOTAL CA: CPT | Performed by: INTERNAL MEDICINE

## 2024-07-28 PROCEDURE — 250N000011 HC RX IP 250 OP 636: Performed by: INTERNAL MEDICINE

## 2024-07-28 PROCEDURE — 84145 PROCALCITONIN (PCT): CPT | Performed by: INTERNAL MEDICINE

## 2024-07-28 PROCEDURE — 250N000011 HC RX IP 250 OP 636: Performed by: PHYSICIAN ASSISTANT

## 2024-07-28 PROCEDURE — 99232 SBSQ HOSP IP/OBS MODERATE 35: CPT | Performed by: INTERNAL MEDICINE

## 2024-07-28 PROCEDURE — 250N000013 HC RX MED GY IP 250 OP 250 PS 637: Performed by: PHYSICIAN ASSISTANT

## 2024-07-28 PROCEDURE — 36415 COLL VENOUS BLD VENIPUNCTURE: CPT | Performed by: INTERNAL MEDICINE

## 2024-07-28 PROCEDURE — 250N000013 HC RX MED GY IP 250 OP 250 PS 637: Performed by: INTERNAL MEDICINE

## 2024-07-28 RX ORDER — PANTOPRAZOLE SODIUM 40 MG/1
40 TABLET, DELAYED RELEASE ORAL
Status: DISCONTINUED | OUTPATIENT
Start: 2024-07-29 | End: 2024-07-29 | Stop reason: HOSPADM

## 2024-07-28 RX ORDER — FAMOTIDINE 20 MG/1
20 TABLET, FILM COATED ORAL
Status: DISCONTINUED | OUTPATIENT
Start: 2024-07-28 | End: 2024-07-29 | Stop reason: HOSPADM

## 2024-07-28 RX ADMIN — CEFTRIAXONE SODIUM 2 G: 2 INJECTION, POWDER, FOR SOLUTION INTRAMUSCULAR; INTRAVENOUS at 21:34

## 2024-07-28 RX ADMIN — FAMOTIDINE 20 MG: 20 TABLET ORAL at 19:42

## 2024-07-28 RX ADMIN — POLYETHYLENE GLYCOL 3350 17 G: 17 POWDER, FOR SOLUTION ORAL at 08:18

## 2024-07-28 RX ADMIN — DOCUSATE SODIUM 100 MG: 100 CAPSULE, LIQUID FILLED ORAL at 08:18

## 2024-07-28 RX ADMIN — CALCIUM CARBONATE (ANTACID) CHEW TAB 500 MG 1000 MG: 500 CHEW TAB at 18:40

## 2024-07-28 RX ADMIN — DOCUSATE SODIUM 100 MG: 100 CAPSULE, LIQUID FILLED ORAL at 19:42

## 2024-07-28 RX ADMIN — PANTOPRAZOLE SODIUM 40 MG: 40 INJECTION, POWDER, FOR SOLUTION INTRAVENOUS at 08:18

## 2024-07-28 ASSESSMENT — ACTIVITIES OF DAILY LIVING (ADL)
ADLS_ACUITY_SCORE: 20

## 2024-07-28 NOTE — PROGRESS NOTES
"Temp: 97.7  F (36.5  C) Temp src: Oral BP: (!) 157/75 Pulse: 77   Resp: 16 SpO2: 97 % O2 Device: None (Room air)      Patient is alert and oriented/3, on room air, denies SOB, no cough noted, voids spontaneously in bathroom, continent of B/B, Independent with cares, ambulated in room and hallway, patient has been disoriented this shift, dressing up and gathering her glasses, cell phone & hand bag. Writer attempted to re-orient but in vain as she insisted she does not like the \"hotel\" here and does not know what she is doing here, frustrated with the fact that she had to have PIV's, left her room and sat in visitors lounge since 0330 am. Frequent monitoring. Continue with the POC  "

## 2024-07-28 NOTE — PLAN OF CARE
Goal Outcome Evaluation: [facility] [place]    Plan of Care Reviewed With: patient  Overall Patient Progress: no change    A/O to time, place, date and place with intermittent confusion but redirectable. No behavior noticed. VS WNL. Occasional pain on LUQ. Offered with Tylenol but refused. She said it goes away right away. Tolerates regular diet, denied nausea/vomiting. Bowel sound normoactive and she is passing gas. Last BM on 7/27/24 and it was loose.     Independent and ambulatory. Denied SOB, dizziness and light-headedness. Continue POC.

## 2024-07-28 NOTE — PLAN OF CARE
"Pt started reporting chest pain around 2230. Pt describing pain as \"achey\" and on the left side of her chest. Dr. Perez with medicine notified, orders obtained for STAT EKG and STAT troponin. Pt also lost IV access, new PIV to be placed with ultrasound after 2300 when ICU RN is available. Provider paged with EKG results. Troponin in process. Report given to oncoming RN.  "

## 2024-07-28 NOTE — PLAN OF CARE
"Patient has been educated on potential risks of choosing to leave the unit and that the responsibility for patient well-being will belong to the patient. Pt has been informed that admission to hospital is due to need for medical treatment. Education given to the patient on some of the potential risks included but are not limited to:      - lack of access to nursing intervention      - possible missed appointments with MD, therapies, tests      - possible missed medications, antibiotics, management of IV's    Patient Response: \"I'm going to meet my son downstairs in the lobby. I gotta come back here and have these removed later on. (IVs in arms)\"    Patient notified staff prior to leaving unit: Yes  Coban wrap placed over IV prior to pt leaving unit N/A   "

## 2024-07-28 NOTE — PLAN OF CARE
"Pt came out of room around 0330 disoriented. Pt says she is scared of being \"in the apartment\" and that it \"has no washcloths, no towels.\" Pt says she is not sure what is going on. Spoke with pt about staying the night, and she seems to remember family being here and some of last evening. Pt hopeful she can discharge today. Currently sitting in lounge in clothes and with purse and bag because she does not want to be in her room. Pt states she is \"scared of that man. The black man. (Pt's nurse).\" She cannot say why she is scared of him. Offered pt TV, newspaper, word find and food/drink. Pt would like to call her family and is looking for her phone.  "

## 2024-07-28 NOTE — PLAN OF CARE
"Goal Outcome Evaluation:      Plan of Care Reviewed With: patient    Overall Patient Progress: decliningOverall Patient Progress: declining    VS: BP (!) 157/75 (BP Location: Left arm)   Pulse 77   Temp 97.7  F (36.5  C) (Oral)   Resp 16   Ht 1.55 m (5' 1.02\")   Wt 68.9 kg (151 lb 14.4 oz)   LMP  (LMP Unknown)   SpO2 97%   BMI 28.68 kg/m     O2: >94% on RA  Denies SOB  Pt reported they began to have achy chest pain at 2230. Provider notified, EKG & troponin ordered   Output: Voiding spontaneously without difficulty     Last BM: x5 today 7/27/2024   Activity: Independent    Skin: Intact, tender buttocks   Pain: Intermittent abdominal and rectal pain   CMS: Intact until 1900  became disorientated x4    Dressing: None    Diet: Regular   LDA: R PIV flushing, yet is leaking. Needs ultra sound for new IV, ICU RN coming after 2300 to place new IV   Equipment: IV pole, personal belongings, call light    Plan: Potential change to oral antibiotics tomorrow    Additional Info: pt woke up from a nap and was disorientated x4. Reoriented the pt. 1945 they were at the elevators stating they were leaving with their grandson. Brought pt back to their room when they began putting their stuff in a bag stating they needed to go home. pt contacted family. Granddaughter and grandson came. Attempted to calm and reorient client. Provider came and saw pt. Pt appears to be more calm and orientated now. pt agrees to stay.            "
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
## 2024-07-29 ENCOUNTER — APPOINTMENT (OUTPATIENT)
Dept: OCCUPATIONAL THERAPY | Facility: CLINIC | Age: 89
DRG: 393 | End: 2024-07-29
Attending: INTERNAL MEDICINE
Payer: COMMERCIAL

## 2024-07-29 VITALS
HEIGHT: 61 IN | SYSTOLIC BLOOD PRESSURE: 149 MMHG | OXYGEN SATURATION: 97 % | WEIGHT: 154.4 LBS | DIASTOLIC BLOOD PRESSURE: 69 MMHG | HEART RATE: 67 BPM | TEMPERATURE: 97.6 F | BODY MASS INDEX: 29.15 KG/M2 | RESPIRATION RATE: 16 BRPM

## 2024-07-29 LAB
ATRIAL RATE - MUSE: 74 BPM
ATRIAL RATE - MUSE: 82 BPM
DIASTOLIC BLOOD PRESSURE - MUSE: NORMAL MMHG
DIASTOLIC BLOOD PRESSURE - MUSE: NORMAL MMHG
INTERPRETATION ECG - MUSE: NORMAL
INTERPRETATION ECG - MUSE: NORMAL
P AXIS - MUSE: 24 DEGREES
P AXIS - MUSE: 70 DEGREES
PR INTERVAL - MUSE: 148 MS
PR INTERVAL - MUSE: 154 MS
QRS DURATION - MUSE: 126 MS
QRS DURATION - MUSE: 128 MS
QT - MUSE: 394 MS
QT - MUSE: 430 MS
QTC - MUSE: 460 MS
QTC - MUSE: 477 MS
R AXIS - MUSE: 0 DEGREES
R AXIS - MUSE: 98 DEGREES
SYSTOLIC BLOOD PRESSURE - MUSE: NORMAL MMHG
SYSTOLIC BLOOD PRESSURE - MUSE: NORMAL MMHG
T AXIS - MUSE: -56 DEGREES
T AXIS - MUSE: 129 DEGREES
VENTRICULAR RATE- MUSE: 74 BPM
VENTRICULAR RATE- MUSE: 82 BPM

## 2024-07-29 PROCEDURE — 250N000013 HC RX MED GY IP 250 OP 250 PS 637: Performed by: STUDENT IN AN ORGANIZED HEALTH CARE EDUCATION/TRAINING PROGRAM

## 2024-07-29 PROCEDURE — 250N000013 HC RX MED GY IP 250 OP 250 PS 637: Performed by: INTERNAL MEDICINE

## 2024-07-29 PROCEDURE — 97165 OT EVAL LOW COMPLEX 30 MIN: CPT | Mod: GO

## 2024-07-29 PROCEDURE — 97530 THERAPEUTIC ACTIVITIES: CPT | Mod: GO

## 2024-07-29 PROCEDURE — 99207 PR APP CREDIT; MD BILLING SHARED VISIT: CPT | Performed by: INTERNAL MEDICINE

## 2024-07-29 PROCEDURE — 99239 HOSP IP/OBS DSCHRG MGMT >30: CPT | Performed by: INTERNAL MEDICINE

## 2024-07-29 PROCEDURE — 97129 THER IVNTJ 1ST 15 MIN: CPT | Mod: GO

## 2024-07-29 PROCEDURE — 97535 SELF CARE MNGMENT TRAINING: CPT | Mod: GO

## 2024-07-29 RX ORDER — CEPHALEXIN 500 MG/1
500 CAPSULE ORAL 2 TIMES DAILY
Qty: 10 CAPSULE | Refills: 0 | Status: SHIPPED | OUTPATIENT
Start: 2024-07-29 | End: 2024-08-03

## 2024-07-29 RX ADMIN — DOCUSATE SODIUM 100 MG: 100 CAPSULE, LIQUID FILLED ORAL at 08:03

## 2024-07-29 RX ADMIN — POLYETHYLENE GLYCOL 3350 17 G: 17 POWDER, FOR SOLUTION ORAL at 08:03

## 2024-07-29 RX ADMIN — PANTOPRAZOLE SODIUM 40 MG: 40 TABLET, DELAYED RELEASE ORAL at 08:03

## 2024-07-29 ASSESSMENT — ACTIVITIES OF DAILY LIVING (ADL)
ADLS_ACUITY_SCORE: 20
PREVIOUS_RESPONSIBILITIES: DRIVING;MEAL PREP;HOUSEKEEPING;LAUNDRY;SHOPPING;YARDWORK;MEDICATION MANAGEMENT;FINANCES
ADLS_ACUITY_SCORE: 20

## 2024-07-29 NOTE — PLAN OF CARE
AOx4, forgetful at times. C/o intermittent RUE and mild abd discomfort, ICE applied- pt declined offered meds. Up IND. Tolerating intake. PIV x2 SL, on IV ABX. Pt slept most of night. Frequent rounding.    Door bell in place for safety.     For vital signs and complete assessments, please see documentation flowsheets.

## 2024-07-29 NOTE — PLAN OF CARE
"Goal Outcome Evaluation:      Plan of Care Reviewed With: patient    Overall Patient Progress: improving    VS: BP (!) 162/82 (BP Location: Right arm, Patient Position: Sitting, Cuff Size: Adult Regular)   Pulse 82   Temp 97.3  F (36.3  C) (Oral)   Resp 16   Ht 1.55 m (5' 1.02\")   Wt 68.9 kg (151 lb 14.4 oz)   LMP  (LMP Unknown)   SpO2 95%   BMI 28.68 kg/m     O2: >90% on RA  Denies SOB, reported slight chest pain at 1630   Output: Voiding spontaneously without difficulty    Last BM: 7/27/2024   Activity: Independent    Skin: Intact, small bruising on right arm from Ivs   Pain: Reported slight abdominal pain around 2100, otherwise pt has denied pain for this shift   CMS: AxO x4 this shift   Dressing: None   Diet: Regular    LDA: 2 PIVs SL   Equipment: IV pole, personal belongings, call light   Plan: Continue POC   Additional Info: Pt is cooperative with cares, however is very eager to go home tomorrow           "

## 2024-07-29 NOTE — PROGRESS NOTES
"   Occupational Therapy Evaluation: 07/29/24 1102   Appointment Info   Signing Clinician's Name / Credentials (OT) Agata Barragan, OTD, OTR/L   Rehab Comments (OT) OT Only; 1 time eval/treat   Living Environment   People in Home sibling(s)  (Pt reports sister lives with her.)   Current Living Arrangements house   Home Accessibility stairs to enter home;stairs within home   Number of Stairs, Main Entrance 2   Stair Railings, Main Entrance railings safe and in good condition   Number of Stairs, Within Home, Primary greater than 10 stairs   Stair Railings, Within Home, Primary railing on left side (ascending)   Transportation Anticipated car, drives self   Living Environment Comments Pt reports lives in house with her sister. Pt reports she makes sisters meals, does not provide any physical assist for sister at this time.   Self-Care   Usual Activity Tolerance good   Current Activity Tolerance good   Equipment Currently Used at Home cane, straight;walker, standard;grab bar, tub/shower  (Pt reports was not using 2ww, was intermittently using cane inside home if LE feeling weak.)   Fall history within last six months yes   Number of times patient has fallen within last six months 2   Activity/Exercise/Self-Care Comment Pt reports ind with ADLS at baseline.   Instrumental Activities of Daily Living (IADL)   Previous Responsibilities driving;meal prep;housekeeping;laundry;shopping;yardwork;medication management;finances   IADL Comments Pt reports ind with IADLs at baseline. Pt reports mowed lawn last week however per pt report, children can assist with yardwork as needed.   General Information   Onset of Illness/Injury or Date of Surgery 07/26/24   Referring Physician Yudy Mckeon MD   Patient/Family Therapy Goal Statement (OT) Return home   Additional Occupational Profile Info/Pertinent History of Current Problem Per EMR \"Lara Marc is a 88 year old female admitted on 7/26/2024. 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 Patient admitted to Medicine for further evaluation and care of suspected proctitis.\"   Existing Precautions/Restrictions fall   Left Upper Extremity (Weight-bearing Status) full weight-bearing (FWB)   Right Upper Extremity (Weight-bearing Status) full weight-bearing (FWB)   Left Lower Extremity (Weight-bearing Status) full weight-bearing (FWB)   Right Lower Extremity (Weight-bearing Status) full weight-bearing (FWB)   General Observations and Info Activity: up with assist   Cognitive Status Examination   Orientation Status orientation to person, place and time   Cognitive Status Comments Pt responding appropriately to orientation questions.   Visual Perception   Visual Impairment/Limitations corrective lenses full-time   Sensory   Sensory Comments Pt reports numbness in tips of toes.   Posture   Posture protracted shoulders;forward head position   Range of Motion Comprehensive   General Range of Motion bilateral upper extremity ROM WFL   Strength Comprehensive (MMT)   General Manual Muscle Testing (MMT) Assessment no strength deficits identified   Coordination   Upper Extremity Coordination No deficits were identified   Bed Mobility   Bed Mobility supine-sit   Comment (Bed Mobility) Ind   Transfers   Transfers toilet transfer   Transfer Comments SBA-supervision per clinical judgement   Activities of Daily Living   BADL Assessment/Intervention bathing;grooming;toileting;lower body dressing;upper body dressing   Bathing Assessment/Intervention   [place] Level (Bathing) supervision   Comment, (Bathing) Per clinical judgement   Upper Body Dressing Assessment/Training   Comment, (Upper Body Dressing) Per clinical judgement "   [place] Level (Upper Body Dressing) supervision   Lower Body Dressing Assessment/Training   Comment, (Lower Body Dressing) Per clinical judgement   [place] Level (Lower Body Dressing) supervision   Grooming Assessment/Training   [place] Level (Grooming) supervision   Comment, (Grooming) Per clinical judgement   Toileting   Comment, (Toileting) Per clinical judgement   [place] Level (Toileting) supervision   Clinical Impression   Criteria for Skilled Therapeutic Interventions Met (OT) Yes, treatment indicated   OT Diagnosis Decreased activity tolerance, ind with ADLs/IADLs, concerns for cog deficits   OT Problem List-Impairments impacting ADL problems related to;activity tolerance impaired;mobility;cognition   Assessment of Occupational Performance 3-5 Performance Deficits   Identified Performance Deficits Bathing, dressing, g/h, toileting, activity tolerance   Planned Therapy Interventions (OT) ADL retraining;IADL retraining;cognition;home program guidelines;progressive activity/exercise;risk factor education   Clinical Decision Making Complexity (OT) problem focused assessment/low complexity   Risk & Benefits of therapy have been explained evaluation/treatment results reviewed;care plan/treatment goals reviewed;risks/benefits reviewed;current/potential barriers reviewed;participants voiced agreement with care plan;participants included;patient   Clinical Impression Comments Pt would benefit from 1 time eval/treat to ensure safety with ADL/IADLs and cognition prior to returning home.   OT Total Evaluation Time   OT Eval, Low Complexity Minutes (80079) 8   OT Goals   Therapy Frequency (OT) One time eval and treatment   OT Predicted Duration/Target Date for Goal Attainment 07/29/24   OT Goals Hygiene/Grooming;Lower Body Dressing;Upper Body Dressing;Lower Body Bathing;Bed Mobility;Toilet Transfer/Toileting;Cognition;OT Goal 1   OT: Hygiene/Grooming independent;Goal Met;Completed   OT: Upper Body  Dressing Independent;Goal Met;Completed   OT: Lower Body Dressing Independent;Goal Met;Completed   OT: Lower Body Bathing Independent;Goal Met;Completed   OT: Bed Mobility Independent;Goal Met;Completed   OT: Toilet Transfer/Toileting Independent;Goal Met;Completed   OT: Cognitive Patient/caregiver will verbalize understanding of cognitive assessment results/recommendations as needed for safe discharge planning  (Pt orientated and not appearing to have concerning cognitive deficits at this time.)   OT: Goal 1 Pt will ind ascend/descend at least 10 steps to ensure safety with household mobility prior to discharge from IP.  (Goal Met)   Interventions   Interventions Quick Adds Therapeutic Activity;Cognitive Retraining   Self-Care/Home Management   Self-Care/Home Mgmt/ADL, Compensatory, Meal Prep Minutes (44617) 10   Symptoms Noted During/After Treatment (Meal Preparation/Planning Training) none   Treatment Detail/Skilled Intervention Evaluation completed, treatment indicated. Pt engaged in self care tasks this date to ensure safety with ADLs prior to discharge from IP. Pt completing toileting ADLs (clothing management, transfer, veronica cares), doffing/donning socks seated EOB, washing hands at sink in BR, and buttoning shirt ind. Engaged pt in conversation regarding showering, pt reports no concerns at this time, would like to continue being ind with task however will ask for assist if needed. Pt reporting managing medications and finances going well at this time as well. Pt up ind in room, using bathroom as needed.   Cognitive Retraining   Cognitive Skills Intervention 1st 15 Minutes Timed (20156) 10   Symptoms Noted During/After Treatment None   Treatment Detail/Skilled Intervention Evaluation completed, treatment indicated. Engaged pt in conversation regarding safety questions and scenarios to ensure safe discharge back to home. Pt oriented x4 this date, reported since being IP, has had some confusion at night about  where she is however states confusion seems to have faded and feels back to normal. OT asking pt who pt would call in emergencies and what she would do, pt responding appropriately to questions. OT asking pt how pt manages medications, pt able to explain in detail her med management system, seems appropriate and safe at this time. Pt reporting still drives, has not had episodes of forgetting where she is/where she is going while driving. Pt reporting if noted memory declining in the future, would notify medical team and children to ensure her and others safety.   Therapeutic Activities   Therapeutic Activity Minutes (80271) 10   Symptoms noted during/after treatment none   Treatment Detail/Skilled Intervention Evaluation completed, treatment indicated. Pt participated in hallway ambulation and stair completion to ensure safety with household mobility upon discharge. Pt ambulated approx 250 ft in [place] this date ind, completed 4 steps up/down 3x with L railing (as pt has at home) ind. Pt reporting no concerns with stairs and mobility. Pt noting slight weakness in LE some days, however declining OP PT, states will complete home walking program instead. OT educated on ensuring ambulation several times each day, even if short bouts for increased strength. Ind bed mobility completed this date.   OT Discharge Planning   OT Plan DC OT   OT Discharge Recommendation (DC Rec) home with assist   OT Rationale for DC Rec Anticipate when pt medicallty stable, safe to discharge home with intermittent assist from children for IADLs and other tasks. Pt ind this date.   OT Brief overview of current status Ind   Total Session Time   Timed Code Treatment Minutes 30   Total Session Time (sum of timed and untimed services) 38

## 2024-07-29 NOTE — PLAN OF CARE
Goal Outcome Evaluation:      Plan of Care Reviewed With: patient    Overall Patient Progress: improving    Outcome Evaluation: A&Ox4. Independent. R and L PIV saline locked. LBM 7/28; states BMs are more formed and back to her baseline. C/o L breast pain but states pain improves with repositioning. Denies headache, chest pain, abdominal pain, SOB, numbness or tingling. Generalized bruising to bilateral forearms from insertion sites. Regular diet, thin liquids, takes pills whole. Uses call light appropriately and makes needs known.    DISCHARGE SUMMARY    Pt discharging to: home  Transportation: friend  AVS given and discussed: Pt was given AVS and pt states understanding of content. Pt has no further questions.   Stoplight Tool given and discussed: Yes, no further questions.  Medications given: Picking medication up from home pharmacy.   Belongings returned: Yes, ensured all belongings packed and sent with pt. No items in security.   Comments: Escorted safely to elevators. Pt left at 1320.

## 2024-07-29 NOTE — DISCHARGE SUMMARY
Medicine Discharge Summary       Lara Marc MRN# [id number]   YOB: 1935 Age: 88 year old     Date of Admission:  7/26/2024  Date of Discharge:  7/29/2024  Admitting Physician:  Yuli Haile MD  Discharge Physician:  Yudy Mckeon MD  Discharging Service:  Hospital Medicine     Primary Provider: Yecenia Dickerson              Discharge Diagnosis:     UTI  Constipation  Diarrhea        Consultations:   Occupational therapy         Hospital Course     Lara Marc is a 88 year old female admitted on 7/26/2024. 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 Patient admitted to Medicine for further evaluation and care of suspected proctitis.        # Cognitive impairment, intermittent confusion    # acute toxic encephalopathy due to UTI ? 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 Reports loose stool ~ q20 min for past 24h. Imodium w/o alleviation of sx. CT on admission w/ large amount of stool in sigmoid and rectum with thickening of rectum consistent of proctitis. No fever, chills, hematochezia, melena, mucus in stool or recent abx use.     ## UTI: Reports urinary frequency PTA. UA on admission: Large LE, 26 wbc. No fever, or leukocytosis, or tachycardia.   - Ceftriaxone 2g q24 h for now, urine pos for E coli sensitivities pending, discussed with sister Sybil about patient wanting to discharge home today and that her PCP's office will need to  "follow up on urine culture and sensitivities  She currently denies any symptoms     - Please follow UC  -procalcitonin was wnl     ## Atypical CP: Reports 'chest heaviness' on exam. No sob, handley or CAD noted.   - STAT EKG and troponin  - Tele     ##  SIS on CKDIII, creatinine is trending down.  Patient encouraged to eat and drink.  Avoid NSAIDs. Scr appears to be at baseline.   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 Recent  BL appears 1.3. Likely related to large GI losses and infection       ## HTN: BP mildly elevated on admission. PTA lisinopril      On Exam ;   Alert and oriented . No acute distress  Vital signs:  Temp: 97.6  F (36.4  C) Temp src: Oral BP: (!) 149/69 Pulse: 67   Resp: 16 SpO2: 97 % O2 Device: None (Room air)   Height: 154.9 cm (5' 1\") Weight: 70 kg (154 lb 6.4 oz)  Estimated body mass index is 29.17 kg/m  as calculated from the following:    Height as of this encounter: 1.549 m (5' 1\").    Weight as of this encounter: 70 kg (154 lb 6.4 oz).  Neck ; supple , no JVD  Chest ; equal BS bilaterally , no rales or rhonchi   CVS; RRR, no murmur /rubs or gallops  GI ; soft abdomen, non tender, BS positive  Ext ; no edema , no cynosis  Neuro ; CN 2 to 12 grossly intact , No Facial asymmetry noticed  .  Psych ; appropriate mood and effect  Skin ; no rash or purpura on exposed skin     ROUTINE IP LABS (Last four results)  BMP  Recent Labs   Lab 07/28/24  0835 07/27/24  0624 07/26/24  1820    139 141   POTASSIUM 4.5 4.8 5.3   CHLORIDE 106 106 106   DARNELL 9.0 9.1 9.2   CO2 22 21* 22   BUN 24.8* 27.4* 32.0*   CR 1.41* 1.55* 1.63*   * 94 96     CBC  Recent Labs   Lab 07/28/24  0835 07/27/24  0624 07/26/24  1820   WBC 5.6 6.4 5.8   RBC 4.17 4.37 4.13   HGB 10.9* 11.5* 10.9*   HCT 35.4 36.8 34.8*   MCV 85 84 84   MCH 26.1* 26.3* 26.4*   MCHC 30.8* 31.3* 31.3*   RDW 14.8 14.6 15.0    199 184     INR  Recent Labs   Lab 07/26/24  1820   INR 1.08                    Discharge Medications: "     Current Discharge Medication List        START taking these medications    Details   cephALEXin (KEFLEX) 500 MG capsule Take 1 capsule (500 mg) by mouth 2 times daily for 5 days  Qty: 10 capsule, Refills: 0    Associated Diagnoses: Urinary tract infection without hematuria, site unspecified           CONTINUE these medications which have NOT CHANGED    Details   acetaminophen (TYLENOL) 325 MG tablet Take 2 tablets (650 mg) by mouth every 8 hours    Associated Diagnoses: Cecal volvulus (H)      aspirin 81 MG EC tablet Take 81 mg by mouth daily      Cholecalciferol (VITAMIN D3 PO) Take by mouth daily      lisinopril (ZESTRIL) 20 MG tablet Take 1 tablet (20 mg) by mouth daily  Qty: 90 tablet, Refills: 0    Associated Diagnoses: Hypertension goal BP (blood pressure) < 140/90      Loperamide HCl (IMODIUM OR)       magnesium 250 MG tablet Take 1 tablet by mouth daily      Multiple Vitamins-Minerals (MULTIVITAMIN OR) Take 1 tablet by mouth daily                  Discharge Instructions and Follow-Up:     Discharge Procedure Orders   Reason for your hospital stay   Order Comments: UTI  Cognitive impairment     Activity   Order Comments: Your activity upon discharge: activity as tolerated     Order Specific Question Answer Comments   Is discharge order? Yes      Adult [facility] Follow-up and recommended labs and tests   Order Comments: Follow up with primary care provider, Yecenia Dickerson MD, within 7 days to evaluate treatment change and for hospital follow- up.  No follow up labs or test are needed.      Appointments on [place] and/or [facility] (with [facility] or [facility] provider or service). Call [phone] if you haven't heard regarding these appointments within 7 days of discharge.     Diet   Order Comments: Follow this diet upon discharge: Orders Placed This Encounter      Regular Diet Adult     Order Specific Question Answer Comments   Is discharge order? Yes          Reason for your hospital stay     UTI  Cognitive impairment     Activity    Your activity upon discharge: activity as tolerated     Adult [facility] Follow-up and recommended labs and tests    Follow up with primary care provider, Yecenia Dickerson MD, within 7 days to evaluate treatment change and for hospital follow- up.  No follow up labs or test are needed.      Appointments on [place] and/or [facility] (with [facility] or [facility] provider or service). Call [phone] if you haven't heard regarding these appointments within 7 days of discharge.     Diet    Follow this diet upon discharge: Orders Placed This Encounter      Regular Diet Adult                Discharge Disposition:   32 minutes spent in discharge, including >50% in counseling and coordination of care, medication review and plan of care recommended on follow up. Questions were answered to satisfaction       Yudy Mckeon MD  Internal Medicine Hospitalist & Staff Physician  [facility]
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
## 2024-07-30 ENCOUNTER — PATIENT OUTREACH (OUTPATIENT)
Dept: FAMILY MEDICINE | Facility: CLINIC | Age: 89
End: 2024-07-30
Payer: COMMERCIAL

## 2024-07-30 NOTE — TELEPHONE ENCOUNTER
Transitions of Care Outreach  Chief Complaint   Patient presents with    Hospital F/U       Most Recent Admission Date: 7/26/2024   Most Recent Admission Diagnosis: Proctitis - K62.89     Most Recent Discharge Date: 7/29/2024   Most Recent Discharge Diagnosis: Proctitis - K62.89  Urinary tract infection without hematuria, site unspecified - N39.0     Transitions of Care Assessment    Discharge Assessment  How are you doing now that you are home?: Sx improving but feeling a little weak  How are your symptoms? (Red Flag symptoms escalate to triage hotline per guidelines): Improved  Do you know how to contact your clinic care team if you have future questions or changes to your health status? : Yes  Does the patient have their discharge instructions? : Yes  Does the patient have questions regarding their discharge instructions? : No  Were you started on any new medications or were there changes to any of your previous medications? : Yes  Does the patient have all of their medications?: No (see comment) (Picking up medications this morning)  Do you have questions regarding any of your medications? : No    Follow up Plan     Discharge Follow-Up  Discharge follow up appointment scheduled in alignment with recommended follow up timeframe or Transitions of Risk Category? (Low = within 30 days; Moderate= within 14 days; High= within 7 days): Yes  Discharge Follow Up Appointment Date: 07/31/24  Discharge Follow Up Appointment Scheduled with?: Primary Care Provider    Future Appointments   Date Time Provider Department Center   7/31/2024 11:00 AM Yecenia Dickerson MD [facility]       Outpatient Plan as outlined on AVS reviewed with patient.    For any urgent concerns, please contact our 24 hour nurse triage line: [phone] ([phone])       Ladarius Pabon RN

## 2024-07-31 ENCOUNTER — OFFICE VISIT (OUTPATIENT)
Dept: FAMILY MEDICINE | Facility: CLINIC | Age: 89
End: 2024-07-31
Payer: COMMERCIAL

## 2024-07-31 VITALS
HEART RATE: 72 BPM | OXYGEN SATURATION: 98 % | WEIGHT: 155.7 LBS | SYSTOLIC BLOOD PRESSURE: 116 MMHG | HEIGHT: 61 IN | RESPIRATION RATE: 18 BRPM | BODY MASS INDEX: 29.39 KG/M2 | DIASTOLIC BLOOD PRESSURE: 66 MMHG | TEMPERATURE: 97.7 F

## 2024-07-31 DIAGNOSIS — I10 HYPERTENSION GOAL BP (BLOOD PRESSURE) < 140/90: ICD-10-CM

## 2024-07-31 DIAGNOSIS — Z00.00 ENCOUNTER FOR MEDICARE ANNUAL WELLNESS EXAM: Primary | ICD-10-CM

## 2024-07-31 DIAGNOSIS — Z52.4 KIDNEY DONOR: ICD-10-CM

## 2024-07-31 DIAGNOSIS — R19.7 OVERFLOW DIARRHEA: ICD-10-CM

## 2024-07-31 DIAGNOSIS — Z87.39 HISTORY OF GOUT: ICD-10-CM

## 2024-07-31 DIAGNOSIS — K62.89 PROCTITIS: ICD-10-CM

## 2024-07-31 DIAGNOSIS — K59.00 CONSTIPATION, UNSPECIFIED CONSTIPATION TYPE: ICD-10-CM

## 2024-07-31 DIAGNOSIS — Z86.718 PERSONAL HISTORY OF DVT (DEEP VEIN THROMBOSIS): ICD-10-CM

## 2024-07-31 DIAGNOSIS — I71.43 INFRARENAL ABDOMINAL AORTIC ANEURYSM (AAA) WITHOUT RUPTURE (H): ICD-10-CM

## 2024-07-31 DIAGNOSIS — Z87.440 PERSONAL HISTORY OF URINARY TRACT INFECTION: ICD-10-CM

## 2024-07-31 DIAGNOSIS — R15.2 FECAL URGENCY: ICD-10-CM

## 2024-07-31 DIAGNOSIS — Z87.19 HISTORY OF FECAL IMPACTION: ICD-10-CM

## 2024-07-31 LAB — BACTERIA BLD CULT: NO GROWTH

## 2024-07-31 PROCEDURE — 99214 OFFICE O/P EST MOD 30 MIN: CPT | Mod: 25 | Performed by: FAMILY MEDICINE

## 2024-07-31 PROCEDURE — 99397 PER PM REEVAL EST PAT 65+ YR: CPT | Performed by: FAMILY MEDICINE

## 2024-07-31 RX ORDER — POLYETHYLENE GLYCOL 3350 17 G/17G
17 POWDER, FOR SOLUTION ORAL DAILY
COMMUNITY
Start: 2024-07-31

## 2024-07-31 SDOH — HEALTH STABILITY: PHYSICAL HEALTH: ON AVERAGE, HOW MANY MINUTES DO YOU ENGAGE IN EXERCISE AT THIS LEVEL?: 30 MIN

## 2024-07-31 SDOH — HEALTH STABILITY: PHYSICAL HEALTH: ON AVERAGE, HOW MANY DAYS PER WEEK DO YOU ENGAGE IN MODERATE TO STRENUOUS EXERCISE (LIKE A BRISK WALK)?: 2 DAYS

## 2024-07-31 ASSESSMENT — SOCIAL DETERMINANTS OF HEALTH (SDOH): HOW OFTEN DO YOU GET TOGETHER WITH FRIENDS OR RELATIVES?: MORE THAN THREE TIMES A WEEK

## 2024-07-31 ASSESSMENT — PAIN SCALES - GENERAL: PAINLEVEL: NO PAIN (0)

## 2024-07-31 NOTE — PROGRESS NOTES
"Preventive Care Visit  [facility]  Yecenia Dickerson MD, MD, Family Medicine  Jul 31, 2024      Assessment & Plan       ICD-10-CM    1. Encounter for Medicare annual wellness exam  Z00.00       2. 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 Personal history of urinary tract infection  Z87.440       4. Hypertension goal BP (blood pressure) < 140/90  I10       5. 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 History of gout  Z87.39       7. Personal history of DVT (deep vein thrombosis)  Z86.718       8. Infrarenal abdominal aortic aneurysm (AAA) without rupture (H24)  I71.43       9. Constipation, unspecified constipation type  K59.00       10. Overflow diarrhea  R19.7       11. Fecal urgency  R15.2       12. History of fecal impaction  Z87.19          Wellness visit      She declines vaccines today.      Constipation, unspecified constipation type  Fecal urgency  Diarrhea, suspect overflow  History of impacted stool on CT 11/22  Was just hospitalized with proctitis, UTI. Abdominal pain is gone.    She saw GI on 4/24/24 - note reviewed . Has a plan for managing constipation and was recommended to follow up in 2-3 months- she has not been following their recommendations and is overdue. Reviewed GI management plan again today in detail and added to patient instructions    Addendum: reviewed stool culture results routed to me which show positive EPEC. Urine Cx remains negative but pt's stool culture was positive for EPEC E. Coli. Pt was treated with supportive cares, Ceftriaxone and discharged on 5 days of Kelfex which should cover for EPEC in patient's stool.  No changes made today.     From discharge summary 7/26/24:   \"Presents with ~-4 day hx of constipation and rectal pain/spasm followed by loose stool and ongoing rectal pain following rectal suppository taken ~ 24 hours ago. Reports loose stool ~ q20 min for past 24h. Imodium w/o alleviation of sx. CT on admission w/ large amount of stool in sigmoid and rectum with " "thickening of rectum consistent of proctitis. No fever, chills, hematochezia, melena, mucus in stool or recent abx use. \"    UTI  Has been taking Keflex as instructed   UC without evidence of infection-causing bacteria  Denies any symptoms    From discharge summary:  \" Reports urinary frequency PTA. UA on admission: Large LE, 26 wbc. No fever, or leukocytosis, or tachycardia.   - Ceftriaxone 2g q24 h for now, urine pos for E coli sensitivities pending, discussed with sister Sybil about patient wanting to discharge home today and that her PCP's office will need to follow up on urine culture and sensitivities  She currently denies any symptoms \"     Hypertension  Controlled. Continues lisinopril 20mg daily.        CKD 3, history of kidney donation  She declines any blood draw today.   Cr and GFR had returned to baseline discharge.    Not taking NSAIDs  Continues ACEI  Urine albumin has been normal.    From discharge summary:    SIS on CKDIII, creatinine is trending down.  Patient encouraged to eat and drink.  Avoid NSAIDs. Scr appears to be at baseline.   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 Recent  BL appears 1.3. Likely related to large GI losses and infection\"     Gout  Episodes of toe redness and pain about every other month resolved with an herbal supplement with celery seed and cherry she uses. She prefers not to take a daily medication at this point.   Uric acid level elevated at 8.6 on 3/17/24.   Discussed prophylactic treatment with allopurinol today and dietary recommendations      Infrarenal abdominal aortic aneurysm (AAA) without rupture 4.3cm  I recommended scheduling with vascular surgery. She had an appointment scheduled, but cancelled it.  CT abd 7/26/24 showed stable 4.3cm AAA     Per ED visit notes: \"Discussed with vascular staff.  Can f/u in 6 mo or just f/u with pmd. Says increased 2-3 mm per year and based on age wouldn't offer repair for her until her aneurysm in 5.5 cm.  Offered option and " "she wants to just f/u with pmd. \"          History of DVT  Unprovoked distal right lower extremity DVT 4/30/2020. Was previously on anticoagulation.    Per 7/27/2020 hematology note:   \"Plan:  1. Will discontinue anticoagulation.  Anticoagulation clinic notified.  While this was an unprovoked clot, it was small and distal and thus recommendations support three months of anticoagulation over longer term anticoagulation. Lara is also very opposed to long term anticoagulation and understands risks of recurrent clotting events.  I support this decision as anticoagulation also comes with significant risk for her.  I did recommended she have repeat ultrasound to confirm resolution of clot but she kindly refused.    2. Discussed risk factors for deep vein thrombosis and stressed importance of moving around house and walking as much as possible.  She will also try to remain hydrated.    3. She will continue her ASA,  lisinopril and have her blood pressure monitored.    \"        MED REC REQUIRED  Post Medication Reconciliation Status:  Discharge medications reconciled and changed, see notes/orders  BMI  Estimated body mass index is 29.4 kg/m  as calculated from the following:    Height as of this encounter: 1.55 m (5' 1.02\").    Weight as of this encounter: 70.6 kg (155 lb 11.2 oz).       Counseling  Appropriate preventive services were addressed with this patient via screening, questionnaire, or discussion as appropriate for fall prevention, nutrition, physical activity, Tobacco-use cessation, weight loss and cognition.  Checklist reviewing preventive services available has been given to the patient.  Reviewed patient's diet, addressing concerns and/or questions.   She is at risk for lack of exercise and has been provided with information to increase physical activity for the benefit of her well-being.   The patient was instructed to see the dentist every 6 months.   She is at risk for psychosocial distress and has been " provided with information to reduce risk.   The patient was provided with written information regarding signs of hearing loss.   Information on urinary incontinence and treatment options given to patient.       Patient Instructions   Patient Education  I strongly recommend completing and advance directive and bringing it or sending it by mail to our clinic. Try to do this by the end of next week.   Please schedule follow up with [facility]. You can ask if they do telephone visits.    -- Continue the Metamucil, 3 chews per day (you can continue this even if you have diarrhea)   -- Use a glycerin/fleet suppository every 3 days or so to clear out the rectum, insert the rounded end first (ask the GI doctor your questions about this)   -- If you skip 3 days with no bowel movement, take 1/2 capful of miralax   -- It is okay to use Gas X as needed  -- It is okay to take Pepto Bismol chews prior to leaving the house to help prevent diarrhea. It is okay to use imodium sparingly as well if pepto bismol is not effective  --  Future considerations include referral to Urogyn, [facility] or  Pelvic Floor PT, bowel cleanse  Preventive Care Advice   This is general advice given by our system to help you stay healthy. However, your care team may have specific advice just for you. Please talk to your care team about your preventive care needs.  Nutrition  Eat 5 or more servings of fruits and vegetables each day.  Try wheat bread, brown rice and whole grain pasta (instead of white bread, rice, and pasta).  Get enough calcium and vitamin D. Check the label on foods and aim for 100% of the RDA (recommended daily allowance).  Lifestyle  Exercise at least 150 minutes each week  (30 minutes a day, 5 days a week).  Do muscle strengthening activities 2 days a week. These help control your weight and prevent disease.  No smoking.  Wear sunscreen to prevent skin cancer.  Have a dental exam and cleaning every 6 months.  Yearly  exams  See your health care team every year to talk about:  Any changes in your health.  Any medicines your care team has prescribed.  Preventive care, family planning, and ways to prevent chronic diseases.  Shots (vaccines)   HPV shots (up to age 26), if you've never had them before.  Hepatitis B shots (up to age 59), if you've never had them before.  COVID-19 shot: Get this shot when it's due.  Flu shot: Get a flu shot every year.  Tetanus shot: Get a tetanus shot every 10 years.  Pneumococcal, hepatitis A, and RSV shots: Ask your care team if you need these based on your risk.  Shingles shot (for age 50 and up)  General health tests  Diabetes screening:  Starting at age 35, Get screened for diabetes at least every 3 years.  If you are younger than age 35, ask your care team if you should be screened for diabetes.  Cholesterol test: At age 39, start having a cholesterol test every 5 years, or more often if advised.  Bone density scan (DEXA): At age 50, ask your care team if you should have this scan for osteoporosis (brittle bones).  Hepatitis C: Get tested at least once in your life.  STIs (sexually transmitted infections)  Before age 24: Ask your care team if you should be screened for STIs.  After age 24: Get screened for STIs if you're at risk. You are at risk for STIs (including HIV) if:  You are sexually active with more than one person.  You don't use condoms every time.  You or a partner was diagnosed with a sexually transmitted infection.  If you are at risk for HIV, ask about PrEP medicine to prevent HIV.  Get tested for HIV at least once in your life, whether you are at risk for HIV or not.  Cancer screening tests  Cervical cancer screening: If you have a cervix, begin getting regular cervical cancer screening tests starting at age 21.  Breast cancer scan (mammogram): If you've ever had breasts, begin having regular mammograms starting at age 40. This is a scan to check for breast cancer.  Colon cancer  screening: It is important to start screening for colon cancer at age 45.  Have a colonoscopy test every 10 years (or more often if you're at risk) Or, ask your provider about stool tests like a FIT test every year or Cologuard test every 3 years.  To learn more about your testing options, visit:   .  For help making a decision, visit:   https://bit.ly/cr38513.  Prostate cancer screening test: If you have a prostate, ask your care team if a prostate cancer screening test (PSA) at age 55 is right for you.  Lung cancer screening: If you are a current or former smoker ages 50 to 80, ask your care team if ongoing lung cancer screenings are right for you.  For informational purposes only. Not to replace the advice of your health care provider. Copyright   2023 [employer]. All rights reserved. Clinically reviewed by the  [employer] [facility]. [employer] 435898 - REV 01/24.  Preventing Falls: Care Instructions  Injuries and health problems such as trouble walking or poor eyesight can increase your risk of falling. So can some medicines. But there are things you can do to help prevent falls. You can exercise to get stronger. You can also arrange your home to make it safer.    Talk to your doctor about the medicines you take. Ask if any of them increase the risk of falls and whether they can be changed or stopped.   Try to exercise regularly. It can help improve your strength and balance. This can help lower your risk of falling.     Practice fall safety and prevention.    Wear low-heeled shoes that fit well and give your feet good support. Talk to your doctor if you have foot problems that make this hard.  Carry a cellphone or wear a medical alert device that you can use to call for help.  Use stepladders instead of chairs to reach high objects. Don't climb if you're at risk for falls. Ask for help, if needed.  Wear the correct eyeglasses, if you need them.    Make your home safer.    Remove  "rugs, cords, clutter, and furniture from walkways.  Keep your house well lit. Use night-lights in hallways and bathrooms.  Install and use sturdy handrails on stairways.  Wear nonskid footwear, even inside. Don't walk barefoot or in socks without shoes.    Be safe outside.    Use handrails, curb cuts, and ramps whenever possible.  Keep your hands free by using a shoulder bag or backpack.  Try to walk in well-lit areas. Watch out for uneven ground, changes in pavement, and debris.  Be careful in the winter. Walk on the grass or gravel when sidewalks are slippery. Use de-icer on steps and walkways. Add non-slip devices to shoes.    Put grab bars and nonskid mats in your shower or tub and near the toilet. Try to use a shower chair or bath bench when bathing.   Get into a tub or shower by putting in your weaker leg first. Get out with your strong side first. Have a phone or medical alert device in the bathroom with you.   Where can you learn more?  Go to https://www.[employer].net/patiented  Enter G117 in the search box to learn more about \"Preventing Falls: Care Instructions.\"  Current as of: July 17, 2023               Content Version: 14.0    2350-4651 [employer].   Care instructions adapted under license by your healthcare professional. If you have questions about a medical condition or this instruction, always ask your healthcare professional. [employer] disclaims any warranty or liability for your use of this information.      Hearing Loss: Care Instructions  Overview     Hearing loss is a sudden or slow decrease in how well you hear. It can range from slight to profound. Permanent hearing loss can occur with aging. It also can happen when you are exposed long-term to loud noise. Examples include listening to loud music, riding motorcycles, or being around other loud machines.  Hearing loss can affect your work and home life. It can make you feel lonely or depressed. You may feel that " you have lost your independence. But hearing aids and other devices can help you hear better and feel connected to others.  Follow-up care is a key part of your treatment and safety. Be sure to make and go to all appointments, and call your doctor if you are having problems. It's also a good idea to know your test results and keep a list of the medicines you take.  How can you care for yourself at home?  Avoid loud noises whenever possible. This helps keep your hearing from getting worse.  Always wear hearing protection around loud noises.  Wear a hearing aid as directed.  A professional can help you pick a hearing aid that will work best for you.  You can also get hearing aids over the counter for mild to moderate hearing loss.  Have hearing tests as your doctor suggests. They can show whether your hearing has changed. Your hearing aid may need to be adjusted.  Use other devices as needed. These may include:  Telephone amplifiers and hearing aids that can connect to a television, stereo, radio, or microphone.  Devices that use lights or vibrations. These alert you to the doorbell, a ringing telephone, or a baby monitor.  Television closed-captioning. This shows the words at the bottom of the screen. Most new TVs can do this.  TTY (text telephone). This lets you type messages back and forth on the telephone instead of talking or listening. These devices are also called TDD. When messages are typed on the keyboard, they are sent over the phone line to a receiving TTY. The message is shown on a monitor.  Use text messaging, social media, and email if it is hard for you to communicate by telephone.  Try to learn a listening technique called speechreading. It is not lipreading. You pay attention to people's gestures, expressions, posture, and tone of voice. These clues can help you understand what a person is saying. Face the person you are talking to, and have them face you. Make sure the lighting is good. You need to  "see the other person's face clearly.  Think about counseling if you need help to adjust to your hearing loss.  When should you call for help?  Watch closely for changes in your health, and be sure to contact your doctor if:    You think your hearing is getting worse.     You have new symptoms, such as dizziness or nausea.   Where can you learn more?  Go to https://www.[employer].net/patiented  Enter R798 in the search box to learn more about \"Hearing Loss: Care Instructions.\"  Current as of: September 27, 2023               Content Version: 14.0    9564-9818 [employer].   Care instructions adapted under license by your healthcare professional. If you have questions about a medical condition or this instruction, always ask your healthcare professional. [employer] disclaims any warranty or liability for your use of this information.      Bladder Training: Care Instructions  Your Care Instructions     Bladder training is used to treat urge incontinence and stress incontinence. Urge incontinence means that the need to urinate comes on so fast that you can't get to a toilet in time. Stress incontinence means that you leak urine because of pressure on your bladder. For example, it may happen when you laugh, cough, or lift something heavy.  Bladder training can increase how long you can wait before you have to urinate. It can also help your bladder hold more urine. And it can give you better control over the urge to urinate.  It is important to remember that bladder training takes a few weeks to a few months to make a difference. You may not see results right away, but don't give up.  Follow-up care is a key part of your treatment and safety. Be sure to make and go to all appointments, and call your doctor if you are having problems. It's also a good idea to know your test results and keep a list of the medicines you take.  How can you care for yourself at home?  Work with your doctor to come up " "with a bladder training program that is right for you. You may use one or more of the following methods.  Delayed urination  In the beginning, try to keep from urinating for 5 minutes after you first feel the need to go.  While you wait, take deep, slow breaths to relax. Kegel exercises can also help you delay the need to go to the bathroom.  After some practice, when you can easily wait 5 minutes to urinate, try to wait 10 minutes before you urinate.  Slowly increase the waiting period until you are able to control when you have to urinate.  Scheduled urination  Empty your bladder when you first wake up in the morning.  Schedule times throughout the day when you will urinate.  Start by going to the bathroom every hour, even if you don't need to go.  Slowly increase the time between trips to the bathroom.  When you have found a schedule that works well for you, keep doing it.  If you wake up during the night and have to urinate, do it. Apply your schedule to waking hours only.  Kegel exercises  These tighten and strengthen pelvic muscles, which can help you control the flow of urine. (If doing these exercises causes pain, stop doing them and talk with your doctor.) To do Kegel exercises:  Squeeze your muscles as if you were trying not to pass gas. Or squeeze your muscles as if you were stopping the flow of urine. Your belly, legs, and buttocks shouldn't move.  Hold the squeeze for 3 seconds, then relax for 5 to 10 seconds.  Start with 3 seconds, then add 1 second each week until you are able to squeeze for 10 seconds.  Repeat the exercise 10 times a session. Do 3 to 8 sessions a day.  When should you call for help?  Watch closely for changes in your health, and be sure to contact your doctor if:    Your incontinence is getting worse.     You do not get better as expected.   Where can you learn more?  Go to https://www.healthwise.net/patiented  Enter V684 in the search box to learn more about \"Bladder Training: Care " "Instructions.\"  Current as of: November 15, 2023               Content Version: 14.0    7577-4026 "[employer]".   Care instructions adapted under license by your healthcare professional. If you have questions about a medical condition or this instruction, always ask your healthcare professional. "[employer]" disclaims any warranty or liability for your use of this information.           Ti Bermudez is a 88 year old, presenting for the following:  Annual Visit        7/31/2024    10:36 AM   Additional Questions   Roomed by Edinson DREW   Accompanied by self         Health Care Directive  Patient does not have a Health Care Directive or Living Will: Discussed advance care planning with patient; however, patient declined at this time.    HPI  She cannot remember much of anything from the time right before she went into the ER and through much of her hospitalization.  She feels like her memory and thinking have returned to normal now.  Abdominal pain is gone.        1/12/2022   General Health   How would you rate your overall physical health? Good            1/12/2022   Nutrition   At least 4 servings of fruits and vegetables/day Yes            1/12/2022   Exercise   Frequency of exercise: None               1/12/2022   Activities of Daily Living- Home Safety   Needs help with the following daily activites NO assistance is needed   Safety concerns in the home No grab bars in the bathroom             No data to display                  1/12/2022   Hearing Screening   Hearing concerns? Difficulty following a conversation in a noisy restaurant or crowded room    Feel that people are mumbling or not speaking clearly    Difficulty following dialogue in the theater    Difficult to understand a speaker at a public meeting or [employer] service    Need to ask people to speak up or repeat themselves    Find that men's voices are easier to understand than woman's    Difficulty understanding soft or " whispered speech       Multiple values from one day are sorted in reverse-chronological order            No data to display                       Today's PHQ-2 Score:       7/31/2024    10:49 AM   PHQ-2 ( 1999 [employer])   Q1: Little interest or pleasure in doing things 0   Q2: Feeling down, depressed or hopeless 1   PHQ-2 Score 1   Q1: Little interest or pleasure in doing things Not at all   Q2: Feeling down, depressed or hopeless Several days   PHQ-2 Score 1         1/12/2022   Substance Use   Alcohol more than 3/day or more than 7/wk No        Social History     Tobacco Use    Smoking status: Former     Current packs/day: 0.00     Types: Cigarettes     Passive exposure: Never    Smokeless tobacco: Never    Tobacco comments:     quit when she was 29 years old   Substance Use Topics    Alcohol use: No    Drug use: No          Mammogram Screening - After age 74- determine frequency with patient based on health status, life expectancy and patient goals          Reviewed and updated as needed this visit by Provider                    Past Medical History:   Diagnosis Date    Accidental fall on or from other stairs or steps 7/3/06    fall in hosptial onto chair foot rest, broke rib    Acquired absence of kidney     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    Breast pain, left 6/10/2016    CARDIOVASCULAR SCREENING; LDL GOAL LESS THAN 130 9/17/2010    Cecal volvulus (H) 9/9/2019    CKD (chronic kidney disease) stage 3, GFR 30-59 ml/min (H) 7/25/2011    has 1 kidney, the right kidney is present---gave left kidney to sister    Complete uterovaginal prolapse 2/8/2013    Deep vein thrombosis (DVT) (H) 5/7/2020    Dyspnea on exertion     Gastro-oesophageal reflux disease     Hypertension goal BP (blood pressure) < 140/90 7/25/2011    Other and unspecified hyperlipidemia     Unspecified essential hypertension     not medicated at this time    Uterovaginal prolapse, complete 2004     resolved '06    Vaginal enterocele, congenital or acquired  2007     or cystocele     Past Surgical History:   Procedure Laterality Date    childbirth X9[      CLOSED RX RIB FRACTURE      left    DAVINCI SACROCOLPOPEXY, MIDURETHRAL SLING, CYSTOSCOPY  2013    Procedure: DAVINCI SACROCOLPOPEXY, MIDURETHRAL SLING, CYSTOSCOPY;  DAVINCI SACROCOLPOPEXY, TVT EXACT SLING, RIGHT SALPINGO-OOPHERECTOMY, CYSTOSCOPY;  Surgeon: Bennie Galdamez MD;  Location: [facility] OR    LAPAROSCOPIC CHOLECYSTECTOMY  3/31/2011    Procedure:LAPAROSCOPIC CHOLECYSTECTOMY; Surgeon:DAVID MOLINA; Location:[facility] OR    ZZC NEPHRECTOMY      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    ZZC VAGINAL HYSTERECTOMY  9/15/06    TVH/BSO/A&P repair/sacrospinus ligament fixation......childbirth x 9    [facility] COLONOSCOPY THRU STOMA, DIAGNOSTIC  2005    diverticulosis,small polyp,recheck 5 yrs     OB History    Para Term  AB Living   11 9 9 0 2 8   SAB IAB Ectopic Multiple Live Births   2 0 0 0 0      # Outcome Date GA Lbr Jewel/2nd Weight Sex Type Anes PTL Lv   11 Term            10 SAB            9 SAB            8 Term            7 Term            6 Term            5 Term            4 Term            3 Term            2 Term            1 Term              Current providers sharing in care for this patient include:  Patient Care Team:  Yecenia Dickerson MD as PCP - General (Family Medicine)  Yecenia Dickerson MD as Assigned PCP  Leda De León APRN CNP as Nurse Practitioner (Colon & Rectal)  Madelyn Cordova MD as MD (Gastroenterology)  Joslyn Pena PA-C as Physician Assistant (Gastroenterology)  Nuris Akers PA-C as Physician Assistant (Gastroenterology)  Nuris Akers PA-C as Assigned Gastroenterology Provider    The following health maintenance items are reviewed in Epic and correct as of today:  Health Maintenance   Topic Date Due    ZOSTER IMMUNIZATION (1 of 2) Never done    RSV VACCINE (Pregnancy & 60+) (1 - 1-dose 60+ series) Never done    Pneumococcal Vaccine: 65+ Years (1 of 1 - PCV)  "Never done    ANNUAL REVIEW OF HM ORDERS  02/18/2023    COVID-19 Vaccine (1 - 2023-24 season) Never done    INFLUENZA VACCINE (1) 09/01/2024    LIPID  03/17/2025    MICROALBUMIN  03/17/2025    BMP  07/28/2025    HEMOGLOBIN  07/28/2025    MEDICARE ANNUAL WELLNESS VISIT  07/31/2025    FALL RISK ASSESSMENT  07/31/2025    DEXA  10/15/2028    ADVANCE CARE PLANNING  07/31/2029    DTAP/TDAP/TD IMMUNIZATION (2 - Td or Tdap) 06/03/2033    PHQ-2 (once per calendar year)  Completed    URINALYSIS  Completed    IPV IMMUNIZATION  Aged Out    HPV IMMUNIZATION  Aged Out    MENINGITIS IMMUNIZATION  Aged Out    RSV MONOCLONAL ANTIBODY  Aged Out           Objective    Exam  /66 (BP Location: Right arm, Patient Position: Sitting, Cuff Size: Adult Regular)   Pulse 72   Temp 97.7  F (36.5  C) (Temporal)   Resp 18   Ht 1.55 m (5' 1.02\")   Wt 70.6 kg (155 lb 11.2 oz)   LMP  (LMP Unknown)   SpO2 98%   BMI 29.40 kg/m     Estimated body mass index is 29.4 kg/m  as calculated from the following:    Height as of this encounter: 1.55 m (5' 1.02\").    Weight as of this encounter: 70.6 kg (155 lb 11.2 oz).    Physical Exam  GENERAL: alert and no distress  EYES: Eyes grossly normal to inspection, PERRL and conjunctivae and sclerae normal  HENT: ear canals and TM's normal, nose and mouth without ulcers or lesions  NECK: no adenopathy, no asymmetry, masses, or scars  RESP: lungs clear to auscultation - no rales, rhonchi or wheezes  CV: regular rate and rhythm, normal S1 S2, no S3 or S4, no murmur, click or rub, no peripheral edema  ABDOMEN: soft, nontender, no hepatosplenomegaly, no masses and bowel sounds normal  MS: no gross musculoskeletal defects noted, no edema  SKIN: no suspicious lesions or rashes  NEURO: Normal strength and tone, mentation intact and speech normal  PSYCH: mentation appears normal, affect normal/bright         7/31/2024   Mini Cog   Clock Draw Score 2 Normal   3 Item Recall 3 objects recalled   Mini Cog Total " Score 5                 Signed Electronically by: Yecenia Dickerson MD, MD

## 2024-07-31 NOTE — PATIENT INSTRUCTIONS
Patient Education   I strongly recommend completing and advance directive and bringing it or sending it by mail to our clinic. Try to do this by the end of next week.   Please schedule follow up with [facility]. You can ask if they do telephone visits.    -- Continue the Metamucil, 3 chews per day (you can continue this even if you have diarrhea)   -- Use a glycerin/fleet suppository every 3 days or so to clear out the rectum, insert the rounded end first (ask the GI doctor your questions about this)   -- If you skip 3 days with no bowel movement, take 1/2 capful of miralax   -- It is okay to use Gas X as needed  -- It is okay to take Pepto Bismol chews prior to leaving the house to help prevent diarrhea. It is okay to use imodium sparingly as well if pepto bismol is not effective  --  Future considerations include referral to Urogyn, Pelvic Floor Center or  Pelvic Floor PT, bowel cleanse  Preventive Care Advice   This is general advice given by our system to help you stay healthy. However, your care team may have specific advice just for you. Please talk to your care team about your preventive care needs.  Nutrition  Eat 5 or more servings of fruits and vegetables each day.  Try wheat bread, brown rice and whole grain pasta (instead of white bread, rice, and pasta).  Get enough calcium and vitamin D. Check the label on foods and aim for 100% of the RDA (recommended daily allowance).  Lifestyle  Exercise at least 150 minutes each week  (30 minutes a day, 5 days a week).  Do muscle strengthening activities 2 days a week. These help control your weight and prevent disease.  No smoking.  Wear sunscreen to prevent skin cancer.  Have a dental exam and cleaning every 6 months.  Yearly exams  See your health care team every year to talk about:  Any changes in your health.  Any medicines your care team has prescribed.  Preventive care, family planning, and ways to prevent chronic diseases.  Shots (vaccines)   HPV  shots (up to age 26), if you've never had them before.  Hepatitis B shots (up to age 59), if you've never had them before.  COVID-19 shot: Get this shot when it's due.  Flu shot: Get a flu shot every year.  Tetanus shot: Get a tetanus shot every 10 years.  Pneumococcal, hepatitis A, and RSV shots: Ask your care team if you need these based on your risk.  Shingles shot (for age 50 and up)  General health tests  Diabetes screening:  Starting at age 35, Get screened for diabetes at least every 3 years.  If you are younger than age 35, ask your care team if you should be screened for diabetes.  Cholesterol test: At age 39, start having a cholesterol test every 5 years, or more often if advised.  Bone density scan (DEXA): At age 50, ask your care team if you should have this scan for osteoporosis (brittle bones).  Hepatitis C: Get tested at least once in your life.  STIs (sexually transmitted infections)  Before age 24: Ask your care team if you should be screened for STIs.  After age 24: Get screened for STIs if you're at risk. You are at risk for STIs (including HIV) if:  You are sexually active with more than one person.  You don't use condoms every time.  You or a partner was diagnosed with a sexually transmitted infection.  If you are at risk for HIV, ask about PrEP medicine to prevent HIV.  Get tested for HIV at least once in your life, whether you are at risk for HIV or not.  Cancer screening tests  Cervical cancer screening: If you have a cervix, begin getting regular cervical cancer screening tests starting at age 21.  Breast cancer scan (mammogram): If you've ever had breasts, begin having regular mammograms starting at age 40. This is a scan to check for breast cancer.  Colon cancer screening: It is important to start screening for colon cancer at age 45.  Have a colonoscopy test every 10 years (or more often if you're at risk) Or, ask your provider about stool tests like a FIT test every year or Cologuard  test every 3 years.  To learn more about your testing options, visit:   .  For help making a decision, visit:   https://bit.ly/ko70995.  Prostate cancer screening test: If you have a prostate, ask your care team if a prostate cancer screening test (PSA) at age 55 is right for you.  Lung cancer screening: If you are a current or former smoker ages 50 to 80, ask your care team if ongoing lung cancer screenings are right for you.  For informational purposes only. Not to replace the advice of your health care provider. Copyright   2023 [employer]. All rights reserved. Clinically reviewed by the  [employer] [facility]. [employer] 178226 - REV 01/24.  Preventing Falls: Care Instructions  Injuries and health problems such as trouble walking or poor eyesight can increase your risk of falling. So can some medicines. But there are things you can do to help prevent falls. You can exercise to get stronger. You can also arrange your home to make it safer.    Talk to your doctor about the medicines you take. Ask if any of them increase the risk of falls and whether they can be changed or stopped.   Try to exercise regularly. It can help improve your strength and balance. This can help lower your risk of falling.     Practice fall safety and prevention.    Wear low-heeled shoes that fit well and give your feet good support. Talk to your doctor if you have foot problems that make this hard.  Carry a cellphone or wear a medical alert device that you can use to call for help.  Use stepladders instead of chairs to reach high objects. Don't climb if you're at risk for falls. Ask for help, if needed.  Wear the correct eyeglasses, if you need them.    Make your home safer.    Remove rugs, cords, clutter, and furniture from walkways.  Keep your house well lit. Use night-lights in hallways and bathrooms.  Install and use sturdy handrails on stairways.  Wear nonskid footwear, even inside. Don't walk barefoot or  "in socks without shoes.    Be safe outside.    Use handrails, curb cuts, and ramps whenever possible.  Keep your hands free by using a shoulder bag or backpack.  Try to walk in well-lit areas. Watch out for uneven ground, changes in pavement, and debris.  Be careful in the winter. Walk on the grass or gravel when sidewalks are slippery. Use de-icer on steps and walkways. Add non-slip devices to shoes.    Put grab bars and nonskid mats in your shower or tub and near the toilet. Try to use a shower chair or bath bench when bathing.   Get into a tub or shower by putting in your weaker leg first. Get out with your strong side first. Have a phone or medical alert device in the bathroom with you.   Where can you learn more?  Go to https://www.openPeople.M[employer]Net/patiented  Enter G117 in the search box to learn more about \"Preventing Falls: Care Instructions.\"  Current as of: July 17, 2023               Content Version: 14.0    8377-6222 [employer].   Care instructions adapted under license by your healthcare professional. If you have questions about a medical condition or this instruction, always ask your healthcare professional. [employer] disclaims any warranty or liability for your use of this information.      Hearing Loss: Care Instructions  Overview     Hearing loss is a sudden or slow decrease in how well you hear. It can range from slight to profound. Permanent hearing loss can occur with aging. It also can happen when you are exposed long-term to loud noise. Examples include listening to loud music, riding motorcycles, or being around other loud machines.  Hearing loss can affect your work and home life. It can make you feel lonely or depressed. You may feel that you have lost your independence. But hearing aids and other devices can help you hear better and feel connected to others.  Follow-up care is a key part of your treatment and safety. Be sure to make and go to all appointments, and " call your doctor if you are having problems. It's also a good idea to know your test results and keep a list of the medicines you take.  How can you care for yourself at home?  Avoid loud noises whenever possible. This helps keep your hearing from getting worse.  Always wear hearing protection around loud noises.  Wear a hearing aid as directed.  A professional can help you pick a hearing aid that will work best for you.  You can also get hearing aids over the counter for mild to moderate hearing loss.  Have hearing tests as your doctor suggests. They can show whether your hearing has changed. Your hearing aid may need to be adjusted.  Use other devices as needed. These may include:  Telephone amplifiers and hearing aids that can connect to a television, stereo, radio, or microphone.  Devices that use lights or vibrations. These alert you to the doorbell, a ringing telephone, or a baby monitor.  Television closed-captioning. This shows the words at the bottom of the screen. Most new TVs can do this.  TTY (text telephone). This lets you type messages back and forth on the telephone instead of talking or listening. These devices are also called TDD. When messages are typed on the keyboard, they are sent over the phone line to a receiving TTY. The message is shown on a monitor.  Use text messaging, social media, and email if it is hard for you to communicate by telephone.  Try to learn a listening technique called speechreading. It is not lipreading. You pay attention to people's gestures, expressions, posture, and tone of voice. These clues can help you understand what a person is saying. Face the person you are talking to, and have them face you. Make sure the lighting is good. You need to see the other person's face clearly.  Think about counseling if you need help to adjust to your hearing loss.  When should you call for help?  Watch closely for changes in your health, and be sure to contact your doctor if:    You  "think your hearing is getting worse.     You have new symptoms, such as dizziness or nausea.   Where can you learn more?  Go to https://www.[address].net/patiented  Enter R798 in the search box to learn more about \"Hearing Loss: Care Instructions.\"  Current as of: September 27, 2023               Content Version: 14.0    6298-7862 [employer].   Care instructions adapted under license by your healthcare professional. If you have questions about a medical condition or this instruction, always ask your healthcare professional. [employer] disclaims any warranty or liability for your use of this information.      Bladder Training: Care Instructions  Your Care Instructions     Bladder training is used to treat urge incontinence and stress incontinence. Urge incontinence means that the need to urinate comes on so fast that you can't get to a toilet in time. Stress incontinence means that you leak urine because of pressure on your bladder. For example, it may happen when you laugh, cough, or lift something heavy.  Bladder training can increase how long you can wait before you have to urinate. It can also help your bladder hold more urine. And it can give you better control over the urge to urinate.  It is important to remember that bladder training takes a few weeks to a few months to make a difference. You may not see results right away, but don't give up.  Follow-up care is a key part of your treatment and safety. Be sure to make and go to all appointments, and call your doctor if you are having problems. It's also a good idea to know your test results and keep a list of the medicines you take.  How can you care for yourself at home?  Work with your doctor to come up with a bladder training program that is right for you. You may use one or more of the following methods.  Delayed urination  In the beginning, try to keep from urinating for 5 minutes after you first feel the need to go.  While " "you wait, take deep, slow breaths to relax. Kegel exercises can also help you delay the need to go to the bathroom.  After some practice, when you can easily wait 5 minutes to urinate, try to wait 10 minutes before you urinate.  Slowly increase the waiting period until you are able to control when you have to urinate.  Scheduled urination  Empty your bladder when you first wake up in the morning.  Schedule times throughout the day when you will urinate.  Start by going to the bathroom every hour, even if you don't need to go.  Slowly increase the time between trips to the bathroom.  When you have found a schedule that works well for you, keep doing it.  If you wake up during the night and have to urinate, do it. Apply your schedule to waking hours only.  Kegel exercises  These tighten and strengthen pelvic muscles, which can help you control the flow of urine. (If doing these exercises causes pain, stop doing them and talk with your doctor.) To do Kegel exercises:  Squeeze your muscles as if you were trying not to pass gas. Or squeeze your muscles as if you were stopping the flow of urine. Your belly, legs, and buttocks shouldn't move.  Hold the squeeze for 3 seconds, then relax for 5 to 10 seconds.  Start with 3 seconds, then add 1 second each week until you are able to squeeze for 10 seconds.  Repeat the exercise 10 times a session. Do 3 to 8 sessions a day.  When should you call for help?  Watch closely for changes in your health, and be sure to contact your doctor if:    Your incontinence is getting worse.     You do not get better as expected.   Where can you learn more?  Go to https://www.healthPA[employer]FORMAN[employer]ING.net/patiented  Enter V684 in the search box to learn more about \"Bladder Training: Care Instructions.\"  Current as of: November 15, 2023               Content Version: 14.0    5973-2169 [employer].   Care instructions adapted under license by your healthcare professional. If you have questions about a " medical condition or this instruction, always ask your healthcare professional. [employer] disclaims any warranty or liability for your use of this information.

## 2024-08-01 ENCOUNTER — TELEPHONE (OUTPATIENT)
Dept: FAMILY MEDICINE | Facility: CLINIC | Age: 89
End: 2024-08-01
Payer: COMMERCIAL

## 2024-08-01 ENCOUNTER — NURSE TRIAGE (OUTPATIENT)
Dept: NURSING | Facility: CLINIC | Age: 89
End: 2024-08-01
Payer: COMMERCIAL

## 2024-08-01 NOTE — TELEPHONE ENCOUNTER
Pt was recently hospitalized  Saw her primary care MD yesterday  She is calling today because she is experiencing diarrhea today and cramping. She last saw Nuris Akers 4/24/24 and is unable to get another appointment with her until 10/30/24.    She states she continues to have  Constipation, specified constipation type Overflow diarrhea and  History of fecal impaction   Reviewed previous plan with her     -- Use a toilet stool to bring your knees above the level of your hips   -- Continue the Metamucil, 3 chews per day   -- Use a glycerin/fleet suppository every 3 days or so to clear out the rectum, insert the rounded end first   -- If you skip 3 days with no bowel movement, take 1/2 capful of miralax   -- It is okay to use Gas X as needed  -- It is okay to take Pepto Bismol chews prior to leaving the house to help prevent diarrhea. It is okay to use imodium sparingly as well if pepto bismol is not effective  -- Follow up in 2-3 months   -- Future considerations include referral to Urogyn, Pelvic Floor Center or  Pelvic Floor PT, bowel cleanse    Instructed her to call her primary care and discuss new symptoms as Dr Dickerson had new recommendations for her yesterday

## 2024-08-01 NOTE — TELEPHONE ENCOUNTER
Dr. Dickerson --  Please review and advise: Fleet enema -- diarrhea/cramping    Patient used the Fleet enema about 9:30 pm last night.   During the night had to get up about 4 times. Result was diarrhea and cramping (pain 7/10).   Today:  Got up about 7 am. Patient states having cramping with diarrhea every 10 minutes.     Instructed patient to stay hydrated.   Suggestions.     CRISTIAN PintoN RN  [facility]

## 2024-08-01 NOTE — TELEPHONE ENCOUNTER
I agree with good hydration and if her pain is not improving in the next 1hour or if escalating, then would recommend ER evaluation. Cramping and bloating are potential side effects of fleet enema. Given this response, I would not use fleet enema in the future. I would recommend using tap water enema in the future if enema is needed or talk with GI about their recommendations for plan (she is overdue for GI follow up). Will also route to her GI provider/their RN for their recommendations.     Yecenia Dickerson M.D.

## 2024-08-01 NOTE — TELEPHONE ENCOUNTER
Helping cover for Nuris Akers PA-C who is currently out of office.     Agree with plan as documented by Dr Dickerson.   -monitor and ensure good hydration.   -For pain relief, can trial APAP 1 gram BID scheduled   -agree with ER precautions as noted below    Seen with [facility] 4/2024. On follow-up tele call, pt declined to scheduled a follow-up appt.     Will ask team to reach out to pt about follow up with Nuris.     MEG

## 2024-08-02 NOTE — TELEPHONE ENCOUNTER
Spoke with Lara and discussed what interventions she is using for her constipation. She is using suppositories and not enemas. She was confused by the terminology.   She did not have a stool for a few days so she did the suppository as per the plan from the appointment with Nuris BLANCHARD.   She stated that she typically has good results with the supps but this time she had more cramping. The cramping has improved.  After the supp she had some hard stool then loose stool after.  We discussed that some cramping is normal with constipation especially when she has a lot of stool in her large intestine.     She did take 2 tabs of imodium to help with the loose stools.  Today she had a stool that was soft formed pieces.   She continues with taking fiber every day and has used Miralax as well.   She agreed to an appointment in a few months. Will have the clinic coordinators schedule her for the appointment time we discussed on 10-22 at 9 am in person with Nuris BLANCHARD.

## 2024-08-02 NOTE — TELEPHONE ENCOUNTER
"Patient calling because she is still having pain. She just wants  to call her. Reviewed that  is not in and she can address this with writing RN.     She asked that we review the CT scan. RN started to inquire on her last BM to which patient stated she has one normal one and then they have all been loose. So, she started taking imodium again.     Reviewed that 's recommendation from yesterday is that if no relief from pain than to return to the ED. Once again patient asked to speak directly to  because \"she is not going back there\". She then stated she had another phone call she had to take and hung up.     Vicenta Waldron RN  [facility]        "

## 2024-09-07 DIAGNOSIS — I10 HYPERTENSION GOAL BP (BLOOD PRESSURE) < 140/90: ICD-10-CM

## 2024-09-08 RX ORDER — LISINOPRIL 20 MG/1
20 TABLET ORAL DAILY
Qty: 90 TABLET | Refills: 2 | Status: SHIPPED | OUTPATIENT
Start: 2024-09-08

## 2024-12-05 ENCOUNTER — NURSE TRIAGE (OUTPATIENT)
Dept: FAMILY MEDICINE | Facility: CLINIC | Age: 89
End: 2024-12-05
Payer: COMMERCIAL

## 2024-12-05 NOTE — TELEPHONE ENCOUNTER
"Call received from patient:  Diarrhea x 3 days  The past 24 hours has had more than 10 episodes of diarrhea  Watery and some solid consistency  \"Mainly watery\"  Afebrile  No emesis  Able to drink water but unsure how much she has had to drink in the past 24 hours.  Has baseline dizziness/lightheadedness  Feeling a little weak  Cannot stand for long and then feels weak to has to sit down  Urinated a few  minutes ago  Muscle spasms-intermittent before having diarrhea  \"I can barely get myself cleaned up before I have another one (episode of diarrhea)\"  Imodium not helpful    Writer recommended evaluation today and explained to patient writer is concerned patient is getting dehydrated.  Writer explained [facility] to patient and offered to refer patient to [facility]. Patient verbalized understanding and declined, stating she cannot leave her house due to frequency of diarrhea.  Writer asked patient if she has any briefs at home she could wear while in transit to [facility] site.  Patient stated yes but that she will not leave her house.    Writer offered to call ambulance/911 to patient's address so she can be safely transported to the Emergency Room.  Patient declined.    Writer once more explained importance of evaluation given reported symptoms and offered to refer patient to [facility] or call ambulance to patient's home.  Patient declined and stated she will call back if she changes her mind and for now will take two Imodium tablets.    Patient spoke in full, clear and complete sentences during phone call.      CRISTIAN GilmanN, RN-[facility]    Reason for Disposition   SEVERE diarrhea (e.g., 7 or more times / day more than normal) and age > 60 years    Additional Information   Negative: Shock suspected (e.g., cold/pale/clammy skin, too weak to stand, low BP, rapid pulse)   Negative: Difficult to awaken or acting confused (e.g., disoriented, slurred speech)   Negative: Sounds like a life-threatening emergency " to the triager   Negative: Vomiting also present and worse than the diarrhea   Negative: Blood in stool and without diarrhea   Negative: Diarrhea begins while taking an antibiotic by mouth (oral antibiotic)   Negative: SEVERE abdominal pain (e.g., excruciating) and present > 1 hour   Negative: SEVERE abdominal pain and age > 60 years   Negative: Bloody, black, or tarry bowel movements  (Exception: Chronic-unchanged black-grey bowel movements and is taking iron pills or Pepto-Bismol.)    Protocols used: Diarrhea-A-OH

## 2024-12-07 ENCOUNTER — NURSE TRIAGE (OUTPATIENT)
Dept: NURSING | Facility: CLINIC | Age: 89
End: 2024-12-07
Payer: COMMERCIAL

## 2024-12-07 NOTE — PROGRESS NOTES
Called pt and has diarrhea and abdominal pain.  No fevers or vomiting.  May be constipated but with amount of pain she's having, needs an in person eval.  Advised to go to urgent care today or tomorrow for further evaluation.  Or can go to ER if pain worsens or other sxs develop.

## 2024-12-07 NOTE — TELEPHONE ENCOUNTER
Triage call  6 days Diarrhea for close to a week  rectal pain her stools have goten firmer but she is having rectal pain.  It is intermittent feels like it is in her colon.    Per protocol see PCP with in 24 hours.  Care advice given.  Verbalizes understanding and agrees with plan. She doesnot feel she can sit in the waiting room so offered her a virtual visit.    Nery Marie RN   [facility] Nurse Advisor  12:25 PM 12/7/2024    Reason for Disposition   MODERATE-SEVERE rectal pain (i.e., interferes with school, work, or sleep)    Additional Information   Negative: Foreign body in rectum   Negative: Diarrhea is main symptom   Negative: Constipation is main symptom (e.g., pain or discomfort caused by passage of hard BMs)   Negative: Blood in or on bowel movement is main symptom   Negative: Pregnant   Negative: Pinworms are suspected (rectal itching; (white thread-like worm about size of a staple, moves)   Negative: [1] Mpox suspected (e.g., direct skin contact such as sex, recent travel to [place] or [place]) AND [2] symptoms of Mpox (e.g., rectal rash or sores, fever, muscle aches, or swollen lymph nodes)   Negative: [1] At risk for Mpox (men-who-have-sex-with-men) AND [2] possible exposure (e.g., multiple sex partners in past 21 days) AND [3] symptoms of Mpox (e.g., rectal rash or sores, fever, muscle aches, or swollen lymph nodes)   Negative: Sexual assault or rape (sexual intercourse or activity occurs without freely given consent), known or suspected   Negative: Injury to rectum   Negative: Large mass protruding out of rectum   Negative: Patient sounds very sick or weak to the triager   Negative: SEVERE rectal pain (e.g., excruciating, unable to have a bowel movement)   Negative: [1] Rectal pain or redness AND [2] fever   Negative: [1] Sudden onset rectal pain AND [2] constipated (straining, rectal pressure or fullness) AND [3] NOT better after SITZ bath, suppository or enema    Protocols used:  Rectal Symptoms-A-AH

## 2024-12-08 ENCOUNTER — VIRTUAL VISIT (OUTPATIENT)
Dept: URGENT CARE | Facility: CLINIC | Age: 89
End: 2024-12-08
Payer: COMMERCIAL

## 2024-12-08 ENCOUNTER — OFFICE VISIT (OUTPATIENT)
Dept: URGENT CARE | Facility: URGENT CARE | Age: 89
End: 2024-12-08
Payer: COMMERCIAL

## 2024-12-08 VITALS
HEART RATE: 74 BPM | RESPIRATION RATE: 16 BRPM | DIASTOLIC BLOOD PRESSURE: 90 MMHG | TEMPERATURE: 98.4 F | SYSTOLIC BLOOD PRESSURE: 172 MMHG | OXYGEN SATURATION: 97 %

## 2024-12-08 DIAGNOSIS — R42 LIGHTHEADEDNESS: ICD-10-CM

## 2024-12-08 DIAGNOSIS — Z53.9 ERRONEOUS ENCOUNTER--DISREGARD: Primary | ICD-10-CM

## 2024-12-08 DIAGNOSIS — K62.89 RECTAL PAIN: ICD-10-CM

## 2024-12-08 DIAGNOSIS — R19.7 DIARRHEA, UNSPECIFIED TYPE: Primary | ICD-10-CM

## 2024-12-08 PROCEDURE — 99215 OFFICE O/P EST HI 40 MIN: CPT

## 2024-12-08 ASSESSMENT — PAIN SCALES - GENERAL: PAINLEVEL_OUTOF10: EXTREME PAIN (9)

## 2024-12-08 NOTE — PROGRESS NOTES
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  Her diarrhea is actually slightly improved today.  She has been able to take some p.o. intake but she says she has a hard time drinking lots of water.  She had some lightheadedness.  She has significant persistent rectal pain.  Here she is vitally stable and nontoxic, though slightly hypertensive, however given her medical comorbidities and age and that she lives with her elderly bedbound sister I am concerned about trying to diagnose and manage her symptoms here.  Given her solitary kidney she should have renal function monitoring and aggressive fluid resuscitation if indicated.  Given abdominal bloating and diarrhea along with nausea she may need imaging beyond what I have available to me here.  For these reasons I am recommending she be evaluated in the ER.  I did defer rectal exam given that she is going to be evaluated in the ER and to minimize discomfort.  Her son who drove her here will take her to the ER.  I do not believe she requires ambulance transport.       Return for in person in ER.    Edy Chavira MD  [facility] [place]    Ti Bermudez is a 89 year old female who presents to clinic today for the following health issues:  Chief Complaint   Patient presents with    Pain     Rectal pain, pain is every 5-10 mins and last for 2 min, diarrhea x1wk, bowel has returned to normal consistency, pt is taking imodium, also taking herbal medication for pain on feet       Diarrhea for 3.5 days, now normal looking  First 3 days was every 5 minutes, different amounts of stools  Today its much improved - has gone 3 times  Tried to take imodium - yesterday or day before.  Typically she goes a lot.  "This has been ongoing issue  Having a lot of pain when she is going  No blood in stool  Rectal pain  No abdominal pain  Had some nausea but no emesis  No fevers/chills  A little bit lightheaded - feels \"darkness\" in forehead.  Does have hx falls - slow to stand is normal for her  Able to eat and drink  No recent antibiotics  Had hospitalization in July for proctatitis   Had follow up visit with PCP who rec'd metamucil - hasn't been taking for last few nights  Lives with her sister - they take care of each other - she is bedridden  Son brought her in          Objective    BP (!) 172/90   Pulse 74   Temp 98.4  F (36.9  C) (Oral)   Resp 16   LMP  (LMP Unknown)   SpO2 97%   Physical Exam  Constitutional:       Appearance: Normal appearance. She is not toxic-appearing.   HENT:      Head: Normocephalic and atraumatic.   Pulmonary:      Effort: Pulmonary effort is normal.   Abdominal:      General: There is distension.      Palpations: Abdomen is soft.      Tenderness: There is no abdominal tenderness. There is no guarding or rebound.   Musculoskeletal:         General: Normal range of motion.      Cervical back: Neck supple.   Skin:     General: Skin is warm and dry.   Neurological:      General: No focal deficit present.      Mental Status: She is alert.      Comments: Slow gait.            "

## 2024-12-08 NOTE — PATIENT INSTRUCTIONS
Please go into the ER. I think you need some more urgent intervention and testing then I can do here.

## 2024-12-09 ENCOUNTER — HOSPITAL ENCOUNTER (EMERGENCY)
Facility: CLINIC | Age: 89
Discharge: HOME | End: 2024-12-09
Admitting: EMERGENCY MEDICINE
Payer: COMMERCIAL

## 2024-12-09 ENCOUNTER — TELEPHONE (OUTPATIENT)
Dept: URGENT CARE | Facility: URGENT CARE | Age: 89
End: 2024-12-09
Payer: COMMERCIAL

## 2024-12-09 VITALS
BODY MASS INDEX: 29.64 KG/M2 | RESPIRATION RATE: 18 BRPM | TEMPERATURE: 98 F | HEIGHT: 61 IN | SYSTOLIC BLOOD PRESSURE: 151 MMHG | WEIGHT: 157 LBS | OXYGEN SATURATION: 96 % | DIASTOLIC BLOOD PRESSURE: 75 MMHG | HEART RATE: 80 BPM

## 2024-12-09 PROCEDURE — 99281 EMR DPT VST MAYX REQ PHY/QHP: CPT | Performed by: EMERGENCY MEDICINE

## 2024-12-09 ASSESSMENT — COLUMBIA-SUICIDE SEVERITY RATING SCALE - C-SSRS
1. IN THE PAST MONTH, HAVE YOU WISHED YOU WERE DEAD OR WISHED YOU COULD GO TO SLEEP AND NOT WAKE UP?: NO
2. HAVE YOU ACTUALLY HAD ANY THOUGHTS OF KILLING YOURSELF IN THE PAST MONTH?: NO
6. HAVE YOU EVER DONE ANYTHING, STARTED TO DO ANYTHING, OR PREPARED TO DO ANYTHING TO END YOUR LIFE?: NO

## 2024-12-09 NOTE — ED TRIAGE NOTES
"Patient reports all of last week she had diarrhea and this week she is back to normal but it is hurting to evacuate stool. Patient reports feeling a little constipated, but a lot of discomfort with bearing down. Patient has a AAA which makes her concerned to bear down too hard.     Patient has history of uterovaginal prolapse, vaginal enterocele, and cecal volvulus. Patient also has CKD stage 3, only has a right kidney, and history of DVT.       Patient reports feeling \"chapped\" from the toilet paper with the diarrhea.   "

## 2024-12-09 NOTE — TELEPHONE ENCOUNTER
"Patient calling after being seen yesterday in UC. Was advised to be seen in the ER, but pt did not present to ER at that time. Patient now calling today to ask if she may just have a referral to a \"colonoscopy doctor\" instead of going to the ER - advised pt to follow  recommendation of ER today for further evaluation, and could request GI/colorectal referral from ER or PCP. Patient states she is in agreement and her son is on the way over to take her to the ER.    CRISTIAN WorthingtonN, RN (she/her)  [facility] RN    "

## 2024-12-10 ENCOUNTER — HOSPITAL ENCOUNTER (EMERGENCY)
Facility: CLINIC | Age: 89
Discharge: HOME | End: 2024-12-10
Attending: STUDENT IN AN ORGANIZED HEALTH CARE EDUCATION/TRAINING PROGRAM | Admitting: STUDENT IN AN ORGANIZED HEALTH CARE EDUCATION/TRAINING PROGRAM
Payer: COMMERCIAL

## 2024-12-10 ENCOUNTER — APPOINTMENT (OUTPATIENT)
Dept: CT IMAGING | Facility: CLINIC | Age: 89
End: 2024-12-10
Attending: PHYSICIAN ASSISTANT
Payer: COMMERCIAL

## 2024-12-10 ENCOUNTER — TELEPHONE (OUTPATIENT)
Dept: FAMILY MEDICINE | Facility: CLINIC | Age: 89
End: 2024-12-10
Payer: COMMERCIAL

## 2024-12-10 VITALS
TEMPERATURE: 97.6 F | RESPIRATION RATE: 16 BRPM | OXYGEN SATURATION: 96 % | SYSTOLIC BLOOD PRESSURE: 201 MMHG | HEIGHT: 61 IN | DIASTOLIC BLOOD PRESSURE: 95 MMHG | BODY MASS INDEX: 29.64 KG/M2 | WEIGHT: 157 LBS | HEART RATE: 80 BPM

## 2024-12-10 DIAGNOSIS — K52.89 STERCORAL COLITIS: ICD-10-CM

## 2024-12-10 LAB
ALBUMIN SERPL BCG-MCNC: 3.8 G/DL (ref 3.5–5.2)
ALBUMIN UR-MCNC: NEGATIVE MG/DL
ALP SERPL-CCNC: 67 U/L (ref 40–150)
ALT SERPL W P-5'-P-CCNC: 12 U/L (ref 0–50)
ANION GAP SERPL CALCULATED.3IONS-SCNC: 10 MMOL/L (ref 7–15)
APPEARANCE UR: CLEAR
AST SERPL W P-5'-P-CCNC: 22 U/L (ref 0–45)
BACTERIA #/AREA URNS HPF: ABNORMAL /HPF
BASOPHILS # BLD AUTO: 0 10E3/UL (ref 0–0.2)
BASOPHILS NFR BLD AUTO: 1 %
BILIRUB SERPL-MCNC: 0.2 MG/DL
BILIRUB UR QL STRIP: NEGATIVE
BUN SERPL-MCNC: 27.5 MG/DL (ref 8–23)
CALCIUM SERPL-MCNC: 9.5 MG/DL (ref 8.8–10.4)
CHLORIDE SERPL-SCNC: 107 MMOL/L (ref 98–107)
COLOR UR AUTO: ABNORMAL
CREAT SERPL-MCNC: 1.36 MG/DL (ref 0.51–0.95)
EGFRCR SERPLBLD CKD-EPI 2021: 37 ML/MIN/1.73M2
EOSINOPHIL # BLD AUTO: 0.2 10E3/UL (ref 0–0.7)
EOSINOPHIL NFR BLD AUTO: 4 %
ERYTHROCYTE [DISTWIDTH] IN BLOOD BY AUTOMATED COUNT: 15 % (ref 10–15)
GLUCOSE SERPL-MCNC: 94 MG/DL (ref 70–99)
GLUCOSE UR STRIP-MCNC: NEGATIVE MG/DL
HCO3 SERPL-SCNC: 24 MMOL/L (ref 22–29)
HCT VFR BLD AUTO: 34.5 % (ref 35–47)
HGB BLD-MCNC: 10.9 G/DL (ref 11.7–15.7)
HGB UR QL STRIP: NEGATIVE
IMM GRANULOCYTES # BLD: 0 10E3/UL
IMM GRANULOCYTES NFR BLD: 0 %
KETONES UR STRIP-MCNC: NEGATIVE MG/DL
LEUKOCYTE ESTERASE UR QL STRIP: ABNORMAL
LYMPHOCYTES # BLD AUTO: 1.4 10E3/UL (ref 0.8–5.3)
LYMPHOCYTES NFR BLD AUTO: 27 %
MAGNESIUM SERPL-MCNC: 2.3 MG/DL (ref 1.7–2.3)
MCH RBC QN AUTO: 26.3 PG (ref 26.5–33)
MCHC RBC AUTO-ENTMCNC: 31.6 G/DL (ref 31.5–36.5)
MCV RBC AUTO: 83 FL (ref 78–100)
MONOCYTES # BLD AUTO: 0.4 10E3/UL (ref 0–1.3)
MONOCYTES NFR BLD AUTO: 8 %
NEUTROPHILS # BLD AUTO: 3.2 10E3/UL (ref 1.6–8.3)
NEUTROPHILS NFR BLD AUTO: 60 %
NITRATE UR QL: NEGATIVE
NRBC # BLD AUTO: 0 10E3/UL
NRBC BLD AUTO-RTO: 0 /100
PH UR STRIP: 7 [PH] (ref 5–7)
PLATELET # BLD AUTO: 200 10E3/UL (ref 150–450)
POTASSIUM SERPL-SCNC: 4.9 MMOL/L (ref 3.4–5.3)
PROT SERPL-MCNC: 6.7 G/DL (ref 6.4–8.3)
RBC # BLD AUTO: 4.14 10E6/UL (ref 3.8–5.2)
RBC URINE: 0 /HPF
SODIUM SERPL-SCNC: 141 MMOL/L (ref 135–145)
SP GR UR STRIP: 1.01 (ref 1–1.03)
SQUAMOUS EPITHELIAL: 3 /HPF
UROBILINOGEN UR STRIP-MCNC: NORMAL MG/DL
WBC # BLD AUTO: 5.3 10E3/UL (ref 4–11)
WBC URINE: 4 /HPF

## 2024-12-10 PROCEDURE — 85004 AUTOMATED DIFF WBC COUNT: CPT | Performed by: PHYSICIAN ASSISTANT

## 2024-12-10 PROCEDURE — 74177 CT ABD & PELVIS W/CONTRAST: CPT

## 2024-12-10 PROCEDURE — 81003 URINALYSIS AUTO W/O SCOPE: CPT | Performed by: PHYSICIAN ASSISTANT

## 2024-12-10 PROCEDURE — 85018 HEMOGLOBIN: CPT | Performed by: PHYSICIAN ASSISTANT

## 2024-12-10 PROCEDURE — 82435 ASSAY OF BLOOD CHLORIDE: CPT | Performed by: PHYSICIAN ASSISTANT

## 2024-12-10 PROCEDURE — 999N000127 HC STATISTIC PERIPHERAL IV START W US GUIDANCE

## 2024-12-10 PROCEDURE — 84460 ALANINE AMINO (ALT) (SGPT): CPT | Performed by: PHYSICIAN ASSISTANT

## 2024-12-10 PROCEDURE — 96361 HYDRATE IV INFUSION ADD-ON: CPT | Performed by: STUDENT IN AN ORGANIZED HEALTH CARE EDUCATION/TRAINING PROGRAM

## 2024-12-10 PROCEDURE — 99284 EMERGENCY DEPT VISIT MOD MDM: CPT | Performed by: STUDENT IN AN ORGANIZED HEALTH CARE EDUCATION/TRAINING PROGRAM

## 2024-12-10 PROCEDURE — 96360 HYDRATION IV INFUSION INIT: CPT | Mod: 59 | Performed by: STUDENT IN AN ORGANIZED HEALTH CARE EDUCATION/TRAINING PROGRAM

## 2024-12-10 PROCEDURE — 83735 ASSAY OF MAGNESIUM: CPT | Performed by: PHYSICIAN ASSISTANT

## 2024-12-10 PROCEDURE — 82947 ASSAY GLUCOSE BLOOD QUANT: CPT | Performed by: PHYSICIAN ASSISTANT

## 2024-12-10 PROCEDURE — 999N000040 HC STATISTIC CONSULT NO CHARGE VASC ACCESS

## 2024-12-10 PROCEDURE — 82247 BILIRUBIN TOTAL: CPT | Performed by: PHYSICIAN ASSISTANT

## 2024-12-10 PROCEDURE — 99285 EMERGENCY DEPT VISIT HI MDM: CPT | Mod: 25 | Performed by: STUDENT IN AN ORGANIZED HEALTH CARE EDUCATION/TRAINING PROGRAM

## 2024-12-10 PROCEDURE — 258N000003 HC RX IP 258 OP 636: Performed by: STUDENT IN AN ORGANIZED HEALTH CARE EDUCATION/TRAINING PROGRAM

## 2024-12-10 PROCEDURE — 250N000009 HC RX 250: Performed by: PHYSICIAN ASSISTANT

## 2024-12-10 PROCEDURE — 250N000011 HC RX IP 250 OP 636: Performed by: PHYSICIAN ASSISTANT

## 2024-12-10 PROCEDURE — 258N000003 HC RX IP 258 OP 636: Performed by: PHYSICIAN ASSISTANT

## 2024-12-10 RX ORDER — SODIUM CHLORIDE 9 MG/ML
INJECTION, SOLUTION INTRAVENOUS
Status: COMPLETED
Start: 2024-12-10 | End: 2024-12-10

## 2024-12-10 RX ORDER — IOPAMIDOL 755 MG/ML
100 INJECTION, SOLUTION INTRAVASCULAR ONCE
Status: COMPLETED | OUTPATIENT
Start: 2024-12-10 | End: 2024-12-10

## 2024-12-10 RX ORDER — DOCUSATE CALCIUM 240 MG
240 CAPSULE ORAL DAILY PRN
Qty: 20 CAPSULE | Refills: 0 | Status: SHIPPED | OUTPATIENT
Start: 2024-12-10

## 2024-12-10 RX ADMIN — SODIUM CHLORIDE 500 ML: 9 INJECTION, SOLUTION INTRAVENOUS at 18:10

## 2024-12-10 RX ADMIN — SODIUM CHLORIDE 1000 ML: 9 INJECTION, SOLUTION INTRAVENOUS at 15:48

## 2024-12-10 RX ADMIN — Medication 500 ML: at 18:10

## 2024-12-10 RX ADMIN — IOPAMIDOL 77 ML: 755 INJECTION, SOLUTION INTRAVENOUS at 17:13

## 2024-12-10 RX ADMIN — SODIUM CHLORIDE 38 ML: 9 INJECTION, SOLUTION INTRAVENOUS at 17:13

## 2024-12-10 ASSESSMENT — ACTIVITIES OF DAILY LIVING (ADL)
ADLS_ACUITY_SCORE: 46

## 2024-12-10 NOTE — CONSULTS
"Consult received for Vascular access care.  See LDA for details. For additional needs place \"Nursing to Consult for Vascular Access\" [id number] order in EPIC.   "

## 2024-12-10 NOTE — TELEPHONE ENCOUNTER
"Seen in UC on 12/8 who advised that she be seen in the ER  Patient presented to the ER on 12/9 but left before being seen   Patient wanting  to \"just place the orders\"  Advised patient that GI is booked out typically at least 3 months that is why given the degree of pain it was recommended that she be seen in the ER.   Provided education that further orders would also be dependent on the diagnostics/imaging obtained in the ER.   Encouraged patient to please seek care in the ER    Patient verbalized understanding of this disposition but unsure if agreeable as she then ended the phone call.     Vicenta Waldron RN  [facility]    "

## 2024-12-10 NOTE — ED PROVIDER NOTES
"ED Provider Note  [facility]      History     Chief Complaint   Patient presents with    Rectal/perineal Pain     Patient reports pain with bowel movements, concerned to bear down too far due to having a AAA without rupture. Patient had diarrhea all of last week but is back to normal with her bowel movements this week.      HPI  Lara Marc is a 89 year old female with a history of CKD stage 3, DVT, abdominal aortic aneurysm without rupture, HTN, Cecal volvulus, who presents to the emergency department for rectal and perineal pain. ***    {History Review Selection (Optional):263509}  {ROS Selection (Optional):783649}    Physical Exam   BP: (!) 151/75  Pulse: 80  Temp: 98  F (36.7  C)  Resp: 18  Height: 154.9 cm (5' 1\")  Weight: 71.2 kg (157 lb)  SpO2: 96 %  Physical Exam  ***    ED Course, Procedures, & Data      Procedures       {ED Course Selections (Optional):972002}  {ED Sepsis CMS Documentation (Optional):141104::\" \"}       No results found for any visits on 12/09/24.  Medications - No data to display  Labs Ordered and Resulted from Time of ED Arrival to Time of ED Departure - No data to display  No orders to display          {Critical Care Performed?:942077}    Assessment & Plan    ***    I have reviewed the nursing notes. I have reviewed the findings, diagnosis, plan and need for follow up with the patient.    New Prescriptions    No medications on file       Final diagnoses:   None       ***  [facility] within [facility] EMERGENCY DEPARTMENT  12/9/2024  "

## 2024-12-10 NOTE — ED TRIAGE NOTES
Diarrhea and pain last week, still weak today. Unable to eat and drink.  Primary referred patient here.     Triage Assessment (Adult)       Row Name 12/10/24 3516          Triage Assessment    Airway WDL WDL        Respiratory WDL    Respiratory WDL WDL        Skin Circulation/Temperature WDL    Skin Circulation/Temperature WDL WDL        Cardiac WDL    Cardiac WDL WDL        Peripheral/Neurovascular WDL    Peripheral Neurovascular WDL WDL        Cognitive/Neuro/Behavioral WDL    Cognitive/Neuro/Behavioral WDL WDL

## 2024-12-10 NOTE — ED PROVIDER NOTES
ED Provider Note  [facility]      History     Chief Complaint   Patient presents with    Fatigue     Diarrhea and pain last week, still fatigue today. Unable to eat and drink.  Primary referred patient here.     HPI  88yo F pmhx vaginal enterocele, ureteral bowel prolapse, cecal volvulus, DVT, abdominal aortic aneurysm (2019), acquired single kidney, HTN and CKD p/w generalized lethargy I/s/o improving diarrhea.  Patient reports copious, watery, nonbloody diarrhea over the past 3 days, but today seemed to transition back to more formed stool.  This has been associated with rectal pain with bowel movements only.  Denies travel, new foods, recent antibiotic use.  No abdominal pain, nausea, vomiting, fever, chills, urinary symptoms.  She has been having decreased p.o. intake when she was having copious diarrhea, as she was concerned it may have be making her symptoms worse.    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    CKD (chronic kidney disease) stage 3, GFR 30-59 ml/min (H) 7/25/2011    has 1 kidney, the right kidney is present---gave left kidney to sister    Complete uterovaginal prolapse 2/8/2013    Deep vein thrombosis (DVT) (H) 5/7/2020    Dyspnea on exertion     Gastro-oesophageal reflux disease     Hypertension goal BP (blood pressure) < 140/90 7/25/2011    Other and unspecified hyperlipidemia     Unspecified essential hypertension     not medicated at this time    Uterovaginal prolapse, complete 2004     resolved '06    Vaginal enterocele, congenital or acquired 2007     or cystocele     Past Surgical History:   Procedure Laterality Date    childbirth X9[      CLOSED  RX RIB FRACTURE  7/06    left    DAVINCI SACROCOLPOPEXY, MIDURETHRAL SLING, CYSTOSCOPY  2/8/2013    Procedure: DAVINCI SACROCOLPOPEXY, MIDURETHRAL SLING, CYSTOSCOPY;  DAVINCI SACROCOLPOPEXY, TVT EXACT SLING, RIGHT SALPINGO-OOPHERECTOMY, CYSTOSCOPY;  Surgeon: Bennie Galdamez MD;  Location: [facility] OR    LAPAROSCOPIC CHOLECYSTECTOMY  3/31/2011    Procedure:LAPAROSCOPIC CHOLECYSTECTOMY; Surgeon:DAVID MOLINA; Location:[facility] OR    [facility] 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    [facility] VAGINAL HYSTERECTOMY  9/15/06    TVH/BSO/A&P repair/sacrospinus ligament fixation......childbirth x 9    ZZ COLONOSCOPY THRU STOMA, DIAGNOSTIC  7/2005    diverticulosis,small polyp,recheck 5 yrs     docusate calcium (SURFAK) 240 MG capsule  magnesium hydroxide (MILK OF MAGNESIA) 400 MG/5ML suspension  acetaminophen (TYLENOL) 325 MG tablet  aspirin 81 MG EC tablet  Cholecalciferol (VITAMIN D3 PO)  lisinopril (ZESTRIL) 20 MG tablet  Loperamide HCl (IMODIUM OR)  magnesium 250 MG tablet  Multiple Vitamins-Minerals (MULTIVITAMIN OR)  polyethylene glycol (MIRALAX) 17 GM/Dose powder  psyllium (METAMUCIL/KONSYL) 58.6 % powder      Allergies   Allergen Reactions    Seasonal Allergies      Itchy eyes and watery eyes     Family History  Family History   Problem Relation Age of Onset    Heart Disease Father     Diabetes Sister     Heart Disease Sister     Cancer - colorectal Brother     Prostate Cancer Brother      Social History   Social History     Tobacco Use    Smoking status: Former     Current packs/day: 0.00     Types: Cigarettes     Passive exposure: Never    Smokeless tobacco: Never    Tobacco comments:     quit when she was 29 years old   Substance Use Topics    Alcohol use: No    Drug use: No      A medically appropriate review of systems was performed with pertinent positives and negatives noted in the HPI, and all other systems negative.    Physical Exam   BP: (!) 170/90  Pulse: 80  Temp: 97.6  F (36.4  C)  Resp: 16  Height: 154.9  "cm (5' 1\")  Weight: 71.2 kg (157 lb)  SpO2: 99 % BP (!) 170/90   Pulse 80   Temp 97.6  F (36.4  C) (Oral)   Resp 16   Ht 1.549 m (5' 1\")   Wt 71.2 kg (157 lb)   LMP  (LMP Unknown)   SpO2 99%   BMI 29.66 kg/m    Physical Exam  Constitutional:       General: She is not in acute distress.     Appearance: Normal appearance. She is not diaphoretic.   HENT:      Head: Atraumatic.      Mouth/Throat:      Mouth: Mucous membranes are moist.   Eyes:      Conjunctiva/sclera: Conjunctivae normal.   Cardiovascular:      Rate and Rhythm: Normal rate.   Pulmonary:      Effort: No respiratory distress.   Abdominal:      General: Abdomen is flat.      Palpations: Abdomen is soft.      Tenderness: There is no abdominal tenderness.   Genitourinary:     Rectum: Tenderness present. No mass, external hemorrhoid or internal hemorrhoid.      Comments: Gayle, ERT as chaperone.  No external hemorrhoids.  No perianal anal skin appears irritated and slightly macerated.  Patient reporting pain with internal exam, but no masses appreciated. Brown stool.   Musculoskeletal:      Cervical back: Neck supple.   Skin:     General: Skin is warm.   Neurological:      Mental Status: She is alert.           ED Course, Procedures, & Data      Procedures                Results for orders placed or performed during the hospital encounter of 12/10/24   CT Abdomen Pelvis w Contrast     Status: None    Narrative    EXAM: CT ABDOMEN PELVIS W CONTRAST  LOCATION: [facility]  DATE: 12/10/2024    INDICATION: Perianal pain.  COMPARISON: CT abdomen pelvis 7/26/2024.  TECHNIQUE: CT scan of the abdomen and pelvis was performed following injection of IV contrast. Multiplanar reformats were obtained. Dose reduction techniques were used.  CONTRAST: 77mL Isovue 370    FINDINGS:     LOWER CHEST: Dense mitral annular calcifications. Small hiatal hernia. Bibasilar scarring and/or atelectasis.    HEPATOBILIARY: Multiple " small hepatic cysts are unchanged. Status post cholecystectomy with an unchanged small calculus within the cystic duct stump. No biliary ductal dilation.    PANCREAS: Normal.    SPLEEN: Normal.    ADRENAL GLANDS: Normal.    KIDNEYS/BLADDER: The left kidney is surgically absent. Normal right kidney. No urinary calculi or hydronephrosis. Bladder is partially decompressed and otherwise normal.    BOWEL: Small duodenal diverticulum. No evidence of bowel obstruction. Few small diverticuli within the distal ileum. Appendix is not visualized; no inflammation at the base of the cecum. Mild to moderate amount of stool within the ascending, transverse,   and descending colon. Tortuous sigmoid colon. Large amount of stool within the distal sigmoid colon and rectum with a large rectal stool ball measuring 10.6 x 13.6 x 16.0 cm. There is mild wall thickening of the rectum surrounding the rectal stool ball   suggestive of stercoral proctitis. No pneumatosis or free intraperitoneal air.    LYMPH NODES: No enlarged lymph nodes.    VASCULATURE: Extensive atherosclerosis. Fusiform 4.6 cm infrarenal abdominal aortic aneurysm is unchanged when measured similarly. Patent portal, splenic, and superior mesenteric veins.    PELVIC ORGANS: Absent uterus.    MUSCULOSKELETAL: Small fat-containing right inguinal hernia. Tiny fat-containing periumbilical hernia. Diffusely demineralized bones. Multilevel degenerative changes of the spine.      Impression    IMPRESSION:     1.  Large, possibly impacted stool ball distending the rectum with mild thickening of the surrounding rectal wall, suggestive of stercoral proctitis.    2.  Unchanged fusiform 4.6 cm infrarenal abdominal aortic aneurysm. See follow-up guidelines below.    3.  Status post cholecystectomy with a small stone within the cystic duct stump. No biliary ductal dilation.    REFERENCE:  [facility] practice guidelines on the care of patients with an abdominal aortic aneurysm. Meghana MELISSA. J Vasc  Surg, 2018. PMID:[id number].    Aorta size: 4.0 cm to 4.9 cm: Surveillance imaging at 12-month intervals.   Comprehensive metabolic panel     Status: Abnormal   Result Value Ref Range    Sodium 141 135 - 145 mmol/L    Potassium 4.9 3.4 - 5.3 mmol/L    Carbon Dioxide (CO2) 24 22 - 29 mmol/L    Anion Gap 10 7 - 15 mmol/L    Urea Nitrogen 27.5 (H) 8.0 - 23.0 mg/dL    Creatinine 1.36 (H) 0.51 - 0.95 mg/dL    GFR Estimate 37 (L) >60 mL/min/1.73m2    Calcium 9.5 8.8 - 10.4 mg/dL    Chloride 107 98 - 107 mmol/L    Glucose 94 70 - 99 mg/dL    Alkaline Phosphatase 67 40 - 150 U/L    AST 22 0 - 45 U/L    ALT 12 0 - 50 U/L    Protein Total 6.7 6.4 - 8.3 g/dL    Albumin 3.8 3.5 - 5.2 g/dL    Bilirubin Total 0.2 <=1.2 mg/dL   Magnesium     Status: Normal   Result Value Ref Range    Magnesium 2.3 1.7 - 2.3 mg/dL   CBC with platelets and differential     Status: Abnormal   Result Value Ref Range    WBC Count 5.3 4.0 - 11.0 10e3/uL    RBC Count 4.14 3.80 - 5.20 10e6/uL    Hemoglobin 10.9 (L) 11.7 - 15.7 g/dL    Hematocrit 34.5 (L) 35.0 - 47.0 %    MCV 83 78 - 100 fL    MCH 26.3 (L) 26.5 - 33.0 pg    MCHC 31.6 31.5 - 36.5 g/dL    RDW 15.0 10.0 - 15.0 %    Platelet Count 200 150 - 450 10e3/uL    % Neutrophils 60 %    % Lymphocytes 27 %    % Monocytes 8 %    % Eosinophils 4 %    % Basophils 1 %    % Immature Granulocytes 0 %    NRBCs per 100 WBC 0 <1 /100    Absolute Neutrophils 3.2 1.6 - 8.3 10e3/uL    Absolute Lymphocytes 1.4 0.8 - 5.3 10e3/uL    Absolute Monocytes 0.4 0.0 - 1.3 10e3/uL    Absolute Eosinophils 0.2 0.0 - 0.7 10e3/uL    Absolute Basophils 0.0 0.0 - 0.2 10e3/uL    Absolute Immature Granulocytes 0.0 <=0.4 10e3/uL    Absolute NRBCs 0.0 10e3/uL   UA with Microscopic reflex to Culture     Status: Abnormal    Specimen: Urine, Clean Catch   Result Value Ref Range    Color Urine Straw Colorless, Straw, Light Yellow, Yellow    Appearance Urine Clear Clear    Glucose Urine Negative Negative mg/dL    Bilirubin Urine Negative  Negative    Ketones Urine Negative Negative mg/dL    Specific Gravity Urine 1.008 1.003 - 1.035    Blood Urine Negative Negative    pH Urine 7.0 5.0 - 7.0    Protein Albumin Urine Negative Negative mg/dL    Urobilinogen Urine Normal Normal, 2.0 mg/dL    Nitrite Urine Negative Negative    Leukocyte Esterase Urine Small (A) Negative    Bacteria Urine None Seen None Seen /HPF    RBC Urine 0 <=2 /HPF    WBC Urine 4 <=5 /HPF    Squamous Epithelials Urine 3 (H) <=1 /HPF    Narrative    Urine Culture not indicated   CBC with platelets differential     Status: Abnormal    Narrative    The following orders were created for panel order CBC with platelets differential.  Procedure                               Abnormality         Status                     ---------                               -----------         ------                     CBC with platelets and d...[[id number]]  Abnormal            Final result                 Please view results for these tests on the individual orders.     Medications   sodium chloride 0.9% BOLUS 500 mL (500 mLs Intravenous $New Bag 12/10/24 1810)   sodium chloride 0.9% BOLUS 1,000 mL (0 mLs Intravenous Stopped 12/10/24 1709)   sodium chloride 0.9 % bag 100 mL for CT (38 mLs Intravenous $Given 12/10/24 1713)   iopamidol (ISOVUE-370) solution 100 mL (77 mLs Intravenous $Given 12/10/24 1713)     Labs Ordered and Resulted from Time of ED Arrival to Time of ED Departure   COMPREHENSIVE METABOLIC PANEL - Abnormal       Result Value    Sodium 141      Potassium 4.9      Carbon Dioxide (CO2) 24      Anion Gap 10      Urea Nitrogen 27.5 (*)     Creatinine 1.36 (*)     GFR Estimate 37 (*)     Calcium 9.5      Chloride 107      Glucose 94      Alkaline Phosphatase 67      AST 22      ALT 12      Protein Total 6.7      Albumin 3.8      Bilirubin Total 0.2     CBC WITH PLATELETS AND DIFFERENTIAL - Abnormal    WBC Count 5.3      RBC Count 4.14      Hemoglobin 10.9 (*)     Hematocrit 34.5 (*)     MCV 83       MCH 26.3 (*)     MCHC 31.6      RDW 15.0      Platelet Count 200      % Neutrophils 60      % Lymphocytes 27      % Monocytes 8      % Eosinophils 4      % Basophils 1      % Immature Granulocytes 0      NRBCs per 100 WBC 0      Absolute Neutrophils 3.2      Absolute Lymphocytes 1.4      Absolute Monocytes 0.4      Absolute Eosinophils 0.2      Absolute Basophils 0.0      Absolute Immature Granulocytes 0.0      Absolute NRBCs 0.0     ROUTINE UA WITH MICROSCOPIC REFLEX TO CULTURE - Abnormal    Color Urine Straw      Appearance Urine Clear      Glucose Urine Negative      Bilirubin Urine Negative      Ketones Urine Negative      Specific Gravity Urine 1.008      Blood Urine Negative      pH Urine 7.0      Protein Albumin Urine Negative      Urobilinogen Urine Normal      Nitrite Urine Negative      Leukocyte Esterase Urine Small (*)     Bacteria Urine None Seen      RBC Urine 0      WBC Urine 4      Squamous Epithelials Urine 3 (*)    MAGNESIUM - Normal    Magnesium 2.3     ENTERIC BACTERIA AND VIRUS PANEL BY PCR     CT Abdomen Pelvis w Contrast   Final Result   IMPRESSION:       1.  Large, possibly impacted stool ball distending the rectum with mild thickening of the surrounding rectal wall, suggestive of stercoral proctitis.      2.  Unchanged fusiform 4.6 cm infrarenal abdominal aortic aneurysm. See follow-up guidelines below.      3.  Status post cholecystectomy with a small stone within the cystic duct stump. No biliary ductal dilation.      REFERENCE:   [facility] practice guidelines on the care of patients with an abdominal aortic aneurysm. Meghana EL. J Vasc Surg, 2018. PMID:[id number].      Aorta size: 4.0 cm to 4.9 cm: Surveillance imaging at 12-month intervals.             Critical care was not performed.     Medical Decision Making  The patient's presentation was of high complexity (an acute health issue posing potential threat to life or bodily function).    The patient's evaluation involved:  review of  external note(s) from 3+ sources (see separate area of note for details)  ordering and/or review of 3+ test(s) in this encounter (see separate area of note for details)    The patient's management necessitated high risk (a decision regarding hospitalization).    Assessment & Plan    88yo F pmhx vaginal enterocele, ureteral bowel prolapse, cecal volvulus, DVT, abdominal aortic aneurysm (2019), acquired single kidney, HTN and CKD p/w generalized lethargy I/s/o improving diarrhea.  Had 3 days of copious, watery, nonbloody diarrhea the transition back to more formed stool today.  No fever, travel, antibiotics, new foods.  He has been associated with rectal pain.  The patient is slightly hypertensive (170/90), but afebrile, generally well-appearing.  She has a benign abdomen.  Rectal exam without external hemorrhoids.  Her perianal skin appears irritated slightly macerated.  Internal exam painful for patient, but without appreciable masses.  DDx foodborne illness versus functional diarrhea versus proctitis versus colitis versus perianal/perirectal abscess versus dehydration/SIS versus other    In ED labs with unremarkable CBC (no leukocytosis, baseline hemoglobin), CMP (prior to baseline creatinine at 1.36), and screening UA.  CT abdomen pelvis obtained showing large impacted stool wall, resulting in distention of patient's rectum with mild thickening of the rectal wall suggesting stercoral proctitis.  Incidentally was patient's previously known aortic aneurysm seen.  Had discussion with patient about attempted manual disimpaction and enemas.  However, she is disinclined to attempt either.  We had lengthy conversation regarding rationale and risks of no treatment which patient verbalized understanding to.  She states that she would prefer to go home, and attempt enemas and stool softeners.  Patient very much has capacity to make her own decisions, and she understands that she will need to return to ER should she have  worsening symptoms.  Patient discharged with prescription for stool softeners and milk of magnesia.    I have reviewed the nursing notes. I have reviewed the findings, diagnosis, plan and need for follow up with the patient.    New Prescriptions    DOCUSATE CALCIUM (SURFAK) 240 MG CAPSULE    Take 1 capsule (240 mg) by mouth daily as needed for constipation.    MAGNESIUM HYDROXIDE (MILK OF MAGNESIA) 400 MG/5ML SUSPENSION    Take 30 mLs by mouth nightly as needed for constipation.       Final diagnoses:   Stercoral colitis         Otilio Cox PA-C  [facility] EMERGENCY DEPARTMENT  12/10/2024     Otilio Cox PA-C  12/10/24 [id number]    --    ED Attending Physician Attestation    I Wendy Mcrae MD, cared for this patient with the Advanced Practice Provider (SAMANTHA). I personally provided a substantive portion of the care for this patient, including approving the care plan for the number and complexity of problems addressed and taking responsibility related to the risk of complications and/or morbidity or mortality of patient management. Please see the SAMANTHA's documentation for full details.  Wendy Mcrae MD  Emergency Medicine        Wendy Mcrae MD  12/10/24 [id number]

## 2024-12-16 ENCOUNTER — HOSPITAL ENCOUNTER (OUTPATIENT)
Facility: CLINIC | Age: 89
Setting detail: OBSERVATION
Discharge: HOME | End: 2024-12-18
Attending: EMERGENCY MEDICINE | Admitting: STUDENT IN AN ORGANIZED HEALTH CARE EDUCATION/TRAINING PROGRAM
Payer: COMMERCIAL

## 2024-12-16 ENCOUNTER — APPOINTMENT (OUTPATIENT)
Dept: GENERAL RADIOLOGY | Facility: CLINIC | Age: 89
End: 2024-12-16
Attending: EMERGENCY MEDICINE
Payer: COMMERCIAL

## 2024-12-16 DIAGNOSIS — Z90.79 S/P TOTAL ABDOMINAL HYSTERECTOMY AND BILATERAL SALPINGO-OOPHORECTOMY: ICD-10-CM

## 2024-12-16 DIAGNOSIS — K56.41 FECAL IMPACTION (H): Primary | ICD-10-CM

## 2024-12-16 DIAGNOSIS — Z90.710 S/P TOTAL ABDOMINAL HYSTERECTOMY AND BILATERAL SALPINGO-OOPHORECTOMY: ICD-10-CM

## 2024-12-16 DIAGNOSIS — Z90.722 S/P TOTAL ABDOMINAL HYSTERECTOMY AND BILATERAL SALPINGO-OOPHORECTOMY: ICD-10-CM

## 2024-12-16 DIAGNOSIS — R33.9 RETENTION OF URINE, UNSPECIFIED: ICD-10-CM

## 2024-12-16 LAB
ALBUMIN UR-MCNC: NEGATIVE MG/DL
ANION GAP SERPL CALCULATED.3IONS-SCNC: 11 MMOL/L (ref 7–15)
APPEARANCE UR: CLEAR
BASOPHILS # BLD AUTO: 0 10E3/UL (ref 0–0.2)
BASOPHILS NFR BLD AUTO: 0 %
BILIRUB UR QL STRIP: NEGATIVE
BUN SERPL-MCNC: 27.5 MG/DL (ref 8–23)
CALCIUM SERPL-MCNC: 9.6 MG/DL (ref 8.8–10.4)
CHLORIDE SERPL-SCNC: 104 MMOL/L (ref 98–107)
COLOR UR AUTO: ABNORMAL
CREAT SERPL-MCNC: 1.37 MG/DL (ref 0.51–0.95)
EGFRCR SERPLBLD CKD-EPI 2021: 37 ML/MIN/1.73M2
EOSINOPHIL # BLD AUTO: 0.1 10E3/UL (ref 0–0.7)
EOSINOPHIL NFR BLD AUTO: 2 %
ERYTHROCYTE [DISTWIDTH] IN BLOOD BY AUTOMATED COUNT: 14.9 % (ref 10–15)
GLUCOSE SERPL-MCNC: 96 MG/DL (ref 70–99)
GLUCOSE UR STRIP-MCNC: NEGATIVE MG/DL
HCO3 SERPL-SCNC: 24 MMOL/L (ref 22–29)
HCT VFR BLD AUTO: 37.8 % (ref 35–47)
HGB BLD-MCNC: 11.9 G/DL (ref 11.7–15.7)
HGB UR QL STRIP: ABNORMAL
IMM GRANULOCYTES # BLD: 0 10E3/UL
IMM GRANULOCYTES NFR BLD: 0 %
KETONES UR STRIP-MCNC: NEGATIVE MG/DL
LEUKOCYTE ESTERASE UR QL STRIP: NEGATIVE
LYMPHOCYTES # BLD AUTO: 1.1 10E3/UL (ref 0.8–5.3)
LYMPHOCYTES NFR BLD AUTO: 18 %
MCH RBC QN AUTO: 26 PG (ref 26.5–33)
MCHC RBC AUTO-ENTMCNC: 31.5 G/DL (ref 31.5–36.5)
MCV RBC AUTO: 83 FL (ref 78–100)
MONOCYTES # BLD AUTO: 0.5 10E3/UL (ref 0–1.3)
MONOCYTES NFR BLD AUTO: 8 %
MUCOUS THREADS #/AREA URNS LPF: PRESENT /LPF
NEUTROPHILS # BLD AUTO: 4.5 10E3/UL (ref 1.6–8.3)
NEUTROPHILS NFR BLD AUTO: 72 %
NITRATE UR QL: NEGATIVE
NRBC # BLD AUTO: 0 10E3/UL
NRBC BLD AUTO-RTO: 0 /100
PH UR STRIP: 7 [PH] (ref 5–7)
PLATELET # BLD AUTO: 231 10E3/UL (ref 150–450)
POTASSIUM SERPL-SCNC: 5.3 MMOL/L (ref 3.4–5.3)
RBC # BLD AUTO: 4.57 10E6/UL (ref 3.8–5.2)
RBC URINE: 3 /HPF
SODIUM SERPL-SCNC: 139 MMOL/L (ref 135–145)
SP GR UR STRIP: 1.01 (ref 1–1.03)
SQUAMOUS EPITHELIAL: <1 /HPF
UROBILINOGEN UR STRIP-MCNC: NORMAL MG/DL
WBC # BLD AUTO: 6.3 10E3/UL (ref 4–11)
WBC URINE: 1 /HPF

## 2024-12-16 PROCEDURE — 80048 BASIC METABOLIC PNL TOTAL CA: CPT | Performed by: EMERGENCY MEDICINE

## 2024-12-16 PROCEDURE — 250N000013 HC RX MED GY IP 250 OP 250 PS 637

## 2024-12-16 PROCEDURE — 51798 US URINE CAPACITY MEASURE: CPT | Performed by: EMERGENCY MEDICINE

## 2024-12-16 PROCEDURE — 85004 AUTOMATED DIFF WBC COUNT: CPT | Performed by: EMERGENCY MEDICINE

## 2024-12-16 PROCEDURE — 84132 ASSAY OF SERUM POTASSIUM: CPT | Performed by: EMERGENCY MEDICINE

## 2024-12-16 PROCEDURE — 85014 HEMATOCRIT: CPT | Performed by: EMERGENCY MEDICINE

## 2024-12-16 PROCEDURE — 99222 1ST HOSP IP/OBS MODERATE 55: CPT | Mod: AI

## 2024-12-16 PROCEDURE — G0378 HOSPITAL OBSERVATION PER HR: HCPCS

## 2024-12-16 PROCEDURE — 81001 URINALYSIS AUTO W/SCOPE: CPT | Performed by: EMERGENCY MEDICINE

## 2024-12-16 PROCEDURE — 99284 EMERGENCY DEPT VISIT MOD MDM: CPT | Performed by: EMERGENCY MEDICINE

## 2024-12-16 PROCEDURE — 99285 EMERGENCY DEPT VISIT HI MDM: CPT | Mod: 25 | Performed by: EMERGENCY MEDICINE

## 2024-12-16 PROCEDURE — 250N000013 HC RX MED GY IP 250 OP 250 PS 637: Performed by: EMERGENCY MEDICINE

## 2024-12-16 PROCEDURE — 36415 COLL VENOUS BLD VENIPUNCTURE: CPT | Performed by: EMERGENCY MEDICINE

## 2024-12-16 PROCEDURE — 74019 RADEX ABDOMEN 2 VIEWS: CPT

## 2024-12-16 RX ORDER — ACETAMINOPHEN 650 MG/1
650 SUPPOSITORY RECTAL EVERY 4 HOURS PRN
Status: DISCONTINUED | OUTPATIENT
Start: 2024-12-16 | End: 2024-12-18 | Stop reason: HOSPADM

## 2024-12-16 RX ORDER — LIDOCAINE HYDROCHLORIDE 20 MG/ML
JELLY TOPICAL
Status: DISCONTINUED | OUTPATIENT
Start: 2024-12-16 | End: 2024-12-18 | Stop reason: HOSPADM

## 2024-12-16 RX ORDER — LISINOPRIL 20 MG/1
20 TABLET ORAL AT BEDTIME
Status: DISCONTINUED | OUTPATIENT
Start: 2024-12-16 | End: 2024-12-18 | Stop reason: HOSPADM

## 2024-12-16 RX ORDER — POLYETHYLENE GLYCOL 3350 17 G/17G
17 POWDER, FOR SOLUTION ORAL DAILY
Status: DISCONTINUED | OUTPATIENT
Start: 2024-12-17 | End: 2024-12-18

## 2024-12-16 RX ORDER — LACTULOSE 10 G/10G
20 SOLUTION ORAL 3 TIMES DAILY
Status: DISCONTINUED | OUTPATIENT
Start: 2024-12-16 | End: 2024-12-17

## 2024-12-16 RX ORDER — ACETAMINOPHEN 325 MG/1
650 TABLET ORAL EVERY 4 HOURS PRN
Status: DISCONTINUED | OUTPATIENT
Start: 2024-12-16 | End: 2024-12-18 | Stop reason: HOSPADM

## 2024-12-16 RX ORDER — ASPIRIN 81 MG/1
81 TABLET ORAL AT BEDTIME
Status: DISCONTINUED | OUTPATIENT
Start: 2024-12-16 | End: 2024-12-18 | Stop reason: HOSPADM

## 2024-12-16 RX ADMIN — LACTULOSE 20 G: 20 POWDER, FOR SOLUTION ORAL at 22:22

## 2024-12-16 RX ADMIN — LACTULOSE 20 G: 20 POWDER, FOR SOLUTION ORAL at 18:48

## 2024-12-16 ASSESSMENT — ACTIVITIES OF DAILY LIVING (ADL)
ADLS_ACUITY_SCORE: 54
ADLS_ACUITY_SCORE: 58
ADLS_ACUITY_SCORE: 58
ADLS_ACUITY_SCORE: 54
ADLS_ACUITY_SCORE: 58
ADLS_ACUITY_SCORE: 54
ADLS_ACUITY_SCORE: 46

## 2024-12-16 ASSESSMENT — COLUMBIA-SUICIDE SEVERITY RATING SCALE - C-SSRS
6. HAVE YOU EVER DONE ANYTHING, STARTED TO DO ANYTHING, OR PREPARED TO DO ANYTHING TO END YOUR LIFE?: NO
2. HAVE YOU ACTUALLY HAD ANY THOUGHTS OF KILLING YOURSELF IN THE PAST MONTH?: NO
1. IN THE PAST MONTH, HAVE YOU WISHED YOU WERE DEAD OR WISHED YOU COULD GO TO SLEEP AND NOT WAKE UP?: NO

## 2024-12-16 NOTE — H&P
[facility]    History and Physical - [facility]       Date of Admission:  12/16/2024    Assessment & Plan    Lara Marc is a 89 year old female admitted on 12/16/2024. 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     #Acute-on-chronic constipation  #Stool impaction  #Urinary retention - improving  Large stool ball on CT 12/10, persistent on admission XR despite trial of home management. No obstruction. Enema in the ED was not successful as it could not be retained. Catheterized for 374 ml of urine, which did not show signs of infection. She has had about 15 bowel movements since admission. The stool in the commode appears thicker than overflow diarrhea, suggesting that the enema and milk of magnesium may be working. JANETTE showed that there was still a stool ball about 5.5 cm past the external anal sphincter that was softening. Able to independently urinate now but still feels like her urine flow is slower than baseline and that she stops urinating before she has completely voided. Lara notes that she became incredibly weak when taking GoLytely in the past, and it was recommended by her providers not to use it again for colonoscopy prep.   Management:  - PTA Miralax 17 g in AM  - Mineral oil enema in AM  - PTA Milk of Magnesia daily PRN  - Avoid GoLytely    #CKD 3 (GFR 30-59)  #History of elective nephrectomy  No SIS on admission - GFR is at baseline. Acquired single kidney, gave left kidney to sister.  "    #HTN  - PTA Lisinopril 20 mg at bedtime    #History of right leg DVT  #CAD prophylaxis  - PTA Aspirin 81 mg at bedtime     Observation Goals: -diagnostic tests and consults completed and resulted, -vital signs normal or at patient baseline, -tolerating oral intake to maintain hydration, Constipation resolved, Nurse to notify provider when observation goals have been met and patient is ready for discharge.  Diet: Regular Diet Adult    DVT Prophylaxis: Pneumatic Compression Devices (PADUA 3 for previous DVT)  Aggarwal Catheter: Not present  Fluids: PO  Lines: None     Cardiac Monitoring: None  Code Status: Full Code      Clinically Significant Risk Factors Present on Admission                 # Drug Induced Platelet Defect: home medication list includes an antiplatelet medication   # Hypertension: Noted on problem list           # Obesity: Estimated body mass index is 30.19 kg/m  as calculated from the following:    Height as of 12/10/24: 1.549 m (5' 1\").    Weight as of this encounter: 72.5 kg (159 lb 12.8 oz).              Disposition Plan      Expected Discharge Date: 12/17/2024                The patient's care was discussed with the Attending Physician, Dr. Dereje MD .    [facility]  [facility]  Securely message with [employer] (more info)  Text page via [facility] Paging/Directory   See signed in provider for up to date coverage information  ______________________________________________________________________    Chief Complaint   Constipation and urinary retention    History is obtained from the patient and electronic health record    History of Present Illness   Lara Marc is a 89 year old female with history of abdominal aortic aneurysm, complete procidentia with prolapsed uterus status post hysterectomy with bilateral salpingo-oophorectomy (2006), complete uterovaginal prolapse s/p sacrocolpopexy (2013), vaginal enterocele, " "ureteral bowel prolapse, 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      She was seen in the emergency department on 12/10/2024 with diarrhea and rectal pain. At that time, CT of the abdomen and pelvis that showed a stool ball measuring 10.6 x 13.6 x 16.0 cm. There was mild wall thickening of the rectum surrounding the rectal stool ball suggestive of stercoral proctitis.  She was offered manual disimpaction and enemas, patient declined this, wanted to attempt her own enemas and stool softeners at home. Lara took Milk of Magnesium at home on 12/15 and tried to digitally disimpact herself.  She presents to the ER today with urinary retention that began 36 hours ago.     While reconciling her medications, she reports that she has been taking Metamucil nightly instead of MiraLax. Lara has had approximately 15 bowel movements since admission. She states that she has been able to independently urinate since passing multiple stools, but she notes that it feels like the urine is \"dragging\", going slower than usual and occasionally stopping before she has finished voiding.     ED COURSE  - BMP: GFR is at baseline (37), remainder normal  - CBC normal  - UA: no infection, trace blood and mucus   - XR: No complete small bowel obstruction. Large stool at the proximal colon and rectum. No free air identified.    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    CKD (chronic kidney disease) stage 3, GFR 30-59 ml/min (H) 07/25/2011    has 1 kidney, the right kidney is present---gave left kidney to sister    Complete uterovaginal prolapse 02/08/2013    Deep vein thrombosis (DVT) (H) 05/07/2020    Dyspnea on exertion     Gastro-oesophageal reflux disease     Hypertension goal BP (blood pressure) < 140/90 07/25/2011    Other and unspecified hyperlipidemia     Unspecified essential hypertension     not medicated at this time    Uterovaginal prolapse, complete 2004     resolved '06    Vaginal enterocele, congenital or acquired 2007     or cystocele       Past Surgical History   Past Surgical History:   Procedure Laterality Date    childbirth X9[      CLOSED RX RIB FRACTURE  7/06    left    DAVINCI SACROCOLPOPEXY, MIDURETHRAL SLING, CYSTOSCOPY  2/8/2013    Procedure: DAVINCI SACROCOLPOPEXY, MIDURETHRAL SLING, CYSTOSCOPY;  DAVINCI SACROCOLPOPEXY, TVT EXACT SLING, RIGHT SALPINGO-OOPHERECTOMY, CYSTOSCOPY;  Surgeon: Bennie Galdamez MD;  Location: [facility] OR    LAPAROSCOPIC CHOLECYSTECTOMY  3/31/2011    Procedure:LAPAROSCOPIC CHOLECYSTECTOMY; Surgeon:DAVID MOLINA; Location:[facility] OR    ZZ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    ZZC VAGINAL HYSTERECTOMY  9/15/06    TVH/BSO/A&P repair/sacrospinus ligament fixation......childbirth x 9    ZZ COLONOSCOPY THRU STOMA, DIAGNOSTIC  7/2005    diverticulosis,small polyp,recheck 5 yrs       Prior to Admission Medications   Prior to Admission Medications   Prescriptions Last Dose Informant Patient Reported? Taking?   Cholecalciferol (VITAMIN D3 PO) 12/15/2024 Bedtime  Yes Yes   Sig: Take by mouth at bedtime.   Loperamide HCl (IMODIUM OR) Past Week  Yes Yes   Multiple Vitamins-Minerals (MULTIVITAMIN OR) 12/15/2024 Bedtime  Yes Yes   Sig: Take 1 tablet by mouth at bedtime.   acetaminophen (TYLENOL) 325 MG tablet 12/16/2024 Morning  No Yes   Sig: Take 2 tablets (650 mg) by mouth every 8 hours   aspirin 81 MG EC tablet  12/15/2024 Bedtime  Yes Yes   Sig: Take 81 mg by mouth at bedtime.   docusate calcium (SURFAK) 240 MG capsule Unknown  No Yes   Sig: Take 1 capsule (240 mg) by mouth daily as needed for constipation.   lisinopril (ZESTRIL) 20 MG tablet 12/15/2024 Bedtime  No Yes   Sig: TAKE 1 TABLET BY MOUTH EVERY DAY   Patient taking differently: Take 20 mg by mouth at bedtime.   magnesium 250 MG tablet 12/15/2024 Bedtime  Yes Yes   Sig: Take 1 tablet by mouth at bedtime.   magnesium hydroxide (MILK OF MAGNESIA) 400 MG/5ML suspension 12/15/2024 Bedtime  No Yes   Sig: Take 30 mLs by mouth nightly as needed for constipation.   polyethylene glycol (MIRALAX) 17 GM/Dose powder 12/15/2024 Bedtime  Yes Yes   Sig: Take 17 g by mouth at bedtime.   psyllium (METAMUCIL/KONSYL) 58.6 % powder 12/15/2024 Bedtime  Yes Yes   Sig: Take by mouth at bedtime.      Facility-Administered Medications: None        Review of Systems    The 10 point Review of Systems is negative other than noted in the HPI or here.      Physical Exam   Vital Signs: Temp: 97.9  F (36.6  C) Temp src: Oral BP: (!) 149/86 Pulse: 95   Resp: 16 SpO2: 98 % O2 Device: None (Room air)    Weight: 159 lbs 12.8 oz    Constitutional: awake, alert, cooperative, no apparent distress, and appears stated age  Respiratory: No increased work of breathing  GI: No scars, soft, mildly distended, non-tender, no masses palpated, no hepatosplenomegally  Genitounirinary: Anus/Perineum:  Lesions absent, Masses absent, and Abnormal findings: palpable stool ball about 5 cm past external anal sphincter that feels like it is softening  Skin: no bruising or bleeding and no redness, warmth, or swelling  Musculoskeletal: There is no redness, warmth, or swelling of the joints. Able to move all 4 extremities.  Neurologic: Awake, alert, oriented to name, place and time.      Medical Decision Making   Please see A&P for additional details of medical decision making.      Data     I have personally reviewed the  following data over the past 24 hrs:    6.3  \   11.9   / 231     139 104 27.5 (H) /  96   5.3 24 1.37 (H) \       Imaging results reviewed over the past 24 hrs:   Recent Results (from the past 24 hours)   Abdomen XR, 2 vw, flat and upright    Narrative    ABDOMEN TWO VIEWS  12/16/2024 3:35 PM     HISTORY: Bloating.    COMPARISON: CT 12/10/2024.      Impression    IMPRESSION: No complete small bowel obstruction. Large stool at the  proximal colon and rectum. No free air identified.    KENNY GARCIA MD         SYSTEM ID:  [id number]

## 2024-12-16 NOTE — ED PROVIDER NOTES
ED PROVIDER NOTE  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  She was seen in the emergency department on 12/10/2024 with diarrhea, rectal pain.  She was seen by Dr. Mcrae who performed workup with labs and CT of the abdomen and pelvis that showed mild to moderate amount of stool throughout the colon, tortuous sigmoid colon and a large amount of stool in the distal sigmoid colon and rectum with a large stool ball measuring 10.6 x 13.6 x 16.0 cm. There was mild wall thickening of the rectum surrounding the rectal stool ball suggestive of stercoral proctitis.  Dr. Mcrae offered manual disimpaction and enemas, patient declined this, wanted to attempt her own enemas and stool softeners.  She presents to the ER today with urinary retention.     I have reviewed the Medications, Allergies, Past Medical and Surgical History, and Social History in the [facility] system.    Past Medical History:   Diagnosis Date    Accidental fall on or from other stairs or steps 7/3/06    fall in hosptial onto chair foot rest, broke rib    Acquired absence of kidney     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    Breast pain, left 6/10/2016    CARDIOVASCULAR SCREENING; LDL GOAL LESS THAN 130 9/17/2010    Cecal volvulus (H) 9/9/2019    CKD (chronic kidney disease) stage 3, GFR 30-59 ml/min (H) 7/25/2011    has 1 kidney, the right kidney is present---gave left kidney to sister     Complete uterovaginal prolapse 2/8/2013    Deep vein thrombosis (DVT) (H) 5/7/2020    Dyspnea on exertion     Gastro-oesophageal reflux disease     Hypertension goal BP (blood pressure) < 140/90 7/25/2011    Other and unspecified hyperlipidemia     Unspecified essential hypertension     not medicated at this time    Uterovaginal prolapse, complete 2004     resolved '06    Vaginal enterocele, congenital or acquired 2007     or cystocele       Past Surgical History:   Procedure Laterality Date    childbirth X9[      CLOSED RX RIB FRACTURE  7/06    left    DAVINCI SACROCOLPOPEXY, MIDURETHRAL SLING, CYSTOSCOPY  2/8/2013    Procedure: DAVINCI SACROCOLPOPEXY, MIDURETHRAL SLING, CYSTOSCOPY;  DAVINCI SACROCOLPOPEXY, TVT EXACT SLING, RIGHT SALPINGO-OOPHERECTOMY, CYSTOSCOPY;  Surgeon: Bennie Galdamez MD;  Location: [facility] OR    LAPAROSCOPIC CHOLECYSTECTOMY  3/31/2011    Procedure:LAPAROSCOPIC CHOLECYSTECTOMY; Surgeon:DAVID MOLINA; Location:[facility] OR    Z 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    ZZC VAGINAL HYSTERECTOMY  9/15/06    TVH/BSO/A&P repair/sacrospinus ligament fixation......childbirth x 9    ZZ COLONOSCOPY THRU STOMA, DIAGNOSTIC  7/2005    diverticulosis,small polyp,recheck 5 yrs         Dose / Directions   acetaminophen 325 MG tablet  Commonly known as: TYLENOL  Used for: Cecal volvulus (H)      Dose: 650 mg  Take 2 tablets (650 mg) by mouth every 8 hours  Refills: 0     aspirin 81 MG EC tablet      Dose: 81 mg  Take 81 mg by mouth daily  Refills: 0     docusate calcium 240 MG capsule  Commonly known as: SURFAK      Dose: 240 mg  Take 1 capsule (240 mg) by mouth daily as needed for constipation.  Quantity: 20 capsule  Refills: 0     IMODIUM OR      Refills: 0     lisinopril 20 MG tablet  Commonly known as: ZESTRIL  Used for: Hypertension goal BP (blood pressure) < 140/90      Dose: 20 mg  TAKE 1 TABLET BY MOUTH EVERY DAY  Quantity: 90 tablet  Refills: 2     magnesium 250 MG tablet      Dose: 1  tablet  Take 1 tablet by mouth daily  Refills: 0     magnesium hydroxide 400 MG/5ML suspension  Commonly known as: MILK OF MAGNESIA      Dose: 30 mL  Take 30 mLs by mouth nightly as needed for constipation.  Quantity: 473 mL  Refills: 0     MULTIVITAMIN PO      Dose: 1 tablet  Take 1 tablet by mouth daily  Refills: 0     polyethylene glycol 17 GM/Dose powder  Commonly known as: MIRALAX      Dose: 17 g  Take 17 g by mouth daily  Refills: 0     psyllium 58.6 % powder  Commonly known as: METAMUCIL/KONSYL      Take by mouth daily  Refills: 0     VITAMIN D3 PO      Take by mouth daily  Refills: 0              Past medical history, past surgical history, medications, and allergies were reviewed with the patient. Additional pertinent items: None    Family History   Problem Relation Age of Onset    Heart Disease Father     Diabetes Sister     Heart Disease Sister     Cancer - colorectal Brother     Prostate Cancer Brother        Social History     Tobacco Use    Smoking status: Former     Current packs/day: 0.00     Types: Cigarettes     Passive exposure: Never    Smokeless tobacco: Never    Tobacco comments:     quit when she was 29 years old   Substance Use Topics    Alcohol use: No     Social history was reviewed with the patient. Additional pertinent items: None    Allergies   Allergen Reactions    Seasonal Allergies      Itchy eyes and watery eyes       Review of Systems  A medically appropriate review of systems was performed with pertinent positives and negatives noted in the HPI, and all other systems negative.    Physical Exam   BP: (!) 186/85  Pulse: 87  Temp: 97.7  F (36.5  C)  Resp: 16  SpO2: 98 %      Physical Exam  Vitals and nursing note reviewed.   Eyes:      Extraocular Movements: Extraocular movements intact.      Pupils: Pupils are equal, round, and reactive to light.   Cardiovascular:      Rate and Rhythm: Regular rhythm.   Pulmonary:      Breath sounds: Normal breath sounds.   Abdominal:      General:  There is distension.      Palpations: Abdomen is soft.   Musculoskeletal:         General: No deformity.      Cervical back: Neck supple.   Neurological:      General: No focal deficit present.      Mental Status: She is alert and oriented to person, place, and time.   Psychiatric:         Mood and Affect: Mood normal.         ED Course     Orders Placed This Encounter   Procedures    Abdomen XR, 2 vw, flat and upright    Basic metabolic panel    CBC with platelets and differential    UA with Microscopic reflex to Culture    Peripheral IV: Standard    Bladder scan    Straight cath for urine    Soap suds enema    [address] to Observation    CBC with platelets differential          Procedures           Results for orders placed or performed during the hospital encounter of 12/16/24 (from the past 24 hours)   CBC with platelets differential    Narrative    The following orders were created for panel order CBC with platelets differential.  Procedure                               Abnormality         Status                     ---------                               -----------         ------                     CBC with platelets and d...[[id number]]  Abnormal            Final result                 Please view results for these tests on the individual orders.   Basic metabolic panel   Result Value Ref Range    Sodium 139 135 - 145 mmol/L    Potassium 5.3 3.4 - 5.3 mmol/L    Chloride 104 98 - 107 mmol/L    Carbon Dioxide (CO2) 24 22 - 29 mmol/L    Anion Gap 11 7 - 15 mmol/L    Urea Nitrogen 27.5 (H) 8.0 - 23.0 mg/dL    Creatinine 1.37 (H) 0.51 - 0.95 mg/dL    GFR Estimate 37 (L) >60 mL/min/1.73m2    Calcium 9.6 8.8 - 10.4 mg/dL    Glucose 96 70 - 99 mg/dL   CBC with platelets and differential   Result Value Ref Range    WBC Count 6.3 4.0 - 11.0 10e3/uL    RBC Count 4.57 3.80 - 5.20 10e6/uL    Hemoglobin 11.9 11.7 - 15.7 g/dL    Hematocrit 37.8 35.0 - 47.0 %    MCV 83 78 - 100 fL    MCH 26.0 (L) 26.5 - 33.0 pg    MCHC 31.5  31.5 - 36.5 g/dL    RDW 14.9 10.0 - 15.0 %    Platelet Count 231 150 - 450 10e3/uL    % Neutrophils 72 %    % Lymphocytes 18 %    % Monocytes 8 %    % Eosinophils 2 %    % Basophils 0 %    % Immature Granulocytes 0 %    NRBCs per 100 WBC 0 <1 /100    Absolute Neutrophils 4.5 1.6 - 8.3 10e3/uL    Absolute Lymphocytes 1.1 0.8 - 5.3 10e3/uL    Absolute Monocytes 0.5 0.0 - 1.3 10e3/uL    Absolute Eosinophils 0.1 0.0 - 0.7 10e3/uL    Absolute Basophils 0.0 0.0 - 0.2 10e3/uL    Absolute Immature Granulocytes 0.0 <=0.4 10e3/uL    Absolute NRBCs 0.0 10e3/uL   Abdomen XR, 2 vw, flat and upright    Narrative    ABDOMEN TWO VIEWS  12/16/2024 3:35 PM     HISTORY: Bloating.    COMPARISON: CT 12/10/2024.      Impression    IMPRESSION: No complete small bowel obstruction. Large stool at the  proximal colon and rectum. No free air identified.    KENNY GARCIA MD         SYSTEM ID:  [id number]    with Microscopic reflex to Culture    Specimen: Urine, Catheter   Result Value Ref Range    Color Urine Light Yellow Colorless, Straw, Light Yellow, Yellow    Appearance Urine Clear Clear    Glucose Urine Negative Negative mg/dL    Bilirubin Urine Negative Negative    Ketones Urine Negative Negative mg/dL    Specific Gravity Urine 1.013 1.003 - 1.035    Blood Urine Trace (A) Negative    pH Urine 7.0 5.0 - 7.0    Protein Albumin Urine Negative Negative mg/dL    Urobilinogen Urine Normal Normal, 2.0 mg/dL    Nitrite Urine Negative Negative    Leukocyte Esterase Urine Negative Negative    Mucus Urine Present (A) None Seen /LPF    RBC Urine 3 (H) <=2 /HPF    WBC Urine 1 <=5 /HPF    Squamous Epithelials Urine <1 <=1 /HPF    Narrative    Urine Culture not indicated     Medications   lactulose (CEPHULAC) Packet 20 g (has no administration in time range)              Critical care was not performed.     Medical Decision Making  The patient's presentation was of moderate complexity (an acute illness with systemic symptoms).    The patient's evaluation  involved:  review of external note(s) from 1 sources (see epic)  review of 2 test result(s) ordered prior to this encounter (see epic)  ordering and/or review of 3+ test(s) in this encounter (see above)    The patient's management necessitated high risk (a decision regarding hospitalization).      Assessments & Plan (with Medical Decision Making)     I have reviewed the nursing notes.    Patient was found by bladder scan to have some urinary retention of almost 400 cc of urine.  Previous abdominal CTs revealed a fecal impaction and x-ray performed today revealed a persistent fecal impaction.  The patient had a enema done which revealed no results as it could not be retained and catheterization of the patient's bladder yielded 374 cc of urine.  At this time the case was discussed with medicine the patient will be brought into the medicine service.    I have reviewed the findings, diagnosis, and plan with the patient.      Final diagnoses:   Fecal impaction (H) - with urinary retention     Adm Med    DYLAN GRIMALDO MD    12/16/2024   [facility] EMERGENCY DEPARTMENT          Dylan Grimaldo MD  12/16/24 [id number]

## 2024-12-16 NOTE — ED TRIAGE NOTES
Reports urinary retention with pelvic pressure reports she dribbling some urine since yesterday.  Pt states she doesn't get any relief when she tries to void.       Reports she also has hard diarrhea starting up again today she states she had it for 3-4 days last week as well. Reports she has had these issues before for a long time.

## 2024-12-16 NOTE — ED TRIAGE NOTES
Triage Assessment (Adult)       Row Name 12/16/24 [id number]          Triage Assessment    Airway WDL WDL        Respiratory WDL    Respiratory WDL WDL        Skin Circulation/Temperature WDL    Skin Circulation/Temperature WDL WDL        Cardiac WDL    Cardiac WDL WDL        Cognitive/Neuro/Behavioral WDL    Cognitive/Neuro/Behavioral WDL WDL

## 2024-12-17 ENCOUNTER — APPOINTMENT (OUTPATIENT)
Dept: GENERAL RADIOLOGY | Facility: CLINIC | Age: 89
End: 2024-12-17
Payer: COMMERCIAL

## 2024-12-17 LAB
ALBUMIN SERPL BCG-MCNC: 3.8 G/DL (ref 3.5–5.2)
ALP SERPL-CCNC: 68 U/L (ref 40–150)
ALT SERPL W P-5'-P-CCNC: 12 U/L (ref 0–50)
ANION GAP SERPL CALCULATED.3IONS-SCNC: 11 MMOL/L (ref 7–15)
AST SERPL W P-5'-P-CCNC: 20 U/L (ref 0–45)
BASOPHILS # BLD AUTO: 0 10E3/UL (ref 0–0.2)
BASOPHILS NFR BLD AUTO: 1 %
BILIRUB SERPL-MCNC: 0.5 MG/DL
BUN SERPL-MCNC: 23.4 MG/DL (ref 8–23)
CALCIUM SERPL-MCNC: 9.3 MG/DL (ref 8.8–10.4)
CHLORIDE SERPL-SCNC: 106 MMOL/L (ref 98–107)
CREAT SERPL-MCNC: 1.31 MG/DL (ref 0.51–0.95)
EGFRCR SERPLBLD CKD-EPI 2021: 39 ML/MIN/1.73M2
EOSINOPHIL # BLD AUTO: 0.3 10E3/UL (ref 0–0.7)
EOSINOPHIL NFR BLD AUTO: 5 %
ERYTHROCYTE [DISTWIDTH] IN BLOOD BY AUTOMATED COUNT: 14.7 % (ref 10–15)
GLUCOSE SERPL-MCNC: 89 MG/DL (ref 70–99)
HCO3 SERPL-SCNC: 23 MMOL/L (ref 22–29)
HCT VFR BLD AUTO: 35.7 % (ref 35–47)
HGB BLD-MCNC: 11.1 G/DL (ref 11.7–15.7)
IMM GRANULOCYTES # BLD: 0 10E3/UL
IMM GRANULOCYTES NFR BLD: 0 %
LYMPHOCYTES # BLD AUTO: 1.6 10E3/UL (ref 0.8–5.3)
LYMPHOCYTES NFR BLD AUTO: 31 %
MCH RBC QN AUTO: 25.7 PG (ref 26.5–33)
MCHC RBC AUTO-ENTMCNC: 31.1 G/DL (ref 31.5–36.5)
MCV RBC AUTO: 83 FL (ref 78–100)
MONOCYTES # BLD AUTO: 0.5 10E3/UL (ref 0–1.3)
MONOCYTES NFR BLD AUTO: 9 %
NEUTROPHILS # BLD AUTO: 2.8 10E3/UL (ref 1.6–8.3)
NEUTROPHILS NFR BLD AUTO: 54 %
NRBC # BLD AUTO: 0 10E3/UL
NRBC BLD AUTO-RTO: 0 /100
PLATELET # BLD AUTO: 228 10E3/UL (ref 150–450)
POTASSIUM SERPL-SCNC: 4.5 MMOL/L (ref 3.4–5.3)
PROT SERPL-MCNC: 6.3 G/DL (ref 6.4–8.3)
RBC # BLD AUTO: 4.32 10E6/UL (ref 3.8–5.2)
SODIUM SERPL-SCNC: 140 MMOL/L (ref 135–145)
WBC # BLD AUTO: 5.1 10E3/UL (ref 4–11)

## 2024-12-17 PROCEDURE — G0378 HOSPITAL OBSERVATION PER HR: HCPCS

## 2024-12-17 PROCEDURE — 99231 SBSQ HOSP IP/OBS SF/LOW 25: CPT | Mod: GC

## 2024-12-17 PROCEDURE — 250N000013 HC RX MED GY IP 250 OP 250 PS 637

## 2024-12-17 PROCEDURE — 74018 RADEX ABDOMEN 1 VIEW: CPT | Mod: 26 | Performed by: RADIOLOGY

## 2024-12-17 PROCEDURE — 74018 RADEX ABDOMEN 1 VIEW: CPT | Mod: 76

## 2024-12-17 PROCEDURE — 74018 RADEX ABDOMEN 1 VIEW: CPT

## 2024-12-17 PROCEDURE — 84155 ASSAY OF PROTEIN SERUM: CPT

## 2024-12-17 PROCEDURE — 36415 COLL VENOUS BLD VENIPUNCTURE: CPT

## 2024-12-17 PROCEDURE — 82435 ASSAY OF BLOOD CHLORIDE: CPT

## 2024-12-17 PROCEDURE — 250N000009 HC RX 250

## 2024-12-17 PROCEDURE — 74018 RADEX ABDOMEN 1 VIEW: CPT | Mod: 26 | Performed by: STUDENT IN AN ORGANIZED HEALTH CARE EDUCATION/TRAINING PROGRAM

## 2024-12-17 PROCEDURE — 85025 COMPLETE CBC W/AUTO DIFF WBC: CPT

## 2024-12-17 RX ORDER — LORAZEPAM 1 MG/1
1 TABLET ORAL
Status: DISCONTINUED | OUTPATIENT
Start: 2024-12-17 | End: 2024-12-17

## 2024-12-17 RX ORDER — LIDOCAINE 40 MG/G
CREAM TOPICAL
Status: COMPLETED | OUTPATIENT
Start: 2024-12-17 | End: 2024-12-17

## 2024-12-17 RX ORDER — POLYETHYLENE GLYCOL 3350 17 G/17G
17 POWDER, FOR SOLUTION ORAL ONCE
Status: COMPLETED | OUTPATIENT
Start: 2024-12-17 | End: 2024-12-17

## 2024-12-17 RX ORDER — LORAZEPAM 1 MG/1
1 TABLET ORAL ONCE
Status: COMPLETED | OUTPATIENT
Start: 2024-12-17 | End: 2024-12-17

## 2024-12-17 RX ORDER — POLYETHYLENE GLYCOL 3350 17 G/17G
17 POWDER, FOR SOLUTION ORAL AT BEDTIME
Qty: 510 G | Refills: 0 | Status: SHIPPED | OUTPATIENT
Start: 2024-12-17

## 2024-12-17 RX ADMIN — ASPIRIN 81 MG: 81 TABLET, COATED ORAL at 00:28

## 2024-12-17 RX ADMIN — LIDOCAINE: 40 CREAM TOPICAL at 15:01

## 2024-12-17 RX ADMIN — LISINOPRIL 20 MG: 20 TABLET ORAL at 21:15

## 2024-12-17 RX ADMIN — MINERAL OIL 1 ENEMA: 100 ENEMA RECTAL at 15:31

## 2024-12-17 RX ADMIN — LORAZEPAM 1 MG: 1 TABLET ORAL at 15:05

## 2024-12-17 RX ADMIN — POLYETHYLENE GLYCOL 3350 17 G: 17 POWDER, FOR SOLUTION ORAL at 15:01

## 2024-12-17 RX ADMIN — ASPIRIN 81 MG: 81 TABLET, COATED ORAL at 21:15

## 2024-12-17 RX ADMIN — LISINOPRIL 20 MG: 20 TABLET ORAL at 00:28

## 2024-12-17 ASSESSMENT — ACTIVITIES OF DAILY LIVING (ADL)
ADLS_ACUITY_SCORE: 58
ADLS_ACUITY_SCORE: 32
ADLS_ACUITY_SCORE: 58
ADLS_ACUITY_SCORE: 32
ADLS_ACUITY_SCORE: 58
ADLS_ACUITY_SCORE: 32
ADLS_ACUITY_SCORE: 58
ADLS_ACUITY_SCORE: 32
ADLS_ACUITY_SCORE: 58
ADLS_ACUITY_SCORE: 32
ADLS_ACUITY_SCORE: 32
ADLS_ACUITY_SCORE: 58
ADLS_ACUITY_SCORE: 32
ADLS_ACUITY_SCORE: 58
ADLS_ACUITY_SCORE: 32
ADLS_ACUITY_SCORE: 58
ADLS_ACUITY_SCORE: 32

## 2024-12-17 NOTE — PHARMACY-ADMISSION MEDICATION HISTORY
Pharmacy Intern Admission Medication History    Admission medication history is complete. The information provided in this note is only as accurate as the sources available at the time of the update.    Information Source(s): Patient and CareEverywhere/SureScripts via in-person    Pertinent Information:   Patient took loperamide 2 tabs of unknown strength last week which did not help with diarrhea.    Patient took milk of magnesia 30 mL last night for the first time ever.     Changes made to PTA medication list:  Added: None  Deleted: None  Changed:   Aspirin 81 mg every day -> at bedtime   Vitamin D3 PO every day -> at bedtime   Lisinopril 20 mg tab every day -> at bedtime   Magnesium 250 mg tab every day -> at bedtime   Multivitamin every day -> at bedtime   Miralax 17 g every day -> at bedtime   Psyllium every day -> at bedtime     Allergies reviewed with patient and updates made in EHR: yes    Medication History Completed By: Mitchel Singh 12/16/2024 6:47 PM    PTA Med List   Medication Sig Note Last Dose/Taking    acetaminophen (TYLENOL) 325 MG tablet Take 2 tablets (650 mg) by mouth every 8 hours  12/16/2024 Morning    aspirin 81 MG EC tablet Take 81 mg by mouth at bedtime.  12/15/2024 Bedtime    Cholecalciferol (VITAMIN D3 PO) Take by mouth at bedtime. 12/16/2024: Unsure on dose 12/15/2024 Bedtime    docusate calcium (SURFAK) 240 MG capsule Take 1 capsule (240 mg) by mouth daily as needed for constipation.  Unknown    lisinopril (ZESTRIL) 20 MG tablet TAKE 1 TABLET BY MOUTH EVERY DAY (Patient taking differently: Take 20 mg by mouth at bedtime.)  12/15/2024 Bedtime    Loperamide HCl (IMODIUM OR)   Past Week    magnesium 250 MG tablet Take 1 tablet by mouth at bedtime.  12/15/2024 Bedtime    magnesium hydroxide (MILK OF MAGNESIA) 400 MG/5ML suspension Take 30 mLs by mouth nightly as needed for constipation.  12/15/2024 Bedtime    Multiple Vitamins-Minerals (MULTIVITAMIN OR) Take 1 tablet by mouth at  bedtime.  12/15/2024 Bedtime    polyethylene glycol (MIRALAX) 17 GM/Dose powder Take 17 g by mouth at bedtime.  12/15/2024 Bedtime    psyllium (METAMUCIL/KONSYL) 58.6 % powder Take by mouth at bedtime.  12/15/2024 Bedtime

## 2024-12-17 NOTE — PLAN OF CARE
Goal Outcome Evaluation:[id number]      Plan of Care Reviewed With: patient    Overall Patient Progress: improvingOverall Patient Progress: improving       Alert and orientated x4, able to make needs known. VSS on RA. Denied pain this shift. Continent of bladder and bowel, Last BM on 12/16/24. No loose bowel movements this shift, Bladder scanned at 00:30 after urination pt retained 148ml and 05:00 was 98ml, no straight cath needed. No skin issues noted. Independent in room. On regular diet and takes medications whole with thin liquids. Call light in reach, Continue with bowel movement regimen and bladder promotion.     Vital signs:  Temp: 98  F (36.7  C) Temp src: Oral BP: (!) 140/68 Pulse: 80   Resp: 18 SpO2: 100 % O2 Device: None (Room air)

## 2024-12-17 NOTE — PLAN OF CARE
Goal Outcome Evaluation:      Plan of Care Reviewed With: patient    Overall Patient Progress: improvingOverall Patient Progress: improving    Outcome Evaluation: Patient verbalized understanding of hospital plan of care.

## 2024-12-17 NOTE — DISCHARGE INSTRUCTIONS
To discuss how to prevent this from happening again, please call [phone] to see the Gastroenterology specialists again. You last saw Nuris Akers in April, these were her recommendations at the time:    -- Use a toilet stool to bring your knees above the level of your hips   -- Continue the Metamucil, 3 chews per day   -- Use a glycerin suppository every 3 days or so to clear out the rectum, insert the rounded end first   -- If you skip 3 days with no bowel movement, take 1/2 capful of miralax   -- It is okay to use Gas X as needed  -- It is okay to take Pepto Bismol chews prior to leaving the house to help prevent diarrhea. It is okay to use imodium sparingly as well if pepto bismol is not effective  -- Follow up in 2-3 months

## 2024-12-17 NOTE — DISCHARGE SUMMARY
"[facility]  Discharge Summary - Medicine & Pediatrics       Date of Admission:  12/16/2024  Date of Discharge:  12/17/2024  Discharging Provider: Dr. Stephanie Cardenas  Discharge Service: [facility] Medicine Service    Discharge Diagnoses   #Acute-on-chronic constipation  #Stool impaction  #Urinary retention   #CKD 3 (GFR 30-59)  #History of elective nephrectomy  #HTN   #History of RLE DVT  #CAD prophylaxis    Clinically Significant Risk Factors     # Obesity: Estimated body mass index is 30.19 kg/m  as calculated from the following:    Height as of 12/10/24: 1.549 m (5' 1\").    Weight as of this encounter: 72.5 kg (159 lb 12.8 oz).       Follow-ups Needed After Discharge   Re-establish with GI    Discharge Disposition   Discharged to home  Condition at discharge: Stable    Hospital Course   Lara Marc was admitted on 12/16/2024 for urinary retention 2/2 fecal impaction.  The following problems were addressed during her hospitalization:    Complex urogynecologic and gastrointestinal surgical history resulting in chronic constipation, recurrent stool impactions with overflow diarrhea. She was admitted for inability to urinate at home for 36 hours due to large fecal impaction. A large stool ball was seen on imaging and she was catheterized for 374 mL of urine, which did not show signs of infection.     Per patient preference, we attempted to manage impaction with a mineral oil enema and rectal stimulation w JANETTE rather than manual disimpaction here in the hospital. She had numerous soft bowel movements. She was able to urinate more easily and bladder scan revealed no concern for retention. She agreed to undergo manual disimpaction with PO lorazepam. This unfortunately was limited by high rectal tone and patient discomfort despite oral ativan use, and was overall unsuccessful. Repeat KUB showed persistent stool ball with increase in air filled bowel loops.GI was " "consulted and offered manual disimpaction which she declined. Recommended continuing home regimen of miralax, milk of magnesia, enemas and suppositories and patient was discharged to home. Recommendations from outpatient GI clinic note in her discharge paperwork, and encouraged her to schedule follow up with them ASAP.     Consultations This Hospital Stay   CARE MANAGEMENT / SOCIAL WORK IP CONSULT  GI LUMINAL ADULT IP CONSULT    Code Status   Full Code       The patient was discussed with MD Shandra Gaston's Service  [facility] MED SURG  [address] [place] [id number]  Phone: [phone]  Fax: [phone]  ______________________________________________________________________    Physical Exam   Vital Signs: Temp: 97.5  F (36.4  C) Temp src: Oral BP: (!) 135/102 Pulse: 71   Resp: 16 SpO2: 96 % O2 Device: None (Room air)    Weight: 159 lbs 12.8 oz    Constitutional: awake, alert, cooperative, no apparent distress, and appears stated age  Respiratory: No increased work of breathing  GI: No scars, soft, mildly distended, non-tender, no masses palpated, no hepatosplenomegally  Genitounirinary: Anus/Perineum:  Lesions absent, Masses absent, and Abnormal findings: palpable stool ball about 5 cm past external anal sphincter that feels like it is softening  Skin: no bruising or bleeding and no redness, warmth, or swelling  Musculoskeletal: There is no redness, warmth, or swelling of the joints. Able to move all 4 extremities.  Neurologic: Awake, alert, oriented to name, place and time.        Primary Care Physician   Yecenia Dickerson MD    Discharge Orders      Primary Care - Care Coordination Referral      Reason for your hospital stay    You were hospitalized for \"fecal impaction\", which is when poop / stool is stuck inside the rectum and can't come out. This caused pressure on your bladder, which prevented urine from being able to come out as well. You were " treated with enemas and pooped. This relieved enough pressure off your bladder that you were able to pass urine.     Activity    Your activity upon discharge: activity as tolerated     Follow Up and recommended labs and tests    Call [phone] to go schedule to see the Gastroenterology specialists again     Diet    Follow this diet upon discharge: Current Diet:Orders Placed This Encounter      Regular Diet Adult       Significant Results and Procedures   Results for orders placed or performed during the hospital encounter of 12/16/24   Abdomen XR, 2 vw, flat and upright    Narrative    ABDOMEN TWO VIEWS  12/16/2024 3:35 PM     HISTORY: Bloating.    COMPARISON: CT 12/10/2024.      Impression    IMPRESSION: No complete small bowel obstruction. Large stool at the  proximal colon and rectum. No free air identified.    KENNY GARCIA MD         SYSTEM ID:  [id number]       Discharge Medications   Current Discharge Medication List        START taking these medications    Details   bisacodyl (DULCOLAX) 10 MG suppository Place 1 suppository (10 mg) rectally daily as needed for constipation.  Qty: 30 suppository, Refills: 0    Associated Diagnoses: Fecal impaction (H)           CONTINUE these medications which have CHANGED    Details   polyethylene glycol (MIRALAX) 17 GM/Dose powder Take 17 g by mouth at bedtime.  Qty: 510 g, Refills: 0    Associated Diagnoses: Fecal impaction (H); S/P total abdominal hysterectomy and bilateral salpingo-oophorectomy           CONTINUE these medications which have NOT CHANGED    Details   acetaminophen (TYLENOL) 325 MG tablet Take 2 tablets (650 mg) by mouth every 8 hours    Associated Diagnoses: Cecal volvulus (H)      aspirin 81 MG EC tablet Take 81 mg by mouth at bedtime.      Cholecalciferol (VITAMIN D3 PO) Take by mouth at bedtime.      docusate calcium (SURFAK) 240 MG capsule Take 1 capsule (240 mg) by mouth daily as needed for constipation.  Qty: 20 capsule, Refills: 0      lisinopril  (ZESTRIL) 20 MG tablet TAKE 1 TABLET BY MOUTH EVERY DAY  Qty: 90 tablet, Refills: 2    Associated Diagnoses: Hypertension goal BP (blood pressure) < 140/90      Loperamide HCl (IMODIUM OR)       magnesium 250 MG tablet Take 1 tablet by mouth at bedtime.      magnesium hydroxide (MILK OF MAGNESIA) 400 MG/5ML suspension Take 30 mLs by mouth nightly as needed for constipation.  Qty: 473 mL, Refills: 0      Multiple Vitamins-Minerals (MULTIVITAMIN OR) Take 1 tablet by mouth at bedtime.      psyllium (METAMUCIL/KONSYL) 58.6 % powder Take by mouth at bedtime.           Allergies   Allergies   Allergen Reactions    Seasonal Allergies      Itchy eyes and watery eyes

## 2024-12-17 NOTE — PROGRESS NOTES
ADMISSION to 6MS UNIT:    Lara Marc was admitted from ED for Fecal impaction.  2 RN skin assessment: completed by Rita Walsh RN  Result of skin assessment and interventions/actions: No findings.  Height, weight: completed? Yes

## 2024-12-17 NOTE — PLAN OF CARE
Patient is alert and Ox4, VSS, on RA.    Denied chest pain, SOB, N/V.     PVR checked after each voiding.     Had digital disimpaction and enema this shift, and repeated abdominal x-ray before and after the procedure.   Patient had multiple bowel movements with hard stool today and feels some relief after enema.     Fair appetite.     IND.     Cont of B/B.     Skin intact.     No PIV access.     Cont of POC.

## 2024-12-18 VITALS
OXYGEN SATURATION: 96 % | RESPIRATION RATE: 16 BRPM | WEIGHT: 159.8 LBS | SYSTOLIC BLOOD PRESSURE: 135 MMHG | BODY MASS INDEX: 30.19 KG/M2 | TEMPERATURE: 97.5 F | HEART RATE: 71 BPM | DIASTOLIC BLOOD PRESSURE: 102 MMHG

## 2024-12-18 LAB
ALBUMIN SERPL BCG-MCNC: 3.5 G/DL (ref 3.5–5.2)
ALP SERPL-CCNC: 64 U/L (ref 40–150)
ALT SERPL W P-5'-P-CCNC: 10 U/L (ref 0–50)
ANION GAP SERPL CALCULATED.3IONS-SCNC: 11 MMOL/L (ref 7–15)
AST SERPL W P-5'-P-CCNC: 16 U/L (ref 0–45)
BASOPHILS # BLD AUTO: 0 10E3/UL (ref 0–0.2)
BASOPHILS NFR BLD AUTO: 1 %
BILIRUB SERPL-MCNC: 0.2 MG/DL
BUN SERPL-MCNC: 25.6 MG/DL (ref 8–23)
CALCIUM SERPL-MCNC: 9.4 MG/DL (ref 8.8–10.4)
CHLORIDE SERPL-SCNC: 107 MMOL/L (ref 98–107)
CREAT SERPL-MCNC: 1.39 MG/DL (ref 0.51–0.95)
EGFRCR SERPLBLD CKD-EPI 2021: 36 ML/MIN/1.73M2
EOSINOPHIL # BLD AUTO: 0.2 10E3/UL (ref 0–0.7)
EOSINOPHIL NFR BLD AUTO: 5 %
ERYTHROCYTE [DISTWIDTH] IN BLOOD BY AUTOMATED COUNT: 14.8 % (ref 10–15)
GLUCOSE SERPL-MCNC: 94 MG/DL (ref 70–99)
HCO3 SERPL-SCNC: 23 MMOL/L (ref 22–29)
HCT VFR BLD AUTO: 34 % (ref 35–47)
HGB BLD-MCNC: 10.5 G/DL (ref 11.7–15.7)
IMM GRANULOCYTES # BLD: 0 10E3/UL
IMM GRANULOCYTES NFR BLD: 0 %
LYMPHOCYTES # BLD AUTO: 1.5 10E3/UL (ref 0.8–5.3)
LYMPHOCYTES NFR BLD AUTO: 28 %
MCH RBC QN AUTO: 26.1 PG (ref 26.5–33)
MCHC RBC AUTO-ENTMCNC: 30.9 G/DL (ref 31.5–36.5)
MCV RBC AUTO: 85 FL (ref 78–100)
MONOCYTES # BLD AUTO: 0.6 10E3/UL (ref 0–1.3)
MONOCYTES NFR BLD AUTO: 11 %
NEUTROPHILS # BLD AUTO: 3 10E3/UL (ref 1.6–8.3)
NEUTROPHILS NFR BLD AUTO: 56 %
NRBC # BLD AUTO: 0 10E3/UL
NRBC BLD AUTO-RTO: 0 /100
PLATELET # BLD AUTO: 209 10E3/UL (ref 150–450)
POTASSIUM SERPL-SCNC: 4.7 MMOL/L (ref 3.4–5.3)
PROT SERPL-MCNC: 6.2 G/DL (ref 6.4–8.3)
RBC # BLD AUTO: 4.02 10E6/UL (ref 3.8–5.2)
SODIUM SERPL-SCNC: 141 MMOL/L (ref 135–145)
WBC # BLD AUTO: 5.3 10E3/UL (ref 4–11)

## 2024-12-18 PROCEDURE — 85025 COMPLETE CBC W/AUTO DIFF WBC: CPT

## 2024-12-18 PROCEDURE — 250N000013 HC RX MED GY IP 250 OP 250 PS 637

## 2024-12-18 PROCEDURE — 99238 HOSP IP/OBS DSCHRG MGMT 30/<: CPT | Mod: GC

## 2024-12-18 PROCEDURE — 80053 COMPREHEN METABOLIC PANEL: CPT

## 2024-12-18 PROCEDURE — G0378 HOSPITAL OBSERVATION PER HR: HCPCS

## 2024-12-18 PROCEDURE — 36415 COLL VENOUS BLD VENIPUNCTURE: CPT

## 2024-12-18 RX ORDER — BISACODYL 10 MG
10 SUPPOSITORY, RECTAL RECTAL ONCE
Status: DISCONTINUED | OUTPATIENT
Start: 2024-12-18 | End: 2024-12-18 | Stop reason: HOSPADM

## 2024-12-18 RX ORDER — BISACODYL 10 MG
10 SUPPOSITORY, RECTAL RECTAL DAILY PRN
Qty: 30 SUPPOSITORY | Refills: 0 | Status: SHIPPED | OUTPATIENT
Start: 2024-12-18

## 2024-12-18 RX ORDER — POLYETHYLENE GLYCOL 3350 17 G/17G
17 POWDER, FOR SOLUTION ORAL 2 TIMES DAILY
Status: DISCONTINUED | OUTPATIENT
Start: 2024-12-18 | End: 2024-12-18 | Stop reason: HOSPADM

## 2024-12-18 RX ADMIN — MAGNESIUM HYDROXIDE 30 ML: 400 SUSPENSION ORAL at 08:54

## 2024-12-18 RX ADMIN — POLYETHYLENE GLYCOL 3350 17 G: 17 POWDER, FOR SOLUTION ORAL at 08:54

## 2024-12-18 ASSESSMENT — ACTIVITIES OF DAILY LIVING (ADL)
ADLS_ACUITY_SCORE: 40

## 2024-12-18 NOTE — PROGRESS NOTES
[facility]    Progress Note - [facility] Medicine Service       Date of Admission:  12/16/2024    Assessment & Plan   Lara Marc is a 89 year old female admitted on 12/16/2024. 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     #Acute-on-chronic constipation  #Stool impaction  #Urinary retention  Per patient preference, we initially attempted to manage impaction with a mineral oil enema and rectal stimulation w JANETTE rather than manual disimpaction here in the hospital. She had numerous soft bowel movements. She was able to urinate more easily. However, bladder scanned after urination pt retained 148ml and KUB showed persistent stool ball with increase in air filled bowel loops. She agreed to undergo manual disimpaction with PO lorazepam followed by another mineral oil enema. This was unfortunately unsuccessful as repeat XR shows no change. The disimpaction was mostly limited by extremely high rectal tone limiting ability to remove stool; could only break it up. She was agreeable to stay the night for GI consult in the morning to see if they recommend additional interventions inpatient.   Management:  - Miralax 17 g in AM  - PTA Milk of Magnesia daily PRN  - Avoid GoLytely  - Reach out to GI in the morning, ask if consult appropriate for persistent fecal impaction     #CKD 3 (GFR 30-59)  #History of elective nephrectomy  No SIS on admission - GFR is at baseline. Acquired single kidney, gave left kidney to sister.     "  #HTN  - PTA Lisinopril 20 mg at bedtime     #History of right leg DVT  #CAD prophylaxis  - PTA Aspirin 81 mg at bedtime        Observation Goals: -diagnostic tests and consults completed and resulted, -vital signs normal or at patient baseline, -tolerating oral intake to maintain hydration, Constipation resolved, Nurse to notify provider when observation goals have been met and patient is ready for discharge.  Diet: Regular Diet Adult  Diet    DVT Prophylaxis: Pneumatic Compression Devices (PADUA 3 for previous DVT)   Aggarwal Catheter: Not present  Fluids: PO  Lines: None     Cardiac Monitoring: None  Code Status: Full Code      Clinically Significant Risk Factors Present on Admission                 # Drug Induced Platelet Defect: home medication list includes an antiplatelet medication   # Hypertension: Noted on problem list           # Obesity: Estimated body mass index is 30.19 kg/m  as calculated from the following:    Height as of 12/10/24: 1.549 m (5' 1\").    Weight as of this encounter: 72.5 kg (159 lb 12.8 oz).              Social Drivers of Health   Housing Stability: High Risk (12/17/2024)    Housing Stability     Do you have housing? : No     Are you worried about losing your housing?: No   Tobacco Use: Medium Risk (12/16/2024)    Patient History     Smoking Tobacco Use: Former     Smokeless Tobacco Use: Never     Passive Exposure: Never   Physical Activity: Insufficiently Active (7/31/2024)    Exercise Vital Sign     Days of Exercise per Week: 2 days     Minutes of Exercise per Session: 30 min   Social Connections: Unknown (7/31/2024)    Social Connection and Isolation Panel [NHANES]     Frequency of Social Gatherings with Friends and Family: More than three times a week         Disposition Plan     Medically Ready for Discharge:          The patient's care was discussed with the Attending Physician, Dr. Cardenas and Patient.    Jemma Carranza MD  [facility]  [facility] " [facility]  Securely message with [employer] (more info)  Text page via [facility] Paging/Directory   See signed in provider for up to date coverage information  ______________________________________________________________________    Interval History   Admitted overnight  Bowel movements constantly. Soft. Small volume. Also incontinent.   Good appetite, taking adequate PO  Bladder scanned post void residual volume twice, 137cc and 97cc      Physical Exam   Vital Signs: Temp: 97.9  F (36.6  C) Temp src: Oral BP: 134/72 Pulse: 91   Resp: 16 SpO2: 97 % O2 Device: None (Room air)    Weight: 159 lbs 12.8 oz  Constitutional: awake, alert, cooperative, no apparent distress, and appears stated age  Respiratory: No increased work of breathing  GI: No scars, soft, distended, non-tender  Musculoskeletal: There is no redness, warmth, or swelling of the joints. Able to move all 4 extremities.  Neurologic: Awake, alert, oriented to name, place and time.          Medical Decision Making             Data

## 2024-12-18 NOTE — PROGRESS NOTES
[facility]   BRIEF PROGRESS NOTE    SUBJECTIVE  Paged by RN at 0154 that Lara fell while in the bathroom. Lara states that she lost her balance and hit her head. Lara requested privacy in the bathroom, so the RN remained in the room outside the bathroom door. Lara denies headache, neck pain, limb weakness.    OBJECTIVE:  BP (!) 152/78 (BP Location: Left arm)   Pulse 71   Temp 97.9  F (36.6  C) (Oral)   Resp 18   Wt 72.5 kg (159 lb 12.8 oz)   LMP  (LMP Unknown)   SpO2 97%   BMI 30.19 kg/m      Exam:  General: Alert and oriented, in no acute distress. Sleeping prior to starting physical exam.  Skin: Mild abrasion over scalp hematoma. Warm and dry, no abnormalities noted.  Head/Neck: Mildly tender scalp hematoma over occiput without cervical tenderness to palpation, cervical range of motion intact  Eyes: Pupils equal and reactive.  Unable to thoroughly examine EOM because she needed to use the restroom, but appeared intact throughout conversation.  ENT: Speech intact, nasal passages open, no hearing impairment noted.  CV: No cyanosis or pallor, warm and well perfused.  Respiratory: No respiratory distress, no accessory muscle use.  Neuro: Gait and station normal, comprehension intact. Gross and fine motor skills intact.  Watched her walk to the restroom.  Balance unchanged from prior.  Speech normal.  Psychiatric: Mood and affect appear normal.   Extremities: Warm, able to move all four extremities at will.    ASSESSMENT/PLAN:  # Fall, initial encounter  # Scalp hematoma, mild  Fell in the bathroom due to loss of balance at 0154.  Physical examination was remarkable for mild scalp hematoma over the occiput with associated tenderness to palpation.  Pupils were equal and reactive bilaterally.  Balance and gait is unchanged from prior.  Able to move all 4 extremities.  Speech was within normal limits.  She was alert and oriented without signs of confusion.  No paracervical tenderness with  intact cervical range of motion.  Denies headache, neck pain, limb weakness.    Continue to monitor  Instructed nurses to remain with her when she uses restroom & informed Lara that this is the safest option  Continue bed alarm  Continue keeping walker near the bed  Low threshold to acquire CT head without contrast if Lara develops headache, confusion, limb weakness, speech changes    Please see daily rounding note for full A/P.  Ethel Khan DO, MPH  2:08 AM

## 2024-12-18 NOTE — PROGRESS NOTES
[id number]    VS: BP (!) 135/102 (BP Location: Right arm)   Pulse 71   Temp 97.5  F (36.4  C) (Oral)   Resp 16   Wt 72.5 kg (159 lb 12.8 oz)   LMP  (LMP Unknown)   SpO2 96%   BMI 30.19 kg/m     O2: Stable on RA . Denies SOB   Output: Continent of bowel and bladder, voids spontaneously w/o difficulty   Last BM: 12/18   Activity: Ax1 with walker and gait belt   Skin: Intact   Pain: Denies              CMS: A&Ox4, denies chest pain, n/v, numbness/tingling, and dizziness   Dressing: N/A   Diet: Regular   LDA: No IV access   Equipment: Personal belongings   Plan: Call light in reach, continue POC   Additional Info: Pt to discharge to home today

## 2024-12-18 NOTE — PROGRESS NOTES
[facility] DISCHARGE    D: Patient discharged to home at 1530. Patient accompanied by family.    I: Discharge prescriptions given to patient. All discharge medications and instructions reviewed with patient. Patient instructed to seek care if experiencing worsening symptoms. Other phone numbers to call with questions or concerns after discharge reviewed. PIV removed. Education completed.    A: Patient verbalized understanding of discharge medications and instructions. Prescribed home medications given to patient.  Belongings returned to patient.

## 2024-12-18 NOTE — PLAN OF CARE
Goal Outcome Evaluation:      Plan of Care Reviewed With: patient    Overall Patient Progress: declining  Overall Patient Progress: declining       VS: Blood pressure 129/81, pulse 72, temperature 97.5  F (36.4  C), temperature source Oral, resp. rate 20, weight 72.5 kg (159 lb 12.8 oz), SpO2 93%, not currently breastfeeding.    O2: RA   Output: Continent of bowel and bladder   Last BM: 12/18/24   Activity: Asst 1 w/ walker   Skin: Visibly intact   Pain: denies   CMS: Alert and oriented   Dressing:    Diet: Regular/thin/whole   LDA: N/a   Equipment: Call light, personal belongings   Plan: Cont'd POC   Additional Info: Patient is alert and oriented, able to make needs known. Patient had a fall while in the bathroom, provider notified and assessed pt, denies pain,compass completed and neuro checks completed, patient is now ambulating asst 1 w/ walker,no further concerns

## 2024-12-18 NOTE — CONSULTS
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     #Chronic Constipation  #Stool impaction    Patient with stool burden noted on XR imaging on admission. Enemas and rectal stimulation with JANETTE was attempted by primary team following admission with some improvement of her symptoms. Repeat imaging showed persistent stool ball at which time she agreed to undergo manual disimpaction following repeat enema but was unsuccessful right away to increased rectal tone noted by primary team.     However, patient reports that overnight she had a large bowel movement. This morning she feels \"okay\" and still feels there is stool impacted. The patient is otherwise clinically and hemodynamically.     Patient was offered repeat manual disimpaction but she declined stated she would like to try suppositories/enemas instead.     Plan:   - No indication for GI endoscopic evaluation  - No indication for any further imaging  - Ensure patient has regular bowel regimen, please encourage on discharge   - Daily Miralax, can uptitrate as needed - would start BID   - Daily soluble fiber, can do BID   - Okay to continue Milk of Magnesia if effective    - PRN suppositories (Glycerin v Dulcolax) and enemas   - Would avoid Loperamide, patient with overflow " diarrhea, likely exacerbates her constipation   - Ensure adequate hydration     Above was discussed in detail with patient who agrees with above assessment and plan.    GI Inpatient service will sign off at this time, please do not hesitate to reach out with any questions.     Discussed with attending GI physician Dr Louis    Gastroenterology follow up recommendations: Please follow up with PCP. Consider anorectal manometry testing, if revealing for underlying cause for constipation can refer to GI outpatient.     Peter Álvarez MD  Gastroenterology Fellow    Attending Attestation:  I discussed the patient with Dr. Álvarez and agree with the findings and the plan of care as documented in the fellow's note. 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     The patient is reportedly improving following a large BM over night. Recommend miralax regularly, glycerine suppositories, and enemas. Continued manual disimpaction if the patient interested however declined to have Dr. Álvarez perform this at the bedside. No plans for intervention and follow clinically at this point. 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     60 minutes were spent on the date of the encounter performing chart review, reviewing of outside and inside available records, review of test results as well as interpretation of tests, the patient visit,  "documentation, and discussion with other provider(s) and/or discussion with family.     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     The patient states that she has long dealt with constipation. She states that she usually manages her constipation with \"small suppositories\" which seem to help and occasionally takes fiber. She states that she has been prescribed Miralax before has not really taken it. She has tried Golytely prep in the past but it makes \"me feel sick\". The patient states over the past week she was noticing some small looser stools and decided to take Loperamide to help with it. She states that over the last few days she has had difficulty urinating as well as having a bowel movement thus prompting her presentation to the hospital. She does not endorse any fevers, chills, nausea, vomiting, focal abdominal pain, hematemesis, melena, hematochezia, or chest pain. She has no further acute complaints or concerns at this time.               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    Complete uterovaginal prolapse 02/08/2013    Deep vein thrombosis (DVT) (H) 05/07/2020    Dyspnea on exertion     Gastro-oesophageal reflux disease     Hypertension goal BP (blood pressure) < 140/90 07/25/2011    Other and unspecified hyperlipidemia     Unspecified essential hypertension     not medicated at this time    Uterovaginal prolapse, complete 2004     resolved '06    Vaginal enterocele, congenital or acquired 2007     or cystocele            Past Surgical History:   Reviewed and edited as appropriate   Past Surgical History:   Procedure Laterality Date    childbirth X9[      CLOSED RX RIB FRACTURE  7/06    left    DAVINCI SACROCOLPOPEXY, MIDURETHRAL SLING, CYSTOSCOPY  2/8/2013    Procedure: DAVINCI SACROCOLPOPEXY, MIDURETHRAL SLING, CYSTOSCOPY;  DAVINCI SACROCOLPOPEXY, TVT EXACT SLING, RIGHT SALPINGO-OOPHERECTOMY, CYSTOSCOPY;  Surgeon: Bennie Galdamez MD;  Location: [facility] OR    LAPAROSCOPIC CHOLECYSTECTOMY  3/31/2011    Procedure:LAPAROSCOPIC CHOLECYSTECTOMY; Surgeon:DAVID MOLINA; Location:[facility] OR    Z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    ZZC VAGINAL HYSTERECTOMY  9/15/06    TVH/BSO/A&P repair/sacrospinus ligament fixation......childbirth x 9    ZZ COLONOSCOPY THRU STOMA, DIAGNOSTIC  7/2005    diverticulosis,small polyp,recheck 5 yrs              Social History:     Social History     Tobacco Use    Smoking status: Former     Current packs/day: 0.00     Types: Cigarettes     Passive exposure: Never    Smokeless tobacco: Never    Tobacco comments:     quit when she was 29 years old   Substance Use Topics    Alcohol use: No    Drug use: No              Family  History:   Reviewed and edited as appropriate  Family History   Problem Relation Age of Onset    Heart Disease Father     Diabetes Sister     Heart Disease Sister     Cancer - colorectal Brother     Prostate Cancer Brother              Allergies:   Reviewed and edited as appropriate     Allergies   Allergen Reactions    Seasonal Allergies      Itchy eyes and watery eyes            Medications:     Medications Prior to Admission   Medication Sig Dispense Refill Last Dose/Taking    acetaminophen (TYLENOL) 325 MG tablet Take 2 tablets (650 mg) by mouth every 8 hours   12/16/2024 Morning    aspirin 81 MG EC tablet Take 81 mg by mouth at bedtime.   12/15/2024 Bedtime    Cholecalciferol (VITAMIN D3 PO) Take by mouth at bedtime.   12/15/2024 Bedtime    docusate calcium (SURFAK) 240 MG capsule Take 1 capsule (240 mg) by mouth daily as needed for constipation. 20 capsule 0 Unknown    lisinopril (ZESTRIL) 20 MG tablet TAKE 1 TABLET BY MOUTH EVERY DAY (Patient taking differently: Take 20 mg by mouth at bedtime.) 90 tablet 2 12/15/2024 Bedtime    Loperamide HCl (IMODIUM OR)    Past Week    magnesium 250 MG tablet Take 1 tablet by mouth at bedtime.   12/15/2024 Bedtime    magnesium hydroxide (MILK OF MAGNESIA) 400 MG/5ML suspension Take 30 mLs by mouth nightly as needed for constipation. 473 mL 0 12/15/2024 Bedtime    Multiple Vitamins-Minerals (MULTIVITAMIN OR) Take 1 tablet by mouth at bedtime.   12/15/2024 Bedtime    psyllium (METAMUCIL/KONSYL) 58.6 % powder Take by mouth at bedtime.   12/15/2024 Bedtime    [DISCONTINUED] polyethylene glycol (MIRALAX) 17 GM/Dose powder Take 17 g by mouth at bedtime.   12/15/2024 Bedtime             Review of Systems:     A complete 10 pt review of systems was performed and is negative except as noted in the HPI           Physical Exam:   Temp: 97.5  F (36.4  C) Temp src: Oral BP: (!) 135/102 Pulse: 71   Resp: 16 SpO2: 96 % O2 Device: None (Room air)    Wt:   Wt Readings from Last 2  Encounters:   12/16/24 72.5 kg (159 lb 12.8 oz)   12/10/24 71.2 kg (157 lb)        General: female in NAD.  Answers appropriately.  Sitting in chair.   HEENT: Head is AT/NC. No conjunctival injection.  Oropharynx is clear, moist and w/o exudate or lesions.  Neck: Full ROM, no cervical lymphadenopathy  Lungs: No respiratory distress   Heart: RRR  Abdomen:  Soft, non-tender, mildly distended.  No rebound or peritoneal signs  Rectal: JANETTE/Manual disimpaction declined.   Extremities: No pedal edema.     MSK: Moving all extremities spontaneously   Skin: No jaundice, rash, purpura or petichiae  Neurologic: Grossly non-focal.            Data:   Labs and imaging below were independently reviewed and interpreted    LAB WORK:    Results for orders placed or performed during the hospital encounter of 12/16/24   Abdomen XR, 2 vw, flat and upright     Status: None    Narrative    ABDOMEN TWO VIEWS  12/16/2024 3:35 PM     HISTORY: Bloating.    COMPARISON: CT 12/10/2024.      Impression    IMPRESSION: No complete small bowel obstruction. Large stool at the  proximal colon and rectum. No free air identified.    KENNY GARCIA MD         SYSTEM ID:  [id number]   XR Abdomen 1 View     Status: None    Narrative    EXAMINATION: XR ABDOMEN 1 VIEW 12/17/2024 12:18 PM     COMPARISON: Abdominal radiograph 12/16/2024    HISTORY: eval interval change of stool burden    TECHNIQUE: Frontal view of the abdomen.    FINDINGS: Overall mild to moderate moderate colonic stool burden with  a large amount of stool within the rectum persistent. Increased  distention air filled large bowel loops in the right upper quadrant .  No pneumatosis. Vascular calcifications.      Impression    IMPRESSION: Persistent large rectal stool burden with increased  dilation of air filled large bowel loops in the right upper quadrant  compared to prior radiograph from 12/16/2024    I have personally reviewed the examination and initial interpretation  and I agree with the  findings.    AXEL DUFF MD         SYSTEM ID:  [id number]   XR Abdomen 1 View     Status: None    Narrative    Exam: XR ABDOMEN 1 VIEW, 12/17/2024 5:01 PM    Indication: fecal impaction - was not able to tolerate entirety of  manual disimpaction, query if it was effective at reducing stool ball  or if needs to be repeated under sedation    Comparison: Radiograph 12/17/2024 at 12:22    Findings:   Overall, moderate colonic stool burden with large amount of stool  within the rectum, persistent following attempted disimpaction.  Multiple air-filled loops of large bowel in the right upper quadrant.      Impression    Impression: Minimal change in size of the fecal impaction following  attempted manual disimpaction.     I have personally reviewed the examination and initial interpretation  and I agree with the findings.    LANDY ARRIETA MD         SYSTEM ID:  [id number]   Basic metabolic panel     Status: Abnormal   Result Value Ref Range    Sodium 139 135 - 145 mmol/L    Potassium 5.3 3.4 - 5.3 mmol/L    Chloride 104 98 - 107 mmol/L    Carbon Dioxide (CO2) 24 22 - 29 mmol/L    Anion Gap 11 7 - 15 mmol/L    Urea Nitrogen 27.5 (H) 8.0 - 23.0 mg/dL    Creatinine 1.37 (H) 0.51 - 0.95 mg/dL    GFR Estimate 37 (L) >60 mL/min/1.73m2    Calcium 9.6 8.8 - 10.4 mg/dL    Glucose 96 70 - 99 mg/dL   CBC with platelets and differential     Status: Abnormal   Result Value Ref Range    WBC Count 6.3 4.0 - 11.0 10e3/uL    RBC Count 4.57 3.80 - 5.20 10e6/uL    Hemoglobin 11.9 11.7 - 15.7 g/dL    Hematocrit 37.8 35.0 - 47.0 %    MCV 83 78 - 100 fL    MCH 26.0 (L) 26.5 - 33.0 pg    MCHC 31.5 31.5 - 36.5 g/dL    RDW 14.9 10.0 - 15.0 %    Platelet Count 231 150 - 450 10e3/uL    % Neutrophils 72 %    % Lymphocytes 18 %    % Monocytes 8 %    % Eosinophils 2 %    % Basophils 0 %    % Immature Granulocytes 0 %    NRBCs per 100 WBC 0 <1 /100    Absolute Neutrophils 4.5 1.6 - 8.3 10e3/uL    Absolute Lymphocytes 1.1 0.8 - 5.3 10e3/uL     Absolute Monocytes 0.5 0.0 - 1.3 10e3/uL    Absolute Eosinophils 0.1 0.0 - 0.7 10e3/uL    Absolute Basophils 0.0 0.0 - 0.2 10e3/uL    Absolute Immature Granulocytes 0.0 <=0.4 10e3/uL    Absolute NRBCs 0.0 10e3/uL   UA with Microscopic reflex to Culture     Status: Abnormal    Specimen: Urine, Catheter   Result Value Ref Range    Color Urine Light Yellow Colorless, Straw, Light Yellow, Yellow    Appearance Urine Clear Clear    Glucose Urine Negative Negative mg/dL    Bilirubin Urine Negative Negative    Ketones Urine Negative Negative mg/dL    Specific Gravity Urine 1.013 1.003 - 1.035    Blood Urine Trace (A) Negative    pH Urine 7.0 5.0 - 7.0    Protein Albumin Urine Negative Negative mg/dL    Urobilinogen Urine Normal Normal, 2.0 mg/dL    Nitrite Urine Negative Negative    Leukocyte Esterase Urine Negative Negative    Mucus Urine Present (A) None Seen /LPF    RBC Urine 3 (H) <=2 /HPF    WBC Urine 1 <=5 /HPF    Squamous Epithelials Urine <1 <=1 /HPF    Narrative    Urine Culture not indicated   Comprehensive metabolic panel     Status: Abnormal   Result Value Ref Range    Sodium 140 135 - 145 mmol/L    Potassium 4.5 3.4 - 5.3 mmol/L    Carbon Dioxide (CO2) 23 22 - 29 mmol/L    Anion Gap 11 7 - 15 mmol/L    Urea Nitrogen 23.4 (H) 8.0 - 23.0 mg/dL    Creatinine 1.31 (H) 0.51 - 0.95 mg/dL    GFR Estimate 39 (L) >60 mL/min/1.73m2    Calcium 9.3 8.8 - 10.4 mg/dL    Chloride 106 98 - 107 mmol/L    Glucose 89 70 - 99 mg/dL    Alkaline Phosphatase 68 40 - 150 U/L    AST 20 0 - 45 U/L    ALT 12 0 - 50 U/L    Protein Total 6.3 (L) 6.4 - 8.3 g/dL    Albumin 3.8 3.5 - 5.2 g/dL    Bilirubin Total 0.5 <=1.2 mg/dL   CBC with platelets and differential     Status: Abnormal   Result Value Ref Range    WBC Count 5.1 4.0 - 11.0 10e3/uL    RBC Count 4.32 3.80 - 5.20 10e6/uL    Hemoglobin 11.1 (L) 11.7 - 15.7 g/dL    Hematocrit 35.7 35.0 - 47.0 %    MCV 83 78 - 100 fL    MCH 25.7 (L) 26.5 - 33.0 pg    MCHC 31.1 (L) 31.5 - 36.5 g/dL     RDW 14.7 10.0 - 15.0 %    Platelet Count 228 150 - 450 10e3/uL    % Neutrophils 54 %    % Lymphocytes 31 %    % Monocytes 9 %    % Eosinophils 5 %    % Basophils 1 %    % Immature Granulocytes 0 %    NRBCs per 100 WBC 0 <1 /100    Absolute Neutrophils 2.8 1.6 - 8.3 10e3/uL    Absolute Lymphocytes 1.6 0.8 - 5.3 10e3/uL    Absolute Monocytes 0.5 0.0 - 1.3 10e3/uL    Absolute Eosinophils 0.3 0.0 - 0.7 10e3/uL    Absolute Basophils 0.0 0.0 - 0.2 10e3/uL    Absolute Immature Granulocytes 0.0 <=0.4 10e3/uL    Absolute NRBCs 0.0 10e3/uL   Comprehensive metabolic panel     Status: Abnormal   Result Value Ref Range    Sodium 141 135 - 145 mmol/L    Potassium 4.7 3.4 - 5.3 mmol/L    Carbon Dioxide (CO2) 23 22 - 29 mmol/L    Anion Gap 11 7 - 15 mmol/L    Urea Nitrogen 25.6 (H) 8.0 - 23.0 mg/dL    Creatinine 1.39 (H) 0.51 - 0.95 mg/dL    GFR Estimate 36 (L) >60 mL/min/1.73m2    Calcium 9.4 8.8 - 10.4 mg/dL    Chloride 107 98 - 107 mmol/L    Glucose 94 70 - 99 mg/dL    Alkaline Phosphatase 64 40 - 150 U/L    AST 16 0 - 45 U/L    ALT 10 0 - 50 U/L    Protein Total 6.2 (L) 6.4 - 8.3 g/dL    Albumin 3.5 3.5 - 5.2 g/dL    Bilirubin Total 0.2 <=1.2 mg/dL   CBC with platelets and differential     Status: Abnormal   Result Value Ref Range    WBC Count 5.3 4.0 - 11.0 10e3/uL    RBC Count 4.02 3.80 - 5.20 10e6/uL    Hemoglobin 10.5 (L) 11.7 - 15.7 g/dL    Hematocrit 34.0 (L) 35.0 - 47.0 %    MCV 85 78 - 100 fL    MCH 26.1 (L) 26.5 - 33.0 pg    MCHC 30.9 (L) 31.5 - 36.5 g/dL    RDW 14.8 10.0 - 15.0 %    Platelet Count 209 150 - 450 10e3/uL    % Neutrophils 56 %    % Lymphocytes 28 %    % Monocytes 11 %    % Eosinophils 5 %    % Basophils 1 %    % Immature Granulocytes 0 %    NRBCs per 100 WBC 0 <1 /100    Absolute Neutrophils 3.0 1.6 - 8.3 10e3/uL    Absolute Lymphocytes 1.5 0.8 - 5.3 10e3/uL    Absolute Monocytes 0.6 0.0 - 1.3 10e3/uL    Absolute Eosinophils 0.2 0.0 - 0.7 10e3/uL    Absolute Basophils 0.0 0.0 - 0.2 10e3/uL    Absolute  Immature Granulocytes 0.0 <=0.4 10e3/uL    Absolute NRBCs 0.0 10e3/uL   CBC with platelets differential     Status: Abnormal    Narrative    The following orders were created for panel order CBC with platelets differential.  Procedure                               Abnormality         Status                     ---------                               -----------         ------                     CBC with platelets and d...[[id number]]  Abnormal            Final result                 Please view results for these tests on the individual orders.   CBC with platelets differential     Status: Abnormal    Narrative    The following orders were created for panel order CBC with platelets differential.  Procedure                               Abnormality         Status                     ---------                               -----------         ------                     CBC with platelets and d...[[id number]]  Abnormal            Final result                 Please view results for these tests on the individual orders.   CBC with platelets differential     Status: Abnormal    Narrative    The following orders were created for panel order CBC with platelets differential.  Procedure                               Abnormality         Status                     ---------                               -----------         ------                     CBC with platelets and d...[[id number]]  Abnormal            Final result                 Please view results for these tests on the individual orders.         IMAGING:  AXR 12/17/24    Findings:   Overall, moderate colonic stool burden with large amount of stool  within the rectum, persistent following attempted disimpaction.  Multiple air-filled loops of large bowel in the right upper quadrant.                                                                      Impression: Minimal change in size of the fecal impaction following  attempted manual disimpaction.        =======================================================================

## 2025-06-07 DIAGNOSIS — I10 HYPERTENSION GOAL BP (BLOOD PRESSURE) < 140/90: ICD-10-CM

## 2025-06-09 RX ORDER — LISINOPRIL 20 MG/1
20 TABLET ORAL AT BEDTIME
Qty: 90 TABLET | Refills: 0 | Status: SHIPPED | OUTPATIENT
Start: 2025-06-09

## 2025-07-01 ENCOUNTER — PATIENT OUTREACH (OUTPATIENT)
Dept: CARE COORDINATION | Facility: CLINIC | Age: OVER 89
End: 2025-07-01
Payer: COMMERCIAL

## 2025-07-15 ENCOUNTER — PATIENT OUTREACH (OUTPATIENT)
Dept: CARE COORDINATION | Facility: CLINIC | Age: OVER 89
End: 2025-07-15
Payer: COMMERCIAL